# Patient Record
Sex: FEMALE | Race: WHITE | Employment: OTHER | URBAN - METROPOLITAN AREA
[De-identification: names, ages, dates, MRNs, and addresses within clinical notes are randomized per-mention and may not be internally consistent; named-entity substitution may affect disease eponyms.]

---

## 2017-02-27 RX ORDER — LANOLIN ALCOHOL/MO/W.PET/CERES
CREAM (GRAM) TOPICAL
Qty: 90 TAB | Refills: 5 | Status: SHIPPED | OUTPATIENT
Start: 2017-02-27 | End: 2017-12-05 | Stop reason: SDUPTHER

## 2017-02-27 NOTE — TELEPHONE ENCOUNTER
Orders Placed This Encounter    ferrous sulfate (IRON, FERROUS SULFATE,) 325 mg (65 mg iron) tablet     Sig: TAKE 1 TABLET BY MOUTH 3 TIMES A DAY WITH MEAL     Dispense:  90 Tab     Refill:  5     The above medication refills were approved via verbal order by Dr. Hermelinda Coburn III.

## 2017-03-01 ENCOUNTER — OFFICE VISIT (OUTPATIENT)
Dept: INTERNAL MEDICINE CLINIC | Age: 77
End: 2017-03-01

## 2017-03-01 ENCOUNTER — HOSPITAL ENCOUNTER (OUTPATIENT)
Dept: LAB | Age: 77
Discharge: HOME | End: 2017-03-01
Payer: MEDICARE

## 2017-03-01 VITALS
HEIGHT: 66 IN | SYSTOLIC BLOOD PRESSURE: 112 MMHG | HEART RATE: 78 BPM | BODY MASS INDEX: 24.11 KG/M2 | TEMPERATURE: 98 F | RESPIRATION RATE: 22 BRPM | OXYGEN SATURATION: 99 % | WEIGHT: 150 LBS | DIASTOLIC BLOOD PRESSURE: 68 MMHG

## 2017-03-01 DIAGNOSIS — R53.82 CHRONIC FATIGUE: Primary | ICD-10-CM

## 2017-03-01 PROCEDURE — 85027 COMPLETE CBC AUTOMATED: CPT

## 2017-03-01 PROCEDURE — 80053 COMPREHEN METABOLIC PANEL: CPT

## 2017-03-01 PROCEDURE — 36415 COLL VENOUS BLD VENIPUNCTURE: CPT

## 2017-03-01 NOTE — PROGRESS NOTES
Arcenio Bauer is a 67 y.o. female who was seen in clinic today (3/1/2017). She is accompanied by her daughter. Assessment & Plan: Yi Scott was seen today for fatigue. Diagnoses and all orders for this visit:    Chronic fatigue- acute on chronic, worsening. Differential dx reviewed with the patient, based on her history favor anemia as no other obvious etiology from her history. Will cont iron BID at this time. -     CBC W/O DIFF  -     METABOLIC PANEL, COMPREHENSIVE         Follow-up Disposition:  Return if symptoms worsen or fail to improve.       ----------------------------------------------------------------------    Subjective:  Patient complains of fatigue. Symptoms began 6 months ago and gradually getting worse. Sentinal symptom the patient feels fatigue began with nothing. Denies any URI symptoms. Patient describes the following psychologic symptoms: anxiety, this is a chronic issue & unchanged. Patient denies significant change in weight, symptoms of true arthritis, unusual rashes, cold intolerance, constipation and change in hair texture. , witnessed or suspected sleep apnea. Severity has been moderate. Fatigue states she feels okay in the morning but gets worse in the evening, per patient. Daughter helped to clarify and pt gives a different story, is fatigued all the time. No CP or changes in her chronic SOB. She also reports feeling cold all the time. Intermittently needing to put on extra layers. Heat in the house has been increased, 75 degrees currently. She reports normally cold natures but this is different. Prior to Admission medications    Medication Sig Start Date End Date Taking? Authorizing Provider   ferrous sulfate (IRON, FERROUS SULFATE,) 325 mg (65 mg iron) tablet TAKE 1 TABLET BY MOUTH 3 TIMES A DAY WITH MEAL 2/27/17  Yes Ranjit Pimentel III, MD   pravastatin (PRAVACHOL) 20 mg tablet TAKE 1 TAB BY MOUTH NIGHTLY.  12/18/16  Yes Main Lin MD lansoprazole (PREVACID) 30 mg capsule TAKE ONE CAPSULE BY MOUTH EVERY DAY BEFORE BREAKFAST 12/2/16  Yes Isamar Friedman MD   ALPRAZolam Charles Okeefe) 0.5 mg tablet Take 1 Tab by mouth three (3) times daily as needed for Anxiety. Max Daily Amount: 1.5 mg. Indications: ANXIETY 11/30/16  Yes Martha Rosario III, MD   cholecalciferol, vitamin d3, (VITAMIN D) 1,000 unit tablet Take 2,000 Units by mouth daily. Yes Historical Provider   MULTIVITAMINS (MULTIVITAMIN PO) Take 1 Tab by mouth daily. Yes Historical Provider   nystatin (MYCOSTATIN) powder Apply  to affected area two (2) times a day. 9/30/16   Martha Rosario III, MD          Allergies   Allergen Reactions    Latex Hives     Latex tape    Egg Anaphylaxis    Codeine Nausea and Vomiting    Darvon [Propoxyphene] Nausea and Vomiting    Pcn [Penicillins] Hives           Review of Systems   Constitutional: Positive for malaise/fatigue. Negative for chills, fever and weight loss. Respiratory: Negative for cough and shortness of breath. Cardiovascular: Negative for chest pain and palpitations. Gastrointestinal: Positive for abdominal pain (she attributes to anxiety, bilateral LUQ and RUQ) and constipation (intermittently, diet has changed recently). Negative for blood in stool, diarrhea, heartburn, melena (she reports darker/black stools), nausea and vomiting. Neurological: Positive for weakness. Objective:   Physical Exam   Constitutional: No distress. Eyes: Conjunctivae are normal. No scleral icterus. Cardiovascular: Regular rhythm and normal heart sounds. No murmur heard. Pulmonary/Chest: Effort normal and breath sounds normal. She has no wheezes. She has no rales. Abdominal: Bowel sounds are normal. She exhibits no mass. There is no hepatosplenomegaly. There is no tenderness.          Visit Vitals    /68    Pulse 78    Temp 98 °F (36.7 °C) (Oral)    Resp 22    Ht 5' 6\" (1.676 m)    Wt 150 lb (68 kg)    SpO2 99%    BMI 24.21 kg/m2         Disclaimer:  Advised her to call back or return to office if symptoms worsen/change/persist.  Discussed expected course/resolution/complications of diagnosis in detail with patient. Medication risks/benefits/costs/interactions/alternatives discussed with patient. She was given an after visit summary which includes diagnoses, current medications, & vitals. She expressed understanding with the diagnosis and plan.         Tim Lobato MD

## 2017-03-01 NOTE — MR AVS SNAPSHOT
Visit Information Date & Time Provider Department Dept. Phone Encounter #  
 3/1/2017  4:15 PM Britni Daigle, [address] [facility] [phone] Follow-up Instructions Return if symptoms worsen or fail to improve. Upcoming Health Maintenance Date Due COLONOSCOPY 11/28/1957 DTaP/Tdap/Td series (1 - Tdap) 11/28/1960 Pneumococcal 65+ Low/Medium Risk (2 of 2 - PCV13) 7/23/2015 MEDICARE YEARLY EXAM 2/15/2017 GLAUCOMA SCREENING Q2Y 2/9/2019 Allergies as of 3/1/2017  Review Complete On: 3/1/2017 By: Britni Daigle MD  
  
 Severity Noted Reaction Type Reactions Latex  07/06/2015    Hives Latex tape Egg High 10/03/2013    Anaphylaxis Codeine  10/17/2009    Nausea and Vomiting Darvon [Propoxyphene]  10/17/2009    Nausea and Vomiting Pcn [Penicillins]  10/17/2009    Hives Current Immunizations  Reviewed on 3/1/2017 Name Date Influenza Vaccine  Deferred (Contraindication) Pneumococcal Conjugate (PCV-13)  Deferred (Patient Refused) Pneumococcal Polysaccharide (PPSV-23) 7/23/2014 Reviewed by Dennise Guzman RN on 3/1/2017 at  3:54 PM  
You Were Diagnosed With   
  
 Codes Comments Chronic fatigue    -  Primary ICD-10-CM: R53.82 
ICD-9-CM: 780.79 Vitals BP  
  
  
  
  
  
 112/68 BMI and BSA Data Body Mass Index Body Surface Area  
 24.21 kg/m 2 1.78 m 2 Preferred Pharmacy Pharmacy Name Phone [id number]- [place] [address] [phone] Your Updated Medication List  
  
   
This list is accurate as of: 3/1/17  4:22 PM.  Always use your most recent med list.  
  
  
  
  
 ALPRAZolam 0.5 mg tablet Commonly known as:  Ace Sa Take 1 Tab by mouth three (3) times daily as needed for Anxiety. Max Daily Amount: 1.5 mg. Indications: ANXIETY  
  
 ferrous sulfate 325 mg (65 mg iron) tablet Commonly known as:  Iron (Ferrous Sulfate) TAKE 1 TABLET BY MOUTH 3 TIMES A DAY WITH MEAL  
  
 lansoprazole 30 mg capsule Commonly known as:  PREVACID TAKE ONE CAPSULE BY MOUTH EVERY DAY BEFORE BREAKFAST MULTIVITAMIN PO Take 1 Tab by mouth daily. nystatin powder Commonly known as:  MYCOSTATIN Apply  to affected area two (2) times a day. pravastatin 20 mg tablet Commonly known as:  PRAVACHOL  
TAKE 1 TAB BY MOUTH NIGHTLY. VITAMIN D3 1,000 unit tablet Generic drug:  cholecalciferol Take 2,000 Units by mouth daily. We Performed the Following CBC W/O DIFF [93840 CPT(R)] METABOLIC PANEL, COMPREHENSIVE [29154 CPT(R)] Follow-up Instructions Return if symptoms worsen or fail to improve. Patient Instructions Fatigue: Care Instructions Your Care Instructions Fatigue is a feeling of tiredness, exhaustion, or lack of energy. You may feel fatigue because of too much or not enough activity. It can also come from stress, lack of sleep, boredom, and poor diet. Many medical problems, such as viral infections, can cause fatigue. Emotional problems, especially depression, are often the cause of fatigue. Fatigue is most often a symptom of another problem. Treatment for fatigue depends on the cause. For example, if you have fatigue because you have a certain health problem, treating this problem also treats your fatigue. If depression or anxiety is the cause, treatment may help. Follow-up care is a key part of your treatment and safety. Be sure to make and go to all appointments, and call your doctor if you are having problems. It's also a good idea to know your test results and keep a list of the medicines you take. How can you care for yourself at home? · Get regular exercise. But don't overdo it. Go back and forth between rest and exercise. · Get plenty of rest. 
· Eat a healthy diet.  Do not skip meals, especially breakfast. 
 · Reduce your use of caffeine, tobacco, and alcohol. Caffeine is most often found in coffee, tea, cola drinks, and chocolate. · Limit medicines that can cause fatigue. This includes tranquilizers and cold and allergy medicines. When should you call for help? Watch closely for changes in your health, and be sure to contact your doctor if: 
· You have new symptoms such as fever or a rash. · Your fatigue gets worse. · You have been feeling down, depressed, or hopeless. Or you may have lost interest in things that you usually enjoy. · You are not getting better as expected. Where can you learn more? Go to http://candy-sheela.info/. Enter C548 in the search box to learn more about \"Fatigue: Care Instructions. \" Current as of: May 27, 2016 Content Version: 11.1 © 9019-6551 [employer]. Care instructions adapted under license by [employer] (which disclaims liability or warranty for this information). If you have questions about a medical condition or this instruction, always ask your healthcare professional. [place] [address] any warranty or liability for your use of this information. Introducing [facility]! Dear Yi Scott: Thank you for requesting a Near Infinity account. Our records indicate that you have previously registered for a Near Infinity account but its currently inactive. Please call our Near Infinity support line at [phone]. Additional Information If you have questions, please visit the Frequently Asked Questions section of the Near Infinity website at [email or web address] Remember, Near Infinity is NOT to be used for urgent needs. For medical emergencies, dial 911. Now available from your iPhone and Android! Please provide this summary of care documentation to your next provider. Your primary care clinician is listed as Bakari [phone]  If you have any questions after today's visit, please call [phone].

## 2017-03-01 NOTE — PATIENT INSTRUCTIONS
Fatigue: Care Instructions  Your Care Instructions  Fatigue is a feeling of tiredness, exhaustion, or lack of energy. You may feel fatigue because of too much or not enough activity. It can also come from stress, lack of sleep, boredom, and poor diet. Many medical problems, such as viral infections, can cause fatigue. Emotional problems, especially depression, are often the cause of fatigue. Fatigue is most often a symptom of another problem. Treatment for fatigue depends on the cause. For example, if you have fatigue because you have a certain health problem, treating this problem also treats your fatigue. If depression or anxiety is the cause, treatment may help. Follow-up care is a key part of your treatment and safety. Be sure to make and go to all appointments, and call your doctor if you are having problems. It's also a good idea to know your test results and keep a list of the medicines you take. How can you care for yourself at home? · Get regular exercise. But don't overdo it. Go back and forth between rest and exercise. · Get plenty of rest.  · Eat a healthy diet. Do not skip meals, especially breakfast.  · Reduce your use of caffeine, tobacco, and alcohol. Caffeine is most often found in coffee, tea, cola drinks, and chocolate. · Limit medicines that can cause fatigue. This includes tranquilizers and cold and allergy medicines. When should you call for help? Watch closely for changes in your health, and be sure to contact your doctor if:  · You have new symptoms such as fever or a rash. · Your fatigue gets worse. · You have been feeling down, depressed, or hopeless. Or you may have lost interest in things that you usually enjoy. · You are not getting better as expected. Where can you learn more? Go to http://candy-sheela.info/. Enter H400 in the search box to learn more about \"Fatigue: Care Instructions. \"  Current as of: May 27, 2016  Content Version: 11.1  © 7274-1527 Healthwise[employer]orporated. Care instructions adapted under license by [facility] (which disclaims liability or warranty for this information). If you have questions about a medical condition or this instruction, always ask your healthcare professional. [place] [address] any warranty or liability for your use of this information.

## 2017-03-01 NOTE — PROGRESS NOTES
H/O \"watermelons stomach\" and becomes anemic. She states this is what it feels like in terms of weakness.

## 2017-03-02 LAB
ALBUMIN SERPL-MCNC: 4.2 G/DL (ref 3.5–4.8)
ALBUMIN/GLOB SERPL: 1.7 {RATIO} (ref 1.1–2.5)
ALP SERPL-CCNC: 38 IU/L (ref 39–117)
ALT SERPL-CCNC: 13 IU/L (ref 0–32)
AST SERPL-CCNC: 23 IU/L (ref 0–40)
BILIRUB SERPL-MCNC: 0.4 MG/DL (ref 0–1.2)
BUN SERPL-MCNC: 18 MG/DL (ref 8–27)
BUN/CREAT SERPL: 14 (ref 11–26)
CALCIUM SERPL-MCNC: 9.8 MG/DL (ref 8.7–10.3)
CHLORIDE SERPL-SCNC: 100 MMOL/L (ref 96–106)
CO2 SERPL-SCNC: 24 MMOL/L (ref 18–29)
CREAT SERPL-MCNC: 1.25 MG/DL (ref 0.57–1)
ERYTHROCYTE [DISTWIDTH] IN BLOOD BY AUTOMATED COUNT: 14.6 % (ref 12.3–15.4)
GLOBULIN SER CALC-MCNC: 2.5 G/DL (ref 1.5–4.5)
GLUCOSE SERPL-MCNC: 112 MG/DL (ref 65–99)
HCT VFR BLD AUTO: 34 % (ref 34–46.6)
HGB BLD-MCNC: 11.4 G/DL (ref 11.1–15.9)
MCH RBC QN AUTO: 33 PG (ref 26.6–33)
MCHC RBC AUTO-ENTMCNC: 33.5 G/DL (ref 31.5–35.7)
MCV RBC AUTO: 99 FL (ref 79–97)
PLATELET # BLD AUTO: 308 X10E3/UL (ref 150–379)
POTASSIUM SERPL-SCNC: 4.4 MMOL/L (ref 3.5–5.2)
PROT SERPL-MCNC: 6.7 G/DL (ref 6–8.5)
RBC # BLD AUTO: 3.45 X10E6/UL (ref 3.77–5.28)
SODIUM SERPL-SCNC: 140 MMOL/L (ref 134–144)
WBC # BLD AUTO: 5 X10E3/UL (ref 3.4–10.8)

## 2017-03-02 NOTE — PROGRESS NOTES
Please call patient. All labs are stable or at goal.  Her hgb level is down slightly but not anything significant. Not enough that I would expect her to be symptomatic. Nothing that would explain her fatigue. If continues to should f/u with PCP. If anything changes let me know.

## 2017-03-10 RX ORDER — PRAVASTATIN SODIUM 20 MG/1
TABLET ORAL
Qty: 90 TAB | Refills: 1 | Status: SHIPPED | OUTPATIENT
Start: 2017-03-10 | End: 2017-04-03 | Stop reason: SDUPTHER

## 2017-03-21 RX ORDER — PRAVASTATIN SODIUM 20 MG/1
TABLET ORAL
Qty: 90 TAB | Refills: 0 | Status: SHIPPED | OUTPATIENT
Start: 2017-03-21 | End: 2018-11-16 | Stop reason: SDUPTHER

## 2017-04-03 ENCOUNTER — OFFICE VISIT (OUTPATIENT)
Dept: INTERNAL MEDICINE CLINIC | Age: 77
End: 2017-04-03

## 2017-04-03 VITALS
OXYGEN SATURATION: 98 % | DIASTOLIC BLOOD PRESSURE: 62 MMHG | HEIGHT: 66 IN | RESPIRATION RATE: 12 BRPM | TEMPERATURE: 98.6 F | WEIGHT: 147 LBS | HEART RATE: 68 BPM | BODY MASS INDEX: 23.63 KG/M2 | SYSTOLIC BLOOD PRESSURE: 114 MMHG

## 2017-04-03 DIAGNOSIS — R41.3 MEMORY LOSS: ICD-10-CM

## 2017-04-03 DIAGNOSIS — I63.50 CEREBROVASCULAR ACCIDENT (CVA) DUE TO OCCLUSION OF CEREBRAL ARTERY (HCC): ICD-10-CM

## 2017-04-03 DIAGNOSIS — K59.01 SLOW TRANSIT CONSTIPATION: Primary | ICD-10-CM

## 2017-04-03 DIAGNOSIS — E78.2 MIXED HYPERLIPIDEMIA: ICD-10-CM

## 2017-04-03 RX ORDER — POLYETHYLENE GLYCOL 3350 17 G/17G
17 POWDER, FOR SOLUTION ORAL
COMMUNITY
Start: 2017-04-03 | End: 2019-01-17 | Stop reason: SDUPTHER

## 2017-04-03 NOTE — LETTER
April 3, 2017 Dear Odilia Silverman, We are pleased to provide you with secure, online access to medical information via Flashnotes for: 
 
Tae Blancas How Do I Sign Up? 1. In your internet browser, go to [email or web address] 2. Click on the Sign Up Now link in the Sign In box. You will see the New Member Sign Up page. 3. Enter your Flashnotes Access Code exactly as it appears below. You will not need to use this code after youve completed the sign-up process. If you do not sign up before the expiration date, you must request a new code. Flashnotes Access Code: [email or web address] Expiration Date: 7/2/2017 12:46 PM  
 
4. In the Social Security Number field, enter your Social Security Number and your Date of Birth (mm/dd/yyyy) and click Submit. You will be taken to the next sign-up page. 5. Create a [email or web address] ID. This will be your Flashnotes login ID and cannot be changed, so think of one that is secure and easy to remember. 6. Create a Flashnotes password. You can change your password at any time. 7. Enter your Password Reset Question and Answer. This can be used at a later time if you forget your password. 8. Enter your e-mail address. You will receive e-mail notification when new information is available in [address] 9. Click Sign Up. You can now view the [email or web address] account of Tae Blancas. Additional Information If you have questions, you can call [phone]. Remember, Flashnotes is NOT to be used for urgent needs. For medical emergencies, dial 911. Now available from your iPhone and Android! Sincerely, Wai Phelps

## 2017-04-03 NOTE — LETTER
April 3, 2017 Beulah Weston [address] [place] [address] [place] [address] Dear Malcolm Hernandez: Thank you for requesting access to Pongr. Please follow the instructions below to securely access and download your online medical record. Pongr allows you to send messages to your doctor, view your test results, renew your prescriptions, schedule appointments, and more. How Do I Sign Up? 1. In your internet browser, go to [email or web address] 2. Click on the First Time User? Click Here link in the Sign In box. You will see the New Member Sign Up page. 3. Enter your Pongr Access Code exactly as it appears below. You will not need to use this code after youve completed the sign-up process. If you do not sign up before the expiration date, you must request a new code. Pongr Access Code: [email or web address] Expires: 7/2/2017 12:32 PM  
 
4. Enter the last four digits of your Social Security Number (xxxx) and Date of Birth (mm/dd/yyyy) as indicated and click Submit. You will be taken to the next sign-up page. 5. Create a Pongr ID. This will be your Pongr login ID and cannot be changed, so think of one that is secure and easy to remember. 6. Create a Pongr password. You can change your password at any time. 7. Enter your Password Reset Question and Answer. This can be used at a later time if you forget your password. 8. Enter your e-mail address. You will receive e-mail notification when new information is available in [address] 9. Click Sign Up. You can now view and download portions of your medical record. 10. Click the Download Summary menu link to download a portable copy of your medical information. Additional Information If you have questions, please visit the Frequently Asked Questions section of the Pongr website at [email or web address] Remember, Pongr is NOT to be used for urgent needs. For medical emergencies, dial 911. Now available from your iPhone and Android! Sincerely, [employer]

## 2017-04-03 NOTE — PATIENT INSTRUCTIONS
Please remember to bring a copy of your advance medical directive with you to your next appointment so that we may update your chart with this important information. Thank you.

## 2017-04-03 NOTE — LETTER
[address] Proxy Access Authorization Form Name: Ana Laura Lipoma Patient Email: [email or web address] Patient YOB: 1940 Patient MRN: [id number] Name of Proxy: Lamine Cabrera. Arcenio Felix Proxy Email: [email or web address] Proxy Address: [address] [place] [address].[facility] Proxy : 1966 Proxy SSN:  By signing this [employer] Proxy Access Authorization Form (this Authorization), I understand that I am giving permission to the [address] [place] [address], and its controlled affiliates that operate one or more hospitals or physician practices located in [place], [place], [place], [address] [place] [address] or [facility]) to disclose confidential health information contained about me through [employer] to the person whose name is designated above (my Proxy). I understand that [employer] is a web-based service through which some (but not all) of the information contained in my ZetrOZ record [facility]) (to the extent that I have an EMR) may be accessed, and that [employer] sometimes shows a summary or description and not the actual entries in my EMR. I understand that by signing this Authorization, my Proxy will be given electronic access through [employer] to all confidential health information about me that is available through [employer], including confidential health information about me that under most circumstances my Proxy would not be able to access without my permission. I understand that I am not required to name a Proxy or sign this Authorization. I further understand that [facility] may not condition treatment or payment on my willingness to sign this Authorization unless the specific circumstances under which such conditioning is permitted by law are applicable and are set forth in this Authorization.  
 
I understand that this Authorization is valid unless and until I revoke it.  I understand that I have the right to revoke this Authorization at any time, but that my revocation will not be effective until delivered in writing to [facility] at the following address:  
 
[address] If I choose to revoke this Authorization, I understand that my revocation will not be effective as to any MyChart information already disclosed to my Proxy pursuant to this Authorization. I understand that MyChart access is a privilege, not a right, and that my Proxy must agree to comply with the MyChart Terms and Conditions of Patient Use (the Terms and Conditions). [facility] will provide my Proxy a special activation code and instructions for accessing confidential health information about me in [address] The first time my Proxy uses the special activation code, my Proxy must review and accept the Terms and Conditions and the Proxy Disclaimer. If my Proxy does not accept and at all times comply with the Terms and Conditions or does not accept the Proxy Disclaimer each time my Proxy accesses MyChart, I understand that [facility] may deny my Proxy access or revoke my Proxys to access confidential health information about me in [address] I also understand that [facility] may deny my Proxy access or revoke my Proxys access for any reason and at any time in [facility] sole discretion. I understand that my Proxy must sign the Acknowledgement set forth below if my Proxy is in the office with me at the time I complete this request.  If my Proxy is not in the office with me, I understand that my Proxy will be mailed a Proxy Identification Verification for Access to [facility] form at the address I have designated above, and that my Proxy must complete and return the form to [facility] before [facility] will take any additional steps to give my Proxy access information about me in [address] A copy of this Authorization and a notation concerning my Proxy shall be included in my original health records. I understand that confidential health information about me disclosed in MyChart to my Proxy pursuant to this Authorization might be redisclosed by my Proxy and may, as a result of such disclosure, no longer be protected to the same extent as such confidential health information was protected by law while solely in the possession of [facility]. Signature of Patient or Legal Guardian Date (MM/DD/YYYY) Printed Name of Patient or Legal Guardian Relationship (if not self) ACKNOWLEDGEMENT TO BE COMPLETED BY PROXY IF IN OFFICE: 
I acknowledge and agree that the above information, including my name, e-mail address, date of birth, Social Security Number, and mailing address are true and correct. I further agree to comply with the Terms and Conditions and Proxy Disclaimer. Proxy Signature Date (MM/DD/YYYY) Printed Name of Proxy Identification Document:   
 
__ s License/Government Issued ID   
__ Passport   
__ Picture ID & Social Security Card Identification Document Number _______________________________ Expiration Date ______________

## 2017-04-03 NOTE — MR AVS SNAPSHOT
Visit Information Date & Time Provider Department Dept. Phone Encounter #  
 4/3/2017 11:00 AM Nam Watson MD [facility] Internal Medicine [phone] [id number] Follow-up Instructions Return for Wellness Visit. Upcoming Health Maintenance Date Due COLONOSCOPY 11/28/1957 DTaP/Tdap/Td series (1 - Tdap) 11/28/1960 Pneumococcal 65+ Low/Medium Risk (2 of 2 - PCV13) 7/23/2015 MEDICARE YEARLY EXAM 2/15/2017 GLAUCOMA SCREENING Q2Y 2/9/2019 Allergies as of 4/3/2017  Review Complete On: 4/3/2017 By: Nam Watson MD  
  
 Severity Noted Reaction Type Reactions Latex  07/06/2015    Hives Latex tape Egg High 10/03/2013    Anaphylaxis Codeine  10/17/2009    Nausea and Vomiting Darvon [Propoxyphene]  10/17/2009    Nausea and Vomiting Pcn [Penicillins]  10/17/2009    Hives Current Immunizations  Reviewed on 3/1/2017 Name Date Influenza Vaccine  Deferred (Contraindication) Pneumococcal Conjugate (PCV-13)  Deferred (Patient Refused) Pneumococcal Polysaccharide (PPSV-23) 7/23/2014 Not reviewed this visit You Were Diagnosed With   
  
 Codes Comments Slow transit constipation    -  Primary ICD-10-CM: K59.01 
ICD-9-CM: 564.01 Mixed hyperlipidemia     ICD-10-CM: E78.2 ICD-9-CM: 272.2 Cerebrovascular accident (CVA) due to occlusion of cerebral artery ([facility] [address]     ICD-10-CM: I63.50 ICD-9-CM: 434.91 Vitals BP Pulse Temp Resp Height(growth percentile) Weight(growth percentile) 114/62 68 98.6 °F (37 °C) (Oral) 12 5' 6\" (1.676 m) 147 lb (66.7 kg) SpO2 BMI OB Status Smoking Status 98% 23.73 kg/m2 Postmenopausal Never Smoker Vitals History BMI and BSA Data Body Mass Index Body Surface Area  
 23.73 kg/m 2 1.76 m 2 Preferred Pharmacy Pharmacy Name Phone [id number], [address] [place] [address] [phone] Your Updated Medication List  
  
   
This list is accurate as of: 4/3/17 12:33 PM.  Always use your most recent med list.  
  
  
  
  
 ALPRAZolam 0.5 mg tablet Commonly known as:  Eid Poke Take 1 Tab by mouth three (3) times daily as needed for Anxiety. Max Daily Amount: 1.5 mg. Indications: ANXIETY  
  
 ferrous sulfate 325 mg (65 mg iron) tablet Commonly known as:  Iron (Ferrous Sulfate) TAKE 1 TABLET BY MOUTH 3 TIMES A DAY WITH MEAL FIBER GUMMIES 2 gram Ezella Lick Generic drug:  inulin Take 1-2 Tabs by mouth daily. lansoprazole 30 mg capsule Commonly known as:  PREVACID TAKE ONE CAPSULE BY MOUTH EVERY DAY BEFORE BREAKFAST MULTIVITAMIN PO Take 1 Tab by mouth daily. polyethylene glycol 17 gram/dose powder Commonly known as:  Lynder Glad Take 17 g by mouth daily as needed. pravastatin 20 mg tablet Commonly known as:  PRAVACHOL  
TAKE 1 TABLET BY MOUTH AT BEDTIME  
  
 VITAMIN D3 1,000 unit tablet Generic drug:  cholecalciferol Take 2,000 Units by mouth daily. We Performed the Following CBC WITH AUTOMATED DIFF [18410 CPT(R)] LIPID PANEL [59064 CPT(R)] METABOLIC PANEL, COMPREHENSIVE [51660 CPT(R)] Follow-up Instructions Return for Wellness Visit. Patient Instructions Please remember to bring a copy of your advance medical directive with you to your next appointment so that we may update your chart with this important information. Thank you. Introducing [facility]! Holly Romero introduces [employer] patient portal. Now you can access parts of your medical record, email your doctor's office, and request medication refills online. 1. In your internet browser, go to [email or web address] 2. Click on the First Time User? Click Here link in the Sign In box. You will see the New Member Sign Up page. 3. Enter your [employer] Access Code exactly as it appears below.  You will not need to use this code after youve completed the sign-up process. If you do not sign up before the expiration date, you must request a new code. · [employer] Access Code: [email or web address] Expires: 7/2/2017 12:32 PM 
 
4. Enter the last four digits of your Social Security Number (xxxx) and Date of Birth (mm/dd/yyyy) as indicated and click Submit. You will be taken to the next sign-up page. 5. Create a [employer] ID. This will be your [employer] login ID and cannot be changed, so think of one that is secure and easy to remember. 6. Create a [employer] password. You can change your password at any time. 7. Enter your Password Reset Question and Answer. This can be used at a later time if you forget your password. 8. Enter your e-mail address. You will receive e-mail notification when new information is available in [address] 9. Click Sign Up. You can now view and download portions of your medical record. 10. Click the Download Summary menu link to download a portable copy of your medical information. If you have questions, please visit the Frequently Asked Questions section of the [employer] website. Remember, [employer] is NOT to be used for urgent needs. For medical emergencies, dial 911. Now available from your iPhone and Android! Please provide this summary of care documentation to your next provider. Your primary care clinician is listed as Bakari Buck64 If you have any questions after today's visit, please call [phone].

## 2017-04-03 NOTE — ACP (ADVANCE CARE PLANNING)
End of life planning discussed with patient. Patient states that they do have an advance directive and will provide a copy for our office at next visit. Patient has an advanced directive and will supply to us.

## 2017-04-04 ENCOUNTER — HOSPITAL ENCOUNTER (OUTPATIENT)
Dept: LAB | Age: 77
Discharge: HOME | End: 2017-04-04
Payer: MEDICARE

## 2017-04-04 PROCEDURE — 80061 LIPID PANEL: CPT

## 2017-04-04 PROCEDURE — 80053 COMPREHEN METABOLIC PANEL: CPT

## 2017-04-04 PROCEDURE — 85025 COMPLETE CBC W/AUTO DIFF WBC: CPT

## 2017-04-04 PROCEDURE — 36415 COLL VENOUS BLD VENIPUNCTURE: CPT

## 2017-04-04 NOTE — PROGRESS NOTES
HPI:  Virgilio Ramos is a 67y.o. year old female who is here for multiple issues/. Over the last wekk increased issues with constipation and left lower and upper abd pain. Finally had a BM yesterday and her pain was better. Some belching and burping. No vomiting. No fever or chills. No bleeding and no melena. She was concerned about the surgeon telling her she would have to have a 2 day surgery and long hospital stay to repair the hiatal hernia. After long discussion about this, her son and daughter who were with her also reported increased issue with her memory and their concerns about her taking her medications inappropriately. Her  recently had CABG and she has been more stressed about this. Her weight is down and she is not eating as much. She was referred to neurology as well as psych testing and did not get the psych testing done and cancelled it. Neurology felt she had white matter disease and prior CVA treated now with statin due to anticoagulant intolerance due to GI bleeding.      Past Medical History:   Diagnosis Date    Anemia NEC     Asthma     major asthma as a child/only with colds now    Cancer [facility])     BCCA removed    Coagulation disorder ([facility] [address]     on xarelto    GERD (gastroesophageal reflux disease) 10/22/2010    Hiatal hernia     Hypertension     hx of blood pressure med for short time    Ill-defined condition     increased cholesterol    Other unknown and unspecified cause of morbidity or mortality     eval with Dr Daquan Vaca for SOB, sept 2015:  results unkown    Stroke ([facility] [address] 2013    no deficits    Stroke ([facility] [address]     heat exhaustion    Watermelon stomach 2003       Past Surgical History:   Procedure Laterality Date    EXC SKIN MALIG >4CM FACE,FACIAL      times 3 - BCCA    HX BLADDER SUSPENSION      HX COLONOSCOPY      HX ENDOSCOPY  6/4/15    Dr. Navarro Estrada      vaginal del times 3    HX HERNIA REPAIR  2014    x2, failure    HX OTHER SURGICAL      EGDs    HX TONSILLECTOMY         Prior to Admission medications    Medication Sig Start Date End Date Taking? Authorizing Provider   inulin (FIBER GUMMIES) 2 gram chew Take 1-2 Tabs by mouth daily. 4/3/17  Yes Kenya Matute III, MD   polyethylene glycol (MIRALAX) 17 gram/dose powder Take 17 g by mouth daily as needed. 4/3/17  Yes Kenya Matute III, MD   pravastatin (PRAVACHOL) 20 mg tablet TAKE 1 TABLET BY MOUTH AT BEDTIME 3/21/17  Yes Kenya Matute III, MD   ferrous sulfate (IRON, FERROUS SULFATE,) 325 mg (65 mg iron) tablet TAKE 1 TABLET BY MOUTH 3 TIMES A DAY WITH MEAL 2/27/17  Yes Kenya Matute III, MD   lansoprazole (PREVACID) 30 mg capsule TAKE ONE CAPSULE BY MOUTH EVERY DAY BEFORE BREAKFAST 12/2/16  Yes Veronica Hauser MD   ALPRAZolam [place] Arbour) 0.5 mg tablet Take 1 Tab by mouth three (3) times daily as needed for Anxiety. Max Daily Amount: 1.5 mg. Indications: ANXIETY 11/30/16  Yes Kenya Matute III, MD   cholecalciferol, vitamin d3, (VITAMIN D) 1,000 unit tablet Take 2,000 Units by mouth daily. Yes Historical Provider   MULTIVITAMINS (MULTIVITAMIN PO) Take 1 Tab by mouth daily.    Yes Historical Provider       Social History     Social History    Marital status:      Spouse name: N/A    Number of children: N/A    Years of education: N/A     Occupational History     Retired     Social History Main Topics    Smoking status: Never Smoker    Smokeless tobacco: Never Used    Alcohol use No    Drug use: No    Sexual activity: No     Other Topics Concern    Not on file     Social History Narrative          ROS  Per HPI    Visit Vitals    /62    Pulse 68    Temp 98.6 °F (37 °C) (Oral)    Resp 12    Ht 5' 6\" (1.676 m)    Wt 147 lb (66.7 kg)    SpO2 98%    BMI 23.73 kg/m2         Physical Exam   Physical Examination: General appearance - alert, well appearing, and in no distress  Mouth - mucous membranes moist, pharynx normal without lesions  Neck - supple, no significant adenopathy  Lymphatics - no palpable lymphadenopathy, no hepatosplenomegaly  Chest - clear to auscultation, no wheezes, rales or rhonchi, symmetric air entry  Heart - normal rate, regular rhythm, normal S1, S2, no murmurs, rubs, clicks or gallops  Abdomen - soft, nontender, nondistended, no masses or organomegaly  Musculoskeletal - no joint tenderness, deformity or swelling  Extremities - peripheral pulses normal, no pedal edema, no clubbing or cyanosis      Assessment/Plan: Ying Waller was seen today for medication evaluation. Diagnoses and all orders for this visit:    Slow transit constipation - will add fiber gummies and use miralax as needed. -     CBC WITH AUTOMATED DIFF    Mixed hyperlipidemia - check labs and LDL goal of 100/.   -     LIPID PANEL  -     METABOLIC PANEL, COMPREHENSIVE    Cerebrovascular accident (CVA) due to occlusion of cerebral artery (HCC) - stable    Memory loss - long discussion about the need to get testing to consider treatment for anxiety versus memory. Have signed her son up for proxy access to her chart to communicate.   -     REFERRAL TO NEUROPSYCHOLOGY  Hiatal hernia - ;long discussion with her and family about the need to increase calories and only consider surgery if absolutely necessary. GERD and GI bleed - continue PPI and check labs for anemia. Follow-up Disposition: - after testing. Return for Wellness Visit. Advised her to call back or return to office if symptoms worsen/change/persist.  Discussed expected course/resolution/complications of diagnosis in detail with patient. Medication risks/benefits/costs/interactions/alternatives discussed with patient. She was given an after visit summary which includes diagnoses, current medications, & vitals. She expressed understanding with the diagnosis and plan.

## 2017-04-05 ENCOUNTER — TELEPHONE (OUTPATIENT)
Dept: INTERNAL MEDICINE CLINIC | Age: 77
End: 2017-04-05

## 2017-04-05 LAB
ALBUMIN SERPL-MCNC: 3.9 G/DL (ref 3.5–4.8)
ALBUMIN/GLOB SERPL: 1.7 {RATIO} (ref 1.2–2.2)
ALP SERPL-CCNC: 48 IU/L (ref 39–117)
ALT SERPL-CCNC: 11 IU/L (ref 0–32)
AST SERPL-CCNC: 22 IU/L (ref 0–40)
BASOPHILS # BLD AUTO: 0 X10E3/UL (ref 0–0.2)
BASOPHILS NFR BLD AUTO: 0 %
BILIRUB SERPL-MCNC: 0.3 MG/DL (ref 0–1.2)
BUN SERPL-MCNC: 16 MG/DL (ref 8–27)
BUN/CREAT SERPL: 15 (ref 12–28)
CALCIUM SERPL-MCNC: 9.4 MG/DL (ref 8.7–10.3)
CHLORIDE SERPL-SCNC: 103 MMOL/L (ref 96–106)
CHOLEST SERPL-MCNC: 145 MG/DL (ref 100–199)
CO2 SERPL-SCNC: 22 MMOL/L (ref 18–29)
CREAT SERPL-MCNC: 1.07 MG/DL (ref 0.57–1)
EOSINOPHIL # BLD AUTO: 0.1 X10E3/UL (ref 0–0.4)
EOSINOPHIL NFR BLD AUTO: 2 %
ERYTHROCYTE [DISTWIDTH] IN BLOOD BY AUTOMATED COUNT: 14.3 % (ref 12.3–15.4)
GLOBULIN SER CALC-MCNC: 2.3 G/DL (ref 1.5–4.5)
GLUCOSE SERPL-MCNC: 99 MG/DL (ref 65–99)
HCT VFR BLD AUTO: 34.5 % (ref 34–46.6)
HDLC SERPL-MCNC: 61 MG/DL
HGB BLD-MCNC: 11.5 G/DL (ref 11.1–15.9)
IMM GRANULOCYTES # BLD: 0 X10E3/UL (ref 0–0.1)
IMM GRANULOCYTES NFR BLD: 0 %
LDLC SERPL CALC-MCNC: 67 MG/DL (ref 0–99)
LYMPHOCYTES # BLD AUTO: 0.8 X10E3/UL (ref 0.7–3.1)
LYMPHOCYTES NFR BLD AUTO: 14 %
MCH RBC QN AUTO: 33.7 PG (ref 26.6–33)
MCHC RBC AUTO-ENTMCNC: 33.3 G/DL (ref 31.5–35.7)
MCV RBC AUTO: 101 FL (ref 79–97)
MONOCYTES # BLD AUTO: 0.5 X10E3/UL (ref 0.1–0.9)
MONOCYTES NFR BLD AUTO: 8 %
NEUTROPHILS # BLD AUTO: 4.3 X10E3/UL (ref 1.4–7)
NEUTROPHILS NFR BLD AUTO: 76 %
PLATELET # BLD AUTO: 293 X10E3/UL (ref 150–379)
POTASSIUM SERPL-SCNC: 4.2 MMOL/L (ref 3.5–5.2)
PROT SERPL-MCNC: 6.2 G/DL (ref 6–8.5)
RBC # BLD AUTO: 3.41 X10E6/UL (ref 3.77–5.28)
SODIUM SERPL-SCNC: 142 MMOL/L (ref 134–144)
TRIGL SERPL-MCNC: 87 MG/DL (ref 0–149)
VLDLC SERPL CALC-MCNC: 17 MG/DL (ref 5–40)
WBC # BLD AUTO: 5.7 X10E3/UL (ref 3.4–10.8)

## 2017-05-27 DIAGNOSIS — K21.9 GASTRIC REFLUX: ICD-10-CM

## 2017-05-29 RX ORDER — LANSOPRAZOLE 30 MG/1
CAPSULE, DELAYED RELEASE ORAL
Qty: 90 CAP | Refills: 1 | Status: SHIPPED | OUTPATIENT
Start: 2017-05-29 | End: 2017-11-04 | Stop reason: SDUPTHER

## 2017-06-11 RX ORDER — ALPRAZOLAM 0.5 MG/1
TABLET ORAL
Qty: 40 TAB | Refills: 1 | OUTPATIENT
Start: 2017-06-11 | End: 2017-11-09 | Stop reason: SDUPTHER

## 2017-06-12 NOTE — TELEPHONE ENCOUNTER
Orders Placed This Encounter    ALPRAZolam (XANAX) 0.5 mg tablet     Sig: TAKE 1 TABLET 3 TIMES DAILY AS NEEDED     Dispense:  40 Tab     Refill:  1     This request is for a new prescription for a controlled substance as required by Federal/State law. .     The above controlled substance refill was called into patients pharmacy -[facility] - authorized by Dr. Magdalena Muhammad.

## 2017-06-28 ENCOUNTER — TELEPHONE (OUTPATIENT)
Dept: INTERNAL MEDICINE CLINIC | Age: 77
End: 2017-06-28

## 2017-06-29 NOTE — TELEPHONE ENCOUNTER
Spoke with pt. She has been more lethargic lately and thinks that it has been a year since she had her Hgb checked. I told her that she had one in 4/17 and was wnl. She has an appt scheduled to see [facility] tomorrow for her hiatal hernia and lethargy.

## 2017-06-30 ENCOUNTER — HOSPITAL ENCOUNTER (OUTPATIENT)
Dept: LAB | Age: 77
Discharge: HOME | End: 2017-06-30
Payer: MEDICARE

## 2017-06-30 ENCOUNTER — OFFICE VISIT (OUTPATIENT)
Dept: INTERNAL MEDICINE CLINIC | Age: 77
End: 2017-06-30

## 2017-06-30 VITALS
HEART RATE: 84 BPM | WEIGHT: 143 LBS | SYSTOLIC BLOOD PRESSURE: 114 MMHG | RESPIRATION RATE: 18 BRPM | BODY MASS INDEX: 22.98 KG/M2 | DIASTOLIC BLOOD PRESSURE: 70 MMHG | TEMPERATURE: 97.5 F | HEIGHT: 66 IN | OXYGEN SATURATION: 97 %

## 2017-06-30 DIAGNOSIS — R53.82 CHRONIC FATIGUE: Primary | ICD-10-CM

## 2017-06-30 DIAGNOSIS — F99 ABNORMAL MMSE: ICD-10-CM

## 2017-06-30 PROCEDURE — 86141 C-REACTIVE PROTEIN HS: CPT

## 2017-06-30 PROCEDURE — 85027 COMPLETE CBC AUTOMATED: CPT

## 2017-06-30 PROCEDURE — 84443 ASSAY THYROID STIM HORMONE: CPT

## 2017-06-30 PROCEDURE — 85651 RBC SED RATE NONAUTOMATED: CPT

## 2017-06-30 NOTE — PROGRESS NOTES
Peetr Benito is a 67 y.o. female who was seen in clinic today (6/30/2017). Patient was seen with Dr Mauricio Way (R2 at [facility]). She RTC with her son. Assessment & Plan:   Diagnoses and all orders for this visit:    1. Chronic fatigue- this is a chronic problem, symptoms are: not changed, differential dx reviewed with the patient, sounds more like a sleep related issue. Reviewed my rational.  Offered sleep study but will defer. Will allow her to sleep upright in a chair for 3-4 wks and see if this resolves her issues (recommended increase in pillows but not an option). Really do not think this is anemia, auto-immune, or thyroid related but can't reconcile the heat intolerance so will check labs below. Red flags were reviewed with the patient to RTC or notify me, expected time course for resolution reviewed. -     TSH 3RD GENERATION  -     CBC W/O DIFF  -     SED RATE (ESR)  -     CRP, HIGH SENSITIVITY    2. Abnormal MMSE-  is aware, likely worse due to lack of sleep, recommend reassessing after sleep issues/fatigue improved. Follow-up Disposition:  Return if symptoms worsen or fail to improve.       ----------------------------------------------------------------------    Subjective: Gay Paris was seen today for Fatigue    Patient RTC to talk about fatigue. Symptoms began 9-12 months ago and is unchanged. She reports sleep is poor. She is not sleeping well during the night, not entirely sure why. Per her  she is having bilateral abd pain, he attributes to h/o PEG tube. He also reports constipation. If she does fall asleep on the couch, while watching tv, she can sleep for 6-7 hrs. She feels better the next day.  reports in bed, sleeps w/ her head flexed fully. Only other complaint is heat intolerance, always needing to wear a jacket or have the heat in the house turned up. She has lost 15 lbs in the last 1 year.       Abd CT scan from '15 reviewed, normal.  TTE from '15 reviewed, normal.        Prior to Admission medications    Medication Sig Start Date End Date Taking? Authorizing Provider   ALPRAZolam [place]) 0.5 mg tablet TAKE 1 TABLET 3 TIMES DAILY AS NEEDED 6/11/17  Yes Kaelyn Calderon III, MD   lansoprazole (PREVACID) 30 mg capsule TAKE ONE CAPSULE BY MOUTH EVERY MORNING WITH BREAKFAST 5/29/17  Yes Kaelyn Calderon III, MD   pravastatin (PRAVACHOL) 20 mg tablet TAKE 1 TABLET BY MOUTH AT BEDTIME 3/21/17  Yes Kaelyn Calderon III, MD   ferrous sulfate (IRON, FERROUS SULFATE,) 325 mg (65 mg iron) tablet TAKE 1 TABLET BY MOUTH 3 TIMES A DAY WITH MEAL 2/27/17  Yes Kaelyn Calderon III, MD   cholecalciferol, vitamin d3, (VITAMIN D) 1,000 unit tablet Take 2,000 Units by mouth daily. Yes Historical Provider   MULTIVITAMINS (MULTIVITAMIN PO) Take 1 Tab by mouth daily. Yes Historical Provider   inulin (FIBER GUMMIES) 2 gram chew Take 1-2 Tabs by mouth daily. 4/3/17   Kaelyn Calderon III, MD   polyethylene glycol (MIRALAX) 17 gram/dose powder Take 17 g by mouth daily as needed. 4/3/17   Kaelyn Calderon III, MD          Allergies   Allergen Reactions    Latex Hives     Latex tape    Egg Anaphylaxis    Codeine Nausea and Vomiting    Darvon [Propoxyphene] Nausea and Vomiting    Pcn [Penicillins] Hives           Review of Systems   Constitutional: Positive for malaise/fatigue and weight loss (15 lbs in the last year). Respiratory: Negative for cough and shortness of breath. Cardiovascular: Negative for chest pain and palpitations. Gastrointestinal: Positive for constipation. Negative for abdominal pain, diarrhea, nausea and vomiting. Musculoskeletal: Positive for back pain. Objective:   Physical Exam   Constitutional: No distress. Eyes: Conjunctivae are normal. No scleral icterus. Cardiovascular: Regular rhythm and normal heart sounds. No murmur heard. Pulmonary/Chest: Effort normal and breath sounds normal. She has no wheezes. She has no rales. Abdominal: Bowel sounds are normal. She exhibits no mass. There is no hepatosplenomegaly. There is no tenderness. Psychiatric:   MMSE 22/30         Visit Vitals    /70    Pulse 84    Temp 97.5 °F (36.4 °C) (Oral)    Resp 18    Ht 5' 6\" (1.676 m)    Wt 143 lb (64.9 kg)    SpO2 97%    BMI 23.08 kg/m2         Disclaimer:  Advised her to call back or return to office if symptoms worsen/change/persist.  Discussed expected course/resolution/complications of diagnosis in detail with patient. Medication risks/benefits/costs/interactions/alternatives discussed with patient. She was given an after visit summary which includes diagnoses, current medications, & vitals. She expressed understanding with the diagnosis and plan.         Colt Arias MD

## 2017-06-30 NOTE — PATIENT INSTRUCTIONS
Constipation: Care Instructions  Your Care Instructions  Constipation means that you have a hard time passing stools (bowel movements). People pass stools from 3 times a day to once every 3 days. What is normal for you may be different. Constipation may occur with pain in the rectum and cramping. The pain may get worse when you try to pass stools. Sometimes there are small amounts of bright red blood on toilet paper or the surface of stools. This is because of enlarged veins near the rectum (hemorrhoids). A few changes in your diet and lifestyle may help you avoid ongoing constipation. Your doctor may also prescribe medicine to help loosen your stool. Some medicines can cause constipation. These include pain medicines and antidepressants. Tell your doctor about all the medicines you take. Your doctor may want to make a medicine change to ease your symptoms. Follow-up care is a key part of your treatment and safety. Be sure to make and go to all appointments, and call your doctor if you are having problems. It's also a good idea to know your test results and keep a list of the medicines you take. How can you care for yourself at home? · Drink plenty of fluids, enough so that your urine is light yellow or clear like water. If you have kidney, heart, or liver disease and have to limit fluids, talk with your doctor before you increase the amount of fluids you drink. · Include high-fiber foods in your diet each day. These include fruits, vegetables, beans, and whole grains. · Get at least 30 minutes of exercise on most days of the week. Walking is a good choice. You also may want to do other activities, such as running, swimming, cycling, or playing tennis or team sports. · Take a fiber supplement, such as Citrucel or Metamucil, every day. Read and follow all instructions on the label. · Schedule time each day for a bowel movement. A daily routine may help.  Take your time having your bowel movement. · Support your feet with a small step stool when you sit on the toilet. This helps flex your hips and places your pelvis in a squatting position. · Your doctor may recommend an over-the-counter laxative to relieve your constipation. Examples are Milk of Magnesia and MiraLax. Read and follow all instructions on the label. Do not use laxatives on a long-term basis. When should you call for help? Call your doctor now or seek immediate medical care if:  · You have new or worse belly pain. · You have new or worse nausea or vomiting. · You have blood in your stools. Watch closely for changes in your health, and be sure to contact your doctor if:  · Your constipation is getting worse. · You do not get better as expected. Where can you learn more? Go to http://candy-sheela.info/. Enter 21 [phone] in the search box to learn more about \"Constipation: Care Instructions. \"  Current as of: March 20, 2017  Content Version: 11.3  © 5447-0087 [employer]dtMED[employer]. Care instructions adapted under license by [employer] (which disclaims liability or warranty for this information). If you have questions about a medical condition or this instruction, always ask your healthcare professional. [address] any warranty or liability for your use of this information. Learning About Sleeping Well  What does sleeping well mean? Sleeping well means getting enough sleep. How much sleep is enough varies among people. The number of hours you sleep is not as important as how you feel when you wake up. If you do not feel refreshed, you probably need more sleep. Another sign of not getting enough sleep is feeling tired during the day. The average total nightly sleep time is 7½ to 8 hours. Healthy adults may need a little more or a little less than this. Why is getting enough sleep important? Getting enough quality sleep is a basic part of good health.  When your sleep suffers, your mood and your thoughts can suffer too. You may find yourself feeling more grumpy or stressed. Not getting enough sleep also can lead to serious problems, including injury, accidents, anxiety, and depression. What might cause poor sleeping? Many things can cause sleep problems, including:  · Stress. Stress can be caused by fear about a single event, such as giving a speech. Or you may have ongoing stress, such as worry about work or school. · Depression, anxiety, and other mental or emotional conditions. · Changes in your sleep habits or surroundings. This includes changes that happen where you sleep, such as noise, light, or sleeping in a different bed. It also includes changes in your sleep pattern, such as having jet lag or working a late shift. · Health problems, such as pain, breathing problems, and restless legs syndrome. · Lack of regular exercise. How can you help yourself? Here are some tips that may help you sleep more soundly and wake up feeling more refreshed. Your sleeping area  · Use your bedroom only for sleeping and sex. A bit of light reading may help you fall asleep. But if it doesn't, do your reading elsewhere in the house. Don't watch TV in bed. · Be sure your bed is big enough to stretch out comfortably, especially if you have a sleep partner. · Keep your bedroom quiet, dark, and cool. Use curtains, blinds, or a sleep mask to block out light. To block out noise, use earplugs, soothing music, or a \"white noise\" machine. Your evening and bedtime routine  · Create a relaxing bedtime routine. You might want to take a warm shower or bath, listen to soothing music, or drink a cup of noncaffeinated tea. · Go to bed at the same time every night. And get up at the same time every morning, even if you feel tired. What to avoid  · Limit caffeine (coffee, tea, caffeinated sodas) during the day, and don't have any for at least 4 to 6 hours before bedtime.   · Don't drink alcohol before bedtime. Alcohol can cause you to wake up more often during the night. · Don't smoke or use tobacco, especially in the evening. Nicotine can keep you awake. · Don't take naps during the day, especially close to bedtime. · Don't lie in bed awake for too long. If you can't fall asleep, or if you wake up in the middle of the night and can't get back to sleep within 15 minutes or so, get out of bed and go to another room until you feel sleepy. · Don't take medicine right before bed that may keep you awake or make you feel hyper or energized. Your doctor can tell you if your medicine may do this and if you can take it earlier in the day. If you can't sleep  · Imagine yourself in a peaceful, pleasant scene. Focus on the details and feelings of being in a place that is relaxing. · Get up and do a quiet or boring activity until you feel sleepy. · Don't drink any liquids after 6 p.m. if you wake up often because you have to go to the bathroom. Where can you learn more? Go to http://candy-sheela.info/. Enter E109 in the search box to learn more about \"Learning About Sleeping Well. \"  Current as of: July 26, 2016  Content Version: 11.3  © 9828-6245 [employer]. Care instructions adapted under license by [employer] (which disclaims liability or warranty for this information). If you have questions about a medical condition or this instruction, always ask your healthcare professional. [place] [address] any warranty or liability for your use of this information.

## 2017-07-01 LAB
CRP SERPL HS-MCNC: 2.23 MG/L (ref 0–3)
ERYTHROCYTE [DISTWIDTH] IN BLOOD BY AUTOMATED COUNT: 13.9 % (ref 12.3–15.4)
ERYTHROCYTE [SEDIMENTATION RATE] IN BLOOD BY WESTERGREN METHOD: 5 MM/HR (ref 0–40)
HCT VFR BLD AUTO: 37.4 % (ref 34–46.6)
HGB BLD-MCNC: 12.7 G/DL (ref 11.1–15.9)
MCH RBC QN AUTO: 32.6 PG (ref 26.6–33)
MCHC RBC AUTO-ENTMCNC: 34 G/DL (ref 31.5–35.7)
MCV RBC AUTO: 96 FL (ref 79–97)
PLATELET # BLD AUTO: 272 X10E3/UL (ref 150–379)
RBC # BLD AUTO: 3.89 X10E6/UL (ref 3.77–5.28)
TSH SERPL DL<=0.005 MIU/L-ACNC: 2.38 UIU/ML (ref 0.45–4.5)
WBC # BLD AUTO: 6.2 X10E3/UL (ref 3.4–10.8)

## 2017-07-03 NOTE — PROGRESS NOTES
Called and spoke with patient regarding lab results. Informed patient that her fatigue and weight loss may be due to her not eating properly. Patient states she does think she is eating properly however she is not eating as much as she normally does.

## 2017-07-03 NOTE — PROGRESS NOTES
Please call patient's . All labs are stable or at goal for her. Nothing to suggest anything serious is going on. I think her fatigue is due to weight loss (not eating properly) and not sleeping properly. These are all related to her memory (dementia).

## 2017-10-29 RX ORDER — PRAVASTATIN SODIUM 20 MG/1
TABLET ORAL
Qty: 90 TAB | Refills: 1 | Status: SHIPPED | OUTPATIENT
Start: 2017-10-29 | End: 2018-01-31 | Stop reason: SDUPTHER

## 2017-11-04 DIAGNOSIS — K21.9 GASTRIC REFLUX: ICD-10-CM

## 2017-11-05 RX ORDER — LANSOPRAZOLE 30 MG/1
CAPSULE, DELAYED RELEASE ORAL
Qty: 90 CAP | Refills: 1 | Status: SHIPPED | OUTPATIENT
Start: 2017-11-05 | End: 2018-03-05 | Stop reason: SDUPTHER

## 2017-11-09 RX ORDER — ALPRAZOLAM 0.5 MG/1
TABLET ORAL
Qty: 40 TAB | Refills: 1 | OUTPATIENT
Start: 2017-11-09 | End: 2017-11-09 | Stop reason: SDUPTHER

## 2017-11-10 RX ORDER — ALPRAZOLAM 0.5 MG/1
TABLET ORAL
Qty: 40 TAB | Refills: 1 | OUTPATIENT
Start: 2017-11-10 | End: 2018-03-08 | Stop reason: SDUPTHER

## 2017-11-10 NOTE — TELEPHONE ENCOUNTER
Orders Placed This Encounter    DISCONTD: ALPRAZolam (XANAX) 0.5 mg tablet     Sig: TAKE 1 TABLET 3 TIMES A DAY AS NEEDED     Dispense:  40 Tab     Refill:  1     Not to exceed 4 additional fills before 12/09/2017. The above controlled substance refill was called into patients pharmacy - [facility]/ - authorized by Dr. Ambrosio العراقي.

## 2017-12-05 RX ORDER — LANOLIN ALCOHOL/MO/W.PET/CERES
CREAM (GRAM) TOPICAL
Qty: 90 TAB | Refills: 5 | Status: SHIPPED | OUTPATIENT
Start: 2017-12-05 | End: 2019-07-03

## 2018-01-31 ENCOUNTER — OFFICE VISIT (OUTPATIENT)
Dept: INTERNAL MEDICINE CLINIC | Age: 78
End: 2018-01-31

## 2018-01-31 VITALS
DIASTOLIC BLOOD PRESSURE: 78 MMHG | SYSTOLIC BLOOD PRESSURE: 124 MMHG | OXYGEN SATURATION: 98 % | TEMPERATURE: 97.6 F | HEIGHT: 66 IN | WEIGHT: 143.8 LBS | HEART RATE: 84 BPM | RESPIRATION RATE: 22 BRPM | BODY MASS INDEX: 23.11 KG/M2

## 2018-01-31 DIAGNOSIS — K44.9 HIATAL HERNIA: Primary | ICD-10-CM

## 2018-01-31 RX ORDER — FAMOTIDINE 40 MG/1
40 TABLET, FILM COATED ORAL DAILY
Qty: 90 TAB | Refills: 0 | Status: SHIPPED | OUTPATIENT
Start: 2018-01-31 | End: 2018-04-28 | Stop reason: SDUPTHER

## 2018-01-31 NOTE — PROGRESS NOTES
Carmen Billingsley is a 65 y.o. female who was seen in clinic today (1/31/2018). She RTC w/ her . Assessment & Plan:   Diagnoses and all orders for this visit:    1. Hiatal hernia- chronic issue, sounds like it is getting worse, reviewed differential dx with her & , does not sound like CP is cardiac and favor due to hernia. Last imaging ~2 yrs ago so will repeat. Will add in H2B to PPI. Reviewed diet triggers to avoid. Reviewed gastroparesis style diet. See AVS, Red flags were reviewed with the patient to RTC or notify me.   -     CT ABD WO CONT; Future  -     famotidine (PEPCID) 40 mg tablet; Take 1 Tab by mouth daily. Follow-up Disposition:  Return if symptoms worsen or fail to improve. Subjective: Bulmaro Lugo was seen today for Shortness of Breath    Chest Pain  Patient complains of chest pain. Onset was 3 weeks ago, with stable course since that time. The patient admits to chest discomfort that is on/off, right sided (was previously on the left side), with radiation no where. She has had on/off chest issues for years. She reports it is dull sensation and lasts for minutes to hours. May be associated w/ eating. Is occurring most evenings. Only issue is on/off abdominal discomfort. No association w/ activity. No nausea, vomiting. Has tried tums w/o much relief. She reports being told that if pain moved from L to R chest then she would need to have surgery. She has a h/o large hiatal hernia, seen by surgeon at [address] [place] [address] in '16, but deferred surgery. CT scan from 5/11/6- There is atelectasis left base with a large hiatal hernia        Prior to Admission medications    Medication Sig Start Date End Date Taking?  Authorizing Provider   ferrous sulfate 325 mg (65 mg iron) tablet TAKE 1 TABLET BY MOUTH 3 TIMES A DAY WITH MEAL 12/5/17  Yes MD SANTIAGO Mahajan IIIZolorrie Valdez Retort) 0.5 mg tablet TAKE 1 TABLET 3 TIMES A DAY AS NEEDED 11/10/17  Yes Jennifer Osullivan MD lansoprazole (PREVACID) 30 mg capsule TAKE ONE CAPSULE BY MOUTH EVERY MORNING WITH BREAKFAST 11/5/17  Yes Rey Guthrie III, MD   polyethylene glycol (MIRALAX) 17 gram/dose powder Take 17 g by mouth daily as needed. 4/3/17  Yes Rey Guthrie III, MD   pravastatin (PRAVACHOL) 20 mg tablet TAKE 1 TABLET BY MOUTH AT BEDTIME 3/21/17  Yes Rey Guthrie III, MD   cholecalciferol, vitamin d3, (VITAMIN D) 1,000 unit tablet Take 2,000 Units by mouth daily. Yes Historical Provider   MULTIVITAMINS (MULTIVITAMIN PO) Take 1 Tab by mouth daily. Yes Historical Provider          Allergies   Allergen Reactions    Latex Hives     Latex tape    Egg Anaphylaxis    Codeine Nausea and Vomiting    Darvon [Propoxyphene] Nausea and Vomiting    Pcn [Penicillins] Hives           Review of Systems   Constitutional: Negative for chills, fever, malaise/fatigue and weight loss. Respiratory: Positive for shortness of breath (chronic, stable per her & ). Negative for cough. Cardiovascular: Positive for chest pain. Negative for palpitations, orthopnea, leg swelling and PND. Gastrointestinal: Positive for abdominal pain and heartburn. Negative for blood in stool, constipation, diarrhea, melena, nausea and vomiting. Objective:   Physical Exam   Constitutional: No distress. Eyes: Conjunctivae are normal. No scleral icterus. Cardiovascular: Regular rhythm and normal heart sounds. No murmur heard. Pulmonary/Chest: Effort normal and breath sounds normal. She has no wheezes. She has no rales. Abdominal: Bowel sounds are normal. She exhibits no mass. There is no hepatosplenomegaly. There is no tenderness.          Visit Vitals    /78    Pulse 84    Temp 97.6 °F (36.4 °C) (Oral)    Resp 22    Ht 5' 6\" (1.676 m)    Wt 143 lb 12.8 oz (65.2 kg)    SpO2 98%    BMI 23.21 kg/m2         Disclaimer:  Advised her to call back or return to office if symptoms worsen/change/persist.  Discussed expected course/resolution/complications of diagnosis in detail with patient. Medication risks/benefits/costs/interactions/alternatives discussed with patient. She was given an after visit summary which includes diagnoses, current medications, & vitals. She expressed understanding with the diagnosis and plan. Aspects of this note may have been generated using voice recognition software. Despite editing, there may be some syntax errors.        Zoila Tovar MD

## 2018-01-31 NOTE — MR AVS SNAPSHOT
[address] [address] 
[phone] Patient: Danitza Holt MRN:  [id number] Visit Information Date & Time Provider Department Dept. Phone Encounter #  
 2018 11:15 AM Kings Starr, [facility] [phone] [id number] Follow-up Instructions Return if symptoms worsen or fail to improve. Upcoming Health Maintenance Date Due COLONOSCOPY 1957 DTaP/Tdap/Td series (1 - Tdap) 1960 Pneumococcal 65+ Low/Medium Risk (2 of 2 - PCV13) 2015 MEDICARE YEARLY EXAM 2/15/2017 GLAUCOMA SCREENING Q2Y 2019 Allergies as of 2018  Review Complete On: 2018 By: Kings Starr MD  
  
 Severity Noted Reaction Type Reactions Latex  2015    Hives Latex tape Egg High 10/03/2013    Anaphylaxis Codeine  10/17/2009    Nausea and Vomiting Darvon [Propoxyphene]  10/17/2009    Nausea and Vomiting Pcn [Penicillins]  10/17/2009    Hives Current Immunizations  Reviewed on 2018 Name Date Influenza Vaccine  Deferred (Contraindication) Pneumococcal Conjugate (PCV-13)  Deferred (Patient Refused) Pneumococcal Polysaccharide (PPSV-23) 2014 Reviewed by Malu Bonilla RN on 2018 at 11:12 AM  
You Were Diagnosed With   
  
 Codes Comments Hiatal hernia    -  Primary ICD-10-CM: K44.9 ICD-9-CM: 410. 3 Vitals BP Pulse Temp Resp Height(growth percentile) Weight(growth percentile) 124/78 84 97.6 °F (36.4 °C) (Oral) 22 5' 6\" (1.676 m) 143 lb 12.8 oz (65.2 kg) SpO2 BMI OB Status Smoking Status 98% 23.21 kg/m2 Postmenopausal Never Smoker Vitals History BMI and BSA Data Body Mass Index Body Surface Area  
 23.21 kg/m 2 1.74 m 2 Preferred Pharmacy Pharmacy Name Phone  [id number]- [address] [phone] Your Updated Medication List  
  
   
This list is accurate as of: 1/31/18 11:37 AM.  Always use your most recent med list.  
  
  
  
  
 ALPRAZolam 0.5 mg tablet Commonly known as:  XANAX  
TAKE 1 TABLET 3 TIMES A DAY AS NEEDED  
  
 famotidine 40 mg tablet Commonly known as:  PEPCID Take 1 Tab by mouth daily. ferrous sulfate 325 mg (65 mg iron) tablet TAKE 1 TABLET BY MOUTH 3 TIMES A DAY WITH MEAL  
  
 lansoprazole 30 mg capsule Commonly known as:  PREVACID TAKE ONE CAPSULE BY MOUTH EVERY MORNING WITH BREAKFAST MULTIVITAMIN PO Take 1 Tab by mouth daily. polyethylene glycol 17 gram/dose powder Commonly known as:  Andre Barbara Take 17 g by mouth daily as needed. pravastatin 20 mg tablet Commonly known as:  PRAVACHOL  
TAKE 1 TABLET BY MOUTH AT BEDTIME  
  
 VITAMIN D3 1,000 unit tablet Generic drug:  cholecalciferol Take 2,000 Units by mouth daily. Prescriptions Sent to Pharmacy Refills  
 famotidine (PEPCID) 40 mg tablet 0 Sig: Take 1 Tab by mouth daily. Class: Normal  
 Pharmacy: [id number]- [address], [address] [place] [address] #: [phone] Route: Oral  
  
Follow-up Instructions Return if symptoms worsen or fail to improve. To-Do List   
 01/31/2018 Imaging:  CT ABD WO CONT Patient Instructions Gastroparesis: Care Instructions Your Care Instructions When you have gastroparesis, your stomach takes a lot longer to empty. This delay can cause belly pain, bloating, and belching. It also can cause hiccups, heartburn, nausea or vomiting. You may not feel like eating. These symptoms may come and go. They most often occur during and after meals. You may feel full after only a few bites of food. This condition occurs when the nerves to the stomach don't work properly. Diabetes is the most common cause of this nerve damage.  Gastroparesis can make it harder to control your blood sugar levels. But keeping your blood sugar levels under control may help with your symptoms. Parkinson's disease, stroke, and some medicines can also cause this condition. Home treatment can often help. Follow-up care is a key part of your treatment and safety. Be sure to make and go to all appointments, and call your doctor if you are having problems. It's also a good idea to know your test results and keep a list of the medicines you take. How can you care for yourself at home? · Eat several small meals each day rather than three large meals. · Eat foods that are low in fiber and fat. · If your doctor suggests it, take medicines that help the stomach empty more quickly. These are called motility agents. When should you call for help? Call your doctor now or seek immediate medical care if: 
? · You are vomiting. ? · You have new or worse belly pain. ? · You have a fever. ? · You cannot pass stools or gas. ? Watch closely for changes in your health, and be sure to contact your doctor if you have any problems. Where can you learn more? Go to http://candy-sheela.info/. Enter M106 in the search box to learn more about \"Gastroparesis: Care Instructions. \" Current as of: May 12, 2017 Content Version: 11.4 © 0855-3453 [employer]. Care instructions adapted under license by [employer] (which disclaims liability or warranty for this information). If you have questions about a medical condition or this instruction, always ask your healthcare professional. [place] [address] any warranty or liability for your use of this information. Hiatal Hernia: Care Instructions Your Care Instructions A hiatal hernia occurs when part of the stomach bulges into the chest cavity. A hiatal hernia may allow stomach acid and juices to back up into the esophagus (acid reflux).  This can cause a feeling of burning, warmth, heat, or pain behind the breastbone. This feeling may often occur after you eat, soon after you lie down, or when you bend forward, and it may come and go. You also may have a sour taste in your mouth. These symptoms are commonly known as heartburn or reflux. But not all hiatal hernias cause symptoms. Follow-up care is a key part of your treatment and safety. Be sure to make and go to all appointments, and call your doctor if you are having problems. It's also a good idea to know your test results and keep a list of the medicines you take. How can you care for yourself at home? · Take your medicines exactly as prescribed. Call your doctor if you think you are having a problem with your medicine. · Do not take aspirin or other nonsteroidal anti-inflammatory drugs (NSAIDs), such as ibuprofen (Advil, Motrin) or naproxen (Aleve), unless your doctor says it is okay. Ask your doctor what you can take for pain. · Your doctor may recommend over-the-counter medicine. For mild or occasional indigestion, antacids such as Tums, Gaviscon, Maalox, or Mylanta may help. Your doctor also may recommend over-the-counter acid reducers, such as famotidine (Pepcid AC), cimetidine (Tagamet HB), ranitidine (Zantac 75 and Zantac 150), or omeprazole (Prilosec). Read and follow all instructions on the label. If you use these medicines often, talk with your doctor. · Change your eating habits. ¨ It's best to eat several small meals instead of two or three large meals. ¨ After you eat, wait 2 to 3 hours before you lie down. Late-night snacks aren't a good idea. ¨ Chocolate, mint, and alcohol can make heartburn worse. They relax the valve between the esophagus and the stomach. ¨ Spicy foods, foods that have a lot of acid (like tomatoes and oranges), and coffee can make heartburn symptoms worse in some people.  If your symptoms are worse after you eat a certain food, you may want to stop eating that food to see if your symptoms get better. · Do not smoke or chew tobacco. 
· If you get heartburn at night, raise the head of your bed 6 to 8 inches by putting the frame on blocks or placing a foam wedge under the head of your mattress. (Adding extra pillows does not work.) · Do not wear tight clothing around your middle. · Lose weight if you need to. Losing just 5 to 10 pounds can help. When should you call for help? Call your doctor now or seek immediate medical care if: 
? · You have new or worse belly pain. ? · You are vomiting. ? Watch closely for changes in your health, and be sure to contact your doctor if: 
? · You have new or worse symptoms of indigestion. ? · You have trouble or pain swallowing. ? · You are losing weight. ? · You do not get better as expected. Where can you learn more? Go to http://candy-sheela.info/. Enter L172 in the search box to learn more about \"Hiatal Hernia: Care Instructions. \" Current as of: May 12, 2017 Content Version: 11.4 © 2223-0234 [employer]. Care instructions adapted under license by [employer] (which disclaims liability or warranty for this information). If you have questions about a medical condition or this instruction, always ask your healthcare professional. [address] any warranty or liability for your use of this information. Introducing [facility]! Dear Libertad Rodriguez: Thank you for requesting a [facility] account. Our records indicate that you already have an active [facility] account. You can access your account anytime at [email or web address]. [email or web address] Did you know that you can access your hospital and ER discharge instructions at any time in [facility]? You can also review all of your test results from your hospital stay or ER visit. Additional Information If you have questions, please visit the Frequently Asked Questions section of the [facility] website at [email or web address] Remember, [facility] is NOT to be used for urgent needs. For medical emergencies, dial 911. Now available from your iPhone and Android! Please provide this summary of care documentation to your next provider. Your primary care clinician is listed as [id number] If you have any questions after today's visit, please call [phone].

## 2018-01-31 NOTE — PATIENT INSTRUCTIONS
Gastroparesis: Care Instructions  Your Care Instructions    When you have gastroparesis, your stomach takes a lot longer to empty. This delay can cause belly pain, bloating, and belching. It also can cause hiccups, heartburn, nausea or vomiting. You may not feel like eating. These symptoms may come and go. They most often occur during and after meals. You may feel full after only a few bites of food. This condition occurs when the nerves to the stomach don't work properly. Diabetes is the most common cause of this nerve damage. Gastroparesis can make it harder to control your blood sugar levels. But keeping your blood sugar levels under control may help with your symptoms. Parkinson's disease, stroke, and some medicines can also cause this condition. Home treatment can often help. Follow-up care is a key part of your treatment and safety. Be sure to make and go to all appointments, and call your doctor if you are having problems. It's also a good idea to know your test results and keep a list of the medicines you take. How can you care for yourself at home? · Eat several small meals each day rather than three large meals. · Eat foods that are low in fiber and fat. · If your doctor suggests it, take medicines that help the stomach empty more quickly. These are called motility agents. When should you call for help? Call your doctor now or seek immediate medical care if:  ? · You are vomiting. ? · You have new or worse belly pain. ? · You have a fever. ? · You cannot pass stools or gas. ? Watch closely for changes in your health, and be sure to contact your doctor if you have any problems. Where can you learn more? Go to http://candy-sheela.info/. Enter M106 in the search box to learn more about \"Gastroparesis: Care Instructions. \"  Current as of: May 12, 2017  Content Version: 11.4  © 8690-7813 Healthwise, [employer].  Care instructions adapted under license by [facility] (which disclaims liability or warranty for this information). If you have questions about a medical condition or this instruction, always ask your healthcare professional. [place] [address] any warranty or liability for your use of this information. Hiatal Hernia: Care Instructions  Your Care Instructions  A hiatal hernia occurs when part of the stomach bulges into the chest cavity. A hiatal hernia may allow stomach acid and juices to back up into the esophagus (acid reflux). This can cause a feeling of burning, warmth, heat, or pain behind the breastbone. This feeling may often occur after you eat, soon after you lie down, or when you bend forward, and it may come and go. You also may have a sour taste in your mouth. These symptoms are commonly known as heartburn or reflux. But not all hiatal hernias cause symptoms. Follow-up care is a key part of your treatment and safety. Be sure to make and go to all appointments, and call your doctor if you are having problems. It's also a good idea to know your test results and keep a list of the medicines you take. How can you care for yourself at home? · Take your medicines exactly as prescribed. Call your doctor if you think you are having a problem with your medicine. · Do not take aspirin or other nonsteroidal anti-inflammatory drugs (NSAIDs), such as ibuprofen (Advil, Motrin) or naproxen (Aleve), unless your doctor says it is okay. Ask your doctor what you can take for pain. · Your doctor may recommend over-the-counter medicine. For mild or occasional indigestion, antacids such as Tums, Gaviscon, Maalox, or Mylanta may help. Your doctor also may recommend over-the-counter acid reducers, such as famotidine (Pepcid AC), cimetidine (Tagamet HB), ranitidine (Zantac 75 and Zantac 150), or omeprazole (Prilosec). Read and follow all instructions on the label. If you use these medicines often, talk with your doctor.   · Change your eating habits. ¨ It's best to eat several small meals instead of two or three large meals. ¨ After you eat, wait 2 to 3 hours before you lie down. Late-night snacks aren't a good idea. ¨ Chocolate, mint, and alcohol can make heartburn worse. They relax the valve between the esophagus and the stomach. ¨ Spicy foods, foods that have a lot of acid (like tomatoes and oranges), and coffee can make heartburn symptoms worse in some people. If your symptoms are worse after you eat a certain food, you may want to stop eating that food to see if your symptoms get better. · Do not smoke or chew tobacco.  · If you get heartburn at night, raise the head of your bed 6 to 8 inches by putting the frame on blocks or placing a foam wedge under the head of your mattress. (Adding extra pillows does not work.)  · Do not wear tight clothing around your middle. · Lose weight if you need to. Losing just 5 to 10 pounds can help. When should you call for help? Call your doctor now or seek immediate medical care if:  ? · You have new or worse belly pain. ? · You are vomiting. ? Watch closely for changes in your health, and be sure to contact your doctor if:  ? · You have new or worse symptoms of indigestion. ? · You have trouble or pain swallowing. ? · You are losing weight. ? · You do not get better as expected. Where can you learn more? Go to http://candy-sheela.info/. Enter H749 in the search box to learn more about \"Hiatal Hernia: Care Instructions. \"  Current as of: May 12, 2017  Content Version: 11.4  © 6171-7302 [employer]. Care instructions adapted under license by [employer] (which disclaims liability or warranty for this information). If you have questions about a medical condition or this instruction, always ask your healthcare professional. [address] any warranty or liability for your use of this information.

## 2018-02-05 ENCOUNTER — HOSPITAL ENCOUNTER (OUTPATIENT)
Dept: CT IMAGING | Age: 78
Discharge: HOME | End: 2018-02-05
Attending: INTERNAL MEDICINE
Payer: MEDICARE

## 2018-02-05 DIAGNOSIS — K44.9 HIATAL HERNIA: ICD-10-CM

## 2018-02-05 PROCEDURE — 74150 CT ABDOMEN W/O CONTRAST: CPT

## 2018-03-05 DIAGNOSIS — K21.9 GASTRIC REFLUX: ICD-10-CM

## 2018-03-06 RX ORDER — LANSOPRAZOLE 30 MG/1
30 CAPSULE, DELAYED RELEASE ORAL
Qty: 90 CAP | Refills: 1 | Status: SHIPPED | OUTPATIENT
Start: 2018-03-06 | End: 2018-07-06 | Stop reason: SDUPTHER

## 2018-03-08 ENCOUNTER — OFFICE VISIT (OUTPATIENT)
Dept: INTERNAL MEDICINE CLINIC | Age: 78
End: 2018-03-08

## 2018-03-08 ENCOUNTER — HOSPITAL ENCOUNTER (OUTPATIENT)
Dept: LAB | Age: 78
Discharge: HOME | End: 2018-03-08
Payer: MEDICARE

## 2018-03-08 VITALS
HEART RATE: 85 BPM | BODY MASS INDEX: 23.14 KG/M2 | HEIGHT: 66 IN | DIASTOLIC BLOOD PRESSURE: 74 MMHG | OXYGEN SATURATION: 95 % | WEIGHT: 144 LBS | TEMPERATURE: 97.7 F | SYSTOLIC BLOOD PRESSURE: 118 MMHG | RESPIRATION RATE: 16 BRPM

## 2018-03-08 DIAGNOSIS — F41.9 ANXIETY: ICD-10-CM

## 2018-03-08 DIAGNOSIS — D50.0 BLOOD LOSS ANEMIA: Primary | ICD-10-CM

## 2018-03-08 DIAGNOSIS — K44.9 ESOPHAGEAL HIATAL HERNIA: ICD-10-CM

## 2018-03-08 PROBLEM — Z93.1 STATUS POST GASTROSTOMY (HCC): Status: ACTIVE | Noted: 2018-03-08

## 2018-03-08 PROCEDURE — 85025 COMPLETE CBC W/AUTO DIFF WBC: CPT

## 2018-03-08 PROCEDURE — 36415 COLL VENOUS BLD VENIPUNCTURE: CPT

## 2018-03-08 PROCEDURE — 84443 ASSAY THYROID STIM HORMONE: CPT

## 2018-03-08 PROCEDURE — 80053 COMPREHEN METABOLIC PANEL: CPT

## 2018-03-08 RX ORDER — ALPRAZOLAM 0.5 MG/1
TABLET ORAL
Qty: 40 TAB | Refills: 1 | Status: SHIPPED | OUTPATIENT
Start: 2018-03-08 | End: 2018-03-18 | Stop reason: SDUPTHER

## 2018-03-08 NOTE — PROGRESS NOTES
HPI:  Kana Davies is a 65y.o. year old female who is here for for discussion about her hiatal hernia. She has had a previous surgery to correct this and it has recurred. Her previous surgery was complicated by the need for a gastrostomy tube that was left in place for quite some time. She has seen Dr. Maia Nguyen at [address] [place] [address] to consider this a little over a year ago. She took away from that conversation that surgery to correct it would require a lifelong hospitalization. I previously have had this discussion with her and suggested that that would not be the case. Over the last few days she has had increasing issues with eating. She eats small amounts and then feels full. She seemed to do better with liquids. She has ongoing issues with feeling cold all the time and fatigued. It has been at least 9 months since her last set of blood work. She denies any vomiting. Denies any melena. Denies any hematochezia. Denies any fevers or chills. Denies any exertional chest pains or shortness of breath.       Past Medical History:   Diagnosis Date    Anemia NEC     Asthma     major asthma as a child/only with colds now    Cancer [facility])     BCCA removed    Coagulation disorder ([facility] [address]     on xarelto    GERD (gastroesophageal reflux disease) 10/22/2010    Hiatal hernia     Hypertension     hx of blood pressure med for short time    Ill-defined condition     increased cholesterol    Other unknown and unspecified cause of morbidity or mortality     eval with Dr Yin Cox for SOB, sept 2015:  results unkown    Stroke ([facility] [address] 2013    no deficits    Stroke ([facility] [address]     heat exhaustion    Watermelon stomach 2003       Past Surgical History:   Procedure Laterality Date    EXC SKIN MALIG >4CM FACE,FACIAL      times 3 - BCCA    HX BLADDER SUSPENSION      HX COLONOSCOPY      HX ENDOSCOPY  6/4/15    Dr. Ceron Saliva      vaginal del times 3    HX HERNIA REPAIR  2014    x2, failure    HX OTHER SURGICAL      EGDs    HX TONSILLECTOMY         Prior to Admission medications    Medication Sig Start Date End Date Taking? Authorizing Provider   ALPRAZolam Guadlurivas Hutchins) 0.5 mg tablet TAKE 1 TABLET 3 TIMES A DAY AS NEEDED 3/8/18  Yes Norris Zaragoza III, MD   lansoprazole (PREVACID) 30 mg capsule Take 1 Cap by mouth Daily (before breakfast). 3/6/18  Yes Norris Zaragoza III, MD   famotidine (PEPCID) 40 mg tablet Take 1 Tab by mouth daily. 1/31/18  Yes Camryn Rainey MD   ferrous sulfate 325 mg (65 mg iron) tablet TAKE 1 TABLET BY MOUTH 3 TIMES A DAY WITH MEAL 12/5/17  Yes Norris Zaragoza III, MD   pravastatin (PRAVACHOL) 20 mg tablet TAKE 1 TABLET BY MOUTH AT BEDTIME 3/21/17  Yes Norris Zaragoza III, MD   cholecalciferol, vitamin d3, (VITAMIN D) 1,000 unit tablet Take 2,000 Units by mouth daily. Yes Historical Provider   MULTIVITAMINS (MULTIVITAMIN PO) Take 1 Tab by mouth daily. Yes Historical Provider   polyethylene glycol (MIRALAX) 17 gram/dose powder Take 17 g by mouth daily as needed.  4/3/17   Danilo Corrales MD       Social History     Social History    Marital status:      Spouse name: N/A    Number of children: N/A    Years of education: N/A     Occupational History     Retired     Social History Main Topics    Smoking status: Never Smoker    Smokeless tobacco: Never Used    Alcohol use No    Drug use: No    Sexual activity: No     Other Topics Concern    Not on file     Social History Narrative          ROS  Per HPI    Visit Vitals    /74    Pulse 85    Temp 97.7 °F (36.5 °C) (Oral)    Resp 16    Ht 5' 6\" (1.676 m)    Wt 144 lb (65.3 kg)    SpO2 95%    BMI 23.24 kg/m2         Physical Exam   Physical Examination: General appearance - alert, well appearing, and in no distress  Mouth - mucous membranes moist, pharynx normal without lesions  Neck - supple, no significant adenopathy  Lymphatics - no palpable lymphadenopathy, no hepatosplenomegaly  Chest - clear to auscultation, no wheezes, rales or rhonchi, symmetric air entry  Heart - normal rate, regular rhythm, normal S1, S2, no murmurs, rubs, clicks or gallops  Abdomen - tenderness noted in the epigastrium, no masses  no rebound tenderness noted  bowel sounds normal  Extremities - peripheral pulses normal, no pedal edema, no clubbing or cyanosis      Assessment/Plan:  Diagnoses and all orders for this visit:    1. Blood loss anemia -due to her previous gastric varices. Will recheck her CBC today and treat as appropriate. She may have developed recurrent anemia given the fact that she has had ongoing fatigue and feeling cold. -     CBC WITH AUTOMATED DIFF    2. Esophageal hiatal hernia -given her worsening symptoms of with eating and her 30 pound weight loss over the last 3 years feel that she should be reevaluated for surgery by Dr. Jason Yoder. I will call and leave a message with him and discussed the situation with him regarding recurrent surgery.  -     METABOLIC PANEL, COMPREHENSIVE  -     CBC WITH AUTOMATED DIFF  -     TSH RFX ON ABNORMAL TO FREE T4    3. Anxiety -well-controlled on as needed meds. Will continue these for now and refill.  -     ALPRAZolam (XANAX) 0.5 mg tablet; TAKE 1 TABLET 3 TIMES A DAY AS NEEDED       Follow-up Disposition: as needed and with surgery. Advised her to call back or return to office if symptoms worsen/change/persist.  Discussed expected course/resolution/complications of diagnosis in detail with patient. Medication risks/benefits/costs/interactions/alternatives discussed with patient. She was given an after visit summary which includes diagnoses, current medications, & vitals. She expressed understanding with the diagnosis and plan.

## 2018-03-09 LAB
ALBUMIN SERPL-MCNC: 4.1 G/DL (ref 3.5–4.8)
ALBUMIN/GLOB SERPL: 1.2 {RATIO} (ref 1.2–2.2)
ALP SERPL-CCNC: 49 IU/L (ref 39–117)
ALT SERPL-CCNC: 14 IU/L (ref 0–32)
AST SERPL-CCNC: 20 IU/L (ref 0–40)
BASOPHILS # BLD AUTO: 0 X10E3/UL (ref 0–0.2)
BASOPHILS NFR BLD AUTO: 0 %
BILIRUB SERPL-MCNC: 0.6 MG/DL (ref 0–1.2)
BUN SERPL-MCNC: 19 MG/DL (ref 8–27)
BUN/CREAT SERPL: 15 (ref 12–28)
CALCIUM SERPL-MCNC: 10.4 MG/DL (ref 8.7–10.3)
CHLORIDE SERPL-SCNC: 98 MMOL/L (ref 96–106)
CO2 SERPL-SCNC: 22 MMOL/L (ref 18–29)
CREAT SERPL-MCNC: 1.25 MG/DL (ref 0.57–1)
EOSINOPHIL # BLD AUTO: 0.1 X10E3/UL (ref 0–0.4)
EOSINOPHIL NFR BLD AUTO: 1 %
ERYTHROCYTE [DISTWIDTH] IN BLOOD BY AUTOMATED COUNT: 13.6 % (ref 12.3–15.4)
GFR SERPLBLD CREATININE-BSD FMLA CKD-EPI: 41 ML/MIN/1.73
GFR SERPLBLD CREATININE-BSD FMLA CKD-EPI: 48 ML/MIN/1.73
GLOBULIN SER CALC-MCNC: 3.3 G/DL (ref 1.5–4.5)
GLUCOSE SERPL-MCNC: 102 MG/DL (ref 65–99)
HCT VFR BLD AUTO: 42.4 % (ref 34–46.6)
HGB BLD-MCNC: 14.5 G/DL (ref 11.1–15.9)
IMM GRANULOCYTES # BLD: 0 X10E3/UL (ref 0–0.1)
IMM GRANULOCYTES NFR BLD: 0 %
LYMPHOCYTES # BLD AUTO: 0.8 X10E3/UL (ref 0.7–3.1)
LYMPHOCYTES NFR BLD AUTO: 14 %
MCH RBC QN AUTO: 32.7 PG (ref 26.6–33)
MCHC RBC AUTO-ENTMCNC: 34.2 G/DL (ref 31.5–35.7)
MCV RBC AUTO: 96 FL (ref 79–97)
MONOCYTES # BLD AUTO: 0.4 X10E3/UL (ref 0.1–0.9)
MONOCYTES NFR BLD AUTO: 8 %
NEUTROPHILS # BLD AUTO: 4.3 X10E3/UL (ref 1.4–7)
NEUTROPHILS NFR BLD AUTO: 77 %
PLATELET # BLD AUTO: 238 X10E3/UL (ref 150–379)
POTASSIUM SERPL-SCNC: 4.3 MMOL/L (ref 3.5–5.2)
PROT SERPL-MCNC: 7.4 G/DL (ref 6–8.5)
RBC # BLD AUTO: 4.43 X10E6/UL (ref 3.77–5.28)
SODIUM SERPL-SCNC: 142 MMOL/L (ref 134–144)
TSH SERPL DL<=0.005 MIU/L-ACNC: 1.74 UIU/ML (ref 0.45–4.5)
WBC # BLD AUTO: 5.6 X10E3/UL (ref 3.4–10.8)

## 2018-03-15 ENCOUNTER — TELEPHONE (OUTPATIENT)
Dept: INTERNAL MEDICINE CLINIC | Age: 78
End: 2018-03-15

## 2018-03-15 DIAGNOSIS — E83.52 HYPERCALCEMIA: Primary | ICD-10-CM

## 2018-03-15 NOTE — TELEPHONE ENCOUNTER
----- Message from Cr Mohan MD sent at 3/9/2018  2:52 PM EST -----  Please get vitamin D level as well as PTH level.

## 2018-03-15 NOTE — TELEPHONE ENCOUNTER
Spoke with pt spouse in person at office. Is on hippa. 2 pt identifiers. Explained that pt calcium barely elevated. Per [facility], to obtain Vit D and PTH. Orders for lab placed. Pt will have done early next week. Also, advised him that I have left messages for Dr. Mindy Iraheta @[facility] to call Dr. Virginia Ryan. Back line info was provided.     Orders Placed This Encounter    PTH INTACT    VITAMIN D, 25 HYDROXY

## 2018-03-18 DIAGNOSIS — F41.9 ANXIETY: ICD-10-CM

## 2018-03-18 RX ORDER — ALPRAZOLAM 0.5 MG/1
TABLET ORAL
Qty: 40 TAB | Refills: 1 | OUTPATIENT
Start: 2018-03-18 | End: 2018-09-05 | Stop reason: SDUPTHER

## 2018-03-19 NOTE — TELEPHONE ENCOUNTER
Orders Placed This Encounter    ALPRAZolam (XANAX) 0.5 mg tablet     Sig: TAKE 1 TABLET BY MOUTH 3 TIMES A DAY     Dispense:  40 Tab     Refill:  1     Not to exceed 4 additional fills before 05/09/2018. The above controlled substance refill was called into patients pharmacy -[facility]/ - authorized by Dr. Fransico Babin

## 2018-03-20 ENCOUNTER — HOSPITAL ENCOUNTER (OUTPATIENT)
Dept: LAB | Age: 78
Discharge: HOME | End: 2018-03-20
Payer: MEDICARE

## 2018-03-20 PROCEDURE — 82306 VITAMIN D 25 HYDROXY: CPT

## 2018-03-20 PROCEDURE — 83970 ASSAY OF PARATHORMONE: CPT

## 2018-03-20 PROCEDURE — 36415 COLL VENOUS BLD VENIPUNCTURE: CPT

## 2018-03-21 LAB
25(OH)D3+25(OH)D2 SERPL-MCNC: 39.2 NG/ML (ref 30–100)
PTH-INTACT SERPL-MCNC: 34 PG/ML (ref 15–65)

## 2018-04-08 RX ORDER — PRAVASTATIN SODIUM 20 MG/1
TABLET ORAL
Qty: 90 TAB | Refills: 1 | Status: SHIPPED | OUTPATIENT
Start: 2018-04-08 | End: 2018-07-13 | Stop reason: SDUPTHER

## 2018-04-28 DIAGNOSIS — K44.9 HIATAL HERNIA: ICD-10-CM

## 2018-04-30 RX ORDER — FAMOTIDINE 40 MG/1
TABLET, FILM COATED ORAL
Qty: 90 TAB | Refills: 0 | Status: SHIPPED | OUTPATIENT
Start: 2018-04-30 | End: 2018-07-13 | Stop reason: SDUPTHER

## 2018-05-19 DIAGNOSIS — K44.9 HIATAL HERNIA: ICD-10-CM

## 2018-05-20 RX ORDER — FAMOTIDINE 40 MG/1
TABLET, FILM COATED ORAL
Qty: 90 TAB | Refills: 0 | Status: SHIPPED | OUTPATIENT
Start: 2018-05-20 | End: 2018-09-20

## 2018-07-02 ENCOUNTER — APPOINTMENT (OUTPATIENT)
Dept: GENERAL RADIOLOGY | Age: 78
End: 2018-07-02
Attending: EMERGENCY MEDICINE
Payer: MEDICARE

## 2018-07-02 ENCOUNTER — HOSPITAL ENCOUNTER (EMERGENCY)
Age: 78
Discharge: HOME | End: 2018-07-02
Attending: EMERGENCY MEDICINE
Payer: MEDICARE

## 2018-07-02 VITALS
BODY MASS INDEX: 23.3 KG/M2 | DIASTOLIC BLOOD PRESSURE: 60 MMHG | RESPIRATION RATE: 26 BRPM | OXYGEN SATURATION: 99 % | WEIGHT: 145 LBS | HEART RATE: 65 BPM | TEMPERATURE: 98 F | HEIGHT: 66 IN | SYSTOLIC BLOOD PRESSURE: 123 MMHG

## 2018-07-02 DIAGNOSIS — F45.8 PSYCHOGENIC HYPERVENTILATION: ICD-10-CM

## 2018-07-02 DIAGNOSIS — F41.0 PANIC ATTACK: Primary | ICD-10-CM

## 2018-07-02 LAB
ALBUMIN SERPL-MCNC: 3 G/DL (ref 3.5–5)
ALBUMIN/GLOB SERPL: 0.8 {RATIO} (ref 1.1–2.2)
ALP SERPL-CCNC: 52 U/L (ref 45–117)
ALT SERPL-CCNC: 17 U/L (ref 12–78)
ANION GAP SERPL CALC-SCNC: 8 MMOL/L (ref 5–15)
AST SERPL-CCNC: 22 U/L (ref 15–37)
BASOPHILS # BLD: 0.1 K/UL (ref 0–0.1)
BASOPHILS NFR BLD: 1 % (ref 0–1)
BILIRUB SERPL-MCNC: 0.4 MG/DL (ref 0.2–1)
BUN SERPL-MCNC: 18 MG/DL (ref 6–20)
BUN/CREAT SERPL: 16 (ref 12–20)
CALCIUM SERPL-MCNC: 8.2 MG/DL (ref 8.5–10.1)
CHLORIDE SERPL-SCNC: 111 MMOL/L (ref 97–108)
CO2 SERPL-SCNC: 23 MMOL/L (ref 21–32)
CREAT SERPL-MCNC: 1.1 MG/DL (ref 0.55–1.02)
DIFFERENTIAL METHOD BLD: ABNORMAL
EOSINOPHIL # BLD: 0.2 K/UL (ref 0–0.4)
EOSINOPHIL NFR BLD: 2 % (ref 0–7)
ERYTHROCYTE [DISTWIDTH] IN BLOOD BY AUTOMATED COUNT: 13.1 % (ref 11.5–14.5)
GLOBULIN SER CALC-MCNC: 3.6 G/DL (ref 2–4)
GLUCOSE SERPL-MCNC: 115 MG/DL (ref 65–100)
HCT VFR BLD AUTO: 38.6 % (ref 35–47)
HGB BLD-MCNC: 12.9 G/DL (ref 11.5–16)
IMM GRANULOCYTES # BLD: 0 K/UL (ref 0–0.04)
IMM GRANULOCYTES NFR BLD AUTO: 0 % (ref 0–0.5)
LIPASE SERPL-CCNC: 89 U/L (ref 73–393)
LYMPHOCYTES # BLD: 0.7 K/UL (ref 0.8–3.5)
LYMPHOCYTES NFR BLD: 8 % (ref 12–49)
MCH RBC QN AUTO: 33.5 PG (ref 26–34)
MCHC RBC AUTO-ENTMCNC: 33.4 G/DL (ref 30–36.5)
MCV RBC AUTO: 100.3 FL (ref 80–99)
MONOCYTES # BLD: 0.7 K/UL (ref 0–1)
MONOCYTES NFR BLD: 8 % (ref 5–13)
NEUTS SEG # BLD: 7.6 K/UL (ref 1.8–8)
NEUTS SEG NFR BLD: 81 % (ref 32–75)
NRBC # BLD: 0 K/UL (ref 0–0.01)
NRBC BLD-RTO: 0 PER 100 WBC
PLATELET # BLD AUTO: 232 K/UL (ref 150–400)
PMV BLD AUTO: 8.4 FL (ref 8.9–12.9)
POTASSIUM SERPL-SCNC: 3.7 MMOL/L (ref 3.5–5.1)
PROT SERPL-MCNC: 6.6 G/DL (ref 6.4–8.2)
RBC # BLD AUTO: 3.85 M/UL (ref 3.8–5.2)
RBC MORPH BLD: ABNORMAL
SODIUM SERPL-SCNC: 142 MMOL/L (ref 136–145)
TROPONIN I SERPL-MCNC: <0.05 NG/ML
WBC # BLD AUTO: 9.3 K/UL (ref 3.6–11)

## 2018-07-02 PROCEDURE — 83690 ASSAY OF LIPASE: CPT | Performed by: EMERGENCY MEDICINE

## 2018-07-02 PROCEDURE — 84484 ASSAY OF TROPONIN QUANT: CPT | Performed by: EMERGENCY MEDICINE

## 2018-07-02 PROCEDURE — 71046 X-RAY EXAM CHEST 2 VIEWS: CPT

## 2018-07-02 PROCEDURE — 80053 COMPREHEN METABOLIC PANEL: CPT | Performed by: EMERGENCY MEDICINE

## 2018-07-02 PROCEDURE — 36415 COLL VENOUS BLD VENIPUNCTURE: CPT | Performed by: EMERGENCY MEDICINE

## 2018-07-02 PROCEDURE — 85025 COMPLETE CBC W/AUTO DIFF WBC: CPT | Performed by: EMERGENCY MEDICINE

## 2018-07-02 PROCEDURE — 93005 ELECTROCARDIOGRAM TRACING: CPT

## 2018-07-02 PROCEDURE — 99284 EMERGENCY DEPT VISIT MOD MDM: CPT

## 2018-07-02 RX ORDER — CHOLECALCIFEROL (VITAMIN D3) 125 MCG
5 CAPSULE ORAL
Qty: 30 TAB | Refills: 0 | Status: SHIPPED | OUTPATIENT
Start: 2018-07-02 | End: 2020-04-20

## 2018-07-02 RX ORDER — HYDROXYZINE PAMOATE 25 MG/1
25 CAPSULE ORAL
Qty: 30 CAP | Refills: 0 | Status: SHIPPED | OUTPATIENT
Start: 2018-07-02 | End: 2019-10-24 | Stop reason: ALTCHOICE

## 2018-07-02 NOTE — ED TRIAGE NOTES
Triage:  Pt to ED from home by EMS due to concerns over sudden onset of CP and SOB around 11 AM today. Ens stated stable transport, Pt appeared anxious during transport and on arrival.  Pt on arrival with complaint of SOB, V/S stable with continued anxiety noted.

## 2018-07-02 NOTE — ED PROVIDER NOTES
HPI Comments: 65 y.o. female with past medical history significant for HTN, stroke, hiatal hernia, asthma, coagulation disorder, skin cancer, high cholesterol, anemia, watermelon stomach, morbid obesity, and GERD who presents from home via EMS with chief complaint of SOB. Pt had a sudden onset of SOB ~6 hours ago and she states that she had a sudden onset of dull, L-sided, chest pain once in the ED. Per EMS, the pt appeared anxious en route. Pt has a hx of a hiatal hernia as well as asthma as a child. Pt denies any abd pain, fever, chills, N/V/D. There are no other acute medical concerns at this time. Social hx: no tobacco use; no EtOH use  PCP: Yudith Montez MD    Note written by Jennifer Bauman, as dictated by Bina Han MD 4:45 PM    The history is provided by the patient. No  was used.         Past Medical History:   Diagnosis Date    Anemia NEC     Asthma     major asthma as a child/only with colds now    Cancer [facility])     BCCA removed    Coagulation disorder ([facility] [address]     on xarelto    GERD (gastroesophageal reflux disease) 10/22/2010    Hiatal hernia     Hypertension     hx of blood pressure med for short time    Ill-defined condition     increased cholesterol    Other unknown and unspecified cause of morbidity or mortality     eval with Dr Silvia Reyes for SOB, sept 2015:  results unkown    Stroke ([address] 2013    no deficits    Stroke ([address]     heat exhaustion    Watermelon stomach 2003       Past Surgical History:   Procedure Laterality Date    EXC SKIN MALIG >4CM FACE,FACIAL      times 3 - BCCA    HX BLADDER SUSPENSION      HX COLONOSCOPY      HX ENDOSCOPY  6/4/15    Dr. Beverly Francois HX GYN      vaginal del times 3    HX HERNIA REPAIR  2014    x2, failure    HX OTHER SURGICAL      EGDs    HX TONSILLECTOMY           Family History:   Problem Relation Age of Onset    COPD Mother     Asthma Mother     Heart Attack Father     Other Father      peptic ulcer disease    Anesth Problems Neg Hx        Social History     Social History    Marital status:      Spouse name: N/A    Number of children: N/A    Years of education: N/A     Occupational History     Retired     Social History Main Topics    Smoking status: Never Smoker    Smokeless tobacco: Never Used    Alcohol use No    Drug use: No    Sexual activity: No     Other Topics Concern    Not on file     Social History Narrative         ALLERGIES: Latex; Egg; Codeine; Darvon [propoxyphene]; and Pcn [penicillins]    Review of Systems   Constitutional: Negative for chills and fever. Respiratory: Positive for shortness of breath. Cardiovascular: Positive for chest pain. Gastrointestinal: Negative for abdominal pain, diarrhea, nausea and vomiting. Psychiatric/Behavioral: The patient is nervous/anxious. All other systems reviewed and are negative. Vitals:    07/02/18 1646   BP: 118/65   Pulse: 67   Resp: 24   Temp: 97.6 °F (36.4 °C)   SpO2: 99%   Weight: 65.8 kg (145 lb)   Height: 5' 6\" (1.676 m)            Physical Exam   Constitutional: She appears well-developed and well-nourished. No distress. HENT:   Head: Normocephalic and atraumatic. Eyes: Conjunctivae are normal.   Neck: Neck supple. Cardiovascular: Normal rate, regular rhythm and normal heart sounds. Occasional extrasystoles are present. No LE edema. Pulmonary/Chest: Effort normal and breath sounds normal. No stridor. No respiratory distress. Normal clear breath sounds bilaterally to auscultation. Abdominal: She exhibits no distension. Musculoskeletal: Normal range of motion. She exhibits no edema. Neurological: She is alert. Coordination normal.   Skin: Skin is warm and dry. Psychiatric: Her mood appears anxious. Anxious with psychogenic hyperventilation that is distractable. Nursing note and vitals reviewed.      Note written by Jennifer Hawkins, as dictated by Jennyfer Okeefe MD 4:45 PM    MDM    76-year-old female presents with shortness of breath over the course of the day along with pain is reproducible with palpation of the xiphoid. Highly atypical for ACS. She is breathing very rapidly and shallowly when discussing her current state of health but when monitored on telemetry her breathing slows. She is highly anxious but ultimately well-appearing. Her family states that she has recurrent anxiety often surrounding a hiatal hernia that she has had for many years. The does not appear to be a contributing factor to today's visit. Symptoms are consistent with psychogenic hyperventilation and subsequent precordial musculoskeletal chest pain due to increased rate of breathing. After discussion with the family her main symptoms revolve around inability to sleep and progressing anxiety so she was provided instructions on melatonin use and p.r.n. Vistaril. Plan to follow up with PCP as needed and return precautions discussed for worsening or new concerning symptoms. ED Course       Procedures    ED EKG interpretation:  Rhythm: sinus rhythm with occasional PAC's; and regular . Rate (approx.): 71; Axis: normal; ST/T wave: no abnormalities.    Note written by Jennifer Aldana, as dictated by Kourtney Thomas MD 4:44 PM

## 2018-07-02 NOTE — DISCHARGE INSTRUCTIONS
Panic Attacks: Care Instructions  Your Care Instructions    During a panic attack, you may have a feeling of intense fear or terror, trouble breathing, chest pain or tightness, heartbeat changes, dizziness, sweating, and shaking. A panic attack starts suddenly and usually lasts from 5 to 20 minutes but may last even longer. You have the most anxiety about 10 minutes after the attack starts. An attack can begin with a stressful event, or it can happen without a cause. Although panic attacks can cause scary symptoms, you can learn to manage them with self-care, counseling, and medicine. Follow-up care is a key part of your treatment and safety. Be sure to make and go to all appointments, and call your doctor if you are having problems. It's also a good idea to know your test results and keep a list of the medicines you take. How can you care for yourself at home? · Take your medicine exactly as directed. Call your doctor if you think you are having a problem with your medicine. · Go to your counseling sessions and follow-up appointments. · Recognize and accept your anxiety. Then, when you are in a situation that makes you anxious, say to yourself, \"This is not an emergency. I feel uncomfortable, but I am not in danger. I can keep going even if I feel anxious. \"  · Be kind to your body:  ¨ Relieve tension with exercise or a massage. ¨ Get enough rest.  ¨ Avoid alcohol, caffeine, nicotine, and illegal drugs. They can increase your anxiety level, cause sleep problems, or trigger a panic attack. ¨ Learn and do relaxation techniques. See below for more about these techniques. · Engage your mind. Get out and do something you enjoy. Go to a funny movie, or take a walk or hike. Plan your day. Having too much or too little to do can make you anxious. · Keep a record of your symptoms. Discuss your fears with a good friend or family member, or join a support group for people with similar problems.  Talking to others sometimes relieves stress. · Get involved in social groups, or volunteer to help others. Being alone sometimes makes things seem worse than they are. · Get at least 30 minutes of exercise on most days of the week to relieve stress. Walking is a good choice. You also may want to do other activities, such as running, swimming, cycling, or playing tennis or team sports. Relaxation techniques  Do relaxation exercises for 10 to 20 minutes a day. You can play soothing, relaxing music while you do them, if you wish. · Tell others in your house that you are going to do your relaxation exercises. Ask them not to disturb you. · Find a comfortable place, away from all distractions and noise. · Lie down on your back, or sit with your back straight. · Focus on your breathing. Make it slow and steady. · Breathe in through your nose. Breathe out through either your nose or mouth. · Breathe deeply, filling up the area between your navel and your rib cage. Breathe so that your belly goes up and down. · Do not hold your breath. · Breathe like this for 5 to 10 minutes. Notice the feeling of calmness throughout your whole body. As you continue to breathe slowly and deeply, relax by doing the following for another 5 to 10 minutes:  · Tighten and relax each muscle group in your body. You can begin at your toes and work your way up to your head. · Imagine your muscle groups relaxing and becoming heavy. · Empty your mind of all thoughts. · Let yourself relax more and more deeply. · Become aware of the state of calmness that surrounds you. · When your relaxation time is over, you can bring yourself back to alertness by moving your fingers and toes and then your hands and feet and then stretching and moving your entire body. Sometimes people fall asleep during relaxation, but they usually wake up shortly afterward.   · Always give yourself time to return to full alertness before you drive a car or do anything that might cause an accident if you are not fully alert. Never play a relaxation tape while driving a car. When should you call for help? Call 911 anytime you think you may need emergency care. For example, call if:  ? · You feel you cannot stop from hurting yourself or someone else. ? Watch closely for changes in your health, and be sure to contact your doctor if:  ? · Your panic attacks get worse. ? · You have new or different anxiety. ? · You are not getting better as expected. Where can you learn more? Go to http://candy-sheela.info/. Enter H601 in the search box to learn more about \"Panic Attacks: Care Instructions. \"  Current as of: May 12, 2017  Content Version: 11.4  © 7376-1945 [employer]. Care instructions adapted under license by [employer] (which disclaims liability or warranty for this information). If you have questions about a medical condition or this instruction, always ask your healthcare professional. [place] [address] any warranty or liability for your use of this information.

## 2018-07-03 LAB
ATRIAL RATE: 71 BPM
CALCULATED P AXIS, ECG09: 10 DEGREES
CALCULATED R AXIS, ECG10: 7 DEGREES
CALCULATED T AXIS, ECG11: 65 DEGREES
DIAGNOSIS, 93000: NORMAL
P-R INTERVAL, ECG05: 96 MS
Q-T INTERVAL, ECG07: 380 MS
QRS DURATION, ECG06: 80 MS
QTC CALCULATION (BEZET), ECG08: 412 MS
VENTRICULAR RATE, ECG03: 71 BPM

## 2018-07-06 DIAGNOSIS — K21.9 GASTRIC REFLUX: ICD-10-CM

## 2018-07-08 RX ORDER — LANSOPRAZOLE 30 MG/1
CAPSULE, DELAYED RELEASE ORAL
Qty: 90 CAP | Refills: 1 | Status: SHIPPED | OUTPATIENT
Start: 2018-07-08 | End: 2019-02-06

## 2018-07-13 ENCOUNTER — OFFICE VISIT (OUTPATIENT)
Dept: INTERNAL MEDICINE CLINIC | Age: 78
End: 2018-07-13

## 2018-07-13 ENCOUNTER — HOSPITAL ENCOUNTER (OUTPATIENT)
Dept: LAB | Age: 78
Discharge: HOME | End: 2018-07-13
Payer: MEDICARE

## 2018-07-13 ENCOUNTER — TELEPHONE (OUTPATIENT)
Dept: INTERNAL MEDICINE CLINIC | Age: 78
End: 2018-07-13

## 2018-07-13 VITALS
OXYGEN SATURATION: 99 % | BODY MASS INDEX: 23.14 KG/M2 | HEIGHT: 66 IN | HEART RATE: 74 BPM | WEIGHT: 144 LBS | TEMPERATURE: 98 F | DIASTOLIC BLOOD PRESSURE: 63 MMHG | RESPIRATION RATE: 18 BRPM | SYSTOLIC BLOOD PRESSURE: 99 MMHG

## 2018-07-13 DIAGNOSIS — K44.9 PARAESOPHAGEAL HERNIA: ICD-10-CM

## 2018-07-13 DIAGNOSIS — R06.02 SHORTNESS OF BREATH ON EXERTION: Primary | ICD-10-CM

## 2018-07-13 DIAGNOSIS — D64.9 ANEMIA, UNSPECIFIED TYPE: ICD-10-CM

## 2018-07-13 DIAGNOSIS — K31.819 WATERMELON STOMACH: ICD-10-CM

## 2018-07-13 DIAGNOSIS — I27.20 PULMONARY HYPERTENSION (HCC): ICD-10-CM

## 2018-07-13 PROCEDURE — 36415 COLL VENOUS BLD VENIPUNCTURE: CPT

## 2018-07-13 PROCEDURE — 85025 COMPLETE CBC W/AUTO DIFF WBC: CPT

## 2018-07-13 NOTE — Clinical Note
Can you please help me with seeing this patient soon? She has seen you in the past and COLLINS is worse and may need surgery for hernia now. Should I order a CT or barium swallow prior to her appt there?  Guru Hahn

## 2018-07-13 NOTE — PROGRESS NOTES
HPI:  Steven Vasquez is a 65y.o. year old female who is here for an urgent visit for increasing dyspnea on exertion. She has a known history of a large hiatal hernia with impression of her lung. She has a long-standing history of early satiety and increasing problems with shortness of breath. She was seen in the emergency room 10 days ago and had an extensive evaluation. Chest x-ray did reveal large hiatal hernia but otherwise negative. Her hemoglobin was normal at 12.9 but this was down from her previous 1 of 14+. She denies any exertional chest pain. No cough or sputum production. No nausea vomiting. No fevers or chills. No change in bowel or bladder habits. No melena. Past Medical History:   Diagnosis Date    Anemia NEC     Asthma     major asthma as a child/only with colds now    Cancer [facility])     BCCA removed    Coagulation disorder ([facility]     on xarelto    GERD (gastroesophageal reflux disease) 10/22/2010    Hiatal hernia     Hypertension     hx of blood pressure med for short time    Ill-defined condition     increased cholesterol    Other unknown and unspecified cause of morbidity or mortality     eval with Dr Bria Millan for SOB, sept 2015:  results unkown    Stroke ([facility] [address] 2013    no deficits    Stroke ([facility] [address]     heat exhaustion    Watermelon stomach 2003       Past Surgical History:   Procedure Laterality Date    EXC SKIN MALIG >4CM FACE,FACIAL      times 3 - BCCA    HX BLADDER SUSPENSION      HX COLONOSCOPY      HX ENDOSCOPY  6/4/15    Dr. Villa Roman      vaginal del times 3    HX HERNIA REPAIR  2014    x2, failure    HX OTHER SURGICAL      EGDs    HX TONSILLECTOMY         Prior to Admission medications    Medication Sig Start Date End Date Taking? Authorizing Provider   lansoprazole (PREVACID) 30 mg capsule TAKE ONE CAPSULE BY MOUTH EVERY MORNING BEFORE BREAKFAST *PA FAXED 3/6* 7/8/18  Yes Luke Torres III, MD   melatonin tab tablet Take 1 Tab by mouth nightly. 7/2/18  Yes Hien Campbell MD   hydrOXYzine pamoate (VISTARIL) 25 mg capsule Take 1 Cap by mouth three (3) times daily as needed for Anxiety. 7/2/18  Yes Hien Campbell MD   famotidine (PEPCID) 40 mg tablet TAKE 1 TABLET BY MOUTH EVERY DAY 5/20/18  Yes Brady Montemayor III, MD   ALPRAZolam Tabby Jordanaz) 0.5 mg tablet TAKE 1 TABLET BY MOUTH 3 TIMES A DAY 3/18/18  Yes Brady Montemayor III, MD   ferrous sulfate 325 mg (65 mg iron) tablet TAKE 1 TABLET BY MOUTH 3 TIMES A DAY WITH MEAL 12/5/17  Yes Brady Montemayor III, MD   polyethylene glycol (MIRALAX) 17 gram/dose powder Take 17 g by mouth daily as needed. 4/3/17  Yes Brady Montemayor III, MD   pravastatin (PRAVACHOL) 20 mg tablet TAKE 1 TABLET BY MOUTH AT BEDTIME 3/21/17  Yes Brady Montemayor III, MD   cholecalciferol, vitamin d3, (VITAMIN D) 1,000 unit tablet Take 2,000 Units by mouth daily. Yes Historical Provider   MULTIVITAMINS (MULTIVITAMIN PO) Take 1 Tab by mouth daily.    Yes Historical Provider       Social History     Social History    Marital status:      Spouse name: N/A    Number of children: N/A    Years of education: N/A     Occupational History     Retired     Social History Main Topics    Smoking status: Never Smoker    Smokeless tobacco: Never Used    Alcohol use No    Drug use: No    Sexual activity: No     Other Topics Concern    Not on file     Social History Narrative          ROS  Per HPI    Visit Vitals    BP 99/63    Pulse 74    Temp 98 °F (36.7 °C) (Oral)    Resp 18    Ht 5' 6\" (1.676 m)    Wt 144 lb (65.3 kg)    SpO2 99%    BMI 23.24 kg/m2         Physical Exam   Physical Examination: General appearance - alert, well appearing, and in no distress  Mouth - mucous membranes moist, pharynx normal without lesions  Neck - supple, no significant adenopathy  Lymphatics - no palpable lymphadenopathy, no hepatosplenomegaly  Chest - clear to auscultation, no wheezes, rales or rhonchi, symmetric air entry  Heart - normal rate, regular rhythm, normal S1, S2, no murmurs, rubs, clicks or gallops  Abdomen - tenderness noted epigastrium. No masses. no rebound tenderness noted  bowel sounds normal  Extremities - peripheral pulses normal, no pedal edema, no clubbing or cyanosis      Assessment/Plan:  Diagnoses and all orders for this visit:    1. Shortness of breath on exertion -is hypoxic when she walked down the tan today. This quickly returned to normal with sitting. Likely related to her previous diagnosis of large hiatal hernia. She also has a known history of GI bleeds associated with watermelon stomach. Will repeat her CBC today. She will likely need surgical intervention. She has seen Dr. Corie Sheppard at [facility] for this and will refer back there. -     CBC WITH AUTOMATED DIFF    2. Pulmonary hypertension ([address]    3. Watermelon stomach    4. Anemia, unspecified type    5. Paraesophageal hernia       Follow-up Disposition:  Return for Wellness Visit. Advised her to call back or return to office if symptoms worsen/change/persist.  Discussed expected course/resolution/complications of diagnosis in detail with patient. Medication risks/benefits/costs/interactions/alternatives discussed with patient. She was given an after visit summary which includes diagnoses, current medications, & vitals. She expressed understanding with the diagnosis and plan.

## 2018-07-14 LAB
BASOPHILS # BLD AUTO: 0 X10E3/UL (ref 0–0.2)
BASOPHILS NFR BLD AUTO: 1 %
EOSINOPHIL # BLD AUTO: 0.1 X10E3/UL (ref 0–0.4)
EOSINOPHIL NFR BLD AUTO: 1 %
ERYTHROCYTE [DISTWIDTH] IN BLOOD BY AUTOMATED COUNT: 13.7 % (ref 12.3–15.4)
HCT VFR BLD AUTO: 42.6 % (ref 34–46.6)
HGB BLD-MCNC: 14.3 G/DL (ref 11.1–15.9)
IMM GRANULOCYTES # BLD: 0 X10E3/UL (ref 0–0.1)
IMM GRANULOCYTES NFR BLD: 0 %
LYMPHOCYTES # BLD AUTO: 0.9 X10E3/UL (ref 0.7–3.1)
LYMPHOCYTES NFR BLD AUTO: 12 %
MCH RBC QN AUTO: 32.4 PG (ref 26.6–33)
MCHC RBC AUTO-ENTMCNC: 33.6 G/DL (ref 31.5–35.7)
MCV RBC AUTO: 96 FL (ref 79–97)
MONOCYTES # BLD AUTO: 0.6 X10E3/UL (ref 0.1–0.9)
MONOCYTES NFR BLD AUTO: 7 %
NEUTROPHILS # BLD AUTO: 6.4 X10E3/UL (ref 1.4–7)
NEUTROPHILS NFR BLD AUTO: 79 %
PLATELET # BLD AUTO: 264 X10E3/UL (ref 150–379)
RBC # BLD AUTO: 4.42 X10E6/UL (ref 3.77–5.28)
WBC # BLD AUTO: 8 X10E3/UL (ref 3.4–10.8)

## 2018-07-16 ENCOUNTER — TELEPHONE (OUTPATIENT)
Dept: INTERNAL MEDICINE CLINIC | Age: 78
End: 2018-07-16

## 2018-07-16 DIAGNOSIS — K44.9 HIATAL HERNIA: Primary | ICD-10-CM

## 2018-07-16 DIAGNOSIS — R06.09 DOE (DYSPNEA ON EXERTION): ICD-10-CM

## 2018-07-16 NOTE — TELEPHONE ENCOUNTER
Was able to get in touch with Dr. Ramses Mora from [address] [place] [address] about her hiatal hernia. He is willing to repair the hernia. Wants pt to have a stress test and chest CT prior to the appt with him. Will order these and he will assist with an appt with him next week. Called Mr. Geiger Escort and explained the plan with mrs Ann on the line as well. WIll arrange CT chest this week. Also will discuss stress test after discussion with cardiology to decide about best test to do - she will not be able to walk adequately to do testing.

## 2018-07-19 ENCOUNTER — HOSPITAL ENCOUNTER (OUTPATIENT)
Dept: CT IMAGING | Age: 78
Discharge: HOME | End: 2018-07-19
Attending: INTERNAL MEDICINE
Payer: MEDICARE

## 2018-07-19 DIAGNOSIS — R06.09 DOE (DYSPNEA ON EXERTION): ICD-10-CM

## 2018-07-19 DIAGNOSIS — K44.9 HIATAL HERNIA: ICD-10-CM

## 2018-07-19 PROCEDURE — 71260 CT THORAX DX C+: CPT

## 2018-07-19 PROCEDURE — 74011636320 HC RX REV CODE- 636/320: Performed by: INTERNAL MEDICINE

## 2018-07-19 PROCEDURE — 74011000258 HC RX REV CODE- 258: Performed by: INTERNAL MEDICINE

## 2018-07-19 RX ADMIN — IOPAMIDOL 100 ML: 612 INJECTION, SOLUTION INTRAVENOUS at 11:31

## 2018-07-19 RX ADMIN — SODIUM CHLORIDE 50 ML: 900 INJECTION, SOLUTION INTRAVENOUS at 11:31

## 2018-07-20 ENCOUNTER — TELEPHONE (OUTPATIENT)
Dept: INTERNAL MEDICINE CLINIC | Age: 78
End: 2018-07-20

## 2018-07-20 NOTE — TELEPHONE ENCOUNTER
----- Message from Rojas Tucker MD sent at 7/19/2018  1:38 PM EDT -----  Copy to Dr. Oscar Phipps. Notify OK.

## 2018-07-20 NOTE — TELEPHONE ENCOUNTER
Spoke with pt and advised that appt has been set up with Dr. Anjana Patel @[facility] for 7/26 at 8:15, Meenakshi Garg [phone]. Explained that I am waiting to hear back from his office about if she needs a stress test since she had a cath in 2015. I will call her back with that info Monday. Copy of CT routed to Dr. Renetta Cope.

## 2018-07-25 ENCOUNTER — TELEPHONE (OUTPATIENT)
Dept: INTERNAL MEDICINE CLINIC | Age: 78
End: 2018-07-25

## 2018-07-25 DIAGNOSIS — Z01.810 ENCOUNTER FOR PRE-OPERATIVE CARDIOVASCULAR CLEARANCE: Primary | ICD-10-CM

## 2018-07-25 NOTE — TELEPHONE ENCOUNTER
Spoke with pt and spouse. Explained that I spoke with Dr. Chaya Russo office and they do want pt to have stress test. It does not have to be before her appt. I will place order and  will contact her to schedule. Verbalized understanding. Orders Placed This Encounter    ECHO TTE STRESS COMP W OR WO CONTR     Standing Status:   Future     Standing Expiration Date:   1/25/2019     Order Specific Question:   Reason for Exam:     Answer:   Z01.810     Order Specific Question:   Contrast Enhancement (Bubble Study, Definity, Optison) may be used if criteria listed in established evidence-based protocol has been identified. Answer:    Yes

## 2018-07-27 ENCOUNTER — TELEPHONE (OUTPATIENT)
Dept: INTERNAL MEDICINE CLINIC | Age: 78
End: 2018-07-27

## 2018-07-27 DIAGNOSIS — Z01.818 PRE-OPERATIVE CLEARANCE: ICD-10-CM

## 2018-07-27 DIAGNOSIS — R06.02 SOB (SHORTNESS OF BREATH) ON EXERTION: Primary | ICD-10-CM

## 2018-07-27 DIAGNOSIS — K44.9 LARGE HIATAL HERNIA: ICD-10-CM

## 2018-07-27 DIAGNOSIS — K44.9 PARAESOPHAGEAL HERNIA: ICD-10-CM

## 2018-07-27 NOTE — TELEPHONE ENCOUNTER
Spoke with Dr. Dayton Mortimer and advised that pt is not able to do stress echo. CAN NOT walk on treadmill. Will then do a dobutamine stress echo. Orders placed for STAT so that pt can have surgery ASAP. Pt advised and will let me know if no call from [employer]. Orders Placed This Encounter    ECHO DOBUTAMINE STRESS     PLEASE RECOGNIZE STAT. PT WAITING FOR SURGERY.  THANKS     Standing Status:   Future     Standing Expiration Date:   1/27/2019     Order Specific Question:   Reason for Exam:     Answer:   R06.02

## 2018-07-30 ENCOUNTER — DOCUMENTATION ONLY (OUTPATIENT)
Dept: CARDIOLOGY CLINIC | Age: 78
End: 2018-07-30

## 2018-07-30 ENCOUNTER — TELEPHONE (OUTPATIENT)
Dept: INTERNAL MEDICINE CLINIC | Age: 78
End: 2018-07-30

## 2018-07-30 NOTE — TELEPHONE ENCOUNTER
Spoke with pt and spouse in ref to appt for dobutamine stress echo that is being recommended by Dr. Girihs Camara for pre op and SOB. Tried to have  schedule, but could not understand what test was being ordered and the \"department\" at [facility] kept giving incorrect order. She is now scheduled at [address] 8/10 arrive 12p. Pre procedure info given. Verbalized understanding. RAISA Almaraz there were early appts available, but the pt did not want to get up early.

## 2018-07-30 NOTE — PROGRESS NOTES
Patient is scheduled for a nuclear test on 8-10-18 at 12 noon at [facility]. Orders were placed for a nuclear test and a dobutamine stress test. Called Dr. Bushra Frank office and left message for a call back to verify type of test. 12:40 pm, per Dawson Guido LPN (Dr. Bushra Frank office) Do Dobutamine stress echo at [facility] [address]. Informed her we are able to do Dobutamine stress echo at the same time the nuclear was scheduled. Gretel Simeon verified paient knew location and time and didn't need a call to verify.

## 2018-08-10 ENCOUNTER — CLINICAL SUPPORT (OUTPATIENT)
Dept: CARDIOLOGY CLINIC | Age: 78
End: 2018-08-10

## 2018-08-10 VITALS — BODY MASS INDEX: 23.24 KG/M2 | WEIGHT: 144 LBS

## 2018-08-10 DIAGNOSIS — I63.50 CEREBROVASCULAR ACCIDENT (CVA) DUE TO OCCLUSION OF CEREBRAL ARTERY (HCC): ICD-10-CM

## 2018-08-10 DIAGNOSIS — Z01.818 PRE-OP TESTING: Primary | ICD-10-CM

## 2018-08-10 RX ORDER — DOBUTAMINE HYDROCHLORIDE 200 MG/100ML
0-10 INJECTION INTRAVENOUS ONCE
Qty: 10 ML | Refills: 0
Start: 2018-08-10 | End: 2018-08-10

## 2018-08-10 NOTE — PROGRESS NOTES
See scanned report. Dr. Hermelinda Coburn ordered study and Dr. Gilles Flowers read study. ID verified per protocol. Explained procedure and risks to patient. All concerns and questions ansewered prior to obtaining consent test. Patient developed headache during test. At 10 minutes in recovey, patient without symptoms or complaints voiced.

## 2018-08-15 DIAGNOSIS — K44.9 HIATAL HERNIA: ICD-10-CM

## 2018-08-15 RX ORDER — FAMOTIDINE 40 MG/1
TABLET, FILM COATED ORAL
Qty: 90 TAB | Refills: 0 | Status: SHIPPED | OUTPATIENT
Start: 2018-08-15 | End: 2018-09-20

## 2018-08-17 ENCOUNTER — TELEPHONE (OUTPATIENT)
Dept: INTERNAL MEDICINE CLINIC | Age: 78
End: 2018-08-17

## 2018-08-20 NOTE — TELEPHONE ENCOUNTER
Spoke with pt and spouse and advised that per [facility], stress echo is normal. Need to discuss with Dr. Domiinque Henning to see what next is. This was ordered at his request for pt to undergo ~7-8 hour surgery.

## 2018-09-05 DIAGNOSIS — F41.9 ANXIETY: ICD-10-CM

## 2018-09-05 RX ORDER — ALPRAZOLAM 0.5 MG/1
TABLET ORAL
Qty: 40 TAB | Refills: 1 | OUTPATIENT
Start: 2018-09-05 | End: 2019-03-25 | Stop reason: SDUPTHER

## 2018-09-07 NOTE — TELEPHONE ENCOUNTER
Orders Placed This Encounter    ALPRAZolam (XANAX) 0.5 mg tablet     Sig: TAKE 1 TABLET BY MOUTH 3 TIMES A DAY AS NEEDED     Dispense:  40 Tab     Refill:  1     Not to exceed 4 additional fills before 09/15/2018. The above controlled substance refill was called into patients pharmacy -[facility]/ - authorized by Dr. Ambrosio العراقي.

## 2018-09-10 ENCOUNTER — TELEPHONE (OUTPATIENT)
Dept: INTERNAL MEDICINE CLINIC | Age: 78
End: 2018-09-10

## 2018-09-10 NOTE — TELEPHONE ENCOUNTER
rupinder at [facility] [phone]     Wants dr Oscar Luis to know that pt just had surgery and had a fall last night and they are trying to get a admit to rehab.

## 2018-09-11 ENCOUNTER — HOSPITAL ENCOUNTER (EMERGENCY)
Age: 78
Discharge: ANOTHER HOSPITAL | End: 2018-09-11
Attending: EMERGENCY MEDICINE
Payer: MEDICARE

## 2018-09-11 ENCOUNTER — APPOINTMENT (OUTPATIENT)
Dept: GENERAL RADIOLOGY | Age: 78
End: 2018-09-11
Attending: EMERGENCY MEDICINE
Payer: MEDICARE

## 2018-09-11 VITALS
RESPIRATION RATE: 28 BRPM | TEMPERATURE: 100 F | BODY MASS INDEX: 21.69 KG/M2 | HEIGHT: 66 IN | HEART RATE: 91 BPM | SYSTOLIC BLOOD PRESSURE: 152 MMHG | WEIGHT: 135 LBS | DIASTOLIC BLOOD PRESSURE: 69 MMHG | OXYGEN SATURATION: 96 %

## 2018-09-11 DIAGNOSIS — J90 PLEURAL EFFUSION: Primary | ICD-10-CM

## 2018-09-11 LAB
ALBUMIN SERPL-MCNC: 2.9 G/DL (ref 3.5–5)
ALBUMIN/GLOB SERPL: 0.5 {RATIO} (ref 1.1–2.2)
ALP SERPL-CCNC: 72 U/L (ref 45–117)
ALT SERPL-CCNC: 16 U/L (ref 12–78)
AMORPH CRY URNS QL MICRO: ABNORMAL
ANION GAP SERPL CALC-SCNC: 13 MMOL/L (ref 5–15)
APPEARANCE UR: CLEAR
AST SERPL-CCNC: 22 U/L (ref 15–37)
ATRIAL RATE: 89 BPM
BACTERIA URNS QL MICRO: NEGATIVE /HPF
BASOPHILS # BLD: 0 K/UL (ref 0–0.1)
BASOPHILS NFR BLD: 0 % (ref 0–1)
BILIRUB SERPL-MCNC: 0.8 MG/DL (ref 0.2–1)
BILIRUB UR QL: NEGATIVE
BNP SERPL-MCNC: 398 PG/ML (ref 0–450)
BUN SERPL-MCNC: 9 MG/DL (ref 6–20)
BUN/CREAT SERPL: 9 (ref 12–20)
CALCIUM SERPL-MCNC: 9.5 MG/DL (ref 8.5–10.1)
CALCULATED R AXIS, ECG10: -9 DEGREES
CALCULATED T AXIS, ECG11: 19 DEGREES
CHLORIDE SERPL-SCNC: 95 MMOL/L (ref 97–108)
CO2 SERPL-SCNC: 27 MMOL/L (ref 21–32)
COLOR UR: ABNORMAL
COMMENT, HOLDF: NORMAL
CREAT SERPL-MCNC: 0.97 MG/DL (ref 0.55–1.02)
DIAGNOSIS, 93000: NORMAL
DIFFERENTIAL METHOD BLD: ABNORMAL
EOSINOPHIL # BLD: 0.1 K/UL (ref 0–0.4)
EOSINOPHIL NFR BLD: 1 % (ref 0–7)
EPITH CASTS URNS QL MICRO: ABNORMAL /LPF
ERYTHROCYTE [DISTWIDTH] IN BLOOD BY AUTOMATED COUNT: 12.9 % (ref 11.5–14.5)
GLOBULIN SER CALC-MCNC: 5.3 G/DL (ref 2–4)
GLUCOSE SERPL-MCNC: 93 MG/DL (ref 65–100)
GLUCOSE UR STRIP.AUTO-MCNC: NEGATIVE MG/DL
HCT VFR BLD AUTO: 41.8 % (ref 35–47)
HGB BLD-MCNC: 13.6 G/DL (ref 11.5–16)
HGB UR QL STRIP: ABNORMAL
IMM GRANULOCYTES # BLD: 0.1 K/UL (ref 0–0.04)
IMM GRANULOCYTES NFR BLD AUTO: 1 % (ref 0–0.5)
KETONES UR QL STRIP.AUTO: 15 MG/DL
LEUKOCYTE ESTERASE UR QL STRIP.AUTO: NEGATIVE
LYMPHOCYTES # BLD: 0.6 K/UL (ref 0.8–3.5)
LYMPHOCYTES NFR BLD: 6 % (ref 12–49)
MCH RBC QN AUTO: 32.5 PG (ref 26–34)
MCHC RBC AUTO-ENTMCNC: 32.5 G/DL (ref 30–36.5)
MCV RBC AUTO: 100 FL (ref 80–99)
MONOCYTES # BLD: 0.8 K/UL (ref 0–1)
MONOCYTES NFR BLD: 8 % (ref 5–13)
NEUTS SEG # BLD: 9 K/UL (ref 1.8–8)
NEUTS SEG NFR BLD: 84 % (ref 32–75)
NITRITE UR QL STRIP.AUTO: NEGATIVE
NRBC # BLD: 0 K/UL (ref 0–0.01)
NRBC BLD-RTO: 0 PER 100 WBC
P-R INTERVAL, ECG05: 122 MS
PH UR STRIP: 6.5 [PH] (ref 5–8)
PLATELET # BLD AUTO: 412 K/UL (ref 150–400)
PMV BLD AUTO: 7.8 FL (ref 8.9–12.9)
POTASSIUM SERPL-SCNC: 4.5 MMOL/L (ref 3.5–5.1)
PROT SERPL-MCNC: 8.2 G/DL (ref 6.4–8.2)
PROT UR STRIP-MCNC: NEGATIVE MG/DL
Q-T INTERVAL, ECG07: 362 MS
QRS DURATION, ECG06: 86 MS
QTC CALCULATION (BEZET), ECG08: 440 MS
RBC # BLD AUTO: 4.18 M/UL (ref 3.8–5.2)
RBC #/AREA URNS HPF: ABNORMAL /HPF (ref 0–5)
RBC MORPH BLD: ABNORMAL
SAMPLES BEING HELD,HOLD: NORMAL
SODIUM SERPL-SCNC: 135 MMOL/L (ref 136–145)
SP GR UR REFRACTOMETRY: 1.01 (ref 1–1.03)
TROPONIN I SERPL-MCNC: <0.05 NG/ML
UROBILINOGEN UR QL STRIP.AUTO: 0.2 EU/DL (ref 0.2–1)
VENTRICULAR RATE, ECG03: 89 BPM
WBC # BLD AUTO: 10.6 K/UL (ref 3.6–11)
WBC URNS QL MICRO: ABNORMAL /HPF (ref 0–4)

## 2018-09-11 PROCEDURE — 84484 ASSAY OF TROPONIN QUANT: CPT | Performed by: EMERGENCY MEDICINE

## 2018-09-11 PROCEDURE — 71045 X-RAY EXAM CHEST 1 VIEW: CPT

## 2018-09-11 PROCEDURE — 99285 EMERGENCY DEPT VISIT HI MDM: CPT

## 2018-09-11 PROCEDURE — 83880 ASSAY OF NATRIURETIC PEPTIDE: CPT | Performed by: EMERGENCY MEDICINE

## 2018-09-11 PROCEDURE — 51701 INSERT BLADDER CATHETER: CPT

## 2018-09-11 PROCEDURE — 85025 COMPLETE CBC W/AUTO DIFF WBC: CPT | Performed by: EMERGENCY MEDICINE

## 2018-09-11 PROCEDURE — 36415 COLL VENOUS BLD VENIPUNCTURE: CPT | Performed by: EMERGENCY MEDICINE

## 2018-09-11 PROCEDURE — 80053 COMPREHEN METABOLIC PANEL: CPT | Performed by: EMERGENCY MEDICINE

## 2018-09-11 PROCEDURE — 77030011943

## 2018-09-11 PROCEDURE — 81001 URINALYSIS AUTO W/SCOPE: CPT | Performed by: EMERGENCY MEDICINE

## 2018-09-11 PROCEDURE — 93005 ELECTROCARDIOGRAM TRACING: CPT

## 2018-09-11 NOTE — ED PROVIDER NOTES
HPI Comments: Red Britt is a 65 y.o. female who presents  to the ED via EMS with a c/o fatigue, generalized weakness and lethargy onset 8 days ago. Pt's  states that pt has been increasingly fatigued and lethargic since being discharged on 9/3/18 after having Hiatal Hernia surgery. Pt's  states that her surgery was on 8/28/18 and she was hospitalized until 9/3/18 (by  at [address] [place] [address]). Pt's  also reports that she appeared to be recovering well until Saturday, on this fate she began to decline. Pt specifically denies chest pain, shortness of breath, n/v,d , fever, chills, numbness, tingling, abdominal pain, back pain, cough, leg swelling, dizziness or any other acute sx. Pt denies any recent travel, known sick contacts, or recent illness. PCP: Darwin Cazares MD 
PMHx significant for:Anemia, GERD, HLD, Asthma, HTN, TIA, Hiatal Hernia PSHx significant for: Hernia Repair, Tonsillectomy, Endoscopy, EGD, Facial Surgery Social Hx: Tobacco: none EtOH: none Illicit drug use: none There are no further complaints or symptoms at this time. Signed by: Maury Judd, scribjordi for Tanya Fernando on September 11th, 2018 at 13:49pm. 
 
 
The history is provided by the patient and the spouse. No  was used. Past Medical History:  
Diagnosis Date  Anemia NEC  Asthma   
 major asthma as a child/only with colds now  Cancer ([facility] [address] [address] BCCA removed  Coagulation disorder ([facility] [address]   
 on xarelto  GERD (gastroesophageal reflux disease) 10/22/2010  
 Hiatal hernia  Hypertension   
 hx of blood pressure med for short time  Ill-defined condition   
 increased cholesterol  Other unknown and unspecified cause of morbidity or mortality   
 eval with Dr Vernadine Denver for SOB, sept 2015:  results unkown  Stroke [facility]) 2013  
 no deficits  Stroke ([facility])   
 heat exhaustion  Watermelon stomach 2003 Past Surgical History: Procedure Laterality Date  EXC SKIN MALIG >4CM FACE,FACIAL    
 times 3 - BCCA  
 HX BLADDER SUSPENSION    
 HX COLONOSCOPY    
 HX ENDOSCOPY  6/4/15 Dr. Coco Bedolla  HX GYN    
 vaginal del times 3  
 HX HERNIA REPAIR  2014  
 x2, failure  HX OTHER SURGICAL    
 EGDs  HX TONSILLECTOMY Family History:  
Problem Relation Age of Onset  COPD Mother  Asthma Mother  Heart Attack Father  Other Father   
  peptic ulcer disease  Anesth Problems Neg Hx Social History Social History  Marital status:  Spouse name: N/A  
 Number of children: N/A  
 Years of education: N/A Occupational History   Retired Social History Main Topics  Smoking status: Never Smoker  Smokeless tobacco: Never Used  Alcohol use No  
 Drug use: No  
 Sexual activity: No  
 
Other Topics Concern  Not on file Social History Narrative ALLERGIES: Latex; Egg; Codeine; Darvon [propoxyphene]; and Pcn [penicillins] Review of Systems Constitutional: Positive for fatigue. Negative for chills and fever. Respiratory: Negative for cough and shortness of breath. Cardiovascular: Negative for chest pain and leg swelling. Gastrointestinal: Negative for nausea and vomiting. Genitourinary: Negative for dysuria and hematuria. Musculoskeletal: Positive for back pain. Negative for neck pain. Neurological: Positive for weakness. Negative for numbness and headaches. All other systems reviewed and are negative. Vitals:  
 09/11/18 1306 BP: 134/65 Pulse: 88 Resp: (!) 35 Temp: 98.9 °F (37.2 °C) SpO2: 92% Weight: 61.2 kg (135 lb) Height: 5' 6\" (1.676 m) Physical Exam  
Constitutional: She is oriented to person, place, and time. She appears cachectic. No distress. HENT:  
Head: Normocephalic and atraumatic. Eyes: Pupils are equal, round, and reactive to light. Neck: Normal range of motion. Neck supple. Cardiovascular: Normal rate, regular rhythm and normal heart sounds. Exam reveals no gallop and no friction rub. No murmur heard. Pulmonary/Chest: Effort normal and breath sounds normal. No respiratory distress. She has no wheezes. Abdominal: Soft. Bowel sounds are normal. She exhibits no distension. There is generalized tenderness. There is no rebound and no guarding. PEG tube presentin left upper abdomen, tenderness present 
(no discharge, increased warmth) Musculoskeletal: Normal range of motion. Neurological: She is alert and oriented to person, place, and time. Skin: Skin is warm. No rash noted. She is not diaphoretic. Psychiatric: She has a normal mood and affect. Her behavior is normal. Judgment and thought content normal.  
Nursing note and vitals reviewed. MDM 
 
 
ED Course This is a 76-year-old female with past medical history, review of systems, physical exam as above, presenting with complaints of weakness, fatigue, lethargy, in the setting of recent discharge from an outside hospital for hiatal hernia repair.  states the patient was discharged approximately one week ago, initially doing well, with gradually advancing diet, however worsening weakness in the last 4 days. Upon arrival patient is awake, alert, tired appearing, noted to be mildly tachypneic, with low room air saturations, requiring 2 L of oxygen by nasal cannula. Physical exam remarkable for a cachectic elderly female, in no acute distress, with soft abdomen, mildly tender to palpation, left-sided G-tube site, without erythema, or discharge, no pedal edema, regular rate and rhythm without murmurs gallops or rubs. Differential includes urinary tract infection, dehydration, electrolyte abnormality, pneumonia. Plan to continue supplemental oxygen, obtain CMP, CBC, chest x-ray, cardiac enzymes. We will reevaluate, and make a disposition based on the patient's diagnostics and response to therapy. Procedures 2:53 PM 
Family requesting pt be transferred to [address] [place] [address]. Spoke with Dr. Elin JENSEN, who will accept the pt. Will go through [facility] transfer center for transport. 3:18 PM 
Pt has been accepted by  at this time.

## 2018-09-11 NOTE — ED NOTES
Report called to [facility] MARINE URIARTE at [facility]. [facility] notified, ETA within 10-15 minutes, confirmation no. [id number].

## 2018-09-11 NOTE — ED NOTES
ED EKG interpretation: 
Rhythm: normal sinus rhythm; and regular . Rate (approx.): 89; Axis: normal; ST/T wave: T wave inverted in lead 3; No ST changes, PAC's; 13:26pm 
 
As dictated by Shanti De La Cruz. Lakshmi Sparrow MD 
Signed by: Gaurang Hooks, scribing for Shanti De La Cruz.  Diskin on September 11th, 2018 at 15:42pm.

## 2018-09-11 NOTE — ED TRIAGE NOTES
Triage note: Pt arrives via EMS from home for fatigue and lethargy since having hiatal hernia repair. Pt states she feels tired. Pt unable to recall when surgery occurred, not able to provide details about current situation. Pt  reports surgery on 8/28 at [address] [place] [address] by Dr Tim Walter, chandler'd on 9/3. States pt was doing well until Saturday when there was a sharp decline in pt's glob al strength.

## 2018-09-11 NOTE — PROGRESS NOTES
Date of previous inpatient admission/ ED visit? 7/2/18 ED visit What brought the patient back to ED? Patient presents to the ED w/ weight loss and post op problem ( surgery at [address] [place] [address] 8/28/18) Did patient decline recommended services during last admission/ ED visit (if yes, what)? No currently [facility] provider is At [facility] (RN/PT/OT) Has patient seen a provider since their last inpatient admission/ED visit (if yes, when)? Yes seen by Mallory Head III on 7/13/18. CM Interventions: 
From previous inpatient admission/ED visit: Assessment From current inpatient admission/ED visit:Assessment SSED/CM consult received and appreciated. EMR reviewed. History significant for HTN, GERD, CVA, SOB, Anemia, and Hiatal Hernia Repair. Patient presents to the ED w/ fatigue and lethargy. Met w/ patient and introduced to role of CM. Patient alert and verbalized spouse had went to the [facility]. Noted Ms. Ann delayed w/ verbal responses. Informed this writer to return. Met Mr. Igor Bond as returning back to room. Provided history patient is followed by [facility] / [facility] - spouse attempted to get additional help into the home and contacted [facility] who provided [facility] [phone] who recommended patient be evaluated for rehab while at [facility].  states made contact w/ [facility] - Surgeon/PA regarding possible transfer to facility noted on diversion.  was able to provide updates to Dr.Francis Marmolejo. Home is 3 levels w/ 3 steps to enter (master bedroom is on the first level. Case Management will follow for transitional care needs. BA Medicare A/B and [employer] are [employer]. Care Management Interventions PCP Verified by CM: Yes Last Visit to PCP: 07/13/18 Palliative Care Criteria Met (RRAT>21 & CHF Dx)?: No 
Transition of Care Consult (CM Consult): Discharge Planning MyChart Signup: Yes Discharge Durable Medical Equipment: No 
Health Maintenance Reviewed:  Yes 
 Physical Therapy Consult: No 
Occupational Therapy Consult: No 
Speech Therapy Consult: No 
Current Support Network: Lives with Spouse, Own Home Confirm Follow Up Transport:  (TBD) Plan discussed with Pt/Family/Caregiver: Yes [employer] Information Provided?: No 
Discharge Location Discharge Placement:  (TBD)

## 2018-09-20 ENCOUNTER — PATIENT OUTREACH (OUTPATIENT)
Dept: INTERNAL MEDICINE CLINIC | Age: 78
End: 2018-09-20

## 2018-09-20 RX ORDER — DOCUSATE SODIUM 50 MG/5ML
100 LIQUID ORAL 2 TIMES DAILY
COMMUNITY
End: 2018-11-30 | Stop reason: SDUPTHER

## 2018-09-20 RX ORDER — MIRTAZAPINE 7.5 MG/1
7.5 TABLET, FILM COATED ORAL
COMMUNITY
End: 2018-11-30 | Stop reason: DRUGHIGH

## 2018-09-20 RX ORDER — METOPROLOL TARTRATE 25 MG/1
12.5 TABLET, FILM COATED ORAL 2 TIMES DAILY
COMMUNITY
End: 2018-11-15 | Stop reason: SDUPTHER

## 2018-09-20 RX ORDER — LANOLIN ALCOHOL/MO/W.PET/CERES
400 CREAM (GRAM) TOPICAL 2 TIMES DAILY
COMMUNITY
End: 2019-10-24 | Stop reason: ALTCHOICE

## 2018-09-20 RX ORDER — SENNOSIDES 8.6 MG/1
2 TABLET ORAL
COMMUNITY
End: 2019-10-24 | Stop reason: ALTCHOICE

## 2018-09-20 RX ORDER — AMIODARONE HYDROCHLORIDE 200 MG/1
TABLET ORAL
COMMUNITY
End: 2018-11-16 | Stop reason: DRUGHIGH

## 2018-09-20 RX ORDER — SIMETHICONE 80 MG
80 TABLET,CHEWABLE ORAL
COMMUNITY
End: 2020-02-07

## 2018-09-20 RX ORDER — ESCITALOPRAM OXALATE 10 MG/1
10 TABLET ORAL DAILY
COMMUNITY
End: 2019-01-07 | Stop reason: SDUPTHER

## 2018-09-20 NOTE — PROGRESS NOTES
Transition of Care Coordination/Hospital to Post Acute Facility: 
  
Date/Time:  2018 10:53 AM 
 
Patient was readmitted to [facility] on 18 and discharged on 18 for failure to thrive. Patient was transferred to [facility] for continuation of care. Pt previously at [facility] on 18 - 18 for scheduled exploratory lap, hiatal hernia repair. She was d/c home with At [facility] SN,PT,OT. Top Challenges reviewed 1. Anxiety - Geriatric MD at [facility] spoke w/ spouse about stopping Xanax d/t potential rebound agitation and delirium in geriatric pts and start Lexapro.  in agreement. However, [facility] received no orders. Nurse spoke w/ Dr Claribel García and will start her on 10mg Lexapro. Method of communication with care team :chart routing Nurse Navigator(NN) spoke with Marcarmenfrancesca Vicente to provide introduction to self and explanation of the Nurse Navigator Role. Verified name and  as patient identifiers. Discussed and reviewed  medication reconciliation ACP:  
Does the patient have a current ACP (including DDNR):  no 
Does the post acute facility have a copy of the patients ACP:  N/A Patient Top Problems: 1. Failure to Thrive - 10# loss from  - 9/15 possibly 2/2 to xerostomia, early satiety, chronic pain, amiodarone. Recommend decrease to 100mg daily. LFTs consistent w/ poor intake. Recommend weighing 3-4 x week. Ensure shakes TID btwn meals. Monitor labs. 2. Insomnia - start Mirtazapine 7.5mg daily, w/ positive SE of appetite stimulant 3. Xerostomia - freq sips of water, avoid caffeine, no alcohol mouth wash, avoid antihistamines, antidepressants, antipsychotics, diuretics. Recommend OTC saliva substitutes, lemon drops, lozenges, Biotene. 4. Anxiety - Xanax stopped d/t rebound agitation and delirium. Lexapro 10mg started at [facility]. Serotonin syndrome, QTC low risk d/t low dose w/ Mirtazapine. 5. AFib - started post op 8/28, now NSR. Cont Amio at lower dose 2/2 appetite depressant, Metoprolol. 6. Large Hiatal Hernia - s/p surgery 8/28/18. PEG tube placed to hold stomach in place. Tube to remain 6-9 weeks. Pt also w/ GAVE Syndrome (Watermelon Stomach) - Cont Prevacid 15mg daily. Medication(s):  
New Medications at Discharge:  
Lexapro 10mg daily Amiodarone 200mg daily Metoprolol 12.5mg BID Changed Medications at Discharge:  
 
Discontinued Medications at Discharge:  
Xanax 0.5mg BID 
  
PCP/Specialist follow up: No future appointments. Opportunity to ask questions was provided. Contact information was provided for future reference or further questions. Will continue to monitor.

## 2018-09-28 ENCOUNTER — HOSPITAL ENCOUNTER (EMERGENCY)
Age: 78
Discharge: NURSING, REHAB OR LONG-TERM CARE | End: 2018-09-28
Attending: EMERGENCY MEDICINE | Admitting: EMERGENCY MEDICINE
Payer: MEDICARE

## 2018-09-28 ENCOUNTER — APPOINTMENT (OUTPATIENT)
Dept: GENERAL RADIOLOGY | Age: 78
End: 2018-09-28
Attending: EMERGENCY MEDICINE
Payer: MEDICARE

## 2018-09-28 VITALS
HEART RATE: 58 BPM | SYSTOLIC BLOOD PRESSURE: 101 MMHG | RESPIRATION RATE: 18 BRPM | TEMPERATURE: 98.5 F | DIASTOLIC BLOOD PRESSURE: 52 MMHG | OXYGEN SATURATION: 100 %

## 2018-09-28 DIAGNOSIS — R06.02 SOB (SHORTNESS OF BREATH): Primary | ICD-10-CM

## 2018-09-28 LAB
ANION GAP BLD CALC-SCNC: 14 MMOL/L (ref 10–20)
BASOPHILS # BLD: 0 K/UL (ref 0–0.1)
BASOPHILS NFR BLD: 0 % (ref 0–1)
BUN BLD-MCNC: 13 MG/DL (ref 9–20)
CA-I BLD-MCNC: 1.15 MMOL/L (ref 1.12–1.32)
CHLORIDE BLD-SCNC: 95 MMOL/L (ref 98–107)
CO2 BLD-SCNC: 29 MMOL/L (ref 21–32)
CREAT BLD-MCNC: 1 MG/DL (ref 0.6–1.3)
DIFFERENTIAL METHOD BLD: ABNORMAL
EOSINOPHIL # BLD: 0.2 K/UL (ref 0–0.4)
EOSINOPHIL NFR BLD: 3 % (ref 0–7)
ERYTHROCYTE [DISTWIDTH] IN BLOOD BY AUTOMATED COUNT: 13.9 % (ref 11.5–14.5)
GLUCOSE BLD-MCNC: 115 MG/DL (ref 65–100)
HCT VFR BLD AUTO: 36.1 % (ref 35–47)
HCT VFR BLD CALC: 33 % (ref 35–47)
HGB BLD-MCNC: 11.7 G/DL (ref 11.5–16)
IMM GRANULOCYTES # BLD: 0 K/UL (ref 0–0.04)
IMM GRANULOCYTES NFR BLD AUTO: 0 % (ref 0–0.5)
LYMPHOCYTES # BLD: 0.9 K/UL (ref 0.8–3.5)
LYMPHOCYTES NFR BLD: 12 % (ref 12–49)
MCH RBC QN AUTO: 31.3 PG (ref 26–34)
MCHC RBC AUTO-ENTMCNC: 32.4 G/DL (ref 30–36.5)
MCV RBC AUTO: 96.5 FL (ref 80–99)
MONOCYTES # BLD: 0.6 K/UL (ref 0–1)
MONOCYTES NFR BLD: 8 % (ref 5–13)
NEUTS SEG # BLD: 5.5 K/UL (ref 1.8–8)
NEUTS SEG NFR BLD: 76 % (ref 32–75)
NRBC # BLD: 0 K/UL (ref 0–0.01)
NRBC BLD-RTO: 0 PER 100 WBC
PLATELET # BLD AUTO: 342 K/UL (ref 150–400)
PMV BLD AUTO: 7.9 FL (ref 8.9–12.9)
POTASSIUM BLD-SCNC: 4 MMOL/L (ref 3.5–5.1)
RBC # BLD AUTO: 3.74 M/UL (ref 3.8–5.2)
SERVICE CMNT-IMP: ABNORMAL
SODIUM BLD-SCNC: 133 MMOL/L (ref 136–145)
TROPONIN I BLD-MCNC: <0.04 NG/ML (ref 0–0.08)
WBC # BLD AUTO: 7.3 K/UL (ref 3.6–11)

## 2018-09-28 PROCEDURE — 85025 COMPLETE CBC W/AUTO DIFF WBC: CPT | Performed by: EMERGENCY MEDICINE

## 2018-09-28 PROCEDURE — 71046 X-RAY EXAM CHEST 2 VIEWS: CPT

## 2018-09-28 PROCEDURE — 74011250637 HC RX REV CODE- 250/637: Performed by: EMERGENCY MEDICINE

## 2018-09-28 PROCEDURE — 80047 BASIC METABLC PNL IONIZED CA: CPT

## 2018-09-28 PROCEDURE — 84484 ASSAY OF TROPONIN QUANT: CPT

## 2018-09-28 PROCEDURE — 99285 EMERGENCY DEPT VISIT HI MDM: CPT

## 2018-09-28 PROCEDURE — 93005 ELECTROCARDIOGRAM TRACING: CPT

## 2018-09-28 RX ORDER — ALPRAZOLAM 0.5 MG/1
0.5 TABLET ORAL
Status: COMPLETED | OUTPATIENT
Start: 2018-09-28 | End: 2018-09-28

## 2018-09-28 RX ADMIN — ALPRAZOLAM 0.5 MG: 0.5 TABLET ORAL at 21:16

## 2018-09-29 LAB
ATRIAL RATE: 59 BPM
CALCULATED P AXIS, ECG09: 18 DEGREES
CALCULATED R AXIS, ECG10: -5 DEGREES
CALCULATED T AXIS, ECG11: 98 DEGREES
DIAGNOSIS, 93000: NORMAL
P-R INTERVAL, ECG05: 132 MS
Q-T INTERVAL, ECG07: 348 MS
QRS DURATION, ECG06: 82 MS
QTC CALCULATION (BEZET), ECG08: 344 MS
VENTRICULAR RATE, ECG03: 59 BPM

## 2018-09-29 NOTE — ED NOTES
Pt sleeping, easily aroused, vs stable, no c/o or needs at this time. Continues to be tachypnic, but is able to slow breathing down when reminded.

## 2018-09-29 NOTE — ED NOTES
Report called to Js Terry, nurse manager at [facility], and verified they would be able to meet  with wheelchair and assist pt back to room once arriving at home.

## 2018-09-29 NOTE — DISCHARGE INSTRUCTIONS
Shortness of Breath: Care Instructions  Your Care Instructions  Shortness of breath has many causes. Sometimes conditions such as anxiety can lead to shortness of breath. Some people get mild shortness of breath when they exercise. Trouble breathing also can be a symptom of a serious problem, such as asthma, lung disease, emphysema, heart problems, and pneumonia. If your shortness of breath continues, you may need tests and treatment. Watch for any changes in your breathing and other symptoms. Follow-up care is a key part of your treatment and safety. Be sure to make and go to all appointments, and call your doctor if you are having problems. It's also a good idea to know your test results and keep a list of the medicines you take. How can you care for yourself at home? · Do not smoke or allow others to smoke around you. If you need help quitting, talk to your doctor about stop-smoking programs and medicines. These can increase your chances of quitting for good. · Get plenty of rest and sleep. · Take your medicines exactly as prescribed. Call your doctor if you think you are having a problem with your medicine. · Find healthy ways to deal with stress. ¨ Exercise daily. ¨ Get plenty of sleep. ¨ Eat regularly and well. When should you call for help? Call 911 anytime you think you may need emergency care. For example, call if:    · You have severe shortness of breath.     · You have symptoms of a heart attack. These may include:  ¨ Chest pain or pressure, or a strange feeling in the chest.  ¨ Sweating. ¨ Shortness of breath. ¨ Nausea or vomiting. ¨ Pain, pressure, or a strange feeling in the back, neck, jaw, or upper belly or in one or both shoulders or arms. ¨ Lightheadedness or sudden weakness. ¨ A fast or irregular heartbeat. After you call 911, the  may tell you to chew 1 adult-strength or 2 to 4 low-dose aspirin. Wait for an ambulance.  Do not try to drive yourself.    Call your doctor now or seek immediate medical care if:    · Your shortness of breath gets worse or you start to wheeze. Wheezing is a high-pitched sound when you breathe.     · You wake up at night out of breath or have to prop your head up on several pillows to breathe.     · You are short of breath after only light activity or while at rest.    Watch closely for changes in your health, and be sure to contact your doctor if:    · You do not get better over the next 1 to 2 days. Where can you learn more? Go to http://candy-sheela.info/. Enter S780 in the search box to learn more about \"Shortness of Breath: Care Instructions. \"  Current as of: December 6, 2017  Content Version: 11.7  © 5411-7531 [employer]. Care instructions adapted under license by [employer] (which disclaims liability or warranty for this information). If you have questions about a medical condition or this instruction, always ask your healthcare professional. [place] [address] any warranty or liability for your use of this information. We hope that we have addressed all of your medical concerns. The examination and treatment you received in the Emergency Department were for an emergent problem and were not intended as complete care. It is important that you follow up with your healthcare provider(s) for ongoing care. If your symptoms worsen or do not improve as expected, and you are unable to reach your usual health care provider(s), you should return to the Emergency Department. Today's healthcare is undergoing tremendous change, and patient satisfaction surveys are one of the many tools to assess the quality of medical care. You may receive a survey from the [employer] organization regarding your experience in the Emergency Department. I hope that your experience has been completely positive, particularly the medical care that I provided.   As such, please participate in the survey; anything less than excellent does not meet my expectations or intentions. [facility] and [facility] participate in nationally recognized quality of care measures. If your blood pressure is greater than 120/80, as reported below, we urge that you seek medical care to address the potential of high blood pressure, commonly known as hypertension. Hypertension can be hereditary or can be caused by certain medical conditions, pain, stress, or \"white coat syndrome. \"       Please make an appointment with your health care provider(s) for follow up of your Emergency Department visit. VITALS:   Patient Vitals for the past 8 hrs:   Temp Pulse Resp BP SpO2   09/28/18 2215 - (!) 58 18 101/52 100 %   09/28/18 2130 - 66 21 119/71 99 %   09/28/18 2100 - 60 (!) 32 128/58 99 %   09/28/18 2059 - - - - 93 %   09/28/18 2028 98.5 °F (36.9 °C) 63 26 122/62 98 %          Thank you for allowing us to provide you with medical care today. We realize that you have many choices for your emergency care needs. Please choose us in the future for any continued health care needs. Yolette Erazo MD    [facility], [facility].   Office: [phone]            Recent Results (from the past 24 hour(s))   CBC WITH AUTOMATED DIFF    Collection Time: 09/28/18  8:36 PM   Result Value Ref Range    WBC 7.3 3.6 - 11.0 K/uL    RBC 3.74 (L) 3.80 - 5.20 M/uL    HGB 11.7 11.5 - 16.0 g/dL    HCT 36.1 35.0 - 47.0 %    MCV 96.5 80.0 - 99.0 FL    MCH 31.3 26.0 - 34.0 PG    MCHC 32.4 30.0 - 36.5 g/dL    RDW 13.9 11.5 - 14.5 %    PLATELET 575 304 - 389 K/uL    MPV 7.9 (L) 8.9 - 12.9 FL    NRBC 0.0 0  WBC    ABSOLUTE NRBC 0.00 0.00 - 0.01 K/uL    NEUTROPHILS 76 (H) 32 - 75 %    LYMPHOCYTES 12 12 - 49 %    MONOCYTES 8 5 - 13 %    EOSINOPHILS 3 0 - 7 %    BASOPHILS 0 0 - 1 %    IMMATURE GRANULOCYTES 0 0.0 - 0.5 %    ABS. NEUTROPHILS 5.5 1.8 - 8.0 K/UL    ABS. LYMPHOCYTES 0.9 0.8 - 3.5 K/UL    ABS. MONOCYTES 0.6 0.0 - 1.0 K/UL    ABS. EOSINOPHILS 0.2 0.0 - 0.4 K/UL    ABS. BASOPHILS 0.0 0.0 - 0.1 K/UL    ABS. IMM. GRANS. 0.0 0.00 - 0.04 K/UL    DF AUTOMATED     EKG, 12 LEAD, INITIAL    Collection Time: 09/28/18  8:36 PM   Result Value Ref Range    Ventricular Rate 59 BPM    Atrial Rate 59 BPM    P-R Interval 132 ms    QRS Duration 82 ms    Q-T Interval 348 ms    QTC Calculation (Bezet) 344 ms    Calculated P Axis 18 degrees    Calculated R Axis -5 degrees    Calculated T Axis 98 degrees    Diagnosis       Sinus bradycardia  Minimal voltage criteria for LVH, may be normal variant  Inferior infarct (cited on or before 11-SEP-2018)  ST & T wave abnormality, consider lateral ischemia  Abnormal ECG  When compared with ECG of 11-SEP-2018 13:26,  premature atrial complexes are no longer present  Vent. rate has decreased BY  30 BPM  T wave inversion now evident in Lateral leads  QT has shortened     POC CHEM8    Collection Time: 09/28/18  8:46 PM   Result Value Ref Range    Calcium, ionized (POC) 1.15 1.12 - 1.32 mmol/L    Sodium (POC) 133 (L) 136 - 145 mmol/L    Potassium (POC) 4.0 3.5 - 5.1 mmol/L    Chloride (POC) 95 (L) 98 - 107 mmol/L    CO2 (POC) 29 21 - 32 mmol/L    Anion gap (POC) 14 10 - 20 mmol/L    Glucose (POC) 115 (H) 65 - 100 mg/dL    BUN (POC) 13 9 - 20 mg/dL    Creatinine (POC) 1.0 0.6 - 1.3 mg/dL    GFRAA, POC >60 >60 ml/min/1.73m2    GFRNA, POC 54 (L) >60 ml/min/1.73m2    Hematocrit (POC) 33 (L) 35.0 - 47.0 %    Comment Comment Not Indicated. POC TROPONIN-I    Collection Time: 09/28/18  9:20 PM   Result Value Ref Range    Troponin-I (POC) <0.04 0.00 - 0.08 ng/mL       Xr Chest Pa Lat    Result Date: 9/28/2018  EXAM:  XR CHEST PA LAT INDICATION:   sob COMPARISON: Chest radiograph 9/11/2018, CT chest 7/19/2018. FINDINGS: PA and lateral radiographs of the chest were obtained. Moderate hiatal hernia with left basilar atelectasis. Trace left pleural effusion.  No pneumothorax. Heart, star, mediastinum are within normal limits. No acute osseous abnormalities. Exaggerated thoracic kyphosis. IMPRESSION: 1. Moderate hiatal hernia with left basilar atelectasis. Trace left pleural effusion.

## 2018-09-29 NOTE — ED TRIAGE NOTES
C/o sob x 1 hr, started after seeing a new nurse at her facility, [facility], which caused some anxiety. Given Xanax by the facility.

## 2018-09-29 NOTE — ED PROVIDER NOTES
HPI Comments: 50-year-old female who arrives from  Equipment or via EMS with chief complaint of shortness of breath. Patient is joined by her . Past medical history significant for  Asthma, hypertension, stroke, anxiety, coagulation disorder on Zaroxolyn. Patient reports she develop shortness of breath around 4:00 this afternoon. No clear trigger for this symptom. Per her , patient breathing appeared shallow and rapid. Patient denies any cough, fever, nausea, vomiting, chest pain, belly pain. Her living facility gave her a dose of Valium which did not seem to help with her symptoms. Per her , she has had several episodes similar to this in the past treated 2 anxiety. The patient's  also adds that she had a thoracentesis performed a couple of weeks ago at [facility] where they drained about 1 L of fluid. Patient is a 65 y.o. female presenting with shortness of breath. The history is provided by the patient, the EMS personnel, medical records and a relative. Shortness of Breath Pertinent negatives include no fever, no chest pain, no abdominal pain and no rash. Past Medical History:  
Diagnosis Date  Anemia NEC  Asthma   
 major asthma as a child/only with colds now  Cancer ([facility] [address] BCCA removed  Coagulation disorder ([facility] [address]   
 on xarelto  GERD (gastroesophageal reflux disease) 10/22/2010  
 Hiatal hernia  Hypertension   
 hx of blood pressure med for short time  Ill-defined condition   
 increased cholesterol  Other unknown and unspecified cause of morbidity or mortality   
 eval with Dr Ubaldo Espana for SOB, sept 2015:  results unkown  Stroke [facility]) 2013  
 no deficits  Stroke (HCC)   
 heat exhaustion  Watermelon stomach 2003 Past Surgical History:  
Procedure Laterality Date  EXC SKIN MALIG >4CM FACE,FACIAL    
 times 3 - BCCA  
 HX BLADDER SUSPENSION    
 HX COLONOSCOPY    
 HX ENDOSCOPY  6/4/15 Dr. Shah Quiet  HX GYN    
 vaginal del times 3  
 HX HERNIA REPAIR  2014  
 x2, failure  HX OTHER SURGICAL    
 EGDs  HX TONSILLECTOMY Family History:  
Problem Relation Age of Onset  COPD Mother  Asthma Mother  Heart Attack Father  Other Father   
  peptic ulcer disease  Anesth Problems Neg Hx Social History Social History  Marital status:  Spouse name: N/A  
 Number of children: N/A  
 Years of education: N/A Occupational History   Retired Social History Main Topics  Smoking status: Never Smoker  Smokeless tobacco: Never Used  Alcohol use No  
 Drug use: No  
 Sexual activity: No  
 
Other Topics Concern  Not on file Social History Narrative ALLERGIES: Latex; Egg; Codeine; Darvon [propoxyphene]; and Pcn [penicillins] Review of Systems Constitutional: Negative for fever. HENT: Negative for facial swelling. Eyes: Negative for visual disturbance. Respiratory: Positive for shortness of breath. Negative for chest tightness. Cardiovascular: Negative for chest pain. Gastrointestinal: Negative for abdominal pain. Genitourinary: Negative for dysuria. Musculoskeletal: Negative for arthralgias. Skin: Negative for rash. Neurological: Negative for dizziness. Hematological: Negative for adenopathy. Psychiatric/Behavioral: Negative for suicidal ideas. Vitals:  
 09/28/18 2028 09/28/18 2059 BP: 122/62 Pulse: 63 Resp: 26 Temp: 98.5 °F (36.9 °C) SpO2: 98% 93% Physical Exam  
Constitutional: She is oriented to person, place, and time. She appears well-developed and well-nourished. No distress. HENT:  
Head: Normocephalic and atraumatic. Mouth/Throat: Oropharynx is clear and moist.  
Eyes: Pupils are equal, round, and reactive to light. No scleral icterus. Neck: Normal range of motion. Neck supple. No thyromegaly present.   
Cardiovascular: Normal rate, regular rhythm, normal heart sounds and intact distal pulses. No murmur heard. Pulmonary/Chest: Effort normal and breath sounds normal. No respiratory distress. Tachypnic. BS clear. Abdominal: Soft. Bowel sounds are normal. She exhibits no distension. There is no tenderness. Musculoskeletal: Normal range of motion. She exhibits no edema. Neurological: She is alert and oriented to person, place, and time. Skin: Skin is warm and dry. No rash noted. She is not diaphoretic. Nursing note and vitals reviewed. MDM Number of Diagnoses or Management Options Diagnosis management comments: A:  Sob and tachypnea. Other VS stable on 2LNC. Denies pain. No other symptoms to suggest infectious etiology. P: 
ecg 
cxr Labs Xanax 
reassess ED Course Procedures ED EKG interpretation: 
Rhythm: normal sinus rhythm. Rate (approx.): 59. Axis: normal.  ST segment:  No concerning ST elevations or depressions. This EKG was interpreted by Seamus Pack MD,ED Provider. Labs and CXR unremarkable. 10:28 PM 
Patient resting comfortably with no complaints at this time. VS remain stable. Repeat physical exam is unremarkable. Pt ambulatory without difficulty and tolerating po's well. Symptoms have completely resolved following xanax. Stable for discharge home.

## 2018-09-29 NOTE — ED NOTES
O2 turned off, pt assisted in getting dressed. O2 sat 98% on RA after activity. Assisted into wheelchair and out to car.  to drive back to facility, they will assist pt back to room.

## 2018-10-01 ENCOUNTER — PATIENT OUTREACH (OUTPATIENT)
Dept: INTERNAL MEDICINE CLINIC | Age: 78
End: 2018-10-01

## 2018-10-01 NOTE — PROGRESS NOTES
NN Follow Up Called pt's spouse. No answer. Pt was sent to [facility] ED on 9/28/18 from [facility] d/t SOB x 1 hr. She was given a dose of Xanax by [facility] prior to EMS arriving. It appears she was given another dose in the ED. Pt was sent back to [facility].

## 2018-10-08 ENCOUNTER — PATIENT OUTREACH (OUTPATIENT)
Dept: INTERNAL MEDICINE CLINIC | Age: 78
End: 2018-10-08

## 2018-10-08 NOTE — PROGRESS NOTES
NN Follow Up Called pt's spouse. Verified wife with 2 identifiers. She continues to struggle with anxiety and is now back on Xanax. She is participating with OT, PT, SLP 3x/day. She is supposed to f/u with Dr Lata Baird this week, but unsure how she will get there if  has to transport since she is on O2 and needs a WC. He is planning on speaking with [address] [facility] staff about this. He may have to r/s if nothing can be worked out. Will continue to follow.

## 2018-10-17 RX ORDER — PRAVASTATIN SODIUM 20 MG/1
TABLET ORAL
Qty: 90 TAB | Refills: 1 | Status: SHIPPED | OUTPATIENT
Start: 2018-10-17

## 2018-11-15 ENCOUNTER — TELEPHONE (OUTPATIENT)
Dept: INTERNAL MEDICINE CLINIC | Age: 78
End: 2018-11-15

## 2018-11-15 ENCOUNTER — PATIENT OUTREACH (OUTPATIENT)
Dept: INTERNAL MEDICINE CLINIC | Age: 78
End: 2018-11-15

## 2018-11-15 RX ORDER — METOPROLOL TARTRATE 25 MG/1
TABLET, FILM COATED ORAL
Qty: 15 TAB | Refills: 0 | Status: SHIPPED | OUTPATIENT
Start: 2018-11-15 | End: 2018-11-16 | Stop reason: ALTCHOICE

## 2018-11-16 RX ORDER — ACETAMINOPHEN 325 MG/1
TABLET ORAL
COMMUNITY
End: 2020-04-20

## 2018-11-16 RX ORDER — AMIODARONE HYDROCHLORIDE 100 MG/1
100 TABLET ORAL DAILY
Status: ON HOLD | COMMUNITY
End: 2019-01-18

## 2018-11-16 RX ORDER — MIDODRINE HYDROCHLORIDE 10 MG/1
10 TABLET ORAL 2 TIMES DAILY
COMMUNITY

## 2018-11-23 ENCOUNTER — PATIENT OUTREACH (OUTPATIENT)
Dept: INTERNAL MEDICINE CLINIC | Age: 78
End: 2018-11-23

## 2018-11-26 NOTE — PROGRESS NOTES
NN Follow Up Called pt and spoke with spouse. [facility]iting [facility] was currently there and she was seen by PT this morning. Pt is c/o back pain. Currently using a heating pad. Probably arthritis, but will have Dr Arlyn Nelson assess when she comes in for appt this week. Future Appointments Date Time Provider Ema Franco 11/29/2018  9:00 AM Art Fernandez MD [id number] [facility]

## 2018-11-30 ENCOUNTER — PATIENT OUTREACH (OUTPATIENT)
Dept: INTERNAL MEDICINE CLINIC | Age: 78
End: 2018-11-30

## 2018-11-30 ENCOUNTER — OFFICE VISIT (OUTPATIENT)
Dept: INTERNAL MEDICINE CLINIC | Age: 78
End: 2018-11-30

## 2018-11-30 ENCOUNTER — HOSPITAL ENCOUNTER (OUTPATIENT)
Dept: LAB | Age: 78
Discharge: HOME | End: 2018-11-30
Payer: MEDICARE

## 2018-11-30 VITALS
SYSTOLIC BLOOD PRESSURE: 146 MMHG | WEIGHT: 145 LBS | HEIGHT: 66 IN | DIASTOLIC BLOOD PRESSURE: 76 MMHG | HEART RATE: 44 BPM | OXYGEN SATURATION: 88 % | TEMPERATURE: 97.4 F | BODY MASS INDEX: 23.3 KG/M2 | RESPIRATION RATE: 14 BRPM

## 2018-11-30 DIAGNOSIS — I27.20 PULMONARY HYPERTENSION (HCC): ICD-10-CM

## 2018-11-30 DIAGNOSIS — K31.819 WATERMELON STOMACH: ICD-10-CM

## 2018-11-30 DIAGNOSIS — E44.0 MODERATE PROTEIN-CALORIE MALNUTRITION (HCC): Primary | ICD-10-CM

## 2018-11-30 DIAGNOSIS — E44.0 MODERATE PROTEIN-CALORIE MALNUTRITION (HCC): ICD-10-CM

## 2018-11-30 DIAGNOSIS — R60.0 BILATERAL LOWER EXTREMITY EDEMA: ICD-10-CM

## 2018-11-30 DIAGNOSIS — I63.50 CEREBROVASCULAR ACCIDENT (CVA) DUE TO OCCLUSION OF CEREBRAL ARTERY (HCC): ICD-10-CM

## 2018-11-30 DIAGNOSIS — D64.9 ANEMIA, UNSPECIFIED TYPE: Primary | ICD-10-CM

## 2018-11-30 DIAGNOSIS — L89.612 PRESSURE INJURY OF RIGHT HEEL, STAGE 2 (HCC): ICD-10-CM

## 2018-11-30 DIAGNOSIS — F41.9 ANXIETY: ICD-10-CM

## 2018-11-30 PROCEDURE — 83735 ASSAY OF MAGNESIUM: CPT

## 2018-11-30 PROCEDURE — 36415 COLL VENOUS BLD VENIPUNCTURE: CPT

## 2018-11-30 PROCEDURE — 80053 COMPREHEN METABOLIC PANEL: CPT

## 2018-11-30 PROCEDURE — 85025 COMPLETE CBC W/AUTO DIFF WBC: CPT

## 2018-11-30 RX ORDER — DOCUSATE SODIUM 100 MG/1
100 CAPSULE, LIQUID FILLED ORAL 2 TIMES DAILY
COMMUNITY
End: 2019-10-24 | Stop reason: ALTCHOICE

## 2018-11-30 RX ORDER — ASPIRIN 81 MG/1
TABLET ORAL DAILY
COMMUNITY
End: 2020-04-20

## 2018-11-30 RX ORDER — MIRTAZAPINE 15 MG/1
15 TABLET, FILM COATED ORAL
Qty: 30 TAB | Refills: 3 | Status: SHIPPED | OUTPATIENT
Start: 2018-11-30 | End: 2019-12-24

## 2018-11-30 NOTE — PROGRESS NOTES
NN Follow Up Met with pt and spouse during her BAKARI appt with Dr Padilla. Discussed adding on SN services to monitor outer wound on right heal and to get pt measured and fitted for compression hose. NN placed order in epic and faxed over to [facility] for these additional services. Will continue to follow.

## 2018-12-01 LAB
ALBUMIN SERPL-MCNC: 4.1 G/DL (ref 3.5–4.8)
ALBUMIN/GLOB SERPL: 1.3 {RATIO} (ref 1.2–2.2)
ALP SERPL-CCNC: 57 IU/L (ref 39–117)
ALT SERPL-CCNC: 14 IU/L (ref 0–32)
AST SERPL-CCNC: 20 IU/L (ref 0–40)
BASOPHILS # BLD AUTO: 0 X10E3/UL (ref 0–0.2)
BASOPHILS NFR BLD AUTO: 1 %
BILIRUB SERPL-MCNC: 0.3 MG/DL (ref 0–1.2)
BUN SERPL-MCNC: 14 MG/DL (ref 8–27)
BUN/CREAT SERPL: 14 (ref 12–28)
CALCIUM SERPL-MCNC: 9.5 MG/DL (ref 8.7–10.3)
CHLORIDE SERPL-SCNC: 99 MMOL/L (ref 96–106)
CO2 SERPL-SCNC: 23 MMOL/L (ref 20–29)
CREAT SERPL-MCNC: 1 MG/DL (ref 0.57–1)
EOSINOPHIL # BLD AUTO: 0.3 X10E3/UL (ref 0–0.4)
EOSINOPHIL NFR BLD AUTO: 6 %
ERYTHROCYTE [DISTWIDTH] IN BLOOD BY AUTOMATED COUNT: 15.1 % (ref 12.3–15.4)
GLOBULIN SER CALC-MCNC: 3.1 G/DL (ref 1.5–4.5)
GLUCOSE SERPL-MCNC: 106 MG/DL (ref 65–99)
HCT VFR BLD AUTO: 37 % (ref 34–46.6)
HGB BLD-MCNC: 12.5 G/DL (ref 11.1–15.9)
IMM GRANULOCYTES # BLD: 0 X10E3/UL (ref 0–0.1)
IMM GRANULOCYTES NFR BLD: 0 %
LYMPHOCYTES # BLD AUTO: 1.1 X10E3/UL (ref 0.7–3.1)
LYMPHOCYTES NFR BLD AUTO: 22 %
MAGNESIUM SERPL-MCNC: 2 MG/DL (ref 1.6–2.3)
MCH RBC QN AUTO: 32.6 PG (ref 26.6–33)
MCHC RBC AUTO-ENTMCNC: 33.8 G/DL (ref 31.5–35.7)
MCV RBC AUTO: 96 FL (ref 79–97)
MONOCYTES # BLD AUTO: 0.5 X10E3/UL (ref 0.1–0.9)
MONOCYTES NFR BLD AUTO: 10 %
NEUTROPHILS # BLD AUTO: 3 X10E3/UL (ref 1.4–7)
NEUTROPHILS NFR BLD AUTO: 61 %
PLATELET # BLD AUTO: 299 X10E3/UL (ref 150–379)
POTASSIUM SERPL-SCNC: 4.3 MMOL/L (ref 3.5–5.2)
PROT SERPL-MCNC: 7.2 G/DL (ref 6–8.5)
RBC # BLD AUTO: 3.84 X10E6/UL (ref 3.77–5.28)
SODIUM SERPL-SCNC: 138 MMOL/L (ref 134–144)
WBC # BLD AUTO: 4.9 X10E3/UL (ref 3.4–10.8)

## 2018-12-01 NOTE — PROGRESS NOTES
HPI:  Todd Farris is a 77y.o. year old female who is here for a routine visit:    Presents today for a transitional care visit after a recent complicated history. She had repair of a large hiatal hernia done in September. She was discharged home from [address] [facility] and then a short period of time had an episode of falling to the floor with generalized weakness to the point that she could not walk and her  could not care for her at home. She was readmitted to [address] [facility] and treated there with rehabilitation. She has for the last month been in rehab at [facility]. She was just discharged home 10 days ago with home health with PT. She has not been getting any occupational therapy as she did not qualify. Her  reports that she continues to have episodes of shortness of breath when she hyperventilates that seem to occur when she is anxious. Her appetite has improved. She is eating some better. She has had some edema in her ankles but that is improving. She has had multiple pressure ulcers on her right heel but all but one have healed. She does describe pain across her back particularly when she is lying in bed. No radicular pain. No fevers or chills. No vomiting. No nausea. No PND or orthopnea. She also suffered from significantly low blood pressure and has been maintained with medications for that.       Past Medical History:   Diagnosis Date    Anemia NEC     Asthma     major asthma as a child/only with colds now    Cancer [facility])     BCCA removed    Coagulation disorder ([address]     on xarelto    GERD (gastroesophageal reflux disease) 10/22/2010    Hiatal hernia     Hypertension     hx of blood pressure med for short time    Ill-defined condition     increased cholesterol    Other unknown and unspecified cause of morbidity or mortality     eval with Dr Enzo Issa for SOB, sept 2015:  results unkown    Stroke ([address] 2013    no deficits    Stroke ([address]     heat exhaustion    Watermelon stomach 2003       Past Surgical History:   Procedure Laterality Date    EXC SKIN MALIG >4CM FACE,FACIAL      times 3 - BCCA    HX BLADDER SUSPENSION      HX COLONOSCOPY      HX ENDOSCOPY  6/4/15    Dr. Luba Tyler HX GYN      vaginal del times 3    HX HERNIA REPAIR  2014    x2, failure    HX OTHER SURGICAL      EGDs    HX TONSILLECTOMY         Prior to Admission medications    Medication Sig Start Date End Date Taking? Authorizing Provider   famotidine (PEPCID PO) Take  by mouth. Yes Provider, Historical   aspirin delayed-release 81 mg tablet Take  by mouth daily. Yes Provider, Historical   docusate sodium (COLACE) 100 mg capsule Take 100 mg by mouth two (2) times a day. Yes Provider, Historical   mirtazapine (REMERON) 15 mg tablet Take 1 Tab by mouth nightly. 11/30/18  Yes Anastasiia Shaffer MD   amiodarone (PACERONE) 100 mg tablet Take 100 mg by mouth daily. Yes Provider, Historical   acetaminophen (TYLENOL) 325 mg tablet Take  by mouth every four (4) hours as needed for Pain. Yes Provider, Historical   midodrine (PROAMITINE) 10 mg tablet Take  by mouth three (3) times daily. Yes Provider, Historical   pravastatin (PRAVACHOL) 20 mg tablet TAKE 1 TABLET BY MOUTH AT BEDTIME 10/17/18  Yes Anastasiia Shaffer MD   magnesium oxide (MAG-OX) 400 mg tablet Take 400 mg by mouth two (2) times a day. Yes Provider, Historical   simethicone (MYLICON) 80 mg chewable tablet Take 80 mg by mouth every four (4) hours as needed for Flatulence. Yes Provider, Historical   saliva substitute combo no.9 (BIOTENE DRY MOUTH ORAL RINSE MM) by Mucous Membrane route. Yes Provider, Historical   ALPRAZolam (XANAX) 0.5 mg tablet TAKE 1 TABLET BY MOUTH 3 TIMES A DAY AS NEEDED 9/5/18  Yes Sailaja Brito III, MD   lansoprazole (PREVACID) 30 mg capsule TAKE ONE CAPSULE BY MOUTH EVERY MORNING BEFORE BREAKFAST *PA FAXED 3/6* 7/8/18  Yes Anastasiia Shaffer MD   melatonin tab tablet Take 1 Tab by mouth nightly. 7/2/18  Yes Jenna Calzada MD   ferrous sulfate 325 mg (65 mg iron) tablet TAKE 1 TABLET BY MOUTH 3 TIMES A DAY WITH MEAL 12/5/17  Yes Heather Rossi MD   polyethylene glycol (MIRALAX) 17 gram/dose powder Take 17 g by mouth daily as needed. 4/3/17  Yes Heather Rossi MD   cholecalciferol, vitamin d3, (VITAMIN D) 1,000 unit tablet Take 2,000 Units by mouth daily. Yes Provider, Historical   MULTIVITAMINS (MULTIVITAMIN PO) Take 1 Tab by mouth daily. Yes Provider, Historical   senna (SENNA) 8.6 mg tablet Take 2 Tabs by mouth nightly as needed for Constipation. Provider, Historical   escitalopram oxalate (LEXAPRO) 10 mg tablet Take 10 mg by mouth daily. Provider, Historical   hydrOXYzine pamoate (VISTARIL) 25 mg capsule Take 1 Cap by mouth three (3) times daily as needed for Anxiety.  7/2/18   Jenna Calzada MD       Social History     Socioeconomic History    Marital status:      Spouse name: Not on file    Number of children: Not on file    Years of education: Not on file    Highest education level: Not on file   Social Needs    Financial resource strain: Not on file    Food insecurity - worry: Not on file    Food insecurity - inability: Not on file    Transportation needs - medical: Not on file   [place] needs - non-medical: Not on file   Occupational History     Employer: RETIRED   Tobacco Use    Smoking status: Never Smoker    Smokeless tobacco: Never Used   Substance and Sexual Activity    Alcohol use: No    Drug use: No    Sexual activity: No   Other Topics Concern    Not on file   Social History Narrative    Not on file          ROS  Per HPI    Visit Vitals  /76   Pulse (!) 44   Temp 97.4 °F (36.3 °C) (Oral)   Resp 14   Ht 5' 6\" (1.676 m)   Wt 145 lb (65.8 kg)   SpO2 (!) 88%   BMI 23.40 kg/m²         Physical Exam   Physical Examination: General appearance - alert, well appearing, and in no distress  Mouth - mucous membranes moist, pharynx normal without lesions  Chest - clear to auscultation, no wheezes, rales or rhonchi, symmetric air entry  Heart - normal rate and regular rhythm  Abdomen - soft, nontender, nondistended, no masses or organomegaly  Musculoskeletal - no joint tenderness, deformity or swelling  Extremities - pedal edema 2 +, intact peripheral pulses  Small scabbed over ulcer on the right heel lateral aspect. No evidence of purulence or drainage. Assessment/Plan:  Diagnoses and all orders for this visit:    1. Anemia, unspecified type -postoperatively. We will recheck her CBC to today to make sure that it has improved. -     CBC WITH AUTOMATED DIFF    2. Moderate protein-calorie malnutrition ([address] -appears to be improving. Will check protein stores today. -     METABOLIC PANEL, COMPREHENSIVE  -     MAGNESIUM    3. Pulmonary hypertension (HCC) -stable. 4. Cerebrovascular accident (CVA) due to occlusion of cerebral artery ([address] -we will. 5. Watermelon stomach -stable. 6. Anxiety -not well controlled. Will increase mirtazapine to 15 mg at night. Will continue to use Lexapro in the morning. 7. Pressure injury of right heel, stage 2 -impression stockings, elevation, and will have home health nursing to follow this up over the next few weeks. Other orders  -     mirtazapine (REMERON) 15 mg tablet; Take 1 Tab by mouth nightly. Dilatation-agree with continued physical therapy. I suggested that she work harder on her exercises on the days off when she does not have home physical therapy as her been reports she is not been doing so. Follow-up Disposition:  Return in about 1 month (around 12/30/2018). Advised her to call back or return to office if symptoms worsen/change/persist.  Discussed expected course/resolution/complications of diagnosis in detail with patient. Medication risks/benefits/costs/interactions/alternatives discussed with patient.   She was given an after visit summary which includes diagnoses, current medications, & vitals. She expressed understanding with the diagnosis and plan.

## 2018-12-11 ENCOUNTER — PATIENT OUTREACH (OUTPATIENT)
Dept: INTERNAL MEDICINE CLINIC | Age: 78
End: 2018-12-11

## 2018-12-11 NOTE — PROGRESS NOTES
NN BAKARI Follow Up Called pt's . Confirmed he received message regarding XR. He hasn't been able to get out of the house d/t weather. Told him the major roads were clear and to let office know when XR is done. Voiced understanding.

## 2018-12-12 ENCOUNTER — HOSPITAL ENCOUNTER (OUTPATIENT)
Dept: GENERAL RADIOLOGY | Age: 78
Discharge: HOME | End: 2018-12-12
Attending: INTERNAL MEDICINE
Payer: MEDICARE

## 2018-12-12 DIAGNOSIS — M54.5 ACUTE LOW BACK PAIN, UNSPECIFIED BACK PAIN LATERALITY, WITH SCIATICA PRESENCE UNSPECIFIED: ICD-10-CM

## 2018-12-12 PROCEDURE — 72100 X-RAY EXAM L-S SPINE 2/3 VWS: CPT

## 2018-12-14 ENCOUNTER — OFFICE VISIT (OUTPATIENT)
Dept: INTERNAL MEDICINE CLINIC | Age: 78
End: 2018-12-14

## 2018-12-14 ENCOUNTER — TELEPHONE (OUTPATIENT)
Dept: INTERNAL MEDICINE CLINIC | Age: 78
End: 2018-12-14

## 2018-12-14 VITALS
HEART RATE: 60 BPM | WEIGHT: 138.4 LBS | DIASTOLIC BLOOD PRESSURE: 54 MMHG | TEMPERATURE: 97.6 F | HEIGHT: 66 IN | RESPIRATION RATE: 20 BRPM | SYSTOLIC BLOOD PRESSURE: 96 MMHG | BODY MASS INDEX: 22.24 KG/M2

## 2018-12-14 DIAGNOSIS — M43.9 COMPRESSION DEFORMITY OF VERTEBRA: Primary | ICD-10-CM

## 2018-12-14 DIAGNOSIS — E86.0 DEHYDRATION: ICD-10-CM

## 2018-12-14 DIAGNOSIS — K52.9 GASTROENTERITIS: Primary | ICD-10-CM

## 2018-12-14 NOTE — PROGRESS NOTES
Vomited all night Wednesday. In bed since. Ongoing back pain, new rib pain. Asking about labs and Xray.      Denies egg allergy

## 2018-12-14 NOTE — TELEPHONE ENCOUNTER
----- Message from Andriy Menezes MD sent at 12/12/2018  5:54 PM EST -----  MRI of lumbar spine without - compression fracture.

## 2018-12-14 NOTE — PATIENT INSTRUCTIONS
Gastroenteritis: Care Instructions  Your Care Instructions    Gastroenteritis is an illness that may cause nausea, vomiting, and diarrhea. It is sometimes called \"stomach flu. \" It can be caused by bacteria or a virus. You will probably begin to feel better in 1 to 2 days. In the meantime, get plenty of rest and make sure you do not become dehydrated. Dehydration occurs when your body loses too much fluid. Follow-up care is a key part of your treatment and safety. Be sure to make and go to all appointments, and call your doctor if you are having problems. It's also a good idea to know your test results and keep a list of the medicines you take. How can you care for yourself at home? · If your doctor prescribed antibiotics, take them as directed. Do not stop taking them just because you feel better. You need to take the full course of antibiotics. · Drink plenty of fluids to prevent dehydration, enough so that your urine is light yellow or clear like water. Choose water and other caffeine-free clear liquids until you feel better. If you have kidney, heart, or liver disease and have to limit fluids, talk with your doctor before you increase your fluid intake. · Drink fluids slowly, in frequent, small amounts, because drinking too much too fast can cause vomiting. · Begin eating mild foods, such as dry toast, yogurt, applesauce, bananas, and rice. Avoid spicy, hot, or high-fat foods, and do not drink alcohol or caffeine for a day or two. Do not drink milk or eat ice cream until you are feeling better. How to prevent gastroenteritis  · Keep hot foods hot and cold foods cold. · Do not eat meats, dressings, salads, or other foods that have been kept at room temperature for more than 2 hours. · Use a thermometer to check your refrigerator. It should be between 34°F and 40°F.  · Defrost meats in the refrigerator or microwave, not on the kitchen counter. · Keep your hands and your kitchen clean.  Wash your hands, cutting boards, and countertops with hot soapy water frequently. · Cook meat until it is well done. · Do not eat raw eggs or uncooked sauces made with raw eggs. · Do not take chances. If food looks or tastes spoiled, throw it out. When should you call for help? Call 911 anytime you think you may need emergency care. For example, call if:    · You vomit blood or what looks like coffee grounds.     · You passed out (lost consciousness).     · You pass maroon or very bloody stools.    Call your doctor now or seek immediate medical care if:    · You have severe belly pain.     · You have signs of needing more fluids. You have sunken eyes, a dry mouth, and pass only a little dark urine.     · You feel like you are going to faint.     · You have increased belly pain that does not go away in 1 to 2 days.     · You have new or increased nausea, or you are vomiting.     · You have a new or higher fever.     · Your stools are black and tarlike or have streaks of blood.    Watch closely for changes in your health, and be sure to contact your doctor if:    · You are dizzy or lightheaded.     · You urinate less than usual, or your urine is dark yellow or brown.     · You do not feel better with each day that goes by. Where can you learn more? Go to http://candy-sheela.info/. Enter N142 in the search box to learn more about \"Gastroenteritis: Care Instructions. \"  Current as of: November 18, 2017  Content Version: 11.8  © 2069-4871 [employer]. Care instructions adapted under license by [employer] (which disclaims liability or warranty for this information). If you have questions about a medical condition or this instruction, always ask your healthcare professional. [place] [address] any warranty or liability for your use of this information.

## 2018-12-14 NOTE — PROGRESS NOTES
Kaitlin Noonan is a 77 y.o. female who was seen in clinic today (12/14/2018) for an acute visit. Assessment & Plan:   Diagnoses and all orders for this visit:    1. Gastroenteritis- new dx, resolved, most likely food related, reviewed BRAT diet and hydration. Red flags and expectations were reviewed & discussed with the her. She verbalized understanding. 2. Dehydration- new dx, secondary to above, weight has been fluctuating, increase PO intake as tolerated. S/s of worsening dehydration reviewed w/ . Follow-up Disposition:  Return if symptoms worsen or fail to improve. Subjective: Solo Ramirez was seen today for Vomiting and Back Pain    Abdominal Pain  Kaitlin Noonan is here to talk about vomiting. This is a new problem and symptoms began yesterday (around midnight and lasted on/off to about 7am), occurred 7 times and had some nausea. No abdominal pain, diarrhea, or constipation. They reports eating at [facility] the night night before. She RTC with her , most of history is provided by him. Prior to Admission medications    Medication Sig Start Date End Date Taking? Authorizing Provider   famotidine (PEPCID PO) Take  by mouth. Yes Provider, Historical   aspirin delayed-release 81 mg tablet Take  by mouth daily. Yes Provider, Historical   docusate sodium (COLACE) 100 mg capsule Take 100 mg by mouth two (2) times a day. Yes Provider, Historical   mirtazapine (REMERON) 15 mg tablet Take 1 Tab by mouth nightly. 11/30/18  Yes Jaison Baez MD   amiodarone (PACERONE) 100 mg tablet Take 100 mg by mouth daily. Yes Provider, Historical   acetaminophen (TYLENOL) 325 mg tablet Take  by mouth every four (4) hours as needed for Pain. Yes Provider, Historical   midodrine (PROAMITINE) 10 mg tablet Take  by mouth three (3) times daily.    Yes Provider, Historical   pravastatin (PRAVACHOL) 20 mg tablet TAKE 1 TABLET BY MOUTH AT BEDTIME 10/17/18  Yes [place] Stage, Bandar Buitrago MD   magnesium oxide (MAG-OX) 400 mg tablet Take 400 mg by mouth two (2) times a day. Yes Provider, Historical   simethicone (MYLICON) 80 mg chewable tablet Take 80 mg by mouth every four (4) hours as needed for Flatulence. Yes Provider, Historical   escitalopram oxalate (LEXAPRO) 10 mg tablet Take 10 mg by mouth daily. Yes Provider, Historical   saliva substitute combo no.9 (BIOTENE DRY MOUTH ORAL RINSE MM) by Mucous Membrane route. Yes Provider, Historical   ALPRAZolam (XANAX) 0.5 mg tablet TAKE 1 TABLET BY MOUTH 3 TIMES A DAY AS NEEDED 9/5/18  Yes Shi Burton III, MD   lansoprazole (PREVACID) 30 mg capsule TAKE ONE CAPSULE BY MOUTH EVERY MORNING BEFORE BREAKFAST *PA FAXED 3/6* 7/8/18  Yes Meli Harrington MD   melatonin tab tablet Take 1 Tab by mouth nightly. 7/2/18  Yes Xavier Fontanez MD   ferrous sulfate 325 mg (65 mg iron) tablet TAKE 1 TABLET BY MOUTH 3 TIMES A DAY WITH MEAL 12/5/17  Yes Meli Harrington MD   polyethylene glycol (MIRALAX) 17 gram/dose powder Take 17 g by mouth daily as needed. 4/3/17  Yes Meli Harrington MD   cholecalciferol, vitamin d3, (VITAMIN D) 1,000 unit tablet Take 2,000 Units by mouth daily. Yes Provider, Historical   MULTIVITAMINS (MULTIVITAMIN PO) Take 1 Tab by mouth daily. Yes Provider, Historical   senna (SENNA) 8.6 mg tablet Take 2 Tabs by mouth nightly as needed for Constipation. Provider, Historical   hydrOXYzine pamoate (VISTARIL) 25 mg capsule Take 1 Cap by mouth three (3) times daily as needed for Anxiety. 7/2/18   Xavier Fontanez MD          Allergies   Allergen Reactions    Latex Hives     Latex tape    Egg Anaphylaxis    Codeine Nausea and Vomiting    Darvon [Propoxyphene] Nausea and Vomiting    Pcn [Penicillins] Hives           Review of Systems   Constitutional: Positive for malaise/fatigue and weight loss. Negative for chills and fever. Respiratory: Negative for cough and shortness of breath.     Cardiovascular: Positive for chest pain (bilateral, lower). Negative for palpitations. Gastrointestinal: Negative for abdominal pain, constipation, diarrhea, nausea and vomiting. Musculoskeletal: Positive for back pain. Objective:   Physical Exam   Constitutional: No distress. HENT:   Mouth/Throat: Mucous membranes are dry. Eyes: Conjunctivae are normal. No scleral icterus. Cardiovascular: Regular rhythm and normal heart sounds. No murmur heard. Pulmonary/Chest: Effort normal and breath sounds normal. She has no wheezes. She has no rales. She exhibits tenderness. Abdominal: Bowel sounds are normal. She exhibits no mass. There is no hepatosplenomegaly. There is no tenderness. Visit Vitals  BP 96/54   Pulse 60   Temp 97.6 °F (36.4 °C) (Oral)   Resp 20   Ht 5' 6\" (1.676 m)   Wt 138 lb 6.4 oz (62.8 kg)   BMI 22.34 kg/m²         Disclaimer:  Advised her to call back or return to office if symptoms worsen/change/persist.  Discussed expected course/resolution/complications of diagnosis in detail with patient. Medication risks/benefits/costs/interactions/alternatives discussed with patient. She was given an after visit summary which includes diagnoses, current medications, & vitals. She expressed understanding with the diagnosis and plan. Aspects of this note may have been generated using voice recognition software. Despite editing, there may be some syntax errors.        Felipe Villegas MD

## 2018-12-14 NOTE — TELEPHONE ENCOUNTER
Spoke with pt and spouse in person about XR results that show a ?compression fx. Per [facility], will obtain MRI lumbar w/o. Pt states that she is not able to lie down for the MRI. I told them that she can go to [facility] MRI and can sit. Imaging may not be as clear was explained. Order placed. Verbalized understanding.

## 2018-12-18 ENCOUNTER — PATIENT OUTREACH (OUTPATIENT)
Dept: INTERNAL MEDICINE CLINIC | Age: 78
End: 2018-12-18

## 2018-12-18 NOTE — PROGRESS NOTES
Patient has graduated from the Transitions of Care Coordination  program on 12/18/18. Patient's symptoms are stable at this time. Patient/family has the ability to self-manage. Care management goals have been completed at this time. No further nurse navigator follow up scheduled. Pt has nurse navigator's contact information for any further questions, concerns, or needs.   Patients upcoming visits:    Future Appointments   Date Time Provider Ema Franco   1/2/2019  2:00 PM WE MRI 2 OPEN [facility] MRI [facility]

## 2019-01-02 ENCOUNTER — HOSPITAL ENCOUNTER (OUTPATIENT)
Dept: MRI IMAGING | Age: 79
Discharge: HOME | End: 2019-01-02
Attending: INTERNAL MEDICINE
Payer: MEDICARE

## 2019-01-02 DIAGNOSIS — M43.9 COMPRESSION DEFORMITY OF VERTEBRA: ICD-10-CM

## 2019-01-02 PROCEDURE — 72148 MRI LUMBAR SPINE W/O DYE: CPT

## 2019-01-07 RX ORDER — ESCITALOPRAM OXALATE 10 MG/1
TABLET ORAL
Qty: 30 TAB | Refills: 0 | Status: SHIPPED | OUTPATIENT
Start: 2019-01-07 | End: 2019-02-06 | Stop reason: SDUPTHER

## 2019-01-08 ENCOUNTER — TELEPHONE (OUTPATIENT)
Dept: INTERNAL MEDICINE CLINIC | Age: 79
End: 2019-01-08

## 2019-01-08 NOTE — TELEPHONE ENCOUNTER
Trinity Villatoro () [phone]      Patient's  called again -- please let him know the results of the MRI. Please call # above.

## 2019-01-09 ENCOUNTER — TELEPHONE (OUTPATIENT)
Dept: INTERNAL MEDICINE CLINIC | Age: 79
End: 2019-01-09

## 2019-01-09 DIAGNOSIS — S32.050A CLOSED COMPRESSION FRACTURE OF FIFTH LUMBAR VERTEBRA, INITIAL ENCOUNTER: Primary | ICD-10-CM

## 2019-01-09 NOTE — TELEPHONE ENCOUNTER
Spoke with pt and spouse in ref to MRI results. It did show Moderate L5 compression fracture, relatively acute, without spinal stenosis. Per [facility]J, will proceed with a kyphoplasty to hopefully help with pain. Pt is in agreement to procedure.      Orders Placed This Encounter    IR KYPHOPLASTY LUMBAR     Standing Status:   Future     Standing Expiration Date:   2/9/2020     Order Specific Question:   Transport     Answer:   Wheelchair [7]     Order Specific Question:   Reason for Exam     Answer:   s32.050A

## 2019-01-11 ENCOUNTER — HOSPITAL ENCOUNTER (EMERGENCY)
Age: 79
Discharge: HOME | End: 2019-01-11
Attending: EMERGENCY MEDICINE | Admitting: EMERGENCY MEDICINE
Payer: MEDICARE

## 2019-01-11 ENCOUNTER — APPOINTMENT (OUTPATIENT)
Dept: GENERAL RADIOLOGY | Age: 79
End: 2019-01-11
Attending: EMERGENCY MEDICINE
Payer: MEDICARE

## 2019-01-11 VITALS
BODY MASS INDEX: 24.61 KG/M2 | HEIGHT: 65 IN | TEMPERATURE: 97.4 F | SYSTOLIC BLOOD PRESSURE: 155 MMHG | RESPIRATION RATE: 20 BRPM | WEIGHT: 147.71 LBS | DIASTOLIC BLOOD PRESSURE: 64 MMHG | HEART RATE: 57 BPM | OXYGEN SATURATION: 99 %

## 2019-01-11 DIAGNOSIS — K59.00 CONSTIPATION, UNSPECIFIED CONSTIPATION TYPE: Primary | ICD-10-CM

## 2019-01-11 PROCEDURE — 99283 EMERGENCY DEPT VISIT LOW MDM: CPT

## 2019-01-11 PROCEDURE — 74018 RADEX ABDOMEN 1 VIEW: CPT

## 2019-01-11 RX ORDER — MAGNESIUM CITRATE
296 SOLUTION, ORAL ORAL
Status: DISCONTINUED | OUTPATIENT
Start: 2019-01-11 | End: 2019-01-11

## 2019-01-11 RX ORDER — POLYETHYLENE GLYCOL 3350 17 G/17G
17 POWDER, FOR SOLUTION ORAL DAILY
Qty: 119 G | Refills: 0 | Status: SHIPPED | OUTPATIENT
Start: 2019-01-11 | End: 2019-01-18

## 2019-01-11 RX ORDER — ONDANSETRON 4 MG/1
4 TABLET, FILM COATED ORAL
Qty: 20 TAB | Refills: 0 | Status: SHIPPED | OUTPATIENT
Start: 2019-01-11 | End: 2020-04-20

## 2019-01-11 RX ORDER — MAGNESIUM CITRATE
296 SOLUTION, ORAL ORAL
Qty: 1 BOTTLE | Refills: 0 | Status: SHIPPED | OUTPATIENT
Start: 2019-01-11 | End: 2019-01-11

## 2019-01-11 NOTE — ED NOTES
Patient discharged by MD. Results and discharge instructions reviewed with the patient by MD. Patient verbalizes understanding. Patient discharged home, stable, in no acute distress. Patient wheeled to the bathroom.

## 2019-01-11 NOTE — ED TRIAGE NOTES
TRIAGE NOTE: Patient arrived from home with c/o constipation since Monday. \"there was blood when I wiped and a drop in the toilet today when I tried to go. \"

## 2019-01-11 NOTE — ED PROVIDER NOTES
The history is provided by the patient and the spouse. No  was used. Constipation This is a new problem. The current episode started more than 2 days ago. The stool is described as blood tinged, firm and formed. Associated symptoms include flatus and constipation. Pertinent negatives include no abdominal pain, no dysuria, no abdominal distention, no chills, no fever, no nausea, no back pain, no vomiting and no diarrhea. Risk factors include immobility. She has tried osmotic agents for the symptoms. The treatment provided no relief. Her past medical history is significant for endocrine disease and metabolic disease. Her past medical history does not include dementia, neuromuscular disease, irritable bowel syndrome, neurologic disease, small bowel obstruction, abdominal surgery or nursing home resident. Past Medical History:  
Diagnosis Date  Anemia NEC  Asthma   
 major asthma as a child/only with colds now  Cancer ([facility] [address] BCCA removed  Coagulation disorder ([facility] [address]   
 on xarelto  GERD (gastroesophageal reflux disease) 10/22/2010  
 Hiatal hernia  Hypertension   
 hx of blood pressure med for short time  Ill-defined condition   
 increased cholesterol  Other unknown and unspecified cause of morbidity or mortality   
 eval with Dr Keaton Gaona for SOB, sept 2015:  results unkown  Stroke [facility]) 2013  
 no deficits  Stroke (HCC)   
 heat exhaustion  Watermelon stomach 2003 Past Surgical History:  
Procedure Laterality Date  EXC SKIN MALIG >4CM FACE,FACIAL    
 times 3 - BCCA  
 HX BLADDER SUSPENSION    
 HX COLONOSCOPY    
 HX ENDOSCOPY  6/4/15 Dr. Caitlin Wadsworth  HX GYN    
 vaginal del times 3  
 HX HERNIA REPAIR  2014  
 x2, failure  HX OTHER SURGICAL    
 EGDs  HX TONSILLECTOMY Family History:  
Problem Relation Age of Onset  COPD Mother  Asthma Mother  Heart Attack Father  Other Father peptic ulcer disease  Anesth Problems Neg Hx Social History Socioeconomic History  Marital status:  Spouse name: Not on file  Number of children: Not on file  Years of education: Not on file  Highest education level: Not on file Social Needs  Financial resource strain: Not on file  Food insecurity - worry: Not on file  Food insecurity - inability: Not on file  Transportation needs - medical: Not on file  Transportation needs - non-medical: Not on file Occupational History Employer: RET[employer] Tobacco Use  Smoking status: Never Smoker  Smokeless tobacco: Never Used Substance and Sexual Activity  Alcohol use: No  
 Drug use: No  
 Sexual activity: No  
Other Topics Concern  Not on file Social History Narrative  Not on file ALLERGIES: Latex; Egg; Codeine; Darvon [propoxyphene]; and Pcn [penicillins] Review of Systems Constitutional: Negative for activity change, chills and fever. HENT: Negative for nosebleeds, sore throat, trouble swallowing and voice change. Eyes: Negative for visual disturbance. Respiratory: Negative for shortness of breath. Cardiovascular: Negative for chest pain and palpitations. Gastrointestinal: Positive for constipation and flatus. Negative for abdominal distention, abdominal pain, diarrhea, nausea and vomiting. Genitourinary: Negative for difficulty urinating, dysuria, hematuria and urgency. Musculoskeletal: Negative for back pain, neck pain and neck stiffness. Skin: Negative for color change. Allergic/Immunologic: Negative for immunocompromised state. Neurological: Negative for dizziness, seizures, syncope, weakness, light-headedness, numbness and headaches. Psychiatric/Behavioral: Negative for behavioral problems, confusion, hallucinations, self-injury and suicidal ideas. Vitals:  
 01/11/19 1539 BP: 155/64 Pulse: (!) 57 Resp: 20 Temp: 97.4 °F (36.3 °C) SpO2: 99% Weight: 67 kg (147 lb 11.3 oz) Height: 5' 5\" (1.651 m) Physical Exam  
Constitutional: She appears well-developed and well-nourished. No distress. HENT:  
Head: Atraumatic. Eyes: EOM are normal.  
Neck: No tracheal deviation present. Cardiovascular:  
Warm and well perfused Pulmonary/Chest: Effort normal. No respiratory distress. Musculoskeletal: Normal range of motion. Neurological: She is alert. Coordination normal.  
Skin: Skin is warm and dry. She is not diaphoretic. Psychiatric: She has a normal mood and affect. Her behavior is normal. Judgment and thought content normal.  
Nursing note and vitals reviewed. MDM This is a 14-year-old female with past medical history, review of systems, physical exam as above, presenting with complaints of constipation. The patient states her last normal bowel movement was 4 days ago, states it was hard, associated with bleeding. She denies complaints of abdominal pain, nausea, vomiting, or rectal pressure, stating that her only concern is she is yet to produce a bowel movement. She denies use of narcotic pain medications, states she's been taking MiraLax without improvement. Physical exam is remarkable for well-appearing elderly female, in no acute distress, with clear breath sounds, soft abdomen, without tenderness. Plan to obtain KUB to evaluate for fecal burden, versus ulcerative process. Will consider magnesium citrate, enema, and make a disposition based on the patient's diagnostics and response to therapy. Procedures 5:26 PM 
Plain films with moderate constipation, enema with mild success. Offered mag citrate, patient states would prefer to take at home, will add one week Miralax. PCP f/u and return precautions given.

## 2019-01-11 NOTE — ED NOTES
Assisted patient with enema. Patient did not tolerate well. Patient unable to have a significant bowel movement. MD aware.

## 2019-01-11 NOTE — DISCHARGE INSTRUCTIONS
Patient Education        Constipation: Care Instructions  Your Care Instructions    Constipation means that you have a hard time passing stools (bowel movements). People pass stools from 3 times a day to once every 3 days. What is normal for you may be different. Constipation may occur with pain in the rectum and cramping. The pain may get worse when you try to pass stools. Sometimes there are small amounts of bright red blood on toilet paper or the surface of stools. This is because of enlarged veins near the rectum (hemorrhoids). A few changes in your diet and lifestyle may help you avoid ongoing constipation. Your doctor may also prescribe medicine to help loosen your stool. Some medicines can cause constipation. These include pain medicines and antidepressants. Tell your doctor about all the medicines you take. Your doctor may want to make a medicine change to ease your symptoms. Follow-up care is a key part of your treatment and safety. Be sure to make and go to all appointments, and call your doctor if you are having problems. It's also a good idea to know your test results and keep a list of the medicines you take. How can you care for yourself at home? · Drink plenty of fluids, enough so that your urine is light yellow or clear like water. If you have kidney, heart, or liver disease and have to limit fluids, talk with your doctor before you increase the amount of fluids you drink. · Include high-fiber foods in your diet each day. These include fruits, vegetables, beans, and whole grains. · Get at least 30 minutes of exercise on most days of the week. Walking is a good choice. You also may want to do other activities, such as running, swimming, cycling, or playing tennis or team sports. · Take a fiber supplement, such as Citrucel or Metamucil, every day. Read and follow all instructions on the label. · Schedule time each day for a bowel movement. A daily routine may help.  Take your time having your bowel movement. · Support your feet with a small step stool when you sit on the toilet. This helps flex your hips and places your pelvis in a squatting position. · Your doctor may recommend an over-the-counter laxative to relieve your constipation. Examples are Milk of Magnesia and MiraLax. Read and follow all instructions on the label. Do not use laxatives on a long-term basis. When should you call for help? Call your doctor now or seek immediate medical care if:    · You have new or worse belly pain.     · You have new or worse nausea or vomiting.     · You have blood in your stools.    Watch closely for changes in your health, and be sure to contact your doctor if:    · Your constipation is getting worse.     · You do not get better as expected. Where can you learn more? Go to http://candy-sheela.info/. Enter 21 [phone] in the search box to learn more about \"Constipation: Care Instructions. \"  Current as of: November 20, 2017  Content Version: 11.8  © 6961-2191 [employer]. Care instructions adapted under license by [employer] (which disclaims liability or warranty for this information). If you have questions about a medical condition or this instruction, always ask your healthcare professional. [place] [address] any warranty or liability for your use of this information.

## 2019-01-17 ENCOUNTER — OFFICE VISIT (OUTPATIENT)
Dept: INTERNAL MEDICINE CLINIC | Age: 79
End: 2019-01-17

## 2019-01-17 VITALS
HEIGHT: 65 IN | SYSTOLIC BLOOD PRESSURE: 122 MMHG | DIASTOLIC BLOOD PRESSURE: 62 MMHG | HEART RATE: 68 BPM | OXYGEN SATURATION: 88 % | TEMPERATURE: 98.3 F | RESPIRATION RATE: 16 BRPM | BODY MASS INDEX: 22.99 KG/M2 | WEIGHT: 138 LBS

## 2019-01-17 DIAGNOSIS — M79.671 HEEL PAIN, BILATERAL: ICD-10-CM

## 2019-01-17 DIAGNOSIS — E44.0 MODERATE PROTEIN-CALORIE MALNUTRITION (HCC): ICD-10-CM

## 2019-01-17 DIAGNOSIS — R53.1 GENERALIZED WEAKNESS: Primary | ICD-10-CM

## 2019-01-17 DIAGNOSIS — I27.20 PULMONARY HYPERTENSION (HCC): ICD-10-CM

## 2019-01-17 DIAGNOSIS — M79.672 HEEL PAIN, BILATERAL: ICD-10-CM

## 2019-01-17 RX ORDER — DRESSING,ODOR ABSORBENT,H-POLY 4" X 4"
1 BANDAGE MISCELLANEOUS
Qty: 2 EACH | Refills: 0 | Status: SHIPPED | OUTPATIENT
Start: 2019-01-17 | End: 2019-10-24 | Stop reason: ALTCHOICE

## 2019-01-17 NOTE — PROGRESS NOTES
HPI: 
Hannah Mcfarlane is a 77y.o. year old female who is here for a routine visit: 
 
Presents for a follow-up visit. She was recently here after an extended hospitalization for repair of a recurrent paraesophageal hernia. She is currently finishing up with home health and physical therapy. They suggested she needed additional therapy as an outpatient. She continues to be somewhat weak and unable to walk without assistance. She is also been diagnosed with a recent compression fracture in her spine and is scheduled for kyphoplasty tomorrow. She does note ongoing issues with pain with certain movements. She has had pain in both of her heels. She apparently had pressure sores when she was in the nursing facility that have healed but they continue to be uncomfortable. Past Medical History:  
Diagnosis Date  Anemia NEC  Asthma   
 major asthma as a child/only with colds now  Cancer ([facility] BCCA removed  Coagulation disorder ([facility] [address]   
 on xarelto  GERD (gastroesophageal reflux disease) 10/22/2010  
 Hiatal hernia  Hypertension   
 hx of blood pressure med for short time  Ill-defined condition   
 increased cholesterol  Other unknown and unspecified cause of morbidity or mortality   
 eval with Dr Jhony Tapia for SOB, sept 2015:  results unkown  Stroke [facility]) 2013  
 no deficits  Stroke (HCC)   
 heat exhaustion  Watermelon stomach 2003 Past Surgical History:  
Procedure Laterality Date  EXC SKIN MALIG >4CM FACE,FACIAL    
 times 3 - BCCA  
 HX BLADDER SUSPENSION    
 HX COLONOSCOPY    
 HX ENDOSCOPY  6/4/15 Dr. Heather Rodriguez  HX GYN    
 vaginal del times 3  
 HX HERNIA REPAIR  2014  
 x2, failure  HX OTHER SURGICAL    
 EGDs  HX TONSILLECTOMY Prior to Admission medications Medication Sig Start Date End Date Taking? Authorizing Provider Hydrocolloid Dressing (DUODERM HYDROACTIVE) 4 X 4 \" bndg 1 Device by Apply Externally route three (3) times daily as needed. 1/17/19  Yes Maxine Huerta MD  
polyethylene glycol (MIRALAX) 17 gram/dose powder Take 17 g by mouth daily for 7 days. 1 tablespoon with 8 oz of water daily 1/11/19 1/18/19 Yes Lucho De Paz MD  
ondansetron hcl (ZOFRAN) 4 mg tablet Take 1 Tab by mouth every eight (8) hours as needed for Nausea. 1/11/19  Yes Lucho De Paz MD  
escitalopram oxalate (LEXAPRO) 10 mg tablet TAKE 1 TABLET BY MOUTH EVERY DAY 1/7/19  Yes Maulik Wise III, MD  
famotidine (PEPCID PO) Take  by mouth. Yes Provider, Historical  
aspirin delayed-release 81 mg tablet Take  by mouth daily. Yes Provider, Historical  
docusate sodium (COLACE) 100 mg capsule Take 100 mg by mouth two (2) times a day. Yes Provider, Historical  
mirtazapine (REMERON) 15 mg tablet Take 1 Tab by mouth nightly. 11/30/18  Yes Maxine Huerta MD  
acetaminophen (TYLENOL) 325 mg tablet Take  by mouth every four (4) hours as needed for Pain. Yes Provider, Historical  
pravastatin (PRAVACHOL) 20 mg tablet TAKE 1 TABLET BY MOUTH AT BEDTIME 10/17/18  Yes Maulik Wise III, MD  
simethicone (MYLICON) 80 mg chewable tablet Take 80 mg by mouth every four (4) hours as needed for Flatulence. Yes Provider, Historical  
saliva substitute combo no.9 (BIOTENE DRY MOUTH ORAL RINSE MM) by Mucous Membrane route. Yes Provider, Historical  
ALPRAZolam (XANAX) 0.5 mg tablet TAKE 1 TABLET BY MOUTH 3 TIMES A DAY AS NEEDED 9/5/18  Yes Maulik Wise III, MD  
lansoprazole (PREVACID) 30 mg capsule TAKE ONE CAPSULE BY MOUTH EVERY MORNING BEFORE BREAKFAST *PA FAXED 3/6* 7/8/18  Yes Maxine Huerta MD  
melatonin tab tablet Take 1 Tab by mouth nightly.  7/2/18  Yes Shari Wilkerson MD  
ferrous sulfate 325 mg (65 mg iron) tablet TAKE 1 TABLET BY MOUTH 3 TIMES A DAY WITH MEAL 12/5/17  Yes Maxine Huerta MD  
cholecalciferol, vitamin d3, (VITAMIN D) 1,000 unit tablet Take 2,000 Units by mouth daily. Yes Provider, Historical  
MULTIVITAMINS (MULTIVITAMIN PO) Take 1 Tab by mouth daily. Yes Provider, Historical  
amiodarone (PACERONE) 100 mg tablet Take 100 mg by mouth daily. Provider, Historical  
midodrine (PROAMITINE) 10 mg tablet Take  by mouth three (3) times daily. Provider, Historical  
magnesium oxide (MAG-OX) 400 mg tablet Take 400 mg by mouth two (2) times a day. Provider, Historical  
senna (SENNA) 8.6 mg tablet Take 2 Tabs by mouth nightly as needed for Constipation. Provider, Historical  
hydrOXYzine pamoate (VISTARIL) 25 mg capsule Take 1 Cap by mouth three (3) times daily as needed for Anxiety. 7/2/18   Kevin Mcmanus MD  
 
 
Social History Socioeconomic History  Marital status:  Spouse name: Not on file  Number of children: Not on file  Years of education: Not on file  Highest education level: Not on file Social Needs  Financial resource strain: Not on file  Food insecurity - worry: Not on file  Food insecurity - inability: Not on file  Transportation needs - medical: Not on file  Transportation needs - non-medical: Not on file Occupational History Employer: RETIRED Tobacco Use  Smoking status: Never Smoker  Smokeless tobacco: Never Used Substance and Sexual Activity  Alcohol use: No  
 Drug use: No  
 Sexual activity: No  
Other Topics Concern  Not on file Social History Narrative  Not on file ROS Per HPI. Visit Vitals /62 Pulse 68 Temp 98.3 °F (36.8 °C) (Oral) Resp 16 Ht 5' 5\" (1.651 m) Wt 138 lb (62.6 kg) SpO2 (!) 88% BMI 22.96 kg/m² Physical Exam  
Physical Examination: General appearance - alert, well appearing, and in no distress Chest - clear to auscultation, no wheezes, rales or rhonchi, symmetric air entry Heart - normal rate and regular rhythm Abdomen - soft, nontender, nondistended, no masses or organomegaly She has a small bone spur on the lateral aspect of the right calcaneus. There is a small pressure scaly skin area there that is tender to the touch. No erythema. No drainage. There is some tenderness across the Achilles insertion at both heels. No swelling or redness. Assessment/Plan: 
Diagnoses and all orders for this visit: 1. Generalized weakness-will refer to physical therapy for continued strengthening and gait training. 
-     REFERRAL TO PHYSICAL THERAPY 2. Heel pain, bilateral-due to tendons as well as pressure. Will add DuoDERM to the area that has not completely healed. Her  will monitor this and let me know if not improving. 3. Pulmonary hypertension ([address] 4. Moderate protein-calorie malnutrition (HCC)-improving. 5.  Compression fracturewe will proceed with kyphoplasty as this may be limiting her ability to rehab. Other orders -     Hydrocolloid Dressing (DUODERM HYDROACTIVE) 4 X 4 \" bndg; 1 Device by Apply Externally route three (3) times daily as needed. Follow-up Disposition: as needed Advised her to call back or return to office if symptoms worsen/change/persist. 
Discussed expected course/resolution/complications of diagnosis in detail with patient. Medication risks/benefits/costs/interactions/alternatives discussed with patient. She was given an after visit summary which includes diagnoses, current medications, & vitals. She expressed understanding with the diagnosis and plan.

## 2019-01-18 ENCOUNTER — HOSPITAL ENCOUNTER (OUTPATIENT)
Dept: INTERVENTIONAL RADIOLOGY/VASCULAR | Age: 79
Discharge: HOME | End: 2019-01-18
Attending: RADIOLOGY | Admitting: RADIOLOGY
Payer: MEDICARE

## 2019-01-18 VITALS
OXYGEN SATURATION: 100 % | DIASTOLIC BLOOD PRESSURE: 53 MMHG | HEART RATE: 58 BPM | RESPIRATION RATE: 16 BRPM | HEIGHT: 65 IN | BODY MASS INDEX: 22.99 KG/M2 | SYSTOLIC BLOOD PRESSURE: 142 MMHG | WEIGHT: 138 LBS | TEMPERATURE: 98.1 F

## 2019-01-18 DIAGNOSIS — S32.050A CLOSED COMPRESSION FRACTURE OF FIFTH LUMBAR VERTEBRA, INITIAL ENCOUNTER: ICD-10-CM

## 2019-01-18 PROCEDURE — 74011250636 HC RX REV CODE- 250/636: Performed by: RADIOLOGY

## 2019-01-18 RX ORDER — BUPIVACAINE HYDROCHLORIDE 5 MG/ML
30 INJECTION, SOLUTION EPIDURAL; INTRACAUDAL ONCE
Status: DISCONTINUED | OUTPATIENT
Start: 2019-01-18 | End: 2019-01-18

## 2019-01-18 RX ORDER — ONDANSETRON 2 MG/ML
8 INJECTION INTRAMUSCULAR; INTRAVENOUS
Status: DISCONTINUED | OUTPATIENT
Start: 2019-01-18 | End: 2019-01-18

## 2019-01-18 RX ORDER — LIDOCAINE HYDROCHLORIDE 20 MG/ML
20 INJECTION, SOLUTION INFILTRATION; PERINEURAL ONCE
Status: DISCONTINUED | OUTPATIENT
Start: 2019-01-18 | End: 2019-01-18

## 2019-01-18 RX ORDER — SODIUM CHLORIDE 9 MG/ML
25 INJECTION, SOLUTION INTRAVENOUS ONCE
Status: COMPLETED | OUTPATIENT
Start: 2019-01-18 | End: 2019-01-18

## 2019-01-18 RX ORDER — FENTANYL CITRATE 50 UG/ML
25 INJECTION, SOLUTION INTRAMUSCULAR; INTRAVENOUS
Status: DISCONTINUED | OUTPATIENT
Start: 2019-01-18 | End: 2019-01-18

## 2019-01-18 RX ORDER — MIDAZOLAM HYDROCHLORIDE 1 MG/ML
5 INJECTION, SOLUTION INTRAMUSCULAR; INTRAVENOUS
Status: DISCONTINUED | OUTPATIENT
Start: 2019-01-18 | End: 2019-01-18

## 2019-01-18 RX ORDER — DIPHENHYDRAMINE HYDROCHLORIDE 50 MG/ML
25-50 INJECTION, SOLUTION INTRAMUSCULAR; INTRAVENOUS ONCE
Status: DISCONTINUED | OUTPATIENT
Start: 2019-01-18 | End: 2019-01-18

## 2019-01-18 RX ADMIN — SODIUM CHLORIDE 25 ML/HR: 900 INJECTION, SOLUTION INTRAVENOUS at 11:30

## 2019-01-18 NOTE — PROGRESS NOTES
1230  Kyphoplasty cancelled by Dr. Ruddy Barth at this time as patient denies any pain with standing and walking. \"I have never had much pain. \"  Gait steady with assistance.  aware of same. Saline lock removed with cath tip intact. Taking Po fluids. To auto via wheelchair with  as .

## 2019-01-18 NOTE — PROGRESS NOTES
To angio holding via wheelchair with . Alert and oriented. Dr. Ryan Peñaloza at bedside to speak with patient and wife. Both verbalize understanding of procedure.

## 2019-02-06 RX ORDER — ESCITALOPRAM OXALATE 10 MG/1
10 TABLET ORAL DAILY
Qty: 90 TAB | Refills: 1 | Status: SHIPPED | OUTPATIENT
Start: 2019-02-06 | End: 2020-02-07

## 2019-02-06 RX ORDER — OMEPRAZOLE 20 MG/1
20 CAPSULE, DELAYED RELEASE ORAL DAILY
Qty: 90 CAP | Refills: 2 | Status: SHIPPED | OUTPATIENT
Start: 2019-02-06 | End: 2019-10-24 | Stop reason: ALTCHOICE

## 2019-02-19 NOTE — ADDENDUM NOTE
Addended by: Bob Lozano on: 7/30/2018 10:47 AM     Modules accepted: Anne Consent: The patient's consent was obtained including but not limited to risks of crusting, scabbing, blistering, scarring, darker or lighter pigmentary change, recurrence, incomplete removal and infection. The patient understands that the procedure is cosmetic in nature and is not covered by insurance. Render Post-Care Instructions In Note?: yes Detail Level: Detailed Post-Care Instructions: I reviewed with the patient in detail post-care instructions. Patient is to wear sunprotection, and avoid picking at any of the treated lesions. Pt may apply Vaseline to crusted or scabbing areas. Price (Use Numbers Only, No Special Characters Or $): 150.00

## 2019-03-08 ENCOUNTER — OFFICE VISIT (OUTPATIENT)
Dept: INTERNAL MEDICINE CLINIC | Age: 79
End: 2019-03-08

## 2019-03-08 VITALS
TEMPERATURE: 97.8 F | OXYGEN SATURATION: 89 % | RESPIRATION RATE: 16 BRPM | HEART RATE: 61 BPM | SYSTOLIC BLOOD PRESSURE: 125 MMHG | DIASTOLIC BLOOD PRESSURE: 75 MMHG

## 2019-03-08 DIAGNOSIS — M94.0 COSTOCHONDRITIS, ACUTE: Primary | ICD-10-CM

## 2019-03-08 RX ORDER — LIDOCAINE 50 MG/G
1-3 PATCH TOPICAL EVERY 24 HOURS
Qty: 30 EACH | Refills: 3 | Status: SHIPPED | OUTPATIENT
Start: 2019-03-08 | End: 2019-10-24 | Stop reason: ALTCHOICE

## 2019-03-08 NOTE — PATIENT INSTRUCTIONS

## 2019-03-09 NOTE — PROGRESS NOTES
HPI:  Carmelita Mcdowell is a 77y.o. year old female who is here for a several day history of pain across the rib cage. She notes it mostly with movement. She has been doing a lot more upper body activity with her  at home to try to increase her stamina. She denies any cough or wheeze. Denies shortness of breath. Denies fevers or chills. Denies any nausea or vomiting. No change in bowel or bladder habits. Past Medical History:   Diagnosis Date    Anemia NEC     Asthma     major asthma as a child/only with colds now    Cancer [facility])     BCCA removed    Coagulation disorder [address] [address]     on xarelto    GERD (gastroesophageal reflux disease) 10/22/2010    Hiatal hernia     Hypertension     hx of blood pressure med for short time    Ill-defined condition     increased cholesterol    Other unknown and unspecified cause of morbidity or mortality     eval with Dr Danika Reynolds for SOB, sept 2015:  results unkown    Stroke ([facility] [address] 2013    no deficits    Stroke ([facility] [address]     heat exhaustion    Watermelon stomach 2003       Past Surgical History:   Procedure Laterality Date    EXC SKIN MALIG >4CM FACE,FACIAL      times 3 - BCCA    HX BLADDER SUSPENSION      HX COLONOSCOPY      HX ENDOSCOPY  6/4/15    Dr. Aisha Vinson      vaginal del times 3    HX HERNIA REPAIR  2014    x2, failure    HX OTHER SURGICAL      EGDs    HX TONSILLECTOMY         Prior to Admission medications    Medication Sig Start Date End Date Taking? Authorizing Provider   lidocaine (LIDODERM) 5 % 1-3 Patches by TransDERmal route every twenty-four (24) hours. Apply patch to the affected area for 12 hours a day and remove for 12 hours a day. 3/8/19  Yes Dunia Orellana MD   omeprazole (PRILOSEC) 20 mg capsule Take 1 Cap by mouth daily. 2/6/19  Yes Dunia Orellana MD   escitalopram oxalate (LEXAPRO) 10 mg tablet Take 1 Tab by mouth daily.  2/6/19  Yes Dunia Orellana MD   ondansetron hcl (ZOFRAN) 4 mg tablet Take 1 Tab by mouth every eight (8) hours as needed for Nausea. 1/11/19  Yes Juana Montes MD   famotidine (PEPCID PO) Take  by mouth. Yes Provider, Historical   aspirin delayed-release 81 mg tablet Take  by mouth daily. Yes Provider, Historical   docusate sodium (COLACE) 100 mg capsule Take 100 mg by mouth two (2) times a day. Yes Provider, Historical   acetaminophen (TYLENOL) 325 mg tablet Take  by mouth every four (4) hours as needed for Pain. Yes Provider, Historical   pravastatin (PRAVACHOL) 20 mg tablet TAKE 1 TABLET BY MOUTH AT BEDTIME 10/17/18  Yes Elaine Suresh III, MD   simethicone (MYLICON) 80 mg chewable tablet Take 80 mg by mouth every four (4) hours as needed for Flatulence. Yes Provider, Historical   saliva substitute combo no.9 (BIOTENE DRY MOUTH ORAL RINSE MM) by Mucous Membrane route. Yes Provider, Historical   ALPRAZolam (XANAX) 0.5 mg tablet TAKE 1 TABLET BY MOUTH 3 TIMES A DAY AS NEEDED 9/5/18  Yes Gus Cerda MD   melatonin tab tablet Take 1 Tab by mouth nightly. 7/2/18  Yes Kevin Mcmanus MD   ferrous sulfate 325 mg (65 mg iron) tablet TAKE 1 TABLET BY MOUTH 3 TIMES A DAY WITH MEAL 12/5/17  Yes Gus Cerda MD   cholecalciferol, vitamin d3, (VITAMIN D) 1,000 unit tablet Take 2,000 Units by mouth daily. Yes Provider, Historical   MULTIVITAMINS (MULTIVITAMIN PO) Take 1 Tab by mouth daily. Yes Provider, Historical   Hydrocolloid Dressing (DUODERM HYDROACTIVE) 4 X 4 \" bndg 1 Device by Apply Externally route three (3) times daily as needed. 1/17/19   Elaine Suresh III, MD   mirtazapine (REMERON) 15 mg tablet Take 1 Tab by mouth nightly. 11/30/18   Elaine Suresh III, MD   midodrine (PROAMITINE) 10 mg tablet Take  by mouth three (3) times daily. Provider, Historical   magnesium oxide (MAG-OX) 400 mg tablet Take 400 mg by mouth two (2) times a day. Provider, Historical   senna (SENNA) 8.6 mg tablet Take 2 Tabs by mouth nightly as needed for Constipation. Provider, Historical   hydrOXYzine pamoate (VISTARIL) 25 mg capsule Take 1 Cap by mouth three (3) times daily as needed for Anxiety. 7/2/18   Rohini Boudreaux MD       Social History     Socioeconomic History    Marital status:      Spouse name: Not on file    Number of children: Not on file    Years of education: Not on file    Highest education level: Not on file   Social Needs    Financial resource strain: Not on file    Food insecurity - worry: Not on file    Food insecurity - inability: Not on file    Transportation needs - medical: Not on file   [place] needs - non-medical: Not on file   Occupational History     Employer: RETIRED   Tobacco Use    Smoking status: Never Smoker    Smokeless tobacco: Never Used   Substance and Sexual Activity    Alcohol use: No    Drug use: No    Sexual activity: No   Other Topics Concern    Not on file   Social History Narrative    Not on file          ROS  Per HPI    Visit Vitals  /75   Pulse 61   Temp 97.8 °F (36.6 °C) (Oral)   Resp 16   SpO2 (!) 89%         Physical Exam   Physical Examination: General appearance - alert, well appearing, and in no distress  Chest - clear to auscultation, no wheezes, rales or rhonchi, symmetric air entry, chest wall tenderness noted along the left sternal border as well as the left lower rib cage. No swelling or redness. No bony deformity. Heart - normal rate, regular rhythm, normal S1, S2, no murmurs, rubs, clicks or gallops  Abdomen - soft, nontender, nondistended, no masses or organomegaly  Extremities - peripheral pulses normal, no pedal edema, no clubbing or cyanosis      Assessment/Plan:  Diagnoses and all orders for this visit:    1. Costochondritis, acute -likely related to repetitive activity. Given her prior history of gastritis, would not use anti-inflammatories. At this point will use Lidoderm patches and avoid activity and see if her symptoms will improve.     Other orders  -     lidocaine (LIDODERM) 5 %; 1-3 Patches by TransDERmal route every twenty-four (24) hours. Apply patch to the affected area for 12 hours a day and remove for 12 hours a day. Follow-up Disposition:  Return if symptoms worsen or fail to improve. Advised her to call back or return to office if symptoms worsen/change/persist.  Discussed expected course/resolution/complications of diagnosis in detail with patient. Medication risks/benefits/costs/interactions/alternatives discussed with patient. She was given an after visit summary which includes diagnoses, current medications, & vitals. She expressed understanding with the diagnosis and plan.

## 2019-03-25 DIAGNOSIS — F41.9 ANXIETY: ICD-10-CM

## 2019-03-25 RX ORDER — ALPRAZOLAM 0.5 MG/1
0.5 TABLET ORAL
Qty: 40 TAB | Refills: 1 | OUTPATIENT
Start: 2019-03-25 | End: 2019-07-03 | Stop reason: SDUPTHER

## 2019-03-27 NOTE — TELEPHONE ENCOUNTER
Orders Placed This Encounter    ALPRAZolam (XANAX) 0.5 mg tablet     Sig: Take 1 Tab by mouth three (3) times daily as needed for Anxiety. Max Daily Amount: 1.5 mg.     Dispense:  40 Tab     Refill:  1     Not to exceed 4 additional fills before 09/15/2018. The above controlled substance refill was called into patients pharmacy - [facility]/ - authorized by Dr. Gi Gonzalez.

## 2019-07-03 ENCOUNTER — HOSPITAL ENCOUNTER (OUTPATIENT)
Dept: LAB | Age: 79
Discharge: HOME | End: 2019-07-03
Payer: MEDICARE

## 2019-07-03 ENCOUNTER — OFFICE VISIT (OUTPATIENT)
Dept: INTERNAL MEDICINE CLINIC | Age: 79
End: 2019-07-03

## 2019-07-03 VITALS
TEMPERATURE: 98.1 F | RESPIRATION RATE: 14 BRPM | HEART RATE: 81 BPM | BODY MASS INDEX: 23.96 KG/M2 | HEIGHT: 65 IN | WEIGHT: 143.8 LBS | SYSTOLIC BLOOD PRESSURE: 134 MMHG | DIASTOLIC BLOOD PRESSURE: 72 MMHG | OXYGEN SATURATION: 97 %

## 2019-07-03 DIAGNOSIS — F41.9 ANXIETY: ICD-10-CM

## 2019-07-03 DIAGNOSIS — K52.9 GASTROENTERITIS: ICD-10-CM

## 2019-07-03 DIAGNOSIS — R63.4 WEIGHT LOSS: ICD-10-CM

## 2019-07-03 DIAGNOSIS — H10.32 ACUTE BACTERIAL CONJUNCTIVITIS OF LEFT EYE: Primary | ICD-10-CM

## 2019-07-03 DIAGNOSIS — E44.0 MODERATE PROTEIN-CALORIE MALNUTRITION (HCC): ICD-10-CM

## 2019-07-03 PROCEDURE — 80053 COMPREHEN METABOLIC PANEL: CPT

## 2019-07-03 PROCEDURE — 85025 COMPLETE CBC W/AUTO DIFF WBC: CPT

## 2019-07-03 PROCEDURE — 36415 COLL VENOUS BLD VENIPUNCTURE: CPT

## 2019-07-03 RX ORDER — CHLORPHENIRAMINE MALEATE 4 MG
TABLET ORAL 2 TIMES DAILY
Qty: 15 G | Refills: 0 | Status: SHIPPED | OUTPATIENT
Start: 2019-07-03 | End: 2019-10-24 | Stop reason: ALTCHOICE

## 2019-07-03 RX ORDER — MIRTAZAPINE 15 MG/1
7.5 TABLET, FILM COATED ORAL
Qty: 45 TAB | Refills: 0 | Status: SHIPPED | OUTPATIENT
Start: 2019-07-03 | End: 2019-09-25 | Stop reason: SDUPTHER

## 2019-07-03 RX ORDER — GENTAMICIN SULFATE 3 MG/ML
2 SOLUTION/ DROPS OPHTHALMIC EVERY 4 HOURS
Qty: 15 ML | Refills: 0 | Status: SHIPPED | OUTPATIENT
Start: 2019-07-03 | End: 2019-10-24 | Stop reason: ALTCHOICE

## 2019-07-03 RX ORDER — POLYETHYLENE GLYCOL 3350 17 G/17G
17 POWDER, FOR SOLUTION ORAL AS NEEDED
COMMUNITY

## 2019-07-03 RX ORDER — ALPRAZOLAM 0.5 MG/1
0.5 TABLET ORAL
Qty: 40 TAB | Refills: 1 | Status: ON HOLD | OUTPATIENT
Start: 2019-07-03 | End: 2020-02-12 | Stop reason: SDUPTHER

## 2019-07-03 RX ORDER — LANOLIN ALCOHOL/MO/W.PET/CERES
325 CREAM (GRAM) TOPICAL
Qty: 90 TAB | Refills: 5 | COMMUNITY
Start: 2019-07-03 | End: 2020-04-20

## 2019-07-04 LAB
ALBUMIN SERPL-MCNC: 4.1 G/DL (ref 3.5–4.8)
ALBUMIN/GLOB SERPL: 1.3 {RATIO} (ref 1.2–2.2)
ALP SERPL-CCNC: 60 IU/L (ref 39–117)
ALT SERPL-CCNC: 9 IU/L (ref 0–32)
AST SERPL-CCNC: 19 IU/L (ref 0–40)
BASOPHILS # BLD AUTO: 0 X10E3/UL (ref 0–0.2)
BASOPHILS NFR BLD AUTO: 1 %
BILIRUB SERPL-MCNC: 0.7 MG/DL (ref 0–1.2)
BUN SERPL-MCNC: 14 MG/DL (ref 8–27)
BUN/CREAT SERPL: 13 (ref 12–28)
CALCIUM SERPL-MCNC: 10.1 MG/DL (ref 8.7–10.3)
CHLORIDE SERPL-SCNC: 100 MMOL/L (ref 96–106)
CO2 SERPL-SCNC: 22 MMOL/L (ref 20–29)
CREAT SERPL-MCNC: 1.1 MG/DL (ref 0.57–1)
EOSINOPHIL # BLD AUTO: 0.1 X10E3/UL (ref 0–0.4)
EOSINOPHIL NFR BLD AUTO: 2 %
ERYTHROCYTE [DISTWIDTH] IN BLOOD BY AUTOMATED COUNT: 13.5 % (ref 12.3–15.4)
GLOBULIN SER CALC-MCNC: 3.2 G/DL (ref 1.5–4.5)
GLUCOSE SERPL-MCNC: 108 MG/DL (ref 65–99)
HCT VFR BLD AUTO: 39.6 % (ref 34–46.6)
HGB BLD-MCNC: 13.2 G/DL (ref 11.1–15.9)
IMM GRANULOCYTES # BLD AUTO: 0 X10E3/UL (ref 0–0.1)
IMM GRANULOCYTES NFR BLD AUTO: 0 %
LYMPHOCYTES # BLD AUTO: 0.9 X10E3/UL (ref 0.7–3.1)
LYMPHOCYTES NFR BLD AUTO: 20 %
MCH RBC QN AUTO: 32 PG (ref 26.6–33)
MCHC RBC AUTO-ENTMCNC: 33.3 G/DL (ref 31.5–35.7)
MCV RBC AUTO: 96 FL (ref 79–97)
MONOCYTES # BLD AUTO: 0.4 X10E3/UL (ref 0.1–0.9)
MONOCYTES NFR BLD AUTO: 9 %
NEUTROPHILS # BLD AUTO: 3.2 X10E3/UL (ref 1.4–7)
NEUTROPHILS NFR BLD AUTO: 68 %
PLATELET # BLD AUTO: 206 X10E3/UL (ref 150–450)
POTASSIUM SERPL-SCNC: 4.4 MMOL/L (ref 3.5–5.2)
PROT SERPL-MCNC: 7.3 G/DL (ref 6–8.5)
RBC # BLD AUTO: 4.13 X10E6/UL (ref 3.77–5.28)
SODIUM SERPL-SCNC: 138 MMOL/L (ref 134–144)
WBC # BLD AUTO: 4.6 X10E3/UL (ref 3.4–10.8)

## 2019-07-04 NOTE — PROGRESS NOTES
HPI:  Nori Llanes is a 77y.o. year old female who is here for multiple issues. She has had about a 3-week history of drainage from the left eye with some redness in the left lower eyelid. She notes that it is burning and stinging at times. No history of injury that she is aware of. She is also had several day history of increasing nausea and decreased p.o. intake. She has been drinking liquids. No vomiting. No melena or hematochezia. No fevers or chills. Her bowel movements are intermittently constipated. She is currently using MiraLAX on an as-needed basis. Past Medical History:   Diagnosis Date    Anemia NEC     Asthma     major asthma as a child/only with colds now    Cancer [facility])     BCCA removed    Coagulation disorder ([facility] [address]     on xarelto    GERD (gastroesophageal reflux disease) 10/22/2010    Hiatal hernia     Hypertension     hx of blood pressure med for short time    Ill-defined condition     increased cholesterol    Other unknown and unspecified cause of morbidity or mortality     eval with Dr Annamarie Jones for SOB, sept 2015:  results unkown    Stroke ([facility] [address] 2013    no deficits    Stroke ([facility] [address]     heat exhaustion    Watermelon stomach 2003       Past Surgical History:   Procedure Laterality Date    EXC SKIN MALIG >4CM FACE,FACIAL      times 3 - BCCA    HX BLADDER SUSPENSION      HX COLONOSCOPY      HX ENDOSCOPY  6/4/15    Dr. Elizabeth Vidal      vaginal del times 3    HX HERNIA REPAIR  2014    x2, failure    HX OTHER SURGICAL      EGDs    HX TONSILLECTOMY         Prior to Admission medications    Medication Sig Start Date End Date Taking? Authorizing Provider   polyethylene glycol (MIRALAX) 17 gram/dose powder Take 17 g by mouth as needed. Yes Provider, Historical   lansoprazole (PREVACID PO) Take  by mouth. Yes Provider, Historical   ALPRAZolam (XANAX) 0.5 mg tablet Take 1 Tab by mouth three (3) times daily as needed for Anxiety.  Max Daily Amount: 1.5 mg. 7/3/19  Yes Vincent West MD   mirtazapine (REMERON) 15 mg tablet Take 0.5 Tabs by mouth nightly. 7/3/19  Yes Vincent West MD   clotrimazole (LOTRIMIN) 1 % topical cream Apply  to affected area two (2) times a day. 7/3/19  Yes Vincent West MD   gentamicin (GARAMYCIN) 0.3 % ophthalmic solution Administer 2 Drops to left eye every four (4) hours. 7/3/19  Yes Vincent West MD   ferrous sulfate 325 mg (65 mg iron) tablet Take 1 Tab by mouth Daily (before breakfast). 7/3/19  Yes Vincent West MD   omeprazole (PRILOSEC) 20 mg capsule Take 1 Cap by mouth daily. 2/6/19  Yes Vincent West MD   ondansetron hcl (ZOFRAN) 4 mg tablet Take 1 Tab by mouth every eight (8) hours as needed for Nausea. 1/11/19  Yes Ana Lugo MD   famotidine (PEPCID PO) Take  by mouth. Yes Provider, Historical   aspirin delayed-release 81 mg tablet Take  by mouth daily. Yes Provider, Historical   acetaminophen (TYLENOL) 325 mg tablet Take  by mouth every four (4) hours as needed for Pain. Yes Provider, Historical   pravastatin (PRAVACHOL) 20 mg tablet TAKE 1 TABLET BY MOUTH AT BEDTIME 10/17/18  Yes Saad Rosales III, MD   simethicone (MYLICON) 80 mg chewable tablet Take 80 mg by mouth every four (4) hours as needed for Flatulence. Yes Provider, Historical   melatonin tab tablet Take 1 Tab by mouth nightly. 7/2/18  Yes Doug Reaves MD   cholecalciferol, vitamin d3, (VITAMIN D) 1,000 unit tablet Take 2,000 Units by mouth daily. Yes Provider, Historical   MULTIVITAMINS (MULTIVITAMIN PO) Take 1 Tab by mouth daily. Yes Provider, Historical   lidocaine (LIDODERM) 5 % 1-3 Patches by TransDERmal route every twenty-four (24) hours. Apply patch to the affected area for 12 hours a day and remove for 12 hours a day. 3/8/19   Vincent West MD   escitalopram oxalate (LEXAPRO) 10 mg tablet Take 1 Tab by mouth daily.  2/6/19   Vincent West MD   Hydrocolloid Dressing (DUODERM HYDROACTIVE) 4 X 4 \" bndg 1 Device by Apply Externally route three (3) times daily as needed. 1/17/19   Austin Wilder III, MD   docusate sodium (COLACE) 100 mg capsule Take 100 mg by mouth two (2) times a day. Provider, Historical   mirtazapine (REMERON) 15 mg tablet Take 1 Tab by mouth nightly. 11/30/18   Austin Wilder III, MD   midodrine (PROAMITINE) 10 mg tablet Take  by mouth three (3) times daily. Provider, Historical   magnesium oxide (MAG-OX) 400 mg tablet Take 400 mg by mouth two (2) times a day. Provider, Historical   senna (SENNA) 8.6 mg tablet Take 2 Tabs by mouth nightly as needed for Constipation. Provider, Historical   saliva substitute combo no.9 (BIOTENE DRY MOUTH ORAL RINSE MM) by Mucous Membrane route. Provider, Historical   hydrOXYzine pamoate (VISTARIL) 25 mg capsule Take 1 Cap by mouth three (3) times daily as needed for Anxiety.  7/2/18   Apoorva Harris MD       Social History     Socioeconomic History    Marital status:      Spouse name: Not on file    Number of children: Not on file    Years of education: Not on file    Highest education level: Not on file   Occupational History     Employer: RETIRED   Social Needs    Financial resource strain: Not on file    Food insecurity:     Worry: Not on file     Inability: Not on file    Transportation needs:     Medical: Not on file     Non-medical: Not on file   Tobacco Use    Smoking status: Never Smoker    Smokeless tobacco: Never Used   Substance and Sexual Activity    Alcohol use: No    Drug use: No    Sexual activity: Never   Lifestyle    Physical activity:     Days per week: Not on file     Minutes per session: Not on file    Stress: Not on file   Relationships    Social connections:     Talks on phone: Not on file     Gets together: Not on file     Attends Muslim service: Not on file     Active member of club or organization: Not on file     Attends meetings of clubs or organizations: Not on file Relationship status: Not on file    Intimate partner violence:     Fear of current or ex partner: Not on file     Emotionally abused: Not on file     Physically abused: Not on file     Forced sexual activity: Not on file   Other Topics Concern    Not on file   Social History Narrative    Not on file          ROS  Per HPI    Visit Vitals  /72   Pulse 81   Temp 98.1 °F (36.7 °C) (Oral)   Resp 14   Ht 5' 5\" (1.651 m)   Wt 143 lb 12.8 oz (65.2 kg)   SpO2 97%   BMI 23.93 kg/m²         Physical Exam   Physical Examination: General appearance - alert, well appearing, and in no distress  Eyes - right eye normal, left eye with injection of the left lower lid and a small area of swelling. Pupils equal round reactive to light. Fundi benign. Mouth - mucous membranes moist, pharynx normal without lesions  Chest - clear to auscultation, no wheezes, rales or rhonchi, symmetric air entry  Heart - normal rate, regular rhythm, normal S1, S2, no murmurs, rubs, clicks or gallops  Abdomen - soft, nontender, nondistended, no masses or organomegaly  Extremities - peripheral pulses normal, no pedal edema, no clubbing or cyanosis      Assessment/Plan:  Diagnoses and all orders for this visit:    1. Acute bacterial conjunctivitis of left eye -we will treat with antibiotic drops and warm compresses. She will let me know if not improving.  -     gentamicin (GARAMYCIN) 0.3 % ophthalmic solution; Administer 2 Drops to left eye every four (4) hours. 2. Anxiety -had stopped taking her mirtazapine. Will restart this as it may help some with her appetite and sleep as well. Continue Lexapro. -     ALPRAZolam (XANAX) 0.5 mg tablet; Take 1 Tab by mouth three (3) times daily as needed for Anxiety. Max Daily Amount: 1.5 mg.  -     mirtazapine (REMERON) 15 mg tablet; Take 0.5 Tabs by mouth nightly. 3. Weight loss -? Appetite and anxiety related. Will check labs today to be sure that they are stable.   -     CBC WITH AUTOMATED DIFF  -     METABOLIC PANEL, COMPREHENSIVE    4. Moderate protein-calorie malnutrition (HCC)  -     CBC WITH AUTOMATED DIFF  -     METABOLIC PANEL, COMPREHENSIVE    5. Gastroenteritis -appears stable. Check CBC. If adequate will reduce her iron to 1 a day and recheck levels in 3 months.  -     CBC WITH AUTOMATED DIFF  -     METABOLIC PANEL, COMPREHENSIVE    Other orders  -     clotrimazole (LOTRIMIN) 1 % topical cream; Apply  to affected area two (2) times a day. Advised her to call back or return to office if symptoms worsen/change/persist.  Discussed expected course/resolution/complications of diagnosis in detail with patient. Medication risks/benefits/costs/interactions/alternatives discussed with patient. She was given an after visit summary which includes diagnoses, current medications, & vitals. She expressed understanding with the diagnosis and plan.

## 2019-09-25 DIAGNOSIS — F41.9 ANXIETY: ICD-10-CM

## 2019-09-25 RX ORDER — MIRTAZAPINE 15 MG/1
TABLET, FILM COATED ORAL
Qty: 45 TAB | Refills: 0 | Status: SHIPPED | OUTPATIENT
Start: 2019-09-25 | End: 2019-10-24 | Stop reason: ALTCHOICE

## 2019-10-24 ENCOUNTER — HOSPITAL ENCOUNTER (OUTPATIENT)
Dept: LAB | Age: 79
Discharge: HOME | End: 2019-10-24
Payer: MEDICARE

## 2019-10-24 ENCOUNTER — OFFICE VISIT (OUTPATIENT)
Dept: INTERNAL MEDICINE CLINIC | Age: 79
End: 2019-10-24

## 2019-10-24 VITALS
HEIGHT: 65 IN | BODY MASS INDEX: 23.99 KG/M2 | HEART RATE: 77 BPM | WEIGHT: 144 LBS | OXYGEN SATURATION: 96 % | RESPIRATION RATE: 10 BRPM | DIASTOLIC BLOOD PRESSURE: 71 MMHG | TEMPERATURE: 97.7 F | SYSTOLIC BLOOD PRESSURE: 135 MMHG

## 2019-10-24 DIAGNOSIS — D64.9 ANEMIA, UNSPECIFIED TYPE: ICD-10-CM

## 2019-10-24 DIAGNOSIS — R63.4 WEIGHT LOSS: Primary | ICD-10-CM

## 2019-10-24 DIAGNOSIS — I63.50 CEREBROVASCULAR ACCIDENT (CVA) DUE TO OCCLUSION OF CEREBRAL ARTERY (HCC): ICD-10-CM

## 2019-10-24 DIAGNOSIS — E44.0 MODERATE PROTEIN-CALORIE MALNUTRITION (HCC): ICD-10-CM

## 2019-10-24 PROCEDURE — 85025 COMPLETE CBC W/AUTO DIFF WBC: CPT

## 2019-10-24 PROCEDURE — 36415 COLL VENOUS BLD VENIPUNCTURE: CPT

## 2019-10-24 PROCEDURE — 80053 COMPREHEN METABOLIC PANEL: CPT

## 2019-10-24 PROCEDURE — 84443 ASSAY THYROID STIM HORMONE: CPT

## 2019-10-24 RX ORDER — CHLORPHENIRAMINE MALEATE 4 MG
TABLET ORAL 2 TIMES DAILY
Qty: 15 G | Refills: 0 | Status: SHIPPED | OUTPATIENT
Start: 2019-10-24 | End: 2020-02-12

## 2019-10-24 RX ORDER — TRIAMCINOLONE ACETONIDE 0.25 MG/G
CREAM TOPICAL
Refills: 1 | COMMUNITY
Start: 2019-09-19 | End: 2019-10-24 | Stop reason: ALTCHOICE

## 2019-10-25 LAB
ALBUMIN SERPL-MCNC: 4.3 G/DL (ref 3.5–4.8)
ALBUMIN/GLOB SERPL: 1.6 {RATIO} (ref 1.2–2.2)
ALP SERPL-CCNC: 54 IU/L (ref 39–117)
ALT SERPL-CCNC: 10 IU/L (ref 0–32)
AST SERPL-CCNC: 21 IU/L (ref 0–40)
BASOPHILS # BLD AUTO: 0.1 X10E3/UL (ref 0–0.2)
BASOPHILS NFR BLD AUTO: 1 %
BILIRUB SERPL-MCNC: 0.4 MG/DL (ref 0–1.2)
BUN SERPL-MCNC: 17 MG/DL (ref 8–27)
BUN/CREAT SERPL: 13 (ref 12–28)
CALCIUM SERPL-MCNC: 9.5 MG/DL (ref 8.7–10.3)
CHLORIDE SERPL-SCNC: 103 MMOL/L (ref 96–106)
CO2 SERPL-SCNC: 23 MMOL/L (ref 20–29)
CREAT SERPL-MCNC: 1.26 MG/DL (ref 0.57–1)
EOSINOPHIL # BLD AUTO: 0.2 X10E3/UL (ref 0–0.4)
EOSINOPHIL NFR BLD AUTO: 4 %
ERYTHROCYTE [DISTWIDTH] IN BLOOD BY AUTOMATED COUNT: 12.3 % (ref 12.3–15.4)
GLOBULIN SER CALC-MCNC: 2.7 G/DL (ref 1.5–4.5)
GLUCOSE SERPL-MCNC: 97 MG/DL (ref 65–99)
HCT VFR BLD AUTO: 37.3 % (ref 34–46.6)
HGB BLD-MCNC: 12.9 G/DL (ref 11.1–15.9)
IMM GRANULOCYTES # BLD AUTO: 0 X10E3/UL (ref 0–0.1)
IMM GRANULOCYTES NFR BLD AUTO: 0 %
LYMPHOCYTES # BLD AUTO: 1 X10E3/UL (ref 0.7–3.1)
LYMPHOCYTES NFR BLD AUTO: 18 %
MCH RBC QN AUTO: 33.1 PG (ref 26.6–33)
MCHC RBC AUTO-ENTMCNC: 34.6 G/DL (ref 31.5–35.7)
MCV RBC AUTO: 96 FL (ref 79–97)
MONOCYTES # BLD AUTO: 0.5 X10E3/UL (ref 0.1–0.9)
MONOCYTES NFR BLD AUTO: 10 %
NEUTROPHILS # BLD AUTO: 3.7 X10E3/UL (ref 1.4–7)
NEUTROPHILS NFR BLD AUTO: 67 %
PLATELET # BLD AUTO: 245 X10E3/UL (ref 150–450)
POTASSIUM SERPL-SCNC: 4.2 MMOL/L (ref 3.5–5.2)
PROT SERPL-MCNC: 7 G/DL (ref 6–8.5)
RBC # BLD AUTO: 3.9 X10E6/UL (ref 3.77–5.28)
SODIUM SERPL-SCNC: 142 MMOL/L (ref 134–144)
T4 FREE SERPL-MCNC: 1.45 NG/DL (ref 0.82–1.77)
TSH SERPL DL<=0.005 MIU/L-ACNC: 2.44 UIU/ML (ref 0.45–4.5)
WBC # BLD AUTO: 5.5 X10E3/UL (ref 3.4–10.8)

## 2019-10-25 NOTE — PROGRESS NOTES
HPI:  Lj Bryant is a 77y.o. year old female who is here for a routine visit:    Presents for follow-up visit. She has been cold all the time. She feels like it has gotten worse over the last 2 to 3 months. She denies any nausea or vomiting. No melena or hematochezia. No fevers or chills. Has been eating and drinking reasonably. She does find that liquids are more difficult. She does note that her stamina and strength is improving. She is walking better. She has had no falls. Past Medical History:   Diagnosis Date    Anemia NEC     Asthma     major asthma as a child/only with colds now    Cancer [facility])     BCCA removed    Coagulation disorder ([facility] [address]     on xarelto    GERD (gastroesophageal reflux disease) 10/22/2010    Hiatal hernia     Hypertension     hx of blood pressure med for short time    Ill-defined condition     increased cholesterol    Other unknown and unspecified cause of morbidity or mortality     eval with Dr Hamilton Laughter for SOB, sept 2015:  results unkown    Stroke ([facility] [address] 2013    no deficits    Stroke ([facility] [address]     heat exhaustion    Watermelon stomach 2003       Past Surgical History:   Procedure Laterality Date    EXC SKIN MALIG >4CM FACE,FACIAL      times 3 - BCCA    HX BLADDER SUSPENSION      HX COLONOSCOPY      HX ENDOSCOPY  6/4/15    Dr. Chani Tai      vaginal del times 3    HX HERNIA REPAIR  2014    x2, failure    HX OTHER SURGICAL      EGDs    HX TONSILLECTOMY         Prior to Admission medications    Medication Sig Start Date End Date Taking? Authorizing Provider   clotrimazole (LOTRIMIN) 1 % topical cream Apply  to affected area two (2) times a day. 10/24/19  Yes Virginia Russo MD   polyethylene glycol (MIRALAX) 17 gram/dose powder Take 17 g by mouth as needed. Yes Provider, Historical   lansoprazole (PREVACID PO) Take  by mouth.    Yes Provider, Historical   ALPRAZolam (XANAX) 0.5 mg tablet Take 1 Tab by mouth three (3) times daily as needed for Anxiety. Max Daily Amount: 1.5 mg. 7/3/19  Yes Christina Lyons MD   ferrous sulfate 325 mg (65 mg iron) tablet Take 1 Tab by mouth Daily (before breakfast). 7/3/19  Yes Christina Lyons MD   ondansetron hcl (ZOFRAN) 4 mg tablet Take 1 Tab by mouth every eight (8) hours as needed for Nausea. 1/11/19  Yes Smitha Moody MD   famotidine (PEPCID PO) Take  by mouth. Yes Provider, Historical   aspirin delayed-release 81 mg tablet Take  by mouth daily. Yes Provider, Historical   mirtazapine (REMERON) 15 mg tablet Take 1 Tab by mouth nightly. 11/30/18  Yes Christina Lyons MD   acetaminophen (TYLENOL) 325 mg tablet Take  by mouth every four (4) hours as needed for Pain. Yes Provider, Historical   midodrine (PROAMITINE) 10 mg tablet Take  by mouth two (2) times a day. Yes Provider, Historical   pravastatin (PRAVACHOL) 20 mg tablet TAKE 1 TABLET BY MOUTH AT BEDTIME 10/17/18  Yes Tracy Nieto III, MD   simethicone (MYLICON) 80 mg chewable tablet Take 80 mg by mouth every four (4) hours as needed for Flatulence. Yes Provider, Historical   saliva substitute combo no.9 (BIOTENE DRY MOUTH ORAL RINSE MM) by Mucous Membrane route. Yes Provider, Historical   melatonin tab tablet Take 1 Tab by mouth nightly. 7/2/18  Yes Nelda Navarro MD   cholecalciferol, vitamin d3, (VITAMIN D) 1,000 unit tablet Take 2,000 Units by mouth daily. Yes Provider, Historical   MULTIVITAMINS (MULTIVITAMIN PO) Take 1 Tab by mouth daily. Yes Provider, Historical   triamcinolone acetonide (KENALOG) 0.025 % topical cream APPLY TO IRRITATION ON FACE AND UNDER BREAST DAILY FOR 7-10 DAYS 9/19/19 10/24/19  Provider, Historical   mirtazapine (REMERON) 15 mg tablet TAKE 1/2 TABLET BY MOUTH AT BEDTIME 9/25/19 10/24/19  Tracy Nieto III, MD   clotrimazole (LOTRIMIN) 1 % topical cream Apply  to affected area two (2) times a day.  7/3/19 10/24/19  Christina Lyons MD   gentamicin (GARAMYCIN) 0.3 % ophthalmic solution Administer 2 Drops to left eye every four (4) hours. 7/3/19 10/24/19  Clay Lee MD   lidocaine (LIDODERM) 5 % 1-3 Patches by TransDERmal route every twenty-four (24) hours. Apply patch to the affected area for 12 hours a day and remove for 12 hours a day. 3/8/19 10/24/19  Akilah Quezada III, MD   escitalopram oxalate (LEXAPRO) 10 mg tablet Take 1 Tab by mouth daily. 2/6/19   Clay Lee MD   omeprazole (PRILOSEC) 20 mg capsule Take 1 Cap by mouth daily. 2/6/19 10/24/19  Clay Lee MD   Hydrocolloid Dressing (DUODERM HYDROACTIVE) 4 X 4 \" bndg 1 Device by Apply Externally route three (3) times daily as needed. 1/17/19 10/24/19  Akilah Quezada III, MD   docusate sodium (COLACE) 100 mg capsule Take 100 mg by mouth two (2) times a day. 10/24/19  Provider, Historical   magnesium oxide (MAG-OX) 400 mg tablet Take 400 mg by mouth two (2) times a day. 10/24/19  Provider, Historical   senna (SENNA) 8.6 mg tablet Take 2 Tabs by mouth nightly as needed for Constipation. 10/24/19  Provider, Historical   hydrOXYzine pamoate (VISTARIL) 25 mg capsule Take 1 Cap by mouth three (3) times daily as needed for Anxiety.  7/2/18 10/24/19  Jenny Cordova MD       Social History     Socioeconomic History    Marital status:      Spouse name: Not on file    Number of children: Not on file    Years of education: Not on file    Highest education level: Not on file   Occupational History     Employer: RETIRED   Social Needs    Financial resource strain: Not on file    Food insecurity:     Worry: Not on file     Inability: Not on file    Transportation needs:     Medical: Not on file     Non-medical: Not on file   Tobacco Use    Smoking status: Never Smoker    Smokeless tobacco: Never Used   Substance and Sexual Activity    Alcohol use: No    Drug use: No    Sexual activity: Never   Lifestyle    Physical activity:     Days per week: Not on file     Minutes per session: Not on file    Stress: Not on file   Relationships    Social connections:     Talks on phone: Not on file     Gets together: Not on file     Attends Voodoo service: Not on file     Active member of club or organization: Not on file     Attends meetings of clubs or organizations: Not on file     Relationship status: Not on file    Intimate partner violence:     Fear of current or ex partner: Not on file     Emotionally abused: Not on file     Physically abused: Not on file     Forced sexual activity: Not on file   Other Topics Concern    Not on file   Social History Narrative    Not on file          ROS  Per HPI    Visit Vitals  /71   Pulse 77   Temp 97.7 °F (36.5 °C) (Oral)   Resp 10   Ht 5' 5\" (1.651 m)   Wt 144 lb (65.3 kg)   SpO2 96%   BMI 23.96 kg/m²         Physical Exam   Physical Examination: General appearance - alert, well appearing, and in no distress  Mouth - mucous membranes moist, pharynx normal without lesions  Chest - clear to auscultation, no wheezes, rales or rhonchi, symmetric air entry  Heart - normal rate and regular rhythm  Abdomen - soft, nontender, nondistended, no masses or organomegaly  Extremities - peripheral pulses normal, no pedal edema, no clubbing or cyanosis  Mild erythema under both breast.    Assessment/Plan:  Diagnoses and all orders for this visit:    1. Weight loss -appears to have stabilized. Will repeat protein stores and blood work. Seems to have improved with mirtazapine and will continue this. -     METABOLIC PANEL, COMPREHENSIVE  -     TSH AND FREE T4    2. Moderate protein-calorie malnutrition (HCC)  -     METABOLIC PANEL, COMPREHENSIVE    3. Anemia, unspecified type -previously stable. Given that she is cold we will repeat blood work for that. -     METABOLIC PANEL, COMPREHENSIVE  -     CBC WITH AUTOMATED DIFF    4. Cerebrovascular accident (CVA) due to occlusion of cerebral artery ([facility] [address] -continue her statin drugs.  -     METABOLIC PANEL, COMPREHENSIVE  5.   Tinea corporis-treat with over-the-counter antifungals. Other orders  -     clotrimazole (LOTRIMIN) 1 % topical cream; Apply  to affected area two (2) times a day. Follow-up and Dispositions    · Return in about 6 months (around 4/24/2020). Advised her to call back or return to office if symptoms worsen/change/persist.  Discussed expected course/resolution/complications of diagnosis in detail with patient. Medication risks/benefits/costs/interactions/alternatives discussed with patient. She was given an after visit summary which includes diagnoses, current medications, & vitals. She expressed understanding with the diagnosis and plan.

## 2019-10-28 ENCOUNTER — TELEPHONE (OUTPATIENT)
Dept: INTERNAL MEDICINE CLINIC | Age: 79
End: 2019-10-28

## 2019-10-28 NOTE — TELEPHONE ENCOUNTER
Spoke with pt spouse and advised that ANGEL sent a My Chart message that read. .. These labs look great. Please keep the medications the same and repeat the labs in 6 months.  Please also let me know if you have any new problems      Verbalized understanding

## 2019-11-10 RX ORDER — OMEPRAZOLE 20 MG/1
CAPSULE, DELAYED RELEASE ORAL
Qty: 90 CAP | Refills: 2 | Status: SHIPPED | OUTPATIENT
Start: 2019-11-10 | End: 2020-04-20

## 2019-11-12 ENCOUNTER — HOSPITAL ENCOUNTER (OUTPATIENT)
Dept: LAB | Age: 79
Discharge: HOME | End: 2019-11-12
Payer: MEDICARE

## 2019-11-12 DIAGNOSIS — Z11.1 ENCOUNTER FOR PPD TEST: Primary | ICD-10-CM

## 2019-11-12 PROCEDURE — 36415 COLL VENOUS BLD VENIPUNCTURE: CPT

## 2019-11-12 PROCEDURE — 86480 TB TEST CELL IMMUN MEASURE: CPT

## 2019-11-15 LAB
GAMMA INTERFERON BACKGROUND BLD IA-ACNC: 0.04 IU/ML
M TB IFN-G BLD-IMP: NEGATIVE
M TB IFN-G CD4+ BCKGRND COR BLD-ACNC: 0.05 IU/ML
MITOGEN IGNF BLD-ACNC: 7.96 IU/ML
QUANTIFERON INCUBATION, QF1T: NORMAL
QUANTIFERON TB2 AG: 0.04 IU/ML
SERVICE CMNT-IMP: NORMAL

## 2019-12-24 RX ORDER — MIRTAZAPINE 15 MG/1
TABLET, FILM COATED ORAL
Qty: 45 TAB | Refills: 0 | Status: SHIPPED | OUTPATIENT
Start: 2019-12-24 | End: 2020-02-07

## 2020-01-01 NOTE — PROGRESS NOTES
Mom called because she was done pumping  She got 14ml of breast milk  Baby was now awake and rooting  I assisted mom with positioning in cross cradle hold and getting a deep latch  Mom has almost a bifurcated nipple, so baby often just gets on top half of nipple  Mom is not c/o any discomfort, but I demo  to her how to aim nipple higher to get a deep latch on underside of nipple first and then bring her up over the top of nipple  Baby does latch fairly well, but keeps falling asleep  We were able to get her to feed for about 10 minutes  I then demo  to mom how to syringe feed baby  Baby took additional 8 ml via syringe  I saved the other 6 ml for the next feeding  I enc triple feeds until baby is feeding more efficiently  So mom is to breastfeed first, then pump and supplement  Mom asked about just pumping and bottle feeding  I enc her to try to give it another day or two before she resorts to this  I also discussed paced bottle feeding, in case she feels overwhelmed with syringe feeding, but enc her to try to avoid bottles  Enc her to call if she needs further assistance  Please get vitamin D level as well as PTH level.

## 2020-01-10 ENCOUNTER — HOSPITAL ENCOUNTER (EMERGENCY)
Age: 80
Discharge: HOME | End: 2020-01-10
Attending: EMERGENCY MEDICINE
Payer: MEDICARE

## 2020-01-10 ENCOUNTER — APPOINTMENT (OUTPATIENT)
Dept: GENERAL RADIOLOGY | Age: 80
End: 2020-01-10
Attending: EMERGENCY MEDICINE
Payer: MEDICARE

## 2020-01-10 VITALS
TEMPERATURE: 97.6 F | DIASTOLIC BLOOD PRESSURE: 63 MMHG | OXYGEN SATURATION: 95 % | WEIGHT: 147.27 LBS | HEIGHT: 64 IN | SYSTOLIC BLOOD PRESSURE: 109 MMHG | RESPIRATION RATE: 18 BRPM | BODY MASS INDEX: 25.14 KG/M2 | HEART RATE: 62 BPM

## 2020-01-10 DIAGNOSIS — M79.602 PAIN OF LEFT UPPER EXTREMITY: ICD-10-CM
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
## 2020-01-10 PROCEDURE — 73562 X-RAY EXAM OF KNEE 3: CPT

## 2020-01-10 PROCEDURE — 73030 X-RAY EXAM OF SHOULDER: CPT

## 2020-01-10 PROCEDURE — 99283 EMERGENCY DEPT VISIT LOW MDM: CPT

## 2020-01-10 PROCEDURE — 74011250637 HC RX REV CODE- 250/637: Performed by: EMERGENCY MEDICINE

## 2020-01-10 PROCEDURE — 73060 X-RAY EXAM OF HUMERUS: CPT

## 2020-01-10 RX ORDER — ONDANSETRON 4 MG/1
4 TABLET, ORALLY DISINTEGRATING ORAL
Status: COMPLETED | OUTPATIENT
Start: 2020-01-10 | End: 2020-01-10

## 2020-01-10 RX ADMIN — ONDANSETRON 4 MG: 4 TABLET, ORALLY DISINTEGRATING ORAL at 01:36

## 2020-01-10 NOTE — ED PROVIDER NOTES
Michael Sky is an [de-identified] yo F with left knee pain and upper arm pain after fall. Patient's  states that she fell down 4 steps about 2 hours ago when her leg gave out and she slid down the steps while trying to hold onto the hand rail. She did not pass out and did not hit her head. She has continued to have pain in her upper arm which increases when she tries to move it. She states that her knee pain is mild. She took acetaminophen prior to coming to the ED.             Past Medical History:   Diagnosis Date    Anemia NEC     Asthma     major asthma as a child/only with colds now    Cancer [facility])     BCCA removed    Coagulation disorder ([facility] [address]     on xarelto    GERD (gastroesophageal reflux disease) 10/22/2010    Hiatal hernia     Hypertension     hx of blood pressure med for short time    Ill-defined condition     increased cholesterol    Other unknown and unspecified cause of morbidity or mortality     eval with Dr Julianna Coelho for SOB, sept 2015:  results unkown    Stroke ([facility] [address] 2013    no deficits    Stroke ([facility] [address]     heat exhaustion    Watermelon stomach 2003       Past Surgical History:   Procedure Laterality Date    HX BLADDER SUSPENSION      HX COLONOSCOPY      HX ENDOSCOPY  6/4/15    Dr. Alexsandra Silveira    HX GYN      vaginal del times 3    HX HERNIA REPAIR  2014    x2, failure    HX OTHER SURGICAL      EGDs    HX TONSILLECTOMY      OK EXC SKIN MALIG >4CM FACE,FACIAL      times 3 - BCCA         Family History:   Problem Relation Age of Onset    COPD Mother     Asthma Mother     Heart Attack Father     Other Father         peptic ulcer disease    Anesth Problems Neg Hx        Social History     Socioeconomic History    Marital status:      Spouse name: Not on file    Number of children: Not on file    Years of education: Not on file    Highest education level: Not on file   Occupational History     Employer: RETIRED   Social Needs    Financial resource strain: Not on file   Sophia Peralta Food insecurity:     Worry: Not on file     Inability: Not on file    Transportation needs:     Medical: Not on file     Non-medical: Not on file   Tobacco Use    Smoking status: Never Smoker    Smokeless tobacco: Never Used   Substance and Sexual Activity    Alcohol use: No    Drug use: No    Sexual activity: Never   Lifestyle    Physical activity:     Days per week: Not on file     Minutes per session: Not on file    Stress: Not on file   Relationships    Social connections:     Talks on phone: Not on file     Gets together: Not on file     Attends Mormonism service: Not on file     Active member of club or organization: Not on file     Attends meetings of clubs or organizations: Not on file     Relationship status: Not on file    Intimate partner violence:     Fear of current or ex partner: Not on file     Emotionally abused: Not on file     Physically abused: Not on file     Forced sexual activity: Not on file   Other Topics Concern    Not on file   Social History Narrative    Not on file         ALLERGIES: Latex; Egg; Codeine; Darvon [propoxyphene]; and Pcn [penicillins]    Review of Systems   Constitutional: Negative for fever. HENT: Negative for sore throat. Eyes: Negative for visual disturbance. Respiratory: Negative for cough. Cardiovascular: Negative for chest pain. Gastrointestinal: Negative for abdominal pain. Genitourinary: Negative for dysuria. Musculoskeletal:        Left arm pain and knee pain   Skin: Negative for rash. Neurological: Negative for headaches. Vitals:    01/10/20 0041   BP: 140/68   Pulse: 79   Resp: 22   Temp: 97.5 °F (36.4 °C)   SpO2: 97%   Weight: 66.8 kg (147 lb 4.3 oz)   Height: 5' 4\" (1.626 m)            Physical Exam  Vitals signs and nursing note reviewed. Constitutional:       General: She is not in acute distress. Appearance: She is well-developed. HENT:      Head: Normocephalic and atraumatic.    Eyes:      Conjunctiva/sclera: Conjunctivae normal.   Neck:      Musculoskeletal: Normal range of motion. Trachea: Phonation normal.   Cardiovascular:      Rate and Rhythm: Normal rate. Pulses:           Radial pulses are 2+ on the right side and 2+ on the left side. Pulmonary:      Effort: Pulmonary effort is normal. No respiratory distress. Abdominal:      General: There is no distension. Musculoskeletal: Normal range of motion. Left shoulder: She exhibits no tenderness. Left elbow: No tenderness found. Left upper arm: She exhibits tenderness and swelling. Skin:     General: Skin is warm and dry. Neurological:      Mental Status: She is alert. She is not disoriented. Motor: No abnormal muscle tone. MDM     2:23 AM  XR L knee, humerus and shoulder normal.  Will ambulate patient. 2:30 AM  Patient ambulates with walker without difficulty. Discharged home.    Procedures

## 2020-01-10 NOTE — ED TRIAGE NOTES
Pt fell down 4 steps 2 hrs ago and is c/o of L. Arm pain. No obvious deformity. No there injuries. Denies L.O.C.. fall witnessed by pt's . Also c/o of L. Knee pain. Ambulatory.

## 2020-01-10 NOTE — ED NOTES
Dr. Betzaida Dunham reviewed discharge instructions with the patient. The patient verbalized understanding. Patient ambulated out of the emergency department with assistance of her walker. Patient ambulated with a steady gait. Patient is in no apparent distress.

## 2020-02-07 ENCOUNTER — APPOINTMENT (OUTPATIENT)
Dept: CT IMAGING | Age: 80
DRG: 884 | End: 2020-02-07
Attending: EMERGENCY MEDICINE
Payer: MEDICARE

## 2020-02-07 ENCOUNTER — HOSPITAL ENCOUNTER (INPATIENT)
Age: 80
LOS: 5 days | Discharge: NURSING, REHAB OR LONG-TERM CARE | DRG: 884 | End: 2020-02-12
Attending: EMERGENCY MEDICINE | Admitting: HOSPITALIST
Payer: MEDICARE

## 2020-02-07 DIAGNOSIS — N39.0 URINARY TRACT INFECTION WITH HEMATURIA, SITE UNSPECIFIED: Primary | ICD-10-CM

## 2020-02-07 DIAGNOSIS — N13.30 HYDRONEPHROSIS, UNSPECIFIED HYDRONEPHROSIS TYPE: ICD-10-CM

## 2020-02-07 DIAGNOSIS — Z91.81 AT MAXIMUM RISK FOR FALL: ICD-10-CM

## 2020-02-07 DIAGNOSIS — R63.0 POOR APPETITE: ICD-10-CM

## 2020-02-07 DIAGNOSIS — R53.81 PHYSICAL DEBILITY: ICD-10-CM

## 2020-02-07 DIAGNOSIS — R31.9 URINARY TRACT INFECTION WITH HEMATURIA, SITE UNSPECIFIED: Primary | ICD-10-CM

## 2020-02-07 DIAGNOSIS — R26.2 UNABLE TO AMBULATE: ICD-10-CM

## 2020-02-07 DIAGNOSIS — R53.1 WEAKNESS: ICD-10-CM

## 2020-02-07 DIAGNOSIS — Z71.89 DNR (DO NOT RESUSCITATE) DISCUSSION: ICD-10-CM

## 2020-02-07 DIAGNOSIS — F41.9 ANXIETY: ICD-10-CM

## 2020-02-07 LAB
ALBUMIN SERPL-MCNC: 3.5 G/DL (ref 3.5–5)
ALBUMIN/GLOB SERPL: 0.8 {RATIO} (ref 1.1–2.2)
ALP SERPL-CCNC: 75 U/L (ref 45–117)
ALT SERPL-CCNC: 17 U/L (ref 12–78)
AMORPH CRY URNS QL MICRO: ABNORMAL
ANION GAP SERPL CALC-SCNC: 10 MMOL/L (ref 5–15)
APPEARANCE UR: CLEAR
AST SERPL-CCNC: 41 U/L (ref 15–37)
BACTERIA URNS QL MICRO: ABNORMAL /HPF
BASOPHILS # BLD: 0.1 K/UL (ref 0–0.1)
BASOPHILS NFR BLD: 1 % (ref 0–1)
BILIRUB SERPL-MCNC: 0.6 MG/DL (ref 0.2–1)
BILIRUB UR QL: NEGATIVE
BUN SERPL-MCNC: 13 MG/DL (ref 6–20)
BUN/CREAT SERPL: 12 (ref 12–20)
CALCIUM SERPL-MCNC: 9.8 MG/DL (ref 8.5–10.1)
CHLORIDE SERPL-SCNC: 98 MMOL/L (ref 97–108)
CO2 SERPL-SCNC: 26 MMOL/L (ref 21–32)
COLOR UR: ABNORMAL
COMMENT, HOLDF: NORMAL
CREAT SERPL-MCNC: 1.06 MG/DL (ref 0.55–1.02)
DIFFERENTIAL METHOD BLD: ABNORMAL
EOSINOPHIL # BLD: 0.3 K/UL (ref 0–0.4)
EOSINOPHIL NFR BLD: 5 % (ref 0–7)
EPITH CASTS URNS QL MICRO: ABNORMAL /LPF
ERYTHROCYTE [DISTWIDTH] IN BLOOD BY AUTOMATED COUNT: 13.2 % (ref 11.5–14.5)
GLOBULIN SER CALC-MCNC: 4.3 G/DL (ref 2–4)
GLUCOSE SERPL-MCNC: 108 MG/DL (ref 65–100)
GLUCOSE UR STRIP.AUTO-MCNC: NEGATIVE MG/DL
HCT VFR BLD AUTO: 41.3 % (ref 35–47)
HGB BLD-MCNC: 13.3 G/DL (ref 11.5–16)
HGB UR QL STRIP: ABNORMAL
IMM GRANULOCYTES # BLD AUTO: 0 K/UL (ref 0–0.04)
IMM GRANULOCYTES NFR BLD AUTO: 0 % (ref 0–0.5)
KETONES UR QL STRIP.AUTO: NEGATIVE MG/DL
LEUKOCYTE ESTERASE UR QL STRIP.AUTO: ABNORMAL
LIPASE SERPL-CCNC: 70 U/L (ref 73–393)
LYMPHOCYTES # BLD: 1 K/UL (ref 0.8–3.5)
LYMPHOCYTES NFR BLD: 18 % (ref 12–49)
MCH RBC QN AUTO: 32.4 PG (ref 26–34)
MCHC RBC AUTO-ENTMCNC: 32.2 G/DL (ref 30–36.5)
MCV RBC AUTO: 100.7 FL (ref 80–99)
MONOCYTES # BLD: 0.5 K/UL (ref 0–1)
MONOCYTES NFR BLD: 10 % (ref 5–13)
NEUTS SEG # BLD: 3.7 K/UL (ref 1.8–8)
NEUTS SEG NFR BLD: 66 % (ref 32–75)
NITRITE UR QL STRIP.AUTO: NEGATIVE
NRBC # BLD: 0 K/UL (ref 0–0.01)
NRBC BLD-RTO: 0 PER 100 WBC
PH UR STRIP: 6.5 [PH] (ref 5–8)
PLATELET # BLD AUTO: 252 K/UL (ref 150–400)
PMV BLD AUTO: 8.1 FL (ref 8.9–12.9)
POTASSIUM SERPL-SCNC: 4.6 MMOL/L (ref 3.5–5.1)
PROT SERPL-MCNC: 7.8 G/DL (ref 6.4–8.2)
PROT UR STRIP-MCNC: NEGATIVE MG/DL
RBC # BLD AUTO: 4.1 M/UL (ref 3.8–5.2)
RBC #/AREA URNS HPF: ABNORMAL /HPF (ref 0–5)
SAMPLES BEING HELD,HOLD: NORMAL
SODIUM SERPL-SCNC: 134 MMOL/L (ref 136–145)
SP GR UR REFRACTOMETRY: 1 (ref 1–1.03)
TROPONIN I SERPL-MCNC: <0.05 NG/ML
UROBILINOGEN UR QL STRIP.AUTO: 0.2 EU/DL (ref 0.2–1)
WBC # BLD AUTO: 5.6 K/UL (ref 3.6–11)
WBC URNS QL MICRO: ABNORMAL /HPF (ref 0–4)

## 2020-02-07 PROCEDURE — 36415 COLL VENOUS BLD VENIPUNCTURE: CPT

## 2020-02-07 PROCEDURE — 85025 COMPLETE CBC W/AUTO DIFF WBC: CPT

## 2020-02-07 PROCEDURE — 99285 EMERGENCY DEPT VISIT HI MDM: CPT

## 2020-02-07 PROCEDURE — 74176 CT ABD & PELVIS W/O CONTRAST: CPT

## 2020-02-07 PROCEDURE — 87086 URINE CULTURE/COLONY COUNT: CPT

## 2020-02-07 PROCEDURE — 81001 URINALYSIS AUTO W/SCOPE: CPT

## 2020-02-07 PROCEDURE — 84484 ASSAY OF TROPONIN QUANT: CPT

## 2020-02-07 PROCEDURE — 80053 COMPREHEN METABOLIC PANEL: CPT

## 2020-02-07 PROCEDURE — 65270000032 HC RM SEMIPRIVATE

## 2020-02-07 PROCEDURE — 94762 N-INVAS EAR/PLS OXIMTRY CONT: CPT

## 2020-02-07 PROCEDURE — 74011250636 HC RX REV CODE- 250/636: Performed by: HOSPITALIST

## 2020-02-07 PROCEDURE — 74011636320 HC RX REV CODE- 636/320: Performed by: EMERGENCY MEDICINE

## 2020-02-07 PROCEDURE — 83690 ASSAY OF LIPASE: CPT

## 2020-02-07 PROCEDURE — 74011250636 HC RX REV CODE- 250/636: Performed by: EMERGENCY MEDICINE

## 2020-02-07 PROCEDURE — 93005 ELECTROCARDIOGRAM TRACING: CPT

## 2020-02-07 PROCEDURE — 74011000258 HC RX REV CODE- 258: Performed by: EMERGENCY MEDICINE

## 2020-02-07 PROCEDURE — 74011250637 HC RX REV CODE- 250/637: Performed by: HOSPITALIST

## 2020-02-07 RX ORDER — PRAVASTATIN SODIUM 20 MG/1
20 TABLET ORAL
Status: DISCONTINUED | OUTPATIENT
Start: 2020-02-07 | End: 2020-02-12 | Stop reason: HOSPADM

## 2020-02-07 RX ORDER — SODIUM CHLORIDE 9 MG/ML
50 INJECTION, SOLUTION INTRAVENOUS CONTINUOUS
Status: DISPENSED | OUTPATIENT
Start: 2020-02-07 | End: 2020-02-08

## 2020-02-07 RX ORDER — ASPIRIN 81 MG/1
81 TABLET ORAL DAILY
Status: DISCONTINUED | OUTPATIENT
Start: 2020-02-08 | End: 2020-02-12 | Stop reason: HOSPADM

## 2020-02-07 RX ORDER — ACETAMINOPHEN 325 MG/1
650 TABLET ORAL
Status: DISCONTINUED | OUTPATIENT
Start: 2020-02-07 | End: 2020-02-12 | Stop reason: HOSPADM

## 2020-02-07 RX ORDER — SODIUM CHLORIDE 0.9 % (FLUSH) 0.9 %
5-40 SYRINGE (ML) INJECTION AS NEEDED
Status: DISCONTINUED | OUTPATIENT
Start: 2020-02-07 | End: 2020-02-12 | Stop reason: HOSPADM

## 2020-02-07 RX ORDER — FAMOTIDINE 20 MG/1
20 TABLET, FILM COATED ORAL EVERY 24 HOURS
Status: DISCONTINUED | OUTPATIENT
Start: 2020-02-07 | End: 2020-02-12 | Stop reason: HOSPADM

## 2020-02-07 RX ORDER — ONDANSETRON 2 MG/ML
4 INJECTION INTRAMUSCULAR; INTRAVENOUS
Status: DISCONTINUED | OUTPATIENT
Start: 2020-02-07 | End: 2020-02-12 | Stop reason: HOSPADM

## 2020-02-07 RX ORDER — SODIUM CHLORIDE 0.9 % (FLUSH) 0.9 %
5-40 SYRINGE (ML) INJECTION EVERY 8 HOURS
Status: DISCONTINUED | OUTPATIENT
Start: 2020-02-07 | End: 2020-02-12 | Stop reason: HOSPADM

## 2020-02-07 RX ORDER — ALPRAZOLAM 0.5 MG/1
0.5 TABLET ORAL
Status: DISCONTINUED | OUTPATIENT
Start: 2020-02-07 | End: 2020-02-12 | Stop reason: HOSPADM

## 2020-02-07 RX ORDER — LANOLIN ALCOHOL/MO/W.PET/CERES
325 CREAM (GRAM) TOPICAL
Status: DISCONTINUED | OUTPATIENT
Start: 2020-02-08 | End: 2020-02-12 | Stop reason: HOSPADM

## 2020-02-07 RX ORDER — MIDODRINE HYDROCHLORIDE 5 MG/1
10 TABLET ORAL 2 TIMES DAILY WITH MEALS
Status: DISCONTINUED | OUTPATIENT
Start: 2020-02-07 | End: 2020-02-12 | Stop reason: HOSPADM

## 2020-02-07 RX ADMIN — ALPRAZOLAM 0.5 MG: 0.5 TABLET ORAL at 19:14

## 2020-02-07 RX ADMIN — IOHEXOL 50 ML: 240 INJECTION, SOLUTION INTRATHECAL; INTRAVASCULAR; INTRAVENOUS; ORAL at 13:41

## 2020-02-07 RX ADMIN — PRAVASTATIN SODIUM 20 MG: 20 TABLET ORAL at 21:27

## 2020-02-07 RX ADMIN — SODIUM CHLORIDE 50 ML/HR: 900 INJECTION, SOLUTION INTRAVENOUS at 19:18

## 2020-02-07 RX ADMIN — CEFTRIAXONE 1 G: 1 INJECTION, POWDER, FOR SOLUTION INTRAMUSCULAR; INTRAVENOUS at 14:57

## 2020-02-07 RX ADMIN — MIDODRINE HYDROCHLORIDE 10 MG: 5 TABLET ORAL at 19:14

## 2020-02-07 RX ADMIN — FAMOTIDINE 20 MG: 20 TABLET ORAL at 19:14

## 2020-02-07 RX ADMIN — SODIUM CHLORIDE 1000 ML: 900 INJECTION, SOLUTION INTRAVENOUS at 12:16

## 2020-02-07 NOTE — ED NOTES
Patient placed on bedpan per request. Repositioned for comfort. Warm blanket provided. Updated family on plan of care. Verbalizes understanding. Denies any questions or concerns at this time.

## 2020-02-07 NOTE — ED PROVIDER NOTES
Please note that this dictation was completed with [employer], the computer voice recognition software.  Quite often unanticipated grammatical, syntax, homophones, and other interpretive errors are inadvertently transcribed by the computer software.  Please disregard these errors.  Please excuse any errors that have escaped final proofreading. 39-year-old white female past medical history dementia (per the  it is worsening), anemia, asthma, basal cell carcinoma status post removal, subdural alto, GERD, hiatal hernia, hypertension, high cholesterol, stroke with no residual deficits, and watermelon stomach presents complaining of \"had hernia surgery repair several years ago at [facility] and has had a gradually declining course since then per the . Patient states she has intermittent abdominal pains these pains usually worse when she is hungry, better after she is eaten. Patient states she has not eaten today. Patient's  relates that this morning she was too weak to get up out of bed, and that she has been gradually weakening over the same 2-year time period. He called 911 to assist him lifting her into the bed and then when they got there they decided just to transport her to the ER. She arrives with no complaints currently but states she is very thirsty.  states she had 2 normal bowel movements this morning is on ferrous sulfate supplements so \"her stool is normally dark. \"  He denies her having any recent trauma or any recent illnesses, was seen here 1 week ago for a fall but \"has healed up nicely from that. \"    pt denies HA, vison changes, diff swallowing, CP, SOB, current Abd pain, F/Ch, N/V, D/Cons or other current systemic complaints;     Social/ PSH reviewed in EMR    EMR Chart Reviewed    The history is provided by the EMS personnel, the patient and the spouse.         Past Medical History:   Diagnosis Date    Anemia NEC     Asthma     major asthma as a child/only with colds now   [facility] ([facility]     BCCA removed    Co[facility] disorder ([facility]     on xarelto    [facility]     GERD (gastroesophageal reflux disease) 10/22/2010    Hiatal hernia     Hypertension     hx of blood pressure med for short time    Ill-defined condition     increased cholesterol    Other unknown and unspecified cause of morbidity or mortality     eval with Dr Piyush Ruiz for SOB, sept 2015:  results unkown    Stroke ([facility] 2013    no deficits    Stroke ([facility]     heat exhaustion    Watermelon stomach 2003       Past Surgical History:   Procedure Laterality Date    HX BLADDER SUSPENSION      HX COLONOSCOPY      HX ENDOSCOPY  6/4/15    Dr. Volodymyr Dominique HX GYN      vaginal del times 3    HX HERNIA REPAIR  2014    x2, failure    HX OTHER SURGICAL      EGDs    HX TONSILLECTOMY      NV EXC SKIN MALIG >4CM FACE,FACIAL      times 3 - BCCA         Family History:   Problem Relation Age of Onset    COPD Mother     Asthma Mother     Heart Attack Father     Other Father         peptic ulcer disease    Anesth Problems Neg Hx        Social History     Socioeconomic History    Marital status:      Spouse name: Not on file    Number of children: Not on file    Years of education: Not on file    Highest education level: Not on file   Occupational History     Employer: RETIRED   Social Needs    Financial resource strain: Not on file    Food insecurity:     Worry: Not on file     Inability: Not on file    Transportation needs:     Medical: Not on file     Non-medical: Not on file   Tobacco Use    Smoking status: Never Smoker    Smokeless tobacco: Never Used   Substance and Sexual Activity    Alcohol use: No    Drug use: No    Sexual activity: Never   Lifestyle    Physical activity:     Days per week: Not on file     Minutes per session: Not on file    Stress: Not on file   Relationships    Social connections:     Talks on phone: Not on file     Gets together: Not on file     Attends [employer] service: Not on file     Active member of club or organization: Not on file     Attends meetings of clubs or organizations: Not on file     Relationship status: Not on file    Intimate partner violence:     Fear of current or ex partner: Not on file     Emotionally abused: Not on file     Physically abused: Not on file     Forced sexual activity: Not on file   Other Topics Concern    Not on file   Social History Narrative    Not on file         ALLERGIES: Latex; Egg; Codeine; Darvon [propoxyphene]; and Pcn [penicillins]    Review of Systems   Constitutional: Positive for activity change and appetite change. Negative for chills and fever. HENT: Negative for trouble swallowing and voice change. Eyes: Negative for photophobia and visual disturbance. Respiratory: Positive for cough. Negative for choking, chest tightness, shortness of breath, wheezing and stridor. Cardiovascular: Negative for chest pain, palpitations and leg swelling. Gastrointestinal: Negative for abdominal pain, diarrhea, nausea and vomiting. Genitourinary: Negative for dysuria. Musculoskeletal: Negative for back pain. Skin: Negative for rash. Neurological: Negative for seizures and speech difficulty. Psychiatric/Behavioral: Positive for confusion and decreased concentration. All other systems reviewed and are negative. Vitals:    02/07/20 1140   BP: 144/73   Pulse: 71   Resp: 24   Temp: 98.4 °F (36.9 °C)   SpO2: 98%   Weight: 68.1 kg (150 lb 2.1 oz)            Physical Exam  Vitals signs and nursing note reviewed. Constitutional:       General: She is not in acute distress. Appearance: Normal appearance. She is well-developed. She is not ill-appearing, toxic-appearing or diaphoretic. Comments: NAD, AxOx2, speaking in complete sentences    gcs = 14 (memory)     HENT:      Head: Normocephalic and atraumatic.       Comments: Cn intact         Right Ear: External ear normal.      Left Ear: External ear normal.      Nose: Nose normal.      Mouth/Throat:      Mouth: Mucous membranes are moist.      Pharynx: No oropharyngeal exudate or posterior oropharyngeal erythema. Eyes:      General: No scleral icterus. Right eye: No discharge. Conjunctiva/sclera: Conjunctivae normal.      Pupils: Pupils are equal, round, and reactive to light. Neck:      Musculoskeletal: Normal range of motion and neck supple. No neck rigidity. Vascular: No JVD. Trachea: No tracheal deviation. Cardiovascular:      Rate and Rhythm: Normal rate and regular rhythm. Pulses: Normal pulses. Heart sounds: Normal heart sounds. No murmur. No friction rub. No gallop. Pulmonary:      Effort: Pulmonary effort is normal. No respiratory distress. Breath sounds: Normal breath sounds. No wheezing or rales. Chest:      Chest wall: No tenderness. Abdominal:      General: Bowel sounds are normal. There is no distension. Palpations: Abdomen is soft. There is no mass. Tenderness: There is no abdominal tenderness. There is no guarding or rebound. Hernia: No hernia is present. Comments: nttp     Genitourinary:     Vagina: No vaginal discharge. Comments: Pt denies urinary/ vaginal complaints  Musculoskeletal: Normal range of motion. General: No swelling, tenderness or deformity. Right lower leg: No edema. Left lower leg: No edema. Skin:     General: Skin is warm and dry. Capillary Refill: Capillary refill takes less than 2 seconds. Coloration: Skin is not jaundiced or pale. Findings: No bruising, erythema or rash. Neurological:      General: No focal deficit present. Mental Status: She is alert and oriented to person, place, and time. Cranial Nerves: No cranial nerve deficit. Motor: No abnormal muscle tone.       Coordination: Coordination normal.      Comments: pt has motor/ CV/ Sensation grossly intact to all extremities, R = L in strength;   Psychiatric: Behavior: Behavior normal.         Thought Content: Thought content normal.          MDM       Procedures    Chief Complaint   Patient presents with    Abdominal Pain       11:54 AM  The patients presenting problems have been discussed, and they are in agreement with the care plan formulated and outlined with them. I have encouraged them to ask questions as they arise throughout their visit. MEDICATIONS GIVEN:  Medications - No data to display    LABS REVIEWED:  Labs Reviewed   URINE CULTURE HOLD SAMPLE   CBC WITH AUTOMATED DIFF   METABOLIC PANEL, COMPREHENSIVE   LIPASE   TROPONIN I   SAMPLES BEING HELD   TROPONIN I   URINALYSIS W/ REFLEX CULTURE   LIPASE   URINALYSIS W/MICROSCOPIC       RADIOLOGY RESULTS:  The following have been ordered and reviewed:  _____________________________________________________________________  _____________________________________________________________________    EKG interpretation:   Rhythm: normal sinus rhythm; and regular . Rate (approx.): 62; Axis: normal; P wave: normal; QRS interval: normal ; ST/T wave: normal; Negative acute significant segmental elevations/ compared to study dated 09/28/2018    PROCEDURES:        CONSULTATIONS:       PROGRESS NOTES:      DIAGNOSIS:    1. Urinary tract infection with hematuria, site unspecified    2. Hydronephrosis, unspecified hydronephrosis type    3. Weakness    4. Unable to ambulate        PLAN:  1-UTI / generalized weakness- rocephin/ admit  2 Hydronephrosis - ? Passed a stone      ED COURSE: The patients hospital course has been uncomplicated. Hospitalist Cata Serve for Admission  2:46 PM    ED Room Number: SER06/06  Patient Name and age: Abby Egan [de-identified] y.o.  female  Working Diagnosis:   1. Urinary tract infection with hematuria, site unspecified    2. Hydronephrosis, unspecified hydronephrosis type    3. Weakness    4.  Unable to ambulate      Readmission: no  Isolation Requirements:  no  Recommended Level of Care: telemetry  Code Status:  Full Code  Department:[facility] - [phone]  Other:  UTI/ Rocephin/ increased dementia/ weakness/ new R sided hydronephrosis;

## 2020-02-07 NOTE — PROGRESS NOTES
TRANSFER - IN REPORT:    Verbal report received from [facility]  (name) on Alondra Olivares  being received from [address] [place] [address] (unit) for routine progression of care      Report consisted of patients Situation, Background, Assessment and   Recommendations(SBAR). Information from the following report(s) SBAR, Kardex, [facility] and Recent Results was reviewed with the receiving nurse. Opportunity for questions and clarification was provided. Assessment completed upon patients arrival to unit and care assumed.

## 2020-02-07 NOTE — ED TRIAGE NOTES
Triage Note: Patient arrives by EMS from home for RUQ abdominal pain for several days.  reports nausea, but no vomiting. Normal bowel movement this morning.

## 2020-02-07 NOTE — ROUTINE PROCESS
TRANSFER - OUT REPORT: 
 
Verbal report given to Shahid Lara RN (name) on [facility]  being transferred to [address] [place] [address] (unit) for routine progression of care Report consisted of patients Situation, Background, Assessment and  
Recommendations(SBAR). Information from the following report(s) SBAR, ED Summary, [facility] and Recent Results was reviewed with the receiving nurse. Lines:  
Peripheral IV 02/07/20 Right Wrist (Active) Site Assessment Clean, dry, & intact 2/7/2020 11:50 AM  
Phlebitis Assessment 0 2/7/2020 11:50 AM  
Infiltration Assessment 0 2/7/2020 11:50 AM  
Dressing Status Clean, dry, & intact 2/7/2020 11:50 AM  
Dressing Type Transparent 2/7/2020 11:50 AM  
Hub Color/Line Status Flushed;Patent 2/7/2020 11:50 AM  
  
 
Opportunity for questions and clarification was provided. Patient transported with: SANDR[facility] via [facility]

## 2020-02-07 NOTE — ED NOTES
[facility] here to transport patient to [address] [place] [address] [address] VSS. Respirations equal and unlabored on RA. IV saline locked. Patient in no acute distress upon transfer.

## 2020-02-08 ENCOUNTER — APPOINTMENT (OUTPATIENT)
Dept: CT IMAGING | Age: 80
DRG: 884 | End: 2020-02-08
Attending: INTERNAL MEDICINE
Payer: MEDICARE

## 2020-02-08 LAB
ALBUMIN SERPL-MCNC: 2.6 G/DL (ref 3.5–5)
ALBUMIN/GLOB SERPL: 0.8 {RATIO} (ref 1.1–2.2)
ALP SERPL-CCNC: 56 U/L (ref 45–117)
ALT SERPL-CCNC: 13 U/L (ref 12–78)
ANION GAP SERPL CALC-SCNC: 6 MMOL/L (ref 5–15)
AST SERPL-CCNC: 14 U/L (ref 15–37)
BACTERIA SPEC CULT: NORMAL
BASOPHILS # BLD: 0.1 K/UL (ref 0–0.1)
BASOPHILS NFR BLD: 1 % (ref 0–1)
BILIRUB SERPL-MCNC: 0.7 MG/DL (ref 0.2–1)
BUN SERPL-MCNC: 10 MG/DL (ref 6–20)
BUN/CREAT SERPL: 12 (ref 12–20)
CALCIUM SERPL-MCNC: 8.5 MG/DL (ref 8.5–10.1)
CC UR VC: NORMAL
CHLORIDE SERPL-SCNC: 104 MMOL/L (ref 97–108)
CO2 SERPL-SCNC: 22 MMOL/L (ref 21–32)
CREAT SERPL-MCNC: 0.81 MG/DL (ref 0.55–1.02)
DIFFERENTIAL METHOD BLD: ABNORMAL
EOSINOPHIL # BLD: 0.2 K/UL (ref 0–0.4)
EOSINOPHIL NFR BLD: 5 % (ref 0–7)
ERYTHROCYTE [DISTWIDTH] IN BLOOD BY AUTOMATED COUNT: 12.9 % (ref 11.5–14.5)
GLOBULIN SER CALC-MCNC: 3.4 G/DL (ref 2–4)
GLUCOSE SERPL-MCNC: 84 MG/DL (ref 65–100)
HCT VFR BLD AUTO: 38.2 % (ref 35–47)
HGB BLD-MCNC: 12 G/DL (ref 11.5–16)
IMM GRANULOCYTES # BLD AUTO: 0 K/UL (ref 0–0.04)
IMM GRANULOCYTES NFR BLD AUTO: 0 % (ref 0–0.5)
LYMPHOCYTES # BLD: 0.9 K/UL (ref 0.8–3.5)
LYMPHOCYTES NFR BLD: 20 % (ref 12–49)
MAGNESIUM SERPL-MCNC: 1.8 MG/DL (ref 1.6–2.4)
MCH RBC QN AUTO: 33.5 PG (ref 26–34)
MCHC RBC AUTO-ENTMCNC: 31.4 G/DL (ref 30–36.5)
MCV RBC AUTO: 106.7 FL (ref 80–99)
MONOCYTES # BLD: 0.6 K/UL (ref 0–1)
MONOCYTES NFR BLD: 12 % (ref 5–13)
NEUTS SEG # BLD: 3 K/UL (ref 1.8–8)
NEUTS SEG NFR BLD: 62 % (ref 32–75)
NRBC # BLD: 0 K/UL (ref 0–0.01)
NRBC BLD-RTO: 0 PER 100 WBC
PHOSPHATE SERPL-MCNC: 2.8 MG/DL (ref 2.6–4.7)
PLATELET # BLD AUTO: 185 K/UL (ref 150–400)
PMV BLD AUTO: 8.1 FL (ref 8.9–12.9)
POTASSIUM SERPL-SCNC: 3.8 MMOL/L (ref 3.5–5.1)
PROT SERPL-MCNC: 6 G/DL (ref 6.4–8.2)
RBC # BLD AUTO: 3.58 M/UL (ref 3.8–5.2)
SERVICE CMNT-IMP: NORMAL
SODIUM SERPL-SCNC: 132 MMOL/L (ref 136–145)
WBC # BLD AUTO: 4.8 K/UL (ref 3.6–11)

## 2020-02-08 PROCEDURE — 65270000032 HC RM SEMIPRIVATE

## 2020-02-08 PROCEDURE — 83735 ASSAY OF MAGNESIUM: CPT

## 2020-02-08 PROCEDURE — 36415 COLL VENOUS BLD VENIPUNCTURE: CPT

## 2020-02-08 PROCEDURE — 74011250637 HC RX REV CODE- 250/637: Performed by: INTERNAL MEDICINE

## 2020-02-08 PROCEDURE — 51798 US URINE CAPACITY MEASURE: CPT

## 2020-02-08 PROCEDURE — 80053 COMPREHEN METABOLIC PANEL: CPT

## 2020-02-08 PROCEDURE — 84100 ASSAY OF PHOSPHORUS: CPT

## 2020-02-08 PROCEDURE — 74011250637 HC RX REV CODE- 250/637: Performed by: HOSPITALIST

## 2020-02-08 PROCEDURE — 85025 COMPLETE CBC W/AUTO DIFF WBC: CPT

## 2020-02-08 PROCEDURE — 74011000258 HC RX REV CODE- 258: Performed by: HOSPITALIST

## 2020-02-08 PROCEDURE — 70450 CT HEAD/BRAIN W/O DYE: CPT

## 2020-02-08 PROCEDURE — 74011250636 HC RX REV CODE- 250/636: Performed by: HOSPITALIST

## 2020-02-08 RX ORDER — QUETIAPINE FUMARATE 25 MG/1
12.5 TABLET, FILM COATED ORAL
Status: DISCONTINUED | OUTPATIENT
Start: 2020-02-08 | End: 2020-02-12 | Stop reason: HOSPADM

## 2020-02-08 RX ADMIN — FERROUS SULFATE TAB 325 MG (65 MG ELEMENTAL FE) 325 MG: 325 (65 FE) TAB at 06:50

## 2020-02-08 RX ADMIN — ASPIRIN 81 MG: 81 TABLET, COATED ORAL at 09:19

## 2020-02-08 RX ADMIN — ALPRAZOLAM 0.5 MG: 0.5 TABLET ORAL at 14:34

## 2020-02-08 RX ADMIN — ALPRAZOLAM 0.5 MG: 0.5 TABLET ORAL at 09:19

## 2020-02-08 RX ADMIN — Medication 10 ML: at 21:26

## 2020-02-08 RX ADMIN — QUETIAPINE FUMARATE 12.5 MG: 25 TABLET ORAL at 19:21

## 2020-02-08 RX ADMIN — Medication 10 ML: at 06:54

## 2020-02-08 RX ADMIN — PRAVASTATIN SODIUM 20 MG: 20 TABLET ORAL at 21:26

## 2020-02-08 RX ADMIN — MIDODRINE HYDROCHLORIDE 10 MG: 5 TABLET ORAL at 09:20

## 2020-02-08 RX ADMIN — FAMOTIDINE 20 MG: 20 TABLET ORAL at 19:21

## 2020-02-08 RX ADMIN — CEFTRIAXONE 1 G: 1 INJECTION, POWDER, FOR SOLUTION INTRAMUSCULAR; INTRAVENOUS at 14:28

## 2020-02-08 RX ADMIN — MIDODRINE HYDROCHLORIDE 10 MG: 5 TABLET ORAL at 19:21

## 2020-02-08 NOTE — H&P
History & Physical    Primary Care Provider: Surendra Ceja MD  Source of Information: Patient's      History of Presenting Illness: Taylor Urrutia is a [de-identified] y.o. female who presents with generalized weakness     50-year-old white female past medical history dementia (per the  it is worsening), anemia, asthma, basal cell carcinoma status post removal, subdural alto, GERD, hiatal hernia, hypertension, high cholesterol, stroke with no residual deficits, and watermelon stomach presents complaining of weakness. Per , pt had hernia surgery repair several years ago at [facility] and has had a gradually declining course since then. Patient states she has intermittent abdominal pains these pains usually worse when she is hungry, better after she is eaten. Patient states she has not eaten today. Patient's  relates that this morning she was too weak to get up out of bed, she also was complaining nausea.  could not get the pt into the car due to her weakness so He called 911 to assist him lifting her into the bed and then when they got there they decided just to transport her to the ER. Per , pt was able to ambulating with a walker, but  she has been gradually weakening over the same 2-year time period. She arrives with no complaints currently but states she is very thirsty.  states she had 2 normal bowel movements this morning is on ferrous sulfate supplements so \"her stool is normally dark. \"  He denies her having any recent trauma or any recent illnesses, was seen here 1 week ago for a fall but \"has healed up nicely from that. \"       Review of Systems:  General: HPI, no fever. Low appetite  HEENT: no headache, no vision changes, no nose discharge, no hearing changes   RES: no wheezing, no cough, no sob  CVS: no cp, no palpitation.   Muscular: no joint swelling, no muscle pain, no leg swelling  Skin: no rash, no itching   GI: HPI   : no dysuria, no hematuria  Hemo: no gum bleeding, no petechial   Neuro: no sensation changes, no focal weakness , HPI. Endo: no polydipsia   Psych: denied depression     Past Medical History:   Diagnosis Date    Anemia NEC     Asthma     major asthma as a child/only with colds now    [facility]     BCCA [facility]    Coagulation disorder ([facility]     on xarelto    D[facility] ([facility]     GERD (gastroesophageal reflux disease) 10/22/2010    Hiatal hernia     Hypertension     hx of blood pressure med for short time    Ill-defined condition     increased cholesterol    Other unknown and unspecified cause of morbidity or mortality     eval with Dr Howard Kumari for SOB, sept 2015:  results unkown    Stroke ([facility] 2013    no deficits    [facility] ([facility]     heat exhaustion    Watermelon stomach 2003      Past Surgical History:   Procedure Laterality Date    HX BLADDER SUSPENSION      HX COLONOSCOPY      HX ENDOSCOPY  6/4/15    Dr. Cheryle Bradford      vaginal del times 3    HX HERNIA REPAIR  2014    x2, failure    HX OTHER SURGICAL      EGDs    HX TONSILLECTOMY      OK EXC SKIN MALIG >4CM FACE,FACIAL      times 3 - BCCA     Prior to Admission medications    Medication Sig Start Date End Date Taking? Authorizing Provider   omeprazole (PRILOSEC) 20 mg capsule TAKE 1 CAPSULE BY MOUTH EVERY DAY 11/10/19  Yes Robinson Hinkle III, MD   clotrimazole (LOTRIMIN) 1 % topical cream Apply  to affected area two (2) times a day. 10/24/19  Yes Tania Youngblood MD   polyethylene glycol (MIRALAX) 17 gram/dose powder Take 17 g by mouth as needed. Yes Provider, Historical   lansoprazole (PREVACID PO) Take  by mouth. Yes Provider, Historical   ALPRAZolam (XANAX) 0.5 mg tablet Take 1 Tab by mouth three (3) times daily as needed for Anxiety. Max Daily Amount: 1.5 mg. 7/3/19  Yes Tania Youngblood MD   ferrous sulfate 325 mg (65 mg iron) tablet Take 1 Tab by mouth Daily (before breakfast).  7/3/19  Yes Tania Youngblood MD ondansetron hcl (ZOFRAN) 4 mg tablet Take 1 Tab by mouth every eight (8) hours as needed for Nausea. 1/11/19  Yes Kody Florentino MD   famotidine (PEPCID PO) Take  by mouth. Yes Provider, Historical   aspirin delayed-release 81 mg tablet Take  by mouth daily. Yes Provider, Historical   acetaminophen (TYLENOL) 325 mg tablet Take  by mouth every four (4) hours as needed for Pain. Yes Provider, Historical   midodrine (PROAMITINE) 10 mg tablet Take  by mouth two (2) times a day. Yes Provider, Historical   pravastatin (PRAVACHOL) 20 mg tablet TAKE 1 TABLET BY MOUTH AT BEDTIME 10/17/18  Yes Evon Groves MD   saliva substitute combo no.9 (BIOTENE DRY MOUTH ORAL RINSE MM) by Mucous Membrane route. Yes Provider, Historical   melatonin tab tablet Take 1 Tab by mouth nightly. 7/2/18  Yes Mai Mackay MD   cholecalciferol, vitamin d3, (VITAMIN D) 1,000 unit tablet Take 2,000 Units by mouth daily. Yes Provider, Historical   MULTIVITAMINS (MULTIVITAMIN PO) Take 1 Tab by mouth daily.    Yes Provider, Historical     Allergies   Allergen Reactions    Latex Hives     Latex tape    Egg Anaphylaxis    Codeine Nausea and Vomiting    Darvon [Propoxyphene] Nausea and Vomiting    Pcn [Penicillins] Hives      Family History   Problem Relation Age of Onset    COPD Mother     Asthma Mother     Heart Attack Father     Other Father         peptic ulcer disease    Anesth Problems Neg Hx         SOCIAL HISTORY:  Patient resides:  Independently    Assisted Living    SNF    With family care x      Smoking history: x  Former x   Chronic      Alcohol history:   None x   Social    Chronic      Ambulates:   Independently    w/cane    w/walker    w/wc x   CODE STATUS:  DNR    Full x   Other      Objective:     Physical Exam:     Visit Vitals  /78 (BP 1 Location: Right arm, BP Patient Position: At rest)   Pulse 69   Temp 98.8 °F (37.1 °C)   Resp 17   Wt 68.1 kg (150 lb 2.1 oz)   SpO2 93%   BMI 25.77 kg/m² O2 Device: Room air    General:  Alert, cooperative, no distress, appears stated age. , speak slowly, O to name and place. Head:  Normocephalic, without obvious abnormality, atraumatic. Eyes:  Conjunctivae/corneas clear. PERRL, EOMs intact. no jaundice    Nose: Nares normal. Septum midline. Mucosa normal. No drainage or sinus tenderness. Throat: Lips, mucosa, and tongue dry    Neck: Supple, symmetrical, trachea midline, no adenopathy, thyroid: no enlargement/tenderness/nodules, no carotid bruit and no JVD. Back:   Symmetric, no curvature. ROM normal. No CVA tenderness. Lungs:   Clear to auscultation bilaterally. Chest wall:  No tenderness or deformity. Heart:  Regular rate and rhythm, S1, S2 normal, no murmur, click, rub or gallop. Abdomen:   Soft, mild distended, lower abd tenderness    Extremities: Extremities normal, atraumatic, no cyanosis or edema. Pulses: 2+ and symmetric all extremities. Skin: Skin color, texture, turgor normal. No rashes or lesions   Neurologic: CNII-XII intact. No focal weaknss              Data Review:     Recent Days:  Recent Labs     02/07/20  1151   WBC 5.6   HGB 13.3   HCT 41.3        Recent Labs     02/07/20  1151   *   K 4.6   CL 98   CO2 26   *   BUN 13   CREA 1.06*   CA 9.8   ALB 3.5   SGOT 41*   ALT 17     No results for input(s): PH, PCO2, PO2, HCO3, FIO2 in the last 72 hours. 24 Hour Results:  Recent Results (from the past 24 hour(s))   SAMPLES BEING HELD    Collection Time: 02/07/20 11:51 AM   Result Value Ref Range    SAMPLES BEING HELD 1RED, 1BLUE, 1LAV     COMMENT        Add-on orders for these samples will be processed based on acceptable specimen integrity and analyte stability, which may vary by analyte.    CBC WITH AUTOMATED DIFF    Collection Time: 02/07/20 11:51 AM   Result Value Ref Range    WBC 5.6 3.6 - 11.0 K/uL    RBC 4.10 3.80 - 5.20 M/uL    HGB 13.3 11.5 - 16.0 g/dL    HCT 41.3 35.0 - 47.0 %    .7 (H) 80.0 - 99.0 FL    MCH 32.4 26.0 - 34.0 PG    MCHC 32.2 30.0 - 36.5 g/dL    RDW 13.2 11.5 - 14.5 %    PLATELET 610 292 - 428 K/uL    MPV 8.1 (L) 8.9 - 12.9 FL    NRBC 0.0 0  WBC    ABSOLUTE NRBC 0.00 0.00 - 0.01 K/uL    NEUTROPHILS 66 32 - 75 %    LYMPHOCYTES 18 12 - 49 %    MONOCYTES 10 5 - 13 %    EOSINOPHILS 5 0 - 7 %    BASOPHILS 1 0 - 1 %    IMMATURE GRANULOCYTES 0 0.0 - 0.5 %    ABS. NEUTROPHILS 3.7 1.8 - 8.0 K/UL    ABS. LYMPHOCYTES 1.0 0.8 - 3.5 K/UL    ABS. MONOCYTES 0.5 0.0 - 1.0 K/UL    ABS. EOSINOPHILS 0.3 0.0 - 0.4 K/UL    ABS. BASOPHILS 0.1 0.0 - 0.1 K/UL    ABS. IMM. GRANS. 0.0 0.00 - 0.04 K/UL    DF AUTOMATED     METABOLIC PANEL, COMPREHENSIVE    Collection Time: 02/07/20 11:51 AM   Result Value Ref Range    Sodium 134 (L) 136 - 145 mmol/L    Potassium 4.6 3.5 - 5.1 mmol/L    Chloride 98 97 - 108 mmol/L    CO2 26 21 - 32 mmol/L    Anion gap 10 5 - 15 mmol/L    Glucose 108 (H) 65 - 100 mg/dL    BUN 13 6 - 20 MG/DL    Creatinine 1.06 (H) 0.55 - 1.02 MG/DL    BUN/Creatinine ratio 12 12 - 20      GFR est AA >60 >60 ml/min/1.73m2    GFR est non-AA 50 (L) >60 ml/min/1.73m2    Calcium 9.8 8.5 - 10.1 MG/DL    Bilirubin, total 0.6 0.2 - 1.0 MG/DL    ALT (SGPT) 17 12 - 78 U/L    AST (SGOT) 41 (H) 15 - 37 U/L    Alk.  phosphatase 75 45 - 117 U/L    Protein, total 7.8 6.4 - 8.2 g/dL    Albumin 3.5 3.5 - 5.0 g/dL    Globulin 4.3 (H) 2.0 - 4.0 g/dL    A-G Ratio 0.8 (L) 1.1 - 2.2     LIPASE    Collection Time: 02/07/20 11:51 AM   Result Value Ref Range    Lipase 70 (L) 73 - 393 U/L   TROPONIN I    Collection Time: 02/07/20 11:51 AM   Result Value Ref Range    Troponin-I, Qt. <0.05 <0.05 ng/mL   EKG, 12 LEAD, INITIAL    Collection Time: 02/07/20 11:57 AM   Result Value Ref Range    Ventricular Rate 62 BPM    Atrial Rate 62 BPM    P-R Interval 148 ms    QRS Duration 76 ms    Q-T Interval 416 ms    QTC Calculation (Bezet) 422 ms    Calculated P Axis 33 degrees    Calculated R Axis -11 degrees    Calculated T Axis 45 degrees Diagnosis       Normal sinus rhythm  Moderate voltage criteria for LVH, may be normal variant  Inferior infarct (cited on or before 07-FEB-2020)  Abnormal ECG  When compared with ECG of 28-SEP-2018 20:36,  ST no longer depressed in Lateral leads  T wave inversion no longer evident in Lateral leads  QT has lengthened     URINALYSIS W/MICROSCOPIC    Collection Time: 02/07/20 12:48 PM   Result Value Ref Range    Color YELLOW/STRAW      Appearance CLEAR CLEAR      Specific gravity 1.005 1.003 - 1.030      pH (UA) 6.5 5.0 - 8.0      Protein NEGATIVE  NEG mg/dL    Glucose NEGATIVE  NEG mg/dL    Ketone NEGATIVE  NEG mg/dL    Bilirubin NEGATIVE  NEG      Blood MODERATE (A) NEG      Urobilinogen 0.2 0.2 - 1.0 EU/dL    Nitrites NEGATIVE  NEG      Leukocyte Esterase SMALL (A) NEG      WBC 10-20 0 - 4 /hpf    RBC 10-20 0 - 5 /hpf    Epithelial cells MODERATE (A) FEW /lpf    Bacteria 3+ (A) NEG /hpf    Amorphous Crystals 2+ (A) NEG         Imaging:   Ct Abd Pelv Wo Cont    Result Date: 2/7/2020  IMPRESSION: 1. No acute inflammation. 2. Hiatal hernia. 3. Cholelithiasis. 4. Diverticulosis. 5. Right hydronephrosis. Assessment:     Active Problems:    Generalized weakness (2/7/2020)           Plan:     1. Generalized weakness: may due to UTI, on top of her deconditioning. IV rocephin and pt/ot. May need SNF for rehab  2. UTI: following cultures   3. Right hydronephrosis: mild, no stone noticed, consult urologist   4. Cholelithiasis: this may be the cause of her intermittent abd pain. Will consult general surgeon. 5. Hyponatremia: mild.  Iv ns and following        Signed By: Justus Webb MD     February 7, 2020

## 2020-02-08 NOTE — PROGRESS NOTES
Bedside and Verbal shift change report given to Susan (oncoming nurse) by Malia Patino (offgoing nurse). Report included the following information SBAR, Kardex and MAR.

## 2020-02-08 NOTE — PROGRESS NOTES
Spiritual Care Assessment/Progress Note  [facility]      NAME: Ольга Adler      MRN: [id number]  AGE: [de-identified] y.o. SEX: female  Adventism Affiliation: Faith   Language: English     2/8/2020     Total Time (in minutes): 5     Spiritual Assessment begun in [facility] through conversation with:         []Patient        [] Family    [] Friend(s)        Reason for Consult: Palliative Care, Initial/Spiritual Assessment     Spiritual beliefs: (Please include comment if needed)     [] Identifies with a rosaura tradition:         [] Supported by a rosaura community:            [] Claims no spiritual orientation:           [] Seeking spiritual identity:                [] Adheres to an individual form of spirituality:           [x] Not able to assess:                           Identified resources for coping:      [] Prayer                               [] Music                  [] Guided Imagery     [] Family/friends                 [] Pet visits     [] Devotional reading                         [] Unknown     [] Other:                                              Interventions offered during this visit: (See comments for more details)    Patient Interventions: Initial visit           Plan of Care:     [] Support spiritual and/or cultural needs    [] Support AMD and/or advance care planning process      [] Support grieving process   [] Coordinate Rites and/or Rituals    [] Coordination with community clergy   [] No spiritual needs identified at this time   [] Detailed Plan of Care below (See Comments)  [] Make referral to Music Therapy  [] Make referral to Pet Therapy     [] Make referral to Addiction services  [] Make referral to [facility]  [] Make referral to Spiritual Care Partner  [] No future visits requested        [x] Follow up visits as needed     Attempted to visit pt without success. Pt sleeping and no family present. Chaplains will continue to offer support as needed.   Chaplain Emir, MDiv, MS, [facility]  [phone]

## 2020-02-08 NOTE — PROGRESS NOTES
Hospitalist Progress Note  Tamara Young MD  Answering service: [phone] from in house phone        Date of Service:  2020  NAME:  Arcenio Bauer  :  1940  MRN:  [id number]  PCP: Laura Mcdowell MD    Chief Complaint:   Chief Complaint   Patient presents with    Abdominal Pain         Admission Summary:     Arcenio Bauer is a [de-identified] y.o. female who presented with abdominal pain    Interval history / Subjective:   Patient seen for Follow up of CC: abdominal pain  Patient seen and examined by the bedside, Labs, images and notes reviewed  Awake, cooperative, confused, oriented to self only, feels hungry and wants to eat food. No NVD, no cough, fevers or chills. Discussed with nursing staff, no acute issues overnight, orders reviewed. Assessment & Plan:     - Generalized weakness, recurrent falls and inability to care for self:  Likely multifactorial  Patient appears to have dementia although not documented in the past, could be acute encephalopathy due to UTI  Check CT head  Patient has deconditioning, cont PT/OT/CM consult for DC planning  May need placement  Consult Palliative care as patient seems to have advanced dementia  Supportive care  Fall precautions    - UTI: following cultures, cont IV Ceftriaxone  - Right hydronephrosis: mild, no stone noticed  Urology consult in the ED    - Cholelithiasis: this may be the cause of her intermittent abd pain. Gen Surg consult pending. Patient has no obvious s/s of acute cholecystitis and may not be a suitable candidate for surgery.     - Hyponatremia: mild  IVF, repeat labs in am    - Moderate protein calorie malnourishment, POA  Nutrition consult      Code status: Full Code    DVT prophylaxis: SCDs    Care Plan discussed with: Patient/Family and Nurse    Disposition: TBD    Hospital Problems  Date Reviewed: 2019          Codes Class Noted POA    Generalized weakness ICD-10-CM: R53.1  ICD-9-CM: 780.79  2020 Unknown Review of Systems:   Review of systems not obtained due to patient factors. confusion      Physical Examination:     General appearance: alert, cooperative, no distress, appears stated age, confused, oriented to self only. Not oriented to time, place or person  Head: left frontal skin lesion, likely BCC  Eyes: conjunctivae/corneas clear. Throat: oral mucosa dry  Lungs: clear to auscultation bilaterally, no wheezing, rales  Heart: RRR, S1S2  Abdomen: soft, non-tender. Bowel sounds normal. No masses,  no organomegaly  Extremities: extremities normal, atraumatic, no cyanosis or edema  Pulses: 2+ and symmetric  Neurologic: Grossly normal         Vital Signs:    Last 24hrs VS reviewed since prior progress note. Most recent are:    Visit Vitals  /69 (BP 1 Location: Left arm, BP Patient Position: At rest)   Pulse 65   Temp 98.3 °F (36.8 °C)   Resp 18   Wt 69.4 kg (153 lb)   SpO2 96%   BMI 26.26 kg/m²         Intake/Output Summary (Last 24 hours) at 2020 4663  Last data filed at 2020 0230  Gross per 24 hour   Intake 1050 ml   Output 300 ml   Net 750 ml        Tmax:  Temp (24hrs), Av.4 °F (36.9 °C), Min:98.2 °F (36.8 °C), Max:98.8 °F (37.1 °C)      Data Review:   Data reviewed by myself:  Xr Shoulder Lt Ap/lat Min 2 V    Result Date: 1/10/2020  EXAM: XR SHOULDER LT AP/LAT MIN 2 V INDICATION: shoulder pain. Left shoulder pain. COMPARISON: None. FINDINGS: Three views of the left shoulder demonstrate no fracture, dislocation or other acute abnormality. There is osteopenia. IMPRESSION: No acute abnormality. Xr Humerus Lt    Result Date: 1/10/2020  EXAM: XR HUMERUS LT INDICATION: pain after fall. COMPARISON: None. FINDINGS: Two views of the left humerus demonstrate no fracture or other acute osseous, articular or soft tissue abnormality. IMPRESSION: No acute abnormality.     Ct Abd Pelv Wo Cont    Result Date: 2020  INDICATION: intermittent abd pain/ x 2 years/ worsening/ increased today; EXAM: CT Abdomen and Pelvis without IV contrast. Oral contrast was administered. CT dose reduction was achieved through use of a standardized protocol tailored for this examination and automatic exposure control for dose modulation. FINDINGS: There is a hiatal hernia. There is cholelithiasis. There are colonic diverticula. No urinary tract stones are seen. Mild right hydronephrosis is progressed since 8030, etiology uncertain. There is no left hydroureteronephrosis. The kidneys are normal in size. Bladder is moderately distended. There is no perirenal fluid or ascites. Liver shows no apparent significant finding without contrast. Pancreas, adrenal glands, and spleen show no significant enlargement. Aorta is calcified without aneurysm. No inflammation is seen. There is no pneumoperitoneum or significant adenopathy. There is no apparent pelvic mass. The appendix is not seen. Bowels are not dilated. There are chronic compression fractures of T9 and L5. IMPRESSION: 1. No acute inflammation. 2. Hiatal hernia. 3. Cholelithiasis. 4. Diverticulosis. 5. Right hydronephrosis. Xr Knee Lt 3 V    Result Date: 1/10/2020  EXAM: XR KNEE LT 3 V INDICATION: knee pain after fall. COMPARISON: None. FINDINGS: Three views of the left knee demonstrate no fracture or other acute osseous or articular abnormality. There is no effusion. There are degenerative changes of the knee with pointing of the tibial spines and mild patellofemoral osteophyte formation. There are vascular calcifications. IMPRESSION: No acute abnormality.     Lab Results   Component Value Date/Time    Specimen Description: URINE 06/01/2013 09:30 PM     Lab Results   Component Value Date/Time    Culture result: NO GROWTH 2 DAYS 08/13/2015 05:08 PM    Culture result: NO SIGNIFICANT GROWTH 06/01/2013 09:30 PM     All Micro Results     Procedure Component Value Units Date/Time    CULTURE, URINE [[id number]] Collected:  02/07/20 1248    Order Status: Completed Specimen:  Urine from Clean catch Updated:  02/07/20 1835    URINE CULTURE HOLD SAMPLE [[id number]]     Order Status:  Sent Specimen:  Urine           Labs: reviewed by myself. Recent Labs     02/08/20  0309 02/07/20  1151   WBC 4.8 5.6   HGB 12.0 13.3   HCT 38.2 41.3    252     Recent Labs     02/08/20  0309 02/07/20  1151   * 134*   K 3.8 4.6    98   CO2 22 26   BUN 10 13   CREA 0.81 1.06*   GLU 84 108*   CA 8.5 9.8   MG 1.8  --    PHOS 2.8  --      Recent Labs     02/08/20  0309 02/07/20  1151   SGOT 14* 41*   ALT 13 17   AP 56 75   TBILI 0.7 0.6   TP 6.0* 7.8   ALB 2.6* 3.5   GLOB 3.4 4.3*   LPSE  --  70*     No results for input(s): INR, PTP, APTT, INREXT in the last 72 hours. No results for input(s): FE, TIBC, PSAT, FERR in the last 72 hours. Lab Results   Component Value Date/Time    Folate 17.3 10/08/2015 11:30 AM      No results for input(s): PH, PCO2, PO2 in the last 72 hours.   Recent Labs     02/07/20  1151   TROIQ <0.05     Lab Results   Component Value Date/Time    Cholesterol, total 145 04/04/2017 09:46 AM    HDL Cholesterol 61 04/04/2017 09:46 AM    LDL, calculated 67 04/04/2017 09:46 AM    Triglyceride 87 04/04/2017 09:46 AM    CHOL/HDL Ratio 3.4 06/02/2013 06:30 AM     Lab Results   Component Value Date/Time    Glucose (POC) 115 (H) 09/28/2018 08:46 PM     Lab Results   Component Value Date/Time    Color YELLOW/STRAW 02/07/2020 12:48 PM    Appearance CLEAR 02/07/2020 12:48 PM    Specific gravity 1.005 02/07/2020 12:48 PM    pH (UA) 6.5 02/07/2020 12:48 PM    Protein NEGATIVE  02/07/2020 12:48 PM    Glucose NEGATIVE  02/07/2020 12:48 PM    Ketone NEGATIVE  02/07/2020 12:48 PM    Bilirubin NEGATIVE  02/07/2020 12:48 PM    Urobilinogen 0.2 02/07/2020 12:48 PM    Nitrites NEGATIVE  02/07/2020 12:48 PM    Leukocyte Esterase SMALL (A) 02/07/2020 12:48 PM    Epithelial cells MODERATE (A) 02/07/2020 12:48 PM    Bacteria 3+ (A) 02/07/2020 12:48 PM    WBC 10-20 02/07/2020 12:48 PM    RBC 10-20 02/07/2020 12:48 PM         Medications Reviewed:     Current Facility-Administered Medications   Medication Dose Route Frequency    sodium chloride (NS) flush 5-40 mL  5-40 mL IntraVENous Q8H    sodium chloride (NS) flush 5-40 mL  5-40 mL IntraVENous PRN    acetaminophen (TYLENOL) tablet 650 mg  650 mg Oral Q4H PRN    ondansetron (ZOFRAN) injection 4 mg  4 mg IntraVENous Q4H PRN    ALPRAZolam (XANAX) tablet 0.5 mg  0.5 mg Oral TID PRN    aspirin delayed-release tablet 81 mg  81 mg Oral DAILY    famotidine (PEPCID) tablet 20 mg  20 mg Oral Q24H    ferrous sulfate tablet 325 mg  325 mg Oral ACB    midodrine (PROAMITINE) tablet 10 mg  10 mg Oral BID WITH MEALS    pravastatin (PRAVACHOL) tablet 20 mg  20 mg Oral QHS    cefTRIAXone (ROCEPHIN) 1 g in 0.9% sodium chloride (MBP/ADV) 50 mL  1 g IntraVENous Q24H    0.9% sodium chloride infusion  50 mL/hr IntraVENous CONTINUOUS     ______________________________________________________________________  EXPECTED LENGTH OF STAY: - - -  ACTUAL LENGTH OF STAY:          1                 Teo Lind MD     Patient's emergency contacts:  Extended Emergency Contact Information  Primary Emergency Contact: Munir Ann  Address: [address] [place] [address] [address] [facility] Phone: [phone]  Mobile Phone: [phone]  Relation: Spouse  Secondary Emergency Contact: [address] Phone: [phone]  Mobile Phone: [phone]  Relation: Daughter

## 2020-02-08 NOTE — CONSULTS
Chief Complaint:  Cholelithiasis    HPI: [de-identified] yo woman with hx dementia, CVA, hiatal hernia s/p repair at [facility] who was admitted for weakness who per  developed intermittent abdominal pain. Spouse reported pain has periumbilical pain radiating across her abdomen which is not associated with food. Patient denied any nausea or vomiting. CT scan incidentally noted cholelithiasis. Past Medical History:   Diagnosis Date    Anemia NEC     Asthma     major asthma as a child/only with colds now    Cancer ([facility] [address]     BCCA removed    Coagulation disorder ([facility] [address]     on xarelto    Dementia ([facility] [address]     GERD (gastroesophageal reflux disease) 10/22/2010    Hiatal hernia     Hypertension     hx of blood pressure med for short time    Ill-defined condition     increased cholesterol    Other unknown and unspecified cause of morbidity or mortality     eval with Dr Howard Kumari for SOB, sept 2015:  results unkown    Stroke ([facility] [address] 2013    no deficits    Stroke ([facility] [address]     heat exhaustion    Watermelon stomach 2003       Past Surgical History:   Procedure Laterality Date    HX BLADDER SUSPENSION      HX COLONOSCOPY      HX ENDOSCOPY  6/4/15    Dr. Cheryle Bradford      vaginal del times 3    HX HERNIA REPAIR  2014    x2, failure    HX OTHER SURGICAL      EGDs    HX TONSILLECTOMY      TN EXC SKIN MALIG >4CM FACE,FACIAL      times 3 - BCCA       No current facility-administered medications on file prior to encounter. Current Outpatient Medications on File Prior to Encounter   Medication Sig Dispense Refill    omeprazole (PRILOSEC) 20 mg capsule TAKE 1 CAPSULE BY MOUTH EVERY DAY 90 Cap 2    clotrimazole (LOTRIMIN) 1 % topical cream Apply  to affected area two (2) times a day. 15 g 0    polyethylene glycol (MIRALAX) 17 gram/dose powder Take 17 g by mouth as needed.  lansoprazole (PREVACID PO) Take  by mouth.  ALPRAZolam (XANAX) 0.5 mg tablet Take 1 Tab by mouth three (3) times daily as needed for Anxiety. Max Daily Amount: 1.5 mg. 40 Tab 1    ferrous sulfate 325 mg (65 mg iron) tablet Take 1 Tab by mouth Daily (before breakfast). 90 Tab 5    ondansetron hcl (ZOFRAN) 4 mg tablet Take 1 Tab by mouth every eight (8) hours as needed for Nausea. 20 Tab 0    famotidine (PEPCID PO) Take  by mouth.  aspirin delayed-release 81 mg tablet Take  by mouth daily.  acetaminophen (TYLENOL) 325 mg tablet Take  by mouth every four (4) hours as needed for Pain.  midodrine (PROAMITINE) 10 mg tablet Take  by mouth two (2) times a day.  pravastatin (PRAVACHOL) 20 mg tablet TAKE 1 TABLET BY MOUTH AT BEDTIME 90 Tab 1    saliva substitute combo no.9 (BIOTENE DRY MOUTH ORAL RINSE MM) by Mucous Membrane route.  melatonin tab tablet Take 1 Tab by mouth nightly. 30 Tab 0    cholecalciferol, vitamin d3, (VITAMIN D) 1,000 unit tablet Take 2,000 Units by mouth daily.  MULTIVITAMINS (MULTIVITAMIN PO) Take 1 Tab by mouth daily.          Allergies   Allergen Reactions    Latex Hives     Latex tape    Egg Anaphylaxis    Codeine Nausea and Vomiting    Darvon [Propoxyphene] Nausea and Vomiting    Pcn [Penicillins] Hives       Review of Systems - General ROS: negative  Psychological ROS: negative  Respiratory ROS: negative  Cardiovascular ROS: negative  Gastrointestinal ROS: positive for - abdominal pain     Visit Vitals  /70 (BP 1 Location: Left arm, BP Patient Position: At rest)   Pulse 80   Temp 97.7 °F (36.5 °C)   Resp 18   Wt 153 lb (69.4 kg)   SpO2 97%   BMI 26.26 kg/m²         Physical Exam:    Gen:  NAD  Pulm:  Unlabored  Abd:  S/ND/protuberant/Non- TTP    Recent Results (from the past 24 hour(s))   CBC WITH AUTOMATED DIFF    Collection Time: 02/08/20  3:09 AM   Result Value Ref Range    WBC 4.8 3.6 - 11.0 K/uL    RBC 3.58 (L) 3.80 - 5.20 M/uL    HGB 12.0 11.5 - 16.0 g/dL    HCT 38.2 35.0 - 47.0 %    .7 (H) 80.0 - 99.0 FL    MCH 33.5 26.0 - 34.0 PG    MCHC 31.4 30.0 - 36.5 g/dL    RDW 12.9 11.5 - 14.5 %    PLATELET 778 433 - 978 K/uL    MPV 8.1 (L) 8.9 - 12.9 FL    NRBC 0.0 0  WBC    ABSOLUTE NRBC 0.00 0.00 - 0.01 K/uL    NEUTROPHILS 62 32 - 75 %    LYMPHOCYTES 20 12 - 49 %    MONOCYTES 12 5 - 13 %    EOSINOPHILS 5 0 - 7 %    BASOPHILS 1 0 - 1 %    IMMATURE GRANULOCYTES 0 0.0 - 0.5 %    ABS. NEUTROPHILS 3.0 1.8 - 8.0 K/UL    ABS. LYMPHOCYTES 0.9 0.8 - 3.5 K/UL    ABS. MONOCYTES 0.6 0.0 - 1.0 K/UL    ABS. EOSINOPHILS 0.2 0.0 - 0.4 K/UL    ABS. BASOPHILS 0.1 0.0 - 0.1 K/UL    ABS. IMM. GRANS. 0.0 0.00 - 0.04 K/UL    DF AUTOMATED     METABOLIC PANEL, COMPREHENSIVE    Collection Time: 02/08/20  3:09 AM   Result Value Ref Range    Sodium 132 (L) 136 - 145 mmol/L    Potassium 3.8 3.5 - 5.1 mmol/L    Chloride 104 97 - 108 mmol/L    CO2 22 21 - 32 mmol/L    Anion gap 6 5 - 15 mmol/L    Glucose 84 65 - 100 mg/dL    BUN 10 6 - 20 MG/DL    Creatinine 0.81 0.55 - 1.02 MG/DL    BUN/Creatinine ratio 12 12 - 20      GFR est AA >60 >60 ml/min/1.73m2    GFR est non-AA >60 >60 ml/min/1.73m2    Calcium 8.5 8.5 - 10.1 MG/DL    Bilirubin, total 0.7 0.2 - 1.0 MG/DL    ALT (SGPT) 13 12 - 78 U/L    AST (SGOT) 14 (L) 15 - 37 U/L    Alk. phosphatase 56 45 - 117 U/L    Protein, total 6.0 (L) 6.4 - 8.2 g/dL    Albumin 2.6 (L) 3.5 - 5.0 g/dL    Globulin 3.4 2.0 - 4.0 g/dL    A-G Ratio 0.8 (L) 1.1 - 2.2     MAGNESIUM    Collection Time: 02/08/20  3:09 AM   Result Value Ref Range    Magnesium 1.8 1.6 - 2.4 mg/dL   PHOSPHORUS    Collection Time: 02/08/20  3:09 AM   Result Value Ref Range    Phosphorus 2.8 2.6 - 4.7 MG/DL       AP: [de-identified] yo woman consulted for cholelithiasis    - Cholelithiasis:  No biliary colic symptoms. No surgical indicatio for cholecystectomy.   Spouse also not wanting to pursue with any operation  - Diet as tolerated  - Will signoff

## 2020-02-08 NOTE — PROGRESS NOTES
Patient resting in bed, pleasantly confused, urology consult has asked for bladder scans for PVR. Family at bedside, appetite is moderate. Problem: Pressure Injury - Risk of  Goal: *Prevention of pressure injury  Description  Document Hawk Scale and appropriate interventions in the flowsheet. Outcome: Progressing Towards Goal  Note: Pressure Injury Interventions:  Sensory Interventions: Keep linens dry and wrinkle-free, Minimize linen layers    Moisture Interventions: Apply protective barrier, creams and emollients, Absorbent underpads    Activity Interventions: Increase time out of bed, Pressure redistribution bed/mattress(bed type)    Mobility Interventions: Pressure redistribution bed/mattress (bed type), PT/OT evaluation, Turn and reposition approx. every two hours(pillow and wedges)    Nutrition Interventions: Document food/fluid/supplement intake    Friction and Shear Interventions: Apply protective barrier, creams and emollients, Lift sheet, Minimize layers                Problem: Patient Education: Go to Patient Education Activity  Goal: Patient/Family Education  Outcome: Progressing Towards Goal     Problem: Falls - Risk of  Goal: *Absence of Falls  Description  Document Marilynn Cabrera Fall Risk and appropriate interventions in the flowsheet.   Outcome: Progressing Towards Goal  Note: Fall Risk Interventions:  Mobility Interventions: Communicate number of staff needed for ambulation/transfer    Mentation Interventions: Adequate sleep, hydration, pain control    Medication Interventions: Evaluate medications/consider consulting pharmacy    Elimination Interventions: Patient to call for help with toileting needs, Toileting schedule/hourly rounds    History of Falls Interventions: Evaluate medications/consider consulting pharmacy, Room close to nurse's station         Problem: Patient Education: Go to Patient Education Activity  Goal: Patient/Family Education  Outcome: Progressing Towards Goal

## 2020-02-08 NOTE — PROGRESS NOTES
Bedside shift change report given to [address] (oncoming nurse) by Lori Estrella RN (offgoing nurse). Report included the following information SBAR, Kardex, MAR and Recent Results.

## 2020-02-09 LAB
ANION GAP SERPL CALC-SCNC: 7 MMOL/L (ref 5–15)
ATRIAL RATE: 62 BPM
BASOPHILS # BLD: 0.1 K/UL (ref 0–0.1)
BASOPHILS NFR BLD: 1 % (ref 0–1)
BUN SERPL-MCNC: 11 MG/DL (ref 6–20)
BUN/CREAT SERPL: 12 (ref 12–20)
CALCIUM SERPL-MCNC: 9.2 MG/DL (ref 8.5–10.1)
CALCULATED P AXIS, ECG09: 33 DEGREES
CALCULATED R AXIS, ECG10: -11 DEGREES
CALCULATED T AXIS, ECG11: 45 DEGREES
CHLORIDE SERPL-SCNC: 107 MMOL/L (ref 97–108)
CO2 SERPL-SCNC: 24 MMOL/L (ref 21–32)
CREAT SERPL-MCNC: 0.93 MG/DL (ref 0.55–1.02)
DIAGNOSIS, 93000: NORMAL
DIFFERENTIAL METHOD BLD: ABNORMAL
EOSINOPHIL # BLD: 0.3 K/UL (ref 0–0.4)
EOSINOPHIL NFR BLD: 5 % (ref 0–7)
ERYTHROCYTE [DISTWIDTH] IN BLOOD BY AUTOMATED COUNT: 12.9 % (ref 11.5–14.5)
GLUCOSE SERPL-MCNC: 104 MG/DL (ref 65–100)
HCT VFR BLD AUTO: 37.3 % (ref 35–47)
HGB BLD-MCNC: 12.6 G/DL (ref 11.5–16)
IMM GRANULOCYTES # BLD AUTO: 0 K/UL (ref 0–0.04)
IMM GRANULOCYTES NFR BLD AUTO: 0 % (ref 0–0.5)
LYMPHOCYTES # BLD: 0.9 K/UL (ref 0.8–3.5)
LYMPHOCYTES NFR BLD: 13 % (ref 12–49)
MCH RBC QN AUTO: 33.6 PG (ref 26–34)
MCHC RBC AUTO-ENTMCNC: 33.8 G/DL (ref 30–36.5)
MCV RBC AUTO: 99.5 FL (ref 80–99)
MONOCYTES # BLD: 0.8 K/UL (ref 0–1)
MONOCYTES NFR BLD: 12 % (ref 5–13)
NEUTS SEG # BLD: 4.7 K/UL (ref 1.8–8)
NEUTS SEG NFR BLD: 69 % (ref 32–75)
NRBC # BLD: 0 K/UL (ref 0–0.01)
NRBC BLD-RTO: 0 PER 100 WBC
P-R INTERVAL, ECG05: 148 MS
PLATELET # BLD AUTO: 226 K/UL (ref 150–400)
PMV BLD AUTO: 8.1 FL (ref 8.9–12.9)
POTASSIUM SERPL-SCNC: 3.9 MMOL/L (ref 3.5–5.1)
Q-T INTERVAL, ECG07: 416 MS
QRS DURATION, ECG06: 76 MS
QTC CALCULATION (BEZET), ECG08: 422 MS
RBC # BLD AUTO: 3.75 M/UL (ref 3.8–5.2)
SODIUM SERPL-SCNC: 138 MMOL/L (ref 136–145)
VENTRICULAR RATE, ECG03: 62 BPM
WBC # BLD AUTO: 6.8 K/UL (ref 3.6–11)

## 2020-02-09 PROCEDURE — 85025 COMPLETE CBC W/AUTO DIFF WBC: CPT

## 2020-02-09 PROCEDURE — 36415 COLL VENOUS BLD VENIPUNCTURE: CPT

## 2020-02-09 PROCEDURE — 80048 BASIC METABOLIC PNL TOTAL CA: CPT

## 2020-02-09 PROCEDURE — 74011250637 HC RX REV CODE- 250/637: Performed by: INTERNAL MEDICINE

## 2020-02-09 PROCEDURE — 74011250637 HC RX REV CODE- 250/637: Performed by: HOSPITALIST

## 2020-02-09 PROCEDURE — 65270000032 HC RM SEMIPRIVATE

## 2020-02-09 PROCEDURE — 97165 OT EVAL LOW COMPLEX 30 MIN: CPT

## 2020-02-09 PROCEDURE — 97535 SELF CARE MNGMENT TRAINING: CPT

## 2020-02-09 PROCEDURE — 51798 US URINE CAPACITY MEASURE: CPT

## 2020-02-09 PROCEDURE — 74011000258 HC RX REV CODE- 258: Performed by: HOSPITALIST

## 2020-02-09 PROCEDURE — 74011250636 HC RX REV CODE- 250/636: Performed by: HOSPITALIST

## 2020-02-09 RX ADMIN — FAMOTIDINE 20 MG: 20 TABLET ORAL at 17:02

## 2020-02-09 RX ADMIN — ASPIRIN 81 MG: 81 TABLET, COATED ORAL at 09:58

## 2020-02-09 RX ADMIN — Medication 10 ML: at 07:01

## 2020-02-09 RX ADMIN — CEFTRIAXONE 1 G: 1 INJECTION, POWDER, FOR SOLUTION INTRAMUSCULAR; INTRAVENOUS at 15:30

## 2020-02-09 RX ADMIN — FERROUS SULFATE TAB 325 MG (65 MG ELEMENTAL FE) 325 MG: 325 (65 FE) TAB at 06:58

## 2020-02-09 RX ADMIN — ALPRAZOLAM 0.5 MG: 0.5 TABLET ORAL at 09:57

## 2020-02-09 RX ADMIN — QUETIAPINE FUMARATE 12.5 MG: 25 TABLET ORAL at 09:58

## 2020-02-09 RX ADMIN — PRAVASTATIN SODIUM 20 MG: 20 TABLET ORAL at 21:06

## 2020-02-09 RX ADMIN — Medication 10 ML: at 21:06

## 2020-02-09 RX ADMIN — ALPRAZOLAM 0.5 MG: 0.5 TABLET ORAL at 17:02

## 2020-02-09 RX ADMIN — MIDODRINE HYDROCHLORIDE 10 MG: 5 TABLET ORAL at 07:01

## 2020-02-09 RX ADMIN — MIDODRINE HYDROCHLORIDE 10 MG: 5 TABLET ORAL at 17:02

## 2020-02-09 RX ADMIN — QUETIAPINE FUMARATE 12.5 MG: 25 TABLET ORAL at 17:02

## 2020-02-09 NOTE — PROGRESS NOTES
Patient incontinent, voided in brief, bladder scanned for 731 cc. Patient voided 200cc in the bedpan. Post void bladder scanned was 532.  Patient straight cath per protocol for 500 cc

## 2020-02-09 NOTE — PROGRESS NOTES
Problem: Self Care Deficits Care Plan (Adult)  Goal: *Acute Goals and Plan of Care (Insert Text)  Description  Occupational Therapy Goals  Initiated: 2/9/2020   1. Patient will perform grooming with supervision/set-up sitting in chair within 7 day(s). 2.  Patient will perform bathing with min A from chair within 7 day(s). 3.  Patient will perform upper body dressing and lower body dressing with min A within 7 day(s). 4.  Patient will perform toilet transfers with CGA within 7 day(s). 5.  Patient will perform all aspects of toileting with CGA within 7 day(s). FUNCTIONAL STATUS PRIOR TO ADMISSION: Pt lives with her  of 61 years. They lives in a 2 story home and remain on the first floor. Pt very confused and unable to provide any information correctly regarding PLOF or home situation.  assisted with all IADL activities and moderate assistance for basic ADL as needed. Some days were better than others per his report. Mentation has waxed and waned for a while however her confusion is greater now that every before. HOME SUPPORT: Lives with . Uses RW for mobility. Outcome: Progressing Towards Goal     OCCUPATIONAL THERAPY EVALUATION  Patient: Candance Munch ([de-identified] y.o. female)  Date: 2/9/2020  Primary Diagnosis: Generalized weakness [R53.1]        Precautions:   Fall, WBAT    ASSESSMENT  Based on the objective data described below, the patient presents with decreased independence with all self care and functional mobility following admission for UTI and increased confusion. Pt does continue to with urinary retention requiring straight caths intermittently. Awaiting urology consult. Pt pleasant but confused during intervention. She is oriented to person only.  assisted with reorienting her as therapist entered room. Pt generally debilitated but agreeable to transfer OOB to chair for grooming activities. She requires mod A for transfer to chair using gait belt. Recommend home pending progress, but at this time, if discharged today, she would require SNF setting due to debility. Recommend OOB to chair TID for meals. Recommend progression to and from bathroom or BSC with use of walker and gait belt for toileting. Of note, pt also with fall 1/10/20 resulting with large hematoma on left humerus. Pt has persisted with non-use of LUE. X-ray at that time was negative. Current Level of Function Impacting Discharge (ADLs/self-care): mod A for transfers and max A to mod A for ADL activities    Functional Outcome Measure: The patient scored 30 on the Barthel Index outcome measure which is indicative of maximal impairment with ADL function. Other factors to consider for discharge: debility     Patient will benefit from skilled therapy intervention to address the above noted impairments. PLAN :  Recommendations and Planned Interventions: self care training, functional mobility training, therapeutic exercise, balance training, visual/perceptual training, therapeutic activities, endurance activities, patient education, home safety training, and family training/education    Frequency/Duration: Patient will be followed by occupational therapy 5 times a week to address goals. Recommendation for discharge: (in order for the patient to meet his/her long term goals)  Therapy up to 5 days/week in SNF setting or an intensive home health therapy program    This discharge recommendation:  Has been made in collaboration with the attending provider and/or case management    IF patient discharges home will need the following DME: TBD       SUBJECTIVE:   Patient stated I think Roscoe Richmond is coming.     OBJECTIVE DATA SUMMARY:   HISTORY:   Past Medical History:   Diagnosis Date    Anemia NEC     Asthma     major asthma as a child/only with colds now    Cancer [facility])     BCCA removed    Coagulation disorder ([facility] [place] [address]     on xarelto    Dementia ([facility] [place] [address]     GERD (gastroesophageal reflux disease) 10/22/2010    Hiatal hernia     Hypertension     hx of blood pressure med for short time    Ill-defined condition     increased cholesterol    Other unknown and unspecified cause of morbidity or mortality     eval with Dr Silvia Reyes for SOB, sept 2015:  results unkown    Stroke ([facility] [address] 2013    no deficits    Stroke ([facility] [address]     heat exhaustion    Watermelon stomach 2003     Past Surgical History:   Procedure Laterality Date    HX BLADDER SUSPENSION      HX COLONOSCOPY      HX ENDOSCOPY  6/4/15    Dr. Renner Figures GYN      vaginal del times 3    HX HERNIA REPAIR  2014    x2, failure    HX OTHER SURGICAL      EGDs    HX TONSILLECTOMY      IA EXC SKIN MALIG >4CM FACE,FACIAL      times 3 - BCCA       Expanded or extensive additional review of patient history:     Home Situation  Home Environment: Private residence  # Steps to Enter: 4  Rails to Enter: Yes  Hand Rails : Bilateral(wideset)  Wheelchair Ramp: No  One/Two Story Residence: Two story, live on 1st floor  Living Alone: No  Support Systems: Family member(s)  Patient Expects to be Discharged to[de-identified] Private residence  Current DME Used/Available at Home: Tammi Pronto, rolling, Wheelchair, [address] chair, Walker, rollator  Tub or Shower Type: Shower    Hand dominance: Right    EXAMINATION OF PERFORMANCE DEFICITS:  Cognitive/Behavioral Status:  Neurologic State: Alert;Confused  Orientation Level: Oriented to person;Disoriented to time;Disoriented to situation;Disoriented to place  Cognition: Follows commands  Perception: Appears intact  Perseveration: Perseverates during conversation  Safety/Judgement: Decreased insight into deficits; Decreased awareness of need for safety;Decreased awareness of need for assistance;Decreased awareness of environment    Skin: see nursing notes    Edema: none noted    Hearing:   Auditory  Auditory Impairment: None    Vision/Perceptual:                           Acuity: Within Defined Limits         Range of Motion:    AROM: Within functional limits(LUE noted with non use since fall 1/10/20)  PROM: Within functional limits(LUE noted with non use since fall 1/10/20)                      Strength:    Strength: Within functional limits(LUE noted with non use since fall 1/10/20)                Coordination:  Coordination: Within functional limits(LUE noted with non use since fall 1/10/20)  Fine Motor Skills-Upper: Right Intact; Left Intact    Gross Motor Skills-Upper: Right Intact; Left Impaired    Tone & Sensation:    Tone: Normal  Sensation: Intact                      Balance:  Sitting: Intact  Standing: Impaired; With support  Standing - Static: Poor  Standing - Dynamic : Poor    Functional Mobility and Transfers for ADLs:  Bed Mobility:  Supine to Sit: Additional time; Moderate assistance  Sit to Supine: (remained OOB in chair)    Transfers:  Sit to Stand: Moderate assistance  Stand to Sit: Moderate assistance  Bed to Chair: Moderate assistance  Bathroom Mobility: Moderate assistance  Toilet Transfer : Moderate assistance    ADL Assessment:  Feeding: Supervision    Oral Facial Hygiene/Grooming: Supervision    Bathing: Maximum assistance    Upper Body Dressing: Maximum assistance    Lower Body Dressing: Maximum assistance    Toileting: Maximum assistance                ADL Intervention and task modifications:   Pt progressed OOB to chair with cues and education for safety.  present and provided a lot of background information. Pt requires mod A for sitting balance EOB to attempt LB dressing. She was able to reach her left toes using cross leg technique but unable to don sock. She was unable to reach her right foot at all. Pt then progressed to chair with mod A and cues. Pt did well with activities. Completed grooming from chair level. Cognitive Retraining  Safety/Judgement: Decreased insight into deficits; Decreased awareness of need for safety;Decreased awareness of need for assistance;Decreased awareness of environment    Functional Measure:  Barthel Index:    Bathin  Bladder: 5  Bowels: 10  Groomin  Dressin  Feedin  Mobility: 0  Stairs: 0  Toilet Use: 5  Transfer (Bed to Chair and Back): 5  Total: 30/100        The Barthel ADL Index: Guidelines  1. The index should be used as a record of what a patient does, not as a record of what a patient could do. 2. The main aim is to establish degree of independence from any help, physical or verbal, however minor and for whatever reason. 3. The need for supervision renders the patient not independent. 4. A patient's performance should be established using the best available evidence. Asking the patient, friends/relatives and nurses are the usual sources, but direct observation and common sense are also important. However direct testing is not needed. 5. Usually the patient's performance over the preceding 24-48 hours is important, but occasionally longer periods will be relevant. 6. Middle categories imply that the patient supplies over 50 per cent of the effort. 7. Use of aids to be independent is allowed. Link ., Barthel, D.W. (5711). Functional evaluation: the Barthel Index. [facility] (14)2. Jael Landa pelon TORITO [place], Chris Pugh., Edgar Echevarria., Sandra, [address] [place] [address] (). Measuring the change indisability after inpatient rehabilitation; comparison of the responsiveness of the Barthel Index and Functional [place] Measure. Journal of Neurology, Neurosurgery, and Psychiatry, 66(4), 894-202. Jill Kaba, N.J.A, RONAK Salgado, & Monica Alcazar MEduarA. (2004.) Assessment of post-stroke quality of life in cost-effectiveness studies: The usefulness of the Barthel Index and the EuroQoL-5D.  Quality of Life Research, 15, 973-93         Occupational Therapy Evaluation Charge Determination   History Examination Decision-Making   HIGH Complexity : Extensive review of history including physical, cognitive and psychosocial history  HIGH Complexity : 5 or more performance deficits relating to physical, cognitive , or psychosocial skils that result in activity limitations and / or participation restrictions HIGH Complexity : Patient presents with comorbidities that affect occupational performance. Signifigant modification of tasks or assistance (eg, physical or verbal) with assessment (s) is necessary to enable patient to complete evaluation       Based on the above components, the patient evaluation is determined to be of the following complexity level: HIGH   Pain Rating:  No pain reported    Activity Tolerance:   Fair  Please refer to the flowsheet for vital signs taken during this treatment. After treatment patient left in no apparent distress:    Sitting in chair, Call bell within reach, Bed / chair alarm activated, and Caregiver / family present    COMMUNICATION/EDUCATION:   The patients plan of care was discussed with: Physical Therapist and Registered Nurse. Home safety education was provided and the patient/caregiver indicated understanding. and Patient/family have participated as able in goal setting and plan of care. This patients plan of care is appropriate for delegation to [facility].     Thank you for this referral.  Ailyn Delarosa OT  Time Calculation: 30 mins

## 2020-02-09 NOTE — PROGRESS NOTES
Hospitalist Progress Note  Oneida Badillo MD  Answering service: [phone] from in house phone        Date of Service:  2020  NAME:  Matt Rosales  :  1940  MRN:  [id number]  PCP: Blu Aleman MD    Chief Complaint:   Chief Complaint   Patient presents with    Abdominal Pain         Admission Summary:     Matt Rosales is a [de-identified] y.o. female who presented with abdominal pain    Interval history / Subjective:   Patient seen for Follow up of CC: abdominal pain  Patient seen and examined by the bedside, Labs, images and notes reviewed  Comfortable, confused, oriented to self only, eating and drinking well, noted to have urinary retention and required straight cath x2. Awaiting urology eval for further input. Discussed with nursing staff, no acute issues overnight, orders reviewed. Assessment & Plan:     - Generalized weakness, recurrent falls and inability to care for self:  Likely multifactorial  Patient appears to have dementia although not documented in the past, could be acute encephalopathy due to UTI  CT head negative for acute pathology. Patient has deconditioning, cont PT/OT/CM consult for DC planning  May need placement  Consult Palliative care as patient seems to have advanced dementia  Supportive care  Fall precautions    - UTI: following cultures, cont IV Ceftriaxone    - Right hydronephrosis in setting of urinary retention: mild, no stone noticed  Bladder scans and straight cath as needed, may require Ilz but I am avoiding as patien. Awaiting urology evaluation.    - Cholelithiasis: this may be the cause of her intermittent abd pain. Gen Surg consult noted, no acute surgical intervention,  does not want patient to have any surgery done. No further surgical work-up. - Hyponatremia: mild  Resolved.   Lab holiday in the morning.    - Moderate protein calorie malnourishment, POA  Nutrition consult      Code status: Full Code    DVT prophylaxis: SCDs    Care Plan discussed with: Patient/Family and Nurse    Disposition: TBD    Hospital Problems  Date Reviewed: 2019          Codes Class Noted POA    Generalized weakness ICD-10-CM: R53.1  ICD-9-CM: 780.79  2020 Unknown                Review of Systems:   Review of systems not obtained due to patient factors. confusion      Physical Examination:     General appearance: alert, cooperative, no distress, appears stated age, confused, oriented to self only. Not oriented to time, place or person  Throat: oral mucosa dry  Lungs: clear to auscultation bilaterally, no wheezing, rales  Heart: RRR, S1S2  Abdomen: soft, non-tender. Bowel sounds normal. No masses,  no organomegaly  Extremities: extremities normal, atraumatic, no cyanosis or edema  Pulses: 2+ and symmetric  Neurologic: Grossly normal         Vital Signs:    Last 24hrs VS reviewed since prior progress note. Most recent are:    Visit Vitals  /61 (BP 1 Location: Left arm, BP Patient Position: At rest)   Pulse (!) 57   Temp 98.2 °F (36.8 °C)   Resp 16   Wt 65.4 kg (144 lb 3.2 oz)   SpO2 93%   BMI 24.75 kg/m²         Intake/Output Summary (Last 24 hours) at 2020 0950  Last data filed at 2020 8950  Gross per 24 hour   Intake 240 ml   Output 1700 ml   Net -1460 ml        Tmax:  Temp (24hrs), Av °F (36.7 °C), Min:97.7 °F (36.5 °C), Max:98.6 °F (37 °C)      Data Review:   Data reviewed by myself:  Xr Shoulder Lt Ap/lat Min 2 V    Result Date: 1/10/2020  EXAM: XR SHOULDER LT AP/LAT MIN 2 V INDICATION: shoulder pain. Left shoulder pain. COMPARISON: None. FINDINGS: Three views of the left shoulder demonstrate no fracture, dislocation or other acute abnormality. There is osteopenia. IMPRESSION: No acute abnormality. Xr Humerus Lt    Result Date: 1/10/2020  EXAM: XR HUMERUS LT INDICATION: pain after fall. COMPARISON: None.  FINDINGS: Two views of the left humerus demonstrate no fracture or other acute osseous, articular or soft tissue abnormality. IMPRESSION: No acute abnormality. Ct Abd Pelv Wo Cont    Result Date: 2/7/2020  INDICATION: intermittent abd pain/ x 2 years/ worsening/ increased today; EXAM: CT Abdomen and Pelvis without IV contrast. Oral contrast was administered. CT dose reduction was achieved through use of a standardized protocol tailored for this examination and automatic exposure control for dose modulation. FINDINGS: There is a hiatal hernia. There is cholelithiasis. There are colonic diverticula. No urinary tract stones are seen. Mild right hydronephrosis is progressed since 3372, etiology uncertain. There is no left hydroureteronephrosis. The kidneys are normal in size. Bladder is moderately distended. There is no perirenal fluid or ascites. Liver shows no apparent significant finding without contrast. Pancreas, adrenal glands, and spleen show no significant enlargement. Aorta is calcified without aneurysm. No inflammation is seen. There is no pneumoperitoneum or significant adenopathy. There is no apparent pelvic mass. The appendix is not seen. Bowels are not dilated. There are chronic compression fractures of T9 and L5. IMPRESSION: 1. No acute inflammation. 2. Hiatal hernia. 3. Cholelithiasis. 4. Diverticulosis. 5. Right hydronephrosis. Xr Knee Lt 3 V    Result Date: 1/10/2020  EXAM: XR KNEE LT 3 V INDICATION: knee pain after fall. COMPARISON: None. FINDINGS: Three views of the left knee demonstrate no fracture or other acute osseous or articular abnormality. There is no effusion. There are degenerative changes of the knee with pointing of the tibial spines and mild patellofemoral osteophyte formation. There are vascular calcifications. IMPRESSION: No acute abnormality.     Lab Results   Component Value Date/Time    Specimen Description: URINE 06/01/2013 09:30 PM     Lab Results   Component Value Date/Time    Culture result: NO SIGNIFICANT GROWTH 02/07/2020 12:48 PM    Culture result: NO GROWTH 2 DAYS 08/13/2015 05:08 PM    Culture result: NO SIGNIFICANT GROWTH 06/01/2013 09:30 PM     All Micro Results     Procedure Component Value Units Date/Time    CULTURE, URINE [[id number]] Collected:  02/07/20 1248    Order Status:  Completed Specimen:  Urine from Clean catch Updated:  02/08/20 1601     Special Requests: NO SPECIAL REQUESTS        [place] Count --        <10,000  COLONIES/mL       Culture result: NO SIGNIFICANT GROWTH       URINE CULTURE HOLD SAMPLE [[id number]]     Order Status:  Sent Specimen:  Urine           Labs: reviewed by myself. Recent Labs     02/09/20  0235 02/08/20  0309   WBC 6.8 4.8   HGB 12.6 12.0   HCT 37.3 38.2    185     Recent Labs     02/09/20  0445 02/08/20  0309 02/07/20  1151    132* 134*   K 3.9 3.8 4.6    104 98   CO2 24 22 26   BUN 11 10 13   CREA 0.93 0.81 1.06*   * 84 108*   CA 9.2 8.5 9.8   MG  --  1.8  --    PHOS  --  2.8  --      Recent Labs     02/08/20  0309 02/07/20  1151   SGOT 14* 41*   ALT 13 17   AP 56 75   TBILI 0.7 0.6   TP 6.0* 7.8   ALB 2.6* 3.5   GLOB 3.4 4.3*   LPSE  --  70*     No results for input(s): INR, PTP, APTT, INREXT, INREXT in the last 72 hours. No results for input(s): FE, TIBC, PSAT, FERR in the last 72 hours. Lab Results   Component Value Date/Time    Folate 17.3 10/08/2015 11:30 AM      No results for input(s): PH, PCO2, PO2 in the last 72 hours.   Recent Labs     02/07/20  1151   TROIQ <0.05     Lab Results   Component Value Date/Time    Cholesterol, total 145 04/04/2017 09:46 AM    HDL Cholesterol 61 04/04/2017 09:46 AM    LDL, calculated 67 04/04/2017 09:46 AM    Triglyceride 87 04/04/2017 09:46 AM    CHOL/HDL Ratio 3.4 06/02/2013 06:30 AM     Lab Results   Component Value Date/Time    Glucose (POC) 115 (H) 09/28/2018 08:46 PM     Lab Results   Component Value Date/Time    Color YELLOW/STRAW 02/07/2020 12:48 PM    Appearance CLEAR 02/07/2020 12:48 PM    Specific gravity 1.005 02/07/2020 12:48 PM    pH (UA) 6.5 02/07/2020 12:48 PM    Protein NEGATIVE  02/07/2020 12:48 PM    Glucose NEGATIVE  02/07/2020 12:48 PM    Ketone NEGATIVE  02/07/2020 12:48 PM    Bilirubin NEGATIVE  02/07/2020 12:48 PM    Urobilinogen 0.2 02/07/2020 12:48 PM    Nitrites NEGATIVE  02/07/2020 12:48 PM    Leukocyte Esterase SMALL (A) 02/07/2020 12:48 PM    Epithelial cells MODERATE (A) 02/07/2020 12:48 PM    Bacteria 3+ (A) 02/07/2020 12:48 PM    WBC 10-20 02/07/2020 12:48 PM    RBC 10-20 02/07/2020 12:48 PM         Medications Reviewed:     Current Facility-Administered Medications   Medication Dose Route Frequency    QUEtiapine (SEROquel) tablet 12.5 mg  12.5 mg Oral Q8H PRN    sodium chloride (NS) flush 5-40 mL  5-40 mL IntraVENous Q8H    sodium chloride (NS) flush 5-40 mL  5-40 mL IntraVENous PRN    acetaminophen (TYLENOL) tablet 650 mg  650 mg Oral Q4H PRN    ondansetron (ZOFRAN) injection 4 mg  4 mg IntraVENous Q4H PRN    ALPRAZolam (XANAX) tablet 0.5 mg  0.5 mg Oral TID PRN    aspirin delayed-release tablet 81 mg  81 mg Oral DAILY    famotidine (PEPCID) tablet 20 mg  20 mg Oral Q24H    ferrous sulfate tablet 325 mg  325 mg Oral ACB    midodrine (PROAMITINE) tablet 10 mg  10 mg Oral BID WITH MEALS    pravastatin (PRAVACHOL) tablet 20 mg  20 mg Oral QHS    cefTRIAXone (ROCEPHIN) 1 g in 0.9% sodium chloride (MBP/ADV) 50 mL  1 g IntraVENous Q24H     ______________________________________________________________________  EXPECTED LENGTH OF STAY: - - -  ACTUAL LENGTH OF STAY:          2                 Ella Dobbs MD     Patient's emergency contacts:  Extended Emergency Contact Information  Primary Emergency Contact: Munir Ann  Address: [address], [address] Phone: [phone]  Mobile Phone: [phone]  Relation: Spouse  Secondary Emergency Contact: [address] Phone: [phone]  Mobile Phone: [phone]  Relation: Daughter

## 2020-02-09 NOTE — CONSULTS
Urology Consult    Subjective:     Date of Consultation:  February 9, 2020    Referring Physician: Anamika Mcdaniel    Reason for Consultation:  Lukasz Lute, uti, [place]    Patient Name: Katia Fisher  MRN: [id number]    History of Present Illness:   Patient is a [de-identified] y.o.  female who is being seen for same. She was admitted to the hospital for Generalized weakness [R53.1].       Past Medical History:   Diagnosis Date    Anemia NEC     Asthma     major asthma as a child/only with colds now    [facility]     BCCA removed    Coagulation disorder ([facility]     on xarelto    Dementia ([facility]     GERD (gastroesophageal reflux disease) 10/22/2010    Hiatal hernia     Hypertension     hx of blood pressure med for short time    Ill-defined condition     increased cholesterol    Other unknown and unspecified cause of morbidity or mortality     eval with Dr Rm Combs for SOB, sept 2015:  results unkown    Stroke ([facility] 2013    no deficits    Stroke ([facility]     heat exhaustion    Watermelon stomach 2003      Past Surgical History:   Procedure Laterality Date    HX BLADDER SUSPENSION      HX COLONOSCOPY      HX ENDOSCOPY  6/4/15    Dr. Tere Davies    HX GYN      vaginal del times 3    HX HERNIA REPAIR  2014    x2, failure    HX OTHER SURGICAL      EGDs    HX TONSILLECTOMY      ND EXC SKIN MALIG >4CM FACE,FACIAL      times 3 - BCCA      Family History   Problem Relation Age of Onset    COPD Mother     Asthma Mother     Heart Attack Father     Other Father         peptic ulcer disease    Anesth Problems Neg Hx       Social History     Tobacco Use    Smoking status: Never Smoker    Smokeless tobacco: Never Used   Substance Use Topics    Alcohol use: No     Allergies   Allergen Reactions    Latex Hives     Latex tape    Egg Anaphylaxis    Codeine Nausea and Vomiting    Darvon [Propoxyphene] Nausea and Vomiting    Pcn [Penicillins] Hives      Prior to Admission medications    Medication Sig Start Date End Date Taking? Authorizing Provider   omeprazole (PRILOSEC) 20 mg capsule TAKE 1 CAPSULE BY MOUTH EVERY DAY 11/10/19  Yes Darci Layne III, MD   clotrimazole (LOTRIMIN) 1 % topical cream Apply  to affected area two (2) times a day. 10/24/19  Yes Blair Sorensen MD   polyethylene glycol (MIRALAX) 17 gram/dose powder Take 17 g by mouth as needed. Yes Provider, Historical   lansoprazole (PREVACID PO) Take  by mouth. Yes Provider, Historical   ALPRAZolam (XANAX) 0.5 mg tablet Take 1 Tab by mouth three (3) times daily as needed for Anxiety. Max Daily Amount: 1.5 mg. 7/3/19  Yes Blair Sorensen MD   ferrous sulfate 325 mg (65 mg iron) tablet Take 1 Tab by mouth Daily (before breakfast). 7/3/19  Yes Blair Sorensen MD   ondansetron hcl (ZOFRAN) 4 mg tablet Take 1 Tab by mouth every eight (8) hours as needed for Nausea. 1/11/19  Yes Khoa Tripp MD   famotidine (PEPCID PO) Take  by mouth. Yes Provider, Historical   aspirin delayed-release 81 mg tablet Take  by mouth daily. Yes Provider, Historical   acetaminophen (TYLENOL) 325 mg tablet Take  by mouth every four (4) hours as needed for Pain. Yes Provider, Historical   midodrine (PROAMITINE) 10 mg tablet Take  by mouth two (2) times a day. Yes Provider, Historical   pravastatin (PRAVACHOL) 20 mg tablet TAKE 1 TABLET BY MOUTH AT BEDTIME 10/17/18  Yes Blair Sorensen MD   saliva substitute combo no.9 (BIOTENE DRY MOUTH ORAL RINSE MM) by Mucous Membrane route. Yes Provider, Historical   melatonin tab tablet Take 1 Tab by mouth nightly. 7/2/18  Yes Shraddha Quinteros MD   cholecalciferol, vitamin d3, (VITAMIN D) 1,000 unit tablet Take 2,000 Units by mouth daily. Yes Provider, Historical   MULTIVITAMINS (MULTIVITAMIN PO) Take 1 Tab by mouth daily. Yes Provider, Historical         Review of Systems:  A comprehensive review of systems was negative except for that written in the HPI.     Objective:     Data Review (Labs):    Recent Labs 20  0445 20  0235 20  0309 20  1151   WBC  --  6.8 4.8 5.6   HGB  --  12.6 12.0 13.3   MCV  --  99.5* 106.7* 100.7*   PLT  --  226 185 252     --  132* 134*   K 3.9  --  3.8 4.6   CREA 0.93  --  0.81 1.06*   BUN 11  --  10 13   ALB  --   --  2.6* 3.5   TBILI  --   --  0.7 0.6   SGOT  --   --  14* 41*   ALT  --   --  13 17   AP  --   --  56 75   LPSE  --   --   --  70*       Patient Vitals for the past 8 hrs:   BP Temp Pulse Resp SpO2 Weight   20 0823 131/61 98.2 °F (36.8 °C) (!) 57 16 93 %    20 0701 125/60 98.6 °F (37 °C) 63 16 93 %    20 0454  97.8 °F (36.6 °C)    65.4 kg (144 lb 3.2 oz)     Temp (24hrs), Av °F (36.7 °C), Min:97.7 °F (36.5 °C), Max:98.6 °F (37 °C)      Intake and Output:    1901 -  0700  In: 360 [P.O.:360]  Out: 2929 [Urine:1750]    Physical Exam:            General:    alert, cooperative, no distress, appears stated age                     Skin:  Normal.   Lymph nodes:  Cervical, supraclavicular, and axillary nodes normal.             Abdomen[de-identified]  soft, non-tender. Bowel sounds normal. No masses,  no organomegaly. No cva tenderness             Genitalia:  defer exam          Extremities:  negative       Assessment:     Active Problems:    Generalized weakness (2020)          Pt adm w AMS and found to have UTI. Ct shows distended bladder and dilated r collecting system. Review of prev CT from early  shows similar dilated r collecting system. No evid stone. This si chronic and likely congenital.  No evid of pyelonephritis. Bladder does no empty well and pt getting straight caths for high pvrs. No meds on list that might lead to retention. No hx incontinence or poor bladder emptying in past    Plan:     Continue abiotics. Agree w trying to avoid [place] in this pt which may not be well tolerated. try to clear uti w abiotics and straight cath as needed while here.   Discussed double voiding and timed voiding to try and improve emptying. Liz only if clinically worsens . No rx needed for mild chronic right hydro.      Signed By: Viri Waddell MD                         February 9, 2020

## 2020-02-09 NOTE — PROGRESS NOTES
Patient straight cath, scanned for 580mL, emptied 320.  Patient had previously urinated on bedside commode with max assist.

## 2020-02-10 PROCEDURE — 97110 THERAPEUTIC EXERCISES: CPT

## 2020-02-10 PROCEDURE — 74011250637 HC RX REV CODE- 250/637: Performed by: HOSPITALIST

## 2020-02-10 PROCEDURE — 65270000032 HC RM SEMIPRIVATE

## 2020-02-10 PROCEDURE — 97161 PT EVAL LOW COMPLEX 20 MIN: CPT

## 2020-02-10 PROCEDURE — 97116 GAIT TRAINING THERAPY: CPT

## 2020-02-10 PROCEDURE — 51798 US URINE CAPACITY MEASURE: CPT

## 2020-02-10 RX ADMIN — ALPRAZOLAM 0.5 MG: 0.5 TABLET ORAL at 22:17

## 2020-02-10 RX ADMIN — Medication 10 ML: at 22:18

## 2020-02-10 RX ADMIN — Medication 10 ML: at 14:02

## 2020-02-10 RX ADMIN — MIDODRINE HYDROCHLORIDE 10 MG: 5 TABLET ORAL at 06:39

## 2020-02-10 RX ADMIN — MIDODRINE HYDROCHLORIDE 10 MG: 5 TABLET ORAL at 19:09

## 2020-02-10 RX ADMIN — FERROUS SULFATE TAB 325 MG (65 MG ELEMENTAL FE) 325 MG: 325 (65 FE) TAB at 06:35

## 2020-02-10 RX ADMIN — ACETAMINOPHEN 650 MG: 325 TABLET ORAL at 08:31

## 2020-02-10 RX ADMIN — PRAVASTATIN SODIUM 20 MG: 20 TABLET ORAL at 22:21

## 2020-02-10 RX ADMIN — FAMOTIDINE 20 MG: 20 TABLET ORAL at 19:09

## 2020-02-10 RX ADMIN — ASPIRIN 81 MG: 81 TABLET, COATED ORAL at 08:28

## 2020-02-10 RX ADMIN — Medication 10 ML: at 06:35

## 2020-02-10 NOTE — PROGRESS NOTES
Problem: Pressure Injury - Risk of  Goal: *Prevention of pressure injury  Description  Document Hawk Scale and appropriate interventions in the flowsheet. Outcome: Progressing Towards Goal  Note: Pressure Injury Interventions:  Sensory Interventions: Discuss PT/OT consult with provider, Float heels    Moisture Interventions: Check for incontinence Q2 hours and as needed    Activity Interventions: Increase time out of bed, Pressure redistribution bed/mattress(bed type), PT/OT evaluation    Mobility Interventions: Float heels, HOB 30 degrees or less, Pressure redistribution bed/mattress (bed type), PT/OT evaluation    Nutrition Interventions: Document food/fluid/supplement intake    Friction and Shear Interventions: Feet elevated on foot rest, HOB 30 degrees or less, Lift sheet                Problem: Falls - Risk of  Goal: *Absence of Falls  Description  Document Mika Fall Risk and appropriate interventions in the flowsheet.   Outcome: Progressing Towards Goal  Note: Fall Risk Interventions:  Mobility Interventions: Bed/chair exit alarm, Communicate number of staff needed for ambulation/transfer, Patient to call before getting OOB    Mentation Interventions: Bed/chair exit alarm, Door open when patient unattended, Adequate sleep, hydration, pain control    Medication Interventions: Evaluate medications/consider consulting pharmacy, Patient to call before getting OOB, Bed/chair exit alarm    Elimination Interventions: Call light in reach, Bed/chair exit alarm, Stay With Me (per policy), Toilet paper/wipes in reach    History of Falls Interventions: Evaluate medications/consider consulting pharmacy, Door open when patient unattended, Bed/chair exit alarm

## 2020-02-10 NOTE — CONSULTS
Palliative Medicine Consult  Kevin: [phone] ([phone])    Patient Name: Matt Rosales  YOB: 1940    Date of Initial Consult: February 10, 2020  Reason for Consult: Care Decisions  Requesting Provider: Dr. Gerhardt Gillis  Primary Care Physician: Blu Aleman MD     SUMMARY:   Matt Rosales is a [de-identified] y.o. with past history of dementia, anemia, asthma, basal cell carcinoma s/p removal, hiatal hernia repair, stroke,   Cholesterolemia, watermelon stomach, recent fall, who was admitted on 2/7/2020 from home with a diagnosis of   Generalized weakness, ? UTI, rt hydronephrosis, cholelithiasis, hyponatremia. Current medical issues leading to Palliative Medicine involvement include: support with care decisions given medical condition and risk of decline. 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 Poor Appetite  2. DNR discussion  3. Fall risk  4. Dementia  5. Physical debility      PLAN:   1. Discussion: pt unable to participate in discussion due to somnolence. Lengthy discussion held with spouse about care at home, concerns, dementia progression, symptoms, code status, options for care including hospice  2. Decision: spouse would like to continue current care. He would like to return to the hospital for acute needs. He is not interested in hospice care. He is unsure of rehab for the pt but open to discussing. I explained that the rehab goal would be aimed at trying to prevent further debility rather than regaining function. 3. Code status: changed to DNR  4. Disposition: home with spouse. Hired care 4 hours per day. Spouse able to hire additional care if needed. He provides the care when the aid is off.   Spouse realizes that the pt may require facility placement in the near future if not now. He feels the pt would be very unhappy so he is having a difficult time with that decision. 5. Plan: dc home when ready. Continue current level of care with DNR. 6. Please call us for any additional needs   7. Initial consult note routed to primary continuity provider and/or primary health care team members  8. Communicated plan of care with: Palliative IDT, Preeti Maciel Team     GOALS OF CARE / TREATMENT PREFERENCES:     GOALS OF CARE:  Patient/Health Care Proxy Stated Goals: Prolong life    TREATMENT PREFERENCES:   Code Status: DNR    Advance Care Planning:  [x] [facility] Interdisciplinary Team has updated the ACP Navigator with Health Care Decision Maker and Patient Capacity       Primary Decision Maker: Tong Marina Spouse - [phone]    Advance Care Planning 2/7/2020   Confirm Advance Directive Yes, not on file       Medical Interventions: Limited additional interventions     Other Instructions:   Artificially Administered Nutrition: No feeding tube     Other:    As far as possible, the palliative care team has discussed with patient / health care proxy about goals of care / treatment preferences for patient.      HISTORY:     History obtained from: chart, spouse, team    CHIEF COMPLAINT: pt admitted with aforementioned history and issues    HPI/SUBJECTIVE:    The patient is:   [] Verbal and participatory  [x] Non-participatory due to:   Medical condition     Clinical Pain Assessment (nonverbal scale for severity on nonverbal patients):   Clinical Pain Assessment  Severity: 0          Duration: for how long has pt been experiencing pain (e.g., 2 days, 1 month, years)  Frequency: how often pain is an issue (e.g., several times per day, once every few days, constant)     FUNCTIONAL ASSESSMENT:     Palliative Performance Scale (PPS):  PPS: 40       PSYCHOSOCIAL/SPIRITUAL SCREENING:     Palliative IDT has assessed this patient for cultural preferences / practices and a referral made as appropriate to needs (Cultural Services, Patient Advocacy, Ethics, etc.)    Any spiritual / Mandaen concerns:  [] Yes /  [x] No    Caregiver Burnout:  [] Yes /  [x] No /  [] No Caregiver Present      Anticipatory grief assessment:   [x] Normal  / [] Maladaptive       ESAS Anxiety: Anxiety: 0    ESAS Depression:          REVIEW OF SYSTEMS:     Positive and pertinent negative findings in ROS are noted above in HPI. The following systems were [x] reviewed objectively  / [] unable to be reviewed as noted in HPI  Other findings are noted below. Systems: constitutional, ears/nose/mouth/throat, respiratory, gastrointestinal, genitourinary, musculoskeletal, integumentary, neurologic, psychiatric, endocrine. Positive findings noted below. Modified ESAS Completed by: provider   Fatigue: 10 Drowsiness: 7     Pain: 0   Anxiety: 0 Nausea: 0   Anorexia: 3 Dyspnea: 0                    PHYSICAL EXAM:     From RN flowsheet:  Wt Readings from Last 3 Encounters:   02/11/20 146 lb 9.7 oz (66.5 kg)   01/10/20 147 lb 4.3 oz (66.8 kg)   10/24/19 144 lb (65.3 kg)     Blood pressure 108/71, pulse 69, temperature 98.6 °F (37 °C), resp. rate 16, height 5' 4\" (1.626 m), weight 146 lb 9.7 oz (66.5 kg), SpO2 94 %.     Pain Scale 1: Numeric (0 - 10)  Pain Intensity 1: 3  Pain Onset 1: Several Days  Pain Location 1: Neck  Pain Orientation 1: Left  Pain Description 1: Aching  Pain Intervention(s) 1: Medication (see MAR)  Last bowel movement, if known:     Constitutional: somnolent  Eyes:   ENMT: no nasal discharge, moist mucous membranes  Cardiovascular: regular rhythm, distal pulses intact  Respiratory: breathing not labored, symmetric  Gastrointestinal: soft non-tender, +bowel sounds  Musculoskeletal: no deformity, no tenderness to palpation  Skin: warm, dry  Neurologic: dementia   Psychiatric:   Other:       HISTORY:     Active Problems:    Generalized weakness (2/7/2020)      Past Medical History:   Diagnosis Date    Anemia NEC     Asthma     major asthma as a child/only with colds now    Cancer ([facility]     BCCA removed    Coagulation disorder ([facility]     on xarelto    Dementia ([facility]     GERD (gastroesophageal reflux disease) 10/22/2010    Hiatal hernia     Hypertension     hx of blood pressure med for short time    Ill-defined condition     increased cholesterol    Other unknown and unspecified cause of morbidity or mortality     eval with Dr Eli Garsia for SOB, sept 2015:  results unkown    Stroke ([facility] 2013    no deficits    Stroke ([facility]     heat exhaustion    Watermelon stomach 2003      Past Surgical History:   Procedure Laterality Date    HX BLADDER SUSPENSION      HX COLONOSCOPY      HX ENDOSCOPY  6/4/15    Dr. Martha Corado HX GYN      vaginal del times 3    HX HERNIA REPAIR  2014    x2, failure    HX OTHER SURGICAL      EGDs    HX TONSILLECTOMY      OR EXC SKIN MALIG >4CM FACE,FACIAL      times 3 - BCCA      Family History   Problem Relation Age of Onset    COPD Mother     Asthma Mother     Heart Attack Father     Other Father         peptic ulcer disease    Anesth Problems Neg Hx       History reviewed, no pertinent family history.   Social History     Tobacco Use    Smoking status: Never Smoker    Smokeless tobacco: Never Used   Substance Use Topics    Alcohol use: No     Allergies   Allergen Reactions    Latex Hives     Latex tape    Codeine Nausea and Vomiting    Darvon [Propoxyphene] Nausea and Vomiting    Other Medication Unknown (comments)     Flu shot (per )    Pcn [Penicillins] Hives      Current Facility-Administered Medications   Medication Dose Route Frequency    QUEtiapine (SEROquel) tablet 12.5 mg  12.5 mg Oral Q8H PRN    sodium chloride (NS) flush 5-40 mL  5-40 mL IntraVENous Q8H    sodium chloride (NS) flush 5-40 mL  5-40 mL IntraVENous PRN    acetaminophen (TYLENOL) tablet 650 mg  650 mg Oral Q4H PRN    ondansetron (ZOFRAN) injection 4 mg  4 mg IntraVENous Q4H PRN    ALPRAZolam (XANAX) tablet 0.5 mg  0.5 mg Oral TID PRN    aspirin delayed-release tablet 81 mg  81 mg Oral DAILY    famotidine (PEPCID) tablet 20 mg  20 mg Oral Q24H    ferrous sulfate tablet 325 mg  325 mg Oral ACB    midodrine (PROAMITINE) tablet 10 mg  10 mg Oral BID WITH MEALS    pravastatin (PRAVACHOL) tablet 20 mg  20 mg Oral QHS          LAB AND IMAGING FINDINGS:     Lab Results   Component Value Date/Time    WBC 6.8 02/09/2020 02:35 AM    HGB 12.6 02/09/2020 02:35 AM    PLATELET 885 13/14/4902 02:35 AM     Lab Results   Component Value Date/Time    Sodium 138 02/09/2020 04:45 AM    Potassium 3.9 02/09/2020 04:45 AM    Chloride 107 02/09/2020 04:45 AM    CO2 24 02/09/2020 04:45 AM    BUN 11 02/09/2020 04:45 AM    Creatinine 0.93 02/09/2020 04:45 AM    Calcium 9.2 02/09/2020 04:45 AM    Magnesium 1.8 02/08/2020 03:09 AM    Phosphorus 2.8 02/08/2020 03:09 AM      Lab Results   Component Value Date/Time    AST (SGOT) 14 (L) 02/08/2020 03:09 AM    Alk.  phosphatase 56 02/08/2020 03:09 AM    Protein, total 6.0 (L) 02/08/2020 03:09 AM    Albumin 2.6 (L) 02/08/2020 03:09 AM    Globulin 3.4 02/08/2020 03:09 AM     Lab Results   Component Value Date/Time    INR 1.1 07/14/2014 05:08 PM    Prothrombin time 11.2 07/14/2014 05:08 PM    aPTT 28.9 12/21/2013 11:50 AM      Lab Results   Component Value Date/Time    Iron 63 02/15/2016 02:38 PM    TIBC 321 02/15/2016 02:38 PM    Iron % saturation 20 02/15/2016 02:38 PM    Ferritin 34 11/18/2015 03:18 PM      No results found for: PH, PCO2, PO2  No components found for: [place]   Lab Results   Component Value Date/Time    CK 38 08/13/2015 02:50 PM    CK - MB <0.5 (L) 08/13/2015 02:50 PM                Total time: 70 min  Counseling / coordination time, spent as noted above: 60 min  > 50% counseling / coordination?: y    Prolonged service was provided for  []30 min   []75 min in face to face time in the presence of the patient, spent as noted above. Time Start:   Time End:   Note: this can only be billed with 57019 (initial) or 65495 (follow up). If multiple start / stop times, list each separately.

## 2020-02-10 NOTE — PROGRESS NOTES
Transition of Care Plan     Disposition TBD pending therapy recommendations, prefers skilled rehab if recommended  (prefers 1. [facility] and 2. [facility])    Transport:  Maximus Iglesias [phone]   [address]     Follow up with PCP/Specialist     Reason for Admission:   RUQ abdominal pain for several days                    RUR Score: 15 % low risks                     Plan for utilizing home health:  Patient is open to [facility] for daily therapy at home                         Current Advanced Directive/Advance Care Plan:  Patient is full code, not interested at this time                          Transition of Care Plan: To be determined by therapy, subject to change pending care provider's recommendation. CM met patient and  at bedside, patient was working with OT, spoke with . CM introduced role, verified demographics, and offered assistance.  would like wife go to a skilled rehab facility if possible. Patient needs lives with her  in a tri-level house with 4 steps to enter on garage side. Patient needs assistance with ADLs but self feeds. Patient uses a walker to ambulate. Patient receives therapy services from [facility] from 12 noon to 4 pm 5 days/week.  assists as needed. Patient uses [facility] at [facility].  to transport at discharge. The Plan for Transition of Care is related to the following treatment goals: SNF (if recommended)     The Patient and/or patient representative Maximus Iglesias () was provided with a choice of provider and agrees   with the discharge plan. [x] Yes [] No    Freedom of choice list was provided with basic dialogue that supports the patient's individualized plan of care/goals, treatment preferences and shares the quality data associated with the providers.  [x] Yes [] No     A Freedom of Choice was provided, signed by the  Hermes Banda, placed on the bedside chart. CM will send referrals upon therapy recommendation.      Care Management Interventions  PCP Verified by CM: Yes(PCP seen few months ago )  Mode of Transport at Discharge: ALS  Transition of Care Consult (CM Consult): Discharge Planning  MyChart Signup: No  Discharge Durable Medical Equipment: No  Health Maintenance Reviewed: Yes  Physical Therapy Consult: Yes  Occupational Therapy Consult: Yes  Speech Therapy Consult: No  Current Support Network: Own Home, Lives with Spouse  Confirm Follow Up Transport: Family( )  The Plan for Transition of Care is Related to the Following Treatment Goals : (discussed with  Destiny Chou [phone])  The Patient and/or Patient Representative was Provided with a Choice of Provider and Agrees with the Discharge Plan?: Yes  Name of the Patient Representative Who was Provided with a Choice of Provider and Agrees with the Discharge Plan: (discussed with  Destiny Chou [phone])  Freedom of Choice List was Provided with Basic Dialogue that Supports the Patient's Individualized Plan of Care/Goals, Treatment Preferences and Shares the Quality Data Associated with the Providers?: Yes  [place] Resource Information Provided?: No  Discharge Location  Discharge Placement: Other:(subject to change: pending therapy and provider's recommendation )    Monik Kern  Clinical     [phone]

## 2020-02-10 NOTE — PROGRESS NOTES
Patient states that she does not feel like she has to go, put on the bed pan but did not void. no complaint of pain or discomfort. Bladder scanned for 392. Will continue to monitor.

## 2020-02-10 NOTE — PROGRESS NOTES
Bedside and Verbal shift change report given to Susan (oncoming nurse) by Ted Cullen (offgoing nurse). Report included the following information SBAR, Kardex and MAR.

## 2020-02-10 NOTE — PROGRESS NOTES
Orders received, chart reviewed and patient evaluated by physical therapy. Pending progression with skilled acute physical therapy, recommend:  No skilled physical therapy/ follow up rehabilitation needs identified at this time. - Per  present during evaluation has caregiver x 5days/week to help with wife's needs and provides basic exercise.  declined home health PT services at this time. Recommend with nursing patient to complete as able in order to maintain strength, endurance and independence: OOB to chair 3x/day with min assist x 1  and ambulating with RW and gait belt. Thank you for your assistance. Full evaluation to follow.

## 2020-02-10 NOTE — CONSULTS
PALLIATIVE MEDICINE      Consult noted and appreciated. Will see tomorrow 2/11/20. Thank you. Alessandro Lebron.  [address] MSN, FNP-BC, [facility]

## 2020-02-10 NOTE — PROGRESS NOTES
Bedside shift change report given to Whit Horvath RN (oncoming nurse) by Dilma Menjivar RN (offgoing nurse). Report included the following information SBAR, Kardex, Intake/Output and MAR.

## 2020-02-10 NOTE — PROGRESS NOTES
Problem: Mobility Impaired (Adult and Pediatric)  Goal: *Acute Goals and Plan of Care (Insert Text)  Description  FUNCTIONAL STATUS PRIOR TO ADMISSION: At baseline patient uses a RW w/ stand by assist to ambulate household distances. Has 4 steps to enter home not sure of level of assist previously required. HOME SUPPORT PRIOR TO ADMISSION: The patient lived with  and had visiting angels visit x5 days/week to help assist w/ ADLs and perform basic exercise program.     Physical Therapy Goals  Initiated 2/10/2020      1. Patient will perform sit to stand with contact guard assist within 7 day(s). 2.  Patient will ambulate with contact guard assist for 150 feet with the least restrictive device within 7 day(s). 3.  Patient will ascend/descend 4 stairs with bilateral handrail(s) with minimal assistance/contact guard assist within 7 day(s). 4. Patient will improve Tinetti outcome measure by 4 points in order to decrease fall risk and caregiver burden within 7 days. Outcome: Progressing Towards Goal    PHYSICAL THERAPY EVALUATION  Patient: Miroslava Brito ([de-identified] y.o. female)  Date: 2/10/2020  Primary Diagnosis: Generalized weakness [R53.1]        Precautions:   Fall, WBAT      ASSESSMENT  Based on the objective data described below, the patient presents with decreased activity tolerance, decreased functional mobility, and confusion. Pt is A&O x 1  present in room during evaluation. Pt requires constant verbal cueing and extra time during bed mobility and ambulation today but is min A overall. Pt close to functional baseline but will continue to benefit from skilled care while in acute care to focus on standing activity tolerance, transfers, and gait training with RW in order to prevent functional decline while in hospital.      Current Level of Function Impacting Discharge (mobility/balance): min a x 1 for transfers     Functional Outcome Measure:   The patient scored 13/28 on the Tinetti outcome measure which is indicative of high fall risk. Other factors to consider for discharge: has good family/caregiver support      Patient will benefit from skilled therapy intervention to address the above noted impairments. PLAN :  Recommendations and Planned Interventions: bed mobility training, transfer training, gait training, therapeutic exercises, patient and family training/education, and therapeutic activities      Frequency/Duration: Patient will be followed by physical therapy:  5 times a week to address goals. Recommendation for discharge: (in order for the patient to meet his/her long term goals)  No skilled physical therapy/ follow up rehabilitation needs identified at this time. -  declined home health PT due to having caregiver in home 5 days a week already     This discharge recommendation:  Has been made in collaboration with the attending provider and/or case management    IF patient discharges home will need the following DME: patient owns DME required for discharge         SUBJECTIVE:   Patient stated I can still take care of her for now. Navi Lemon -  about wife, Pt speaks very little other than repeating instructions or saying 'ok'.      OBJECTIVE DATA SUMMARY:   HISTORY:    Past Medical History:   Diagnosis Date    Anemia NEC     Asthma     major asthma as a child/only with colds now    Cancer [facility])     BCCA removed    Coagulation disorder ([facility] [address]     on xarelto    Dementia ([facility] [address]     GERD (gastroesophageal reflux disease) 10/22/2010    Hiatal hernia     Hypertension     hx of blood pressure med for short time    Ill-defined condition     increased cholesterol    Other unknown and unspecified cause of morbidity or mortality     eval with Dr Howard Kumari for SOB, sept 2015:  results unkown    Stroke ([facility] [address] 2013    no deficits    Stroke ([facility] [address]     heat exhaustion    Watermelon stomach 2003     Past Surgical History:   Procedure Laterality Date    HX BLADDER SUSPENSION      HX COLONOSCOPY HX ENDOSCOPY  6/4/15    Dr. Zo Quiñonez GYN      vaginal del times 3    HX HERNIA REPAIR  2014    x2, failure    HX OTHER SURGICAL      EGDs    HX TONSILLECTOMY      OR EXC SKIN MALIG >4CM FACE,FACIAL      times 3 - BCCA           Home Situation  Home Environment: Private residence  # Steps to Enter: 4  Rails to Enter: Yes  Hand Rails : Bilateral(wideset)  Wheelchair Ramp: No  One/Two Story Residence: Two story, live on 1st floor  Living Alone: No  Support Systems: Family member(s)  Patient Expects to be Discharged to[de-identified] Private residence  Current DME Used/Available at Home: Ki Josephine, rolling, Wheelchair, [address] [employer] chair, Walker, rollator  Tub or Shower Type: Shower    EXAMINATION/PRESENTATION/DECISION MAKING:   Critical Behavior:  Neurologic State: Alert, Confused  Orientation Level: Oriented to person  Cognition: Follows commands  Safety/Judgement: Decreased insight into deficits, Decreased awareness of need for safety, Decreased awareness of need for assistance, Decreased awareness of environment  Hearing: Auditory  Auditory Impairment: None    Range Of Motion:  AROM: Within functional limits           PROM: Within functional limits           Strength:    Strength: Generally decreased, functional                    Tone & Sensation:   Tone: Normal              Sensation: Intact               Coordination:  Coordination: Within functional limits  Vision:      Functional Mobility:  Bed Mobility:  Rolling: Bed Modified;Stand-by assistance; Additional time  Supine to Sit: Stand-by assistance; Additional time;Bed Modified        Transfers:  Sit to Stand: Minimum assistance;Assist x1- requires additional time  Stand to Sit: Minimum assistance;Assist x1- will sit early requires cueing to correct         Bed to Chair: Minimum assistance;Assist x1              Balance:   Sitting: Intact; Without support  Standing: Impaired; With support  Standing - Static: Fair;Constant support  Standing - Dynamic : Fair;Constant support  Ambulation/Gait Training:  Distance (ft): 25 Feet (ft)  Assistive Device: Gait belt;Walker, rolling  Ambulation - Level of Assistance: Contact guard assistance;Assist x1     Gait Description (WDL): Exceptions to WDL  Gait Abnormalities: Decreased step clearance;Shuffling gait(excessive trunk flexion )        Base of Support: Widened;Center of gravity altered     Speed/Alexis: Shuffled; Slow  Step Length: Left shortened;Right shortened  Swing Pattern: Right asymmetrical - step to gait pattern initially that resolves with cueing for increased alexis                   Stairs: Did not assess pt has 4 steps in home              Therapeutic Exercises:   Seated LAQ  Seated Marching  Supine ankle pumps     Functional Measure:  Tinetti test:    Sitting Balance: 1  Arises: 1  Attempts to Rise: 2  Immediate Standing Balance: 1  Standing Balance: 1  Nudged: 0  Eyes Closed: 1  Turn 360 Degrees - Continuous/Discontinuous: 0  Turn 360 Degrees - Steady/Unsteady: 0  Sitting Down: 1  Balance Score: 8 Balance total score  Indication of Gait: 1  R Step Length/Height: 0  L Step Length/Height: 0  R Foot Clearance: 1  L Foot Clearance: 1  Step Symmetry: 0  Step Continuity: 0  Path: 1  Trunk: 0  Walking Time: 1  Gait Score: 5 Gait total score  Total Score: 13/28 Overall total score         Tinetti Tool Score Risk of Falls  <19 = High Fall Risk  19-24 = Moderate Fall Risk  25-28 = Low Fall Risk  Tinetti ME. Performance-Oriented Assessment of Mobility Problems in Elderly Patients. [place] 66; [id number].  (Scoring Description: PT Bulletin Feb. 10, 1993)    Older adults: Jeremiah Schwab et al, 2009; n = 1000 [facility] elderly evaluated with ABC, VIKAS, ADL, and IADL)  · Mean VIKAS score for males aged 69-68 years = 26.21(3.40)  · Mean VIKAS score for females age 69-68 years = 25.16(4.30)  · Mean VIKAS score for males over 80 years = 23.29(6.02)  · Mean VIKAS score for females over 80 years = 17.20(8.32)           Physical Therapy Evaluation Charge Determination   History Examination Presentation Decision-Making   MEDIUM  Complexity : 1-2 comorbidities / personal factors will impact the outcome/ POC  LOW Complexity : 1-2 Standardized tests and measures addressing body structure, function, activity limitation and / or participation in recreation  LOW Complexity : Stable, uncomplicated  LOW Complexity : FOTO score of       Based on the above components, the patient evaluation is determined to be of the following complexity level: LOW     Pain Rating:  Pt did not report during session    Activity Tolerance:   Fair and tolerates short distance ambulation without rest breaks  Please refer to the flowsheet for vital signs taken during this treatment. After treatment patient left in no apparent distress:   Sitting in chair, Call bell within reach, Bed / chair alarm activated, and Caregiver / family present    COMMUNICATION/EDUCATION:   The patients plan of care was discussed with: Registered Nurse. Fall prevention education was provided and the patient/caregiver indicated understanding., Patient/family have participated as able in goal setting and plan of care. , and Patient/family agree to work toward stated goals and plan of care. Regarding student involvement in patient care:  A student participated in this treatment session. Per CMS Medicare statements and APTA guidelines I certify that the following was true:  1. I was present and directly observed the entire session. 2. I made all skilled judgments and clinical decisions regarding care. 3. I am the practitioner responsible for assessment, treatment, and documentation.       Thank you for this referral.  Jairo Virk

## 2020-02-10 NOTE — PROGRESS NOTES
Hospitalist Progress Note  Ina Soler MD  Answering service: [phone] from in house phone        Date of Service:  2/10/2020  NAME:  Taylor Urrutia  :  1940  MRN:  [id number]  PCP: Surendra Ceja MD    Chief Complaint:   Chief Complaint   Patient presents with    Abdominal Pain         Admission Summary:     Taylor Urrutia is a [de-identified] y.o. female who presented with abdominal pain    Interval history / Subjective:   Patient seen for Follow up of CC: abdominal pain  Patient seen and examined by the bedside, Labs, images and notes reviewed  Comfortable, afebrile, no NVD  dw ns     Assessment & Plan:     - Generalized weakness, recurrent falls and inability to care for self:  Likely multifactorial  Patient appears to have dementia although not documented. Doubt acute encephalopathy. CT head negative for acute pathology. Patient has deconditioning, cont PT/OT/CM consult for DC planning  will need placement  Palliative care eval pending  Supportive care  Fall precautions    - Abnormal UA, no UTI noted  Cx neg  Stop Ceftriazone    - Right hydronephrosis in setting of urinary retention: mild, no stone noticed  Bladder scans and straight cath as needed, Agree with Urology, avoid [place]. - Cholelithiasis: this may be the cause of her intermittent abd pain. Gen Surg consult noted, no acute surgical intervention,  does not want patient to have any surgery done. No further surgical work-up. - Hyponatremia: mild  Resolved.   Lab holiday in the morning.    - Moderate protein calorie malnourishment, POA  Nutrition consult      Code status: Full Code    DVT prophylaxis: SCDs    Care Plan discussed with: Patient/Family and Nurse    Disposition: TBD    Hospital Problems  Date Reviewed: 2019          Codes Class Noted POA    Generalized weakness ICD-10-CM: R53.1  ICD-9-CM: 780.79  2020 Unknown                Review of Systems:   Review of systems not obtained due to patient factors. confusion      Physical Examination:     General appearance: alert, cooperative, no distress, appears stated age, confused, oriented to self only. Not oriented to time, place or person  Throat: oral mucosa dry  Lungs: clear to auscultation bilaterally, no wheezing, rales  Heart: RRR, S1S2  Abdomen: soft, non-tender. Bowel sounds normal. No masses,  no organomegaly  Extremities: extremities normal, atraumatic, no cyanosis or edema  Pulses: 2+ and symmetric  Neurologic: Grossly normal         Vital Signs:    Last 24hrs VS reviewed since prior progress note. Most recent are:    Visit Vitals  /68 (BP 1 Location: Right arm, BP Patient Position: At rest)   Pulse 61   Temp 96.4 °F (35.8 °C)   Resp 16   Ht 5' 4\" (1.626 m)   Wt 66.6 kg (146 lb 14.4 oz)   SpO2 95%   BMI 25.22 kg/m²         Intake/Output Summary (Last 24 hours) at 2/10/2020 0848  Last data filed at 2/10/2020 0531  Gross per 24 hour   Intake 680 ml   Output 520 ml   Net 160 ml        Tmax:  Temp (24hrs), Av.7 °F (36.5 °C), Min:96.4 °F (35.8 °C), Max:98.5 °F (36.9 °C)      Data Review:   Data reviewed by myself:  Xr Shoulder Lt Ap/lat Min 2 V    Result Date: 1/10/2020  EXAM: XR SHOULDER LT AP/LAT MIN 2 V INDICATION: shoulder pain. Left shoulder pain. COMPARISON: None. FINDINGS: Three views of the left shoulder demonstrate no fracture, dislocation or other acute abnormality. There is osteopenia. IMPRESSION: No acute abnormality. Xr Humerus Lt    Result Date: 1/10/2020  EXAM: XR HUMERUS LT INDICATION: pain after fall. COMPARISON: None. FINDINGS: Two views of the left humerus demonstrate no fracture or other acute osseous, articular or soft tissue abnormality. IMPRESSION: No acute abnormality. Ct Abd Pelv Wo Cont    Result Date: 2020  INDICATION: intermittent abd pain/ x 2 years/ worsening/ increased today; EXAM: CT Abdomen and Pelvis without IV contrast. Oral contrast was administered.  CT dose reduction was achieved through use of a standardized protocol tailored for this examination and automatic exposure control for dose modulation. FINDINGS: There is a hiatal hernia. There is cholelithiasis. There are colonic diverticula. No urinary tract stones are seen. Mild right hydronephrosis is progressed since 9347, etiology uncertain. There is no left hydroureteronephrosis. The kidneys are normal in size. Bladder is moderately distended. There is no perirenal fluid or ascites. Liver shows no apparent significant finding without contrast. Pancreas, adrenal glands, and spleen show no significant enlargement. Aorta is calcified without aneurysm. No inflammation is seen. There is no pneumoperitoneum or significant adenopathy. There is no apparent pelvic mass. The appendix is not seen. Bowels are not dilated. There are chronic compression fractures of T9 and L5. IMPRESSION: 1. No acute inflammation. 2. Hiatal hernia. 3. Cholelithiasis. 4. Diverticulosis. 5. Right hydronephrosis. Xr Knee Lt 3 V    Result Date: 1/10/2020  EXAM: XR KNEE LT 3 V INDICATION: knee pain after fall. COMPARISON: None. FINDINGS: Three views of the left knee demonstrate no fracture or other acute osseous or articular abnormality. There is no effusion. There are degenerative changes of the knee with pointing of the tibial spines and mild patellofemoral osteophyte formation. There are vascular calcifications. IMPRESSION: No acute abnormality.     Lab Results   Component Value Date/Time    Specimen Description: URINE 06/01/2013 09:30 PM     Lab Results   Component Value Date/Time    Culture result: NO SIGNIFICANT GROWTH 02/07/2020 12:48 PM    Culture result: NO GROWTH 2 DAYS 08/13/2015 05:08 PM    Culture result: NO SIGNIFICANT GROWTH 06/01/2013 09:30 PM     All Micro Results     Procedure Component Value Units Date/Time    CULTURE, URINE [[id number]] Collected:  02/07/20 1248    Order Status:  Completed Specimen:  Urine from Clean catch Updated:  02/08/20 1601     Special Requests: NO SPECIAL REQUESTS        Reliance Count --        <10,000  COLONIES/mL       Culture result: NO SIGNIFICANT GROWTH       URINE CULTURE HOLD SAMPLE [[id number]]     Order Status:  Canceled Specimen:  Urine           Labs: reviewed by myself. Recent Labs     02/09/20  0235 02/08/20  0309   WBC 6.8 4.8   HGB 12.6 12.0   HCT 37.3 38.2    185     Recent Labs     02/09/20  0445 02/08/20  0309 02/07/20  1151    132* 134*   K 3.9 3.8 4.6    104 98   CO2 24 22 26   BUN 11 10 13   CREA 0.93 0.81 1.06*   * 84 108*   CA 9.2 8.5 9.8   MG  --  1.8  --    PHOS  --  2.8  --      Recent Labs     02/08/20  0309 02/07/20  1151   SGOT 14* 41*   ALT 13 17   AP 56 75   TBILI 0.7 0.6   TP 6.0* 7.8   ALB 2.6* 3.5   GLOB 3.4 4.3*   LPSE  --  70*     No results for input(s): INR, PTP, APTT, INREXT, INREXT in the last 72 hours. No results for input(s): FE, TIBC, PSAT, FERR in the last 72 hours. Lab Results   Component Value Date/Time    Folate 17.3 10/08/2015 11:30 AM      No results for input(s): PH, PCO2, PO2 in the last 72 hours.   Recent Labs     02/07/20  1151   TROIQ <0.05     Lab Results   Component Value Date/Time    Cholesterol, total 145 04/04/2017 09:46 AM    HDL Cholesterol 61 04/04/2017 09:46 AM    LDL, calculated 67 04/04/2017 09:46 AM    Triglyceride 87 04/04/2017 09:46 AM    CHOL/HDL Ratio 3.4 06/02/2013 06:30 AM     Lab Results   Component Value Date/Time    Glucose (POC) 115 (H) 09/28/2018 08:46 PM     Lab Results   Component Value Date/Time    Color YELLOW/STRAW 02/07/2020 12:48 PM    Appearance CLEAR 02/07/2020 12:48 PM    Specific gravity 1.005 02/07/2020 12:48 PM    pH (UA) 6.5 02/07/2020 12:48 PM    Protein NEGATIVE  02/07/2020 12:48 PM    Glucose NEGATIVE  02/07/2020 12:48 PM    Ketone NEGATIVE  02/07/2020 12:48 PM    Bilirubin NEGATIVE  02/07/2020 12:48 PM    Urobilinogen 0.2 02/07/2020 12:48 PM    Nitrites NEGATIVE  02/07/2020 12:48 PM    Leukocyte Esterase SMALL (A) 02/07/2020 12:48 PM    Epithelial cells MODERATE (A) 02/07/2020 12:48 PM    Bacteria 3+ (A) 02/07/2020 12:48 PM    WBC 10-20 02/07/2020 12:48 PM    RBC 10-20 02/07/2020 12:48 PM         Medications Reviewed:     Current Facility-Administered Medications   Medication Dose Route Frequency    QUEtiapine (SEROquel) tablet 12.5 mg  12.5 mg Oral Q8H PRN    sodium chloride (NS) flush 5-40 mL  5-40 mL IntraVENous Q8H    sodium chloride (NS) flush 5-40 mL  5-40 mL IntraVENous PRN    acetaminophen (TYLENOL) tablet 650 mg  650 mg Oral Q4H PRN    ondansetron (ZOFRAN) injection 4 mg  4 mg IntraVENous Q4H PRN    ALPRAZolam (XANAX) tablet 0.5 mg  0.5 mg Oral TID PRN    aspirin delayed-release tablet 81 mg  81 mg Oral DAILY    famotidine (PEPCID) tablet 20 mg  20 mg Oral Q24H    ferrous sulfate tablet 325 mg  325 mg Oral ACB    midodrine (PROAMITINE) tablet 10 mg  10 mg Oral BID WITH MEALS    pravastatin (PRAVACHOL) tablet 20 mg  20 mg Oral QHS    cefTRIAXone (ROCEPHIN) 1 g in 0.9% sodium chloride (MBP/ADV) 50 mL  1 g IntraVENous Q24H     ______________________________________________________________________  EXPECTED LENGTH OF STAY: - - -  ACTUAL LENGTH OF STAY:          3                 Mikal Galvan MD     Patient's emergency contacts:  Extended Emergency Contact Information  Primary Emergency Contact: Munir Ann  Address: [address]  [address] Phone: [phone]  Mobile Phone: [phone]  Relation: Spouse  Secondary Emergency Contact: [address] Phone: [phone]  Mobile Phone: [phone]  Relation: Daughter

## 2020-02-10 NOTE — PROGRESS NOTES
Problem: Self Care Deficits Care Plan (Adult)  Goal: *Acute Goals and Plan of Care (Insert Text)  Description  Occupational Therapy Goals  Initiated: 2/9/2020   1. Patient will perform grooming with supervision/set-up sitting in chair within 7 day(s). 2.  Patient will perform bathing with min A from chair within 7 day(s). 3.  Patient will perform upper body dressing and lower body dressing with min A within 7 day(s). 4.  Patient will perform toilet transfers with CGA within 7 day(s). 5.  Patient will perform all aspects of toileting with CGA within 7 day(s). FUNCTIONAL STATUS PRIOR TO ADMISSION: Pt lives with her  of 61 years. They lives in a 2 story home and remain on the first floor. Pt very confused and unable to provide any information correctly regarding PLOF or home situation.  assisted with all IADL activities and moderate assistance for basic ADL as needed. Some days were better than others per his report. Mentation has waxed and waned for a while however her confusion is greater now that every before. HOME SUPPORT: Lives with . Uses RW for mobility. Outcome: Progressing Towards Goal       OCCUPATIONAL THERAPY TREATMENT  Patient: Katia Fisher ([de-identified] y.o. female)  Date: 2/10/2020  Diagnosis: Generalized weakness [R53.1]   <principal problem not specified>       Precautions: Fall, WBAT  Chart, occupational therapy assessment, plan of care, and goals were reviewed. ASSESSMENT  Patient continues with skilled OT services and is progressing towards goals. Participated and tolerated BUE exercise. Needed AAROM but able to get reasonable range in shoulder flexion and external rotation on LUE. Also not hesitant or resisting movement. . Multiple reps provided with rest breaks built into session. 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     Current Level of Function Impacting Discharge (ADLs): up to Max A    Other factors to consider for discharge: none         PLAN :  Patient continues to benefit from skilled intervention to address the above impairments. Continue treatment per established plan of care. to address goals. Recommend with staff: assist to chair or BSC    Recommend next OT session: continue POC    Recommendation for discharge: (in order for the patient to meet his/her long term goals)  Therapy up to 5 days/week in SNF setting    This discharge recommendation:  A follow-up discussion with the attending provider and/or case management is planned    IF patient discharges home will need the following DME: none       SUBJECTIVE:   Patient stated it doesn't hurt much anymore.     OBJECTIVE DATA SUMMARY:   Cognitive/Behavioral Status:  Neurologic State: Alert;Confused  Orientation Level: Oriented to person  Cognition: Follows commands             Functional Mobility and Transfers for ADLs:  Bed Mobility:  Rolling: Bed Modified;Stand-by assistance; Additional time  Supine to Sit: Stand-by assistance; Additional time;Bed Modified    Transfers:  Sit to Stand: Minimum assistance;Assist x1     Bed to Chair: Minimum assistance;Assist x1    Balance:  Sitting: Intact; Without support  Standing: Impaired; With support  Standing - Static: Fair;Constant support  Standing - Dynamic : Fair;Constant support      Therapeutic Exercises:   AAROM R & L. Able to actively move both put preferred tactile feedback and assistance. Performed hand wrist, elbow multiple reps and Shoulder flexion and E multiple reps. No complaints of pain and tolerated about 120 degrees shoulder flexion. Pain:  None reported    Activity Tolerance:   Good  Please refer to the flowsheet for vital signs taken during this treatment.     After treatment patient left in no apparent distress:   Supine in bed    COMMUNICATION/COLLABORATION:   The patients plan of care was discussed with: Physical Therapist and Registered Nurse    Paulette Lange Calculation: 23 mins

## 2020-02-10 NOTE — PROGRESS NOTES
NUTRITION COMPLETE ASSESSMENT    RECOMMENDATIONS:   1. Prompting and assistance with meals to encourage PO    - document % meals consumed in flowsheet   - use supplements with medpass  2. Add PRN bowel regimen   - no BM since admit and currently on iron supplements  3. Zero bedscale and reweigh    Please document  Severe Chronic Protein Calorie Malnutrition in patient diagnoses. Patient meets criteria for as evidenced by:   ASPEN Malnutrition Criteria  Acute Illness, Chronic Illness, or Social/Enviornmental: Chronic illness  Energy Intake: Less than/equal to 75% of est energy req for greater than/equal to 1 month  Muscle Mass Loss: Severe  ASPEN Malnutrition Score - Chronic Illness: 12  Chronic Illness - Malnutrition Diagnosis: Severe malnutrition. Interventions/Plan:   Food/Nutrient Delivery:  (regular diet) Commercial supplement(Ensure Compact BID) Feeding assistance at meals      Assessment:   Reason for Assessment: Provider Consult    Diet: Cardiac   Supplements: None  Nutritionally Significant Medications: [x] Reviewed & Includes: pepcid, iron, midodrine; PRN: zofran, seroquel  Meal Intake:   Patient Vitals for the past 100 hrs:   % Diet Eaten   02/09/20 1921 90 %   02/09/20 1353 75 %   02/08/20 1800 75 %   02/08/20 1332 50 %   02/08/20 0921 50 %     Pre-Hospitalization:  Usual Appetite: Fair  Diet at Home: regular    Current Hospitalization:   Appetite: Fair  PO Ability: With assist Average po intake:25-50%  Average supplements intake:        Subjective: \"I eat eggs. \" Confirmed that pt does not have egg allergy with  via phone. He notes fair intake at home, occasionally will drink Chocolate Boost.     Objective:  Pt admitted for weakness. PMHx: dementia, GERD, hiatal hernia, HTN, stroke, watermelon stomach. AMS with  baseline dementia, abx rx. Plans for palliative meeting noted. Pt visited this morning. Reports fair appetite but drowsy and baseline dementia so not a good historian.  Spoke with  (see above). Will add Ensure Compact BID (440kcal, 18g protein). Likely would benefit from prompting and assistance with meals. With dementia would not recommend aggressive nutrition intervention even if oral intake remains inadequate. Estimated Nutrition Needs:   Kcals/day: 1300 Kcals/day(1300-1408kcal)  Protein: 70 g(70-76g (1.1-1.2g/kg))  Fluid: 1400 ml(1ml/kca)  Based On: [place] Jeor(x 1.2-1.3)  Weight Used: UBW(63.6kg)    Pt expected to meet estimated nutrient needs:  []   Yes     []  No [x] Unable to predict at this time  Nutrition Diagnosis:   1. Malnutrition related to inadequate protein/energy intake 2/2 poor appetite and dementia as evidenced by <75% meals consumed, severe temporal wasting; pt hx    Goals:     Consumption of at least 50% meals and 2 supplements/day in 5-7 days     Monitoring & Evaluation:    - Total energy intake, Liquid meal replacement, Protein intake   - Weight/weight change    Previous Nutrition Goals Met:   N/A  Previous Recommendations:    N/A    Education & Discharge Needs:   [x] None Identified   [] Identified and addressed    [x] Participated in care plan, discharge planning, and/or interdisciplinary rounds        Nutrition Discharge Plan:   [] Too soon to determine  [x] Other: May be discharged to SNF. Added supplements to continue after discharge onto d/c paperwork.        Cultural, Hoahaoism and ethnic food preferences identified: None    Skin Integrity: [x]Intact  []Other  Edema: [x]None []Other  Last BM: 2/7  Food Allergies: [x]None []Other  Diet Restrictions: Cultural/Gnosticist Preference(s): None     Anthropometrics:    Weight Loss Metrics 2/10/2020 1/10/2020 10/24/2019 7/3/2019 3/8/2019 1/18/2019 1/17/2019   Today's Wt 146 lb 14.4 oz 147 lb 4.3 oz 144 lb 143 lb 12.8 oz - 138 lb 138 lb   BMI 25.22 kg/m2 25.28 kg/m2 23.96 kg/m2 23.93 kg/m2 - 22.96 kg/m2 22.96 kg/m2      Weight Source: Bed  Height: 5' 4\" (260.6 cm)(drivers license),    Body mass index is 25.22 kg/m².      IBW : 54.4 kg (120 lb), % IBW (Calculated): 122.42 %  Usual Body Weight: 63.5 kg (140 lb),      Labs:    Lab Results   Component Value Date/Time    Sodium 138 02/09/2020 04:45 AM    Potassium 3.9 02/09/2020 04:45 AM    Chloride 107 02/09/2020 04:45 AM    CO2 24 02/09/2020 04:45 AM    Glucose 104 (H) 02/09/2020 04:45 AM    BUN 11 02/09/2020 04:45 AM    Creatinine 0.93 02/09/2020 04:45 AM    Calcium 9.2 02/09/2020 04:45 AM    Magnesium 1.8 02/08/2020 03:09 AM    Phosphorus 2.8 02/08/2020 03:09 AM    Albumin 2.6 (L) 02/08/2020 03:09 AM     Lab Results   Component Value Date/Time    Hemoglobin A1c 5.5 06/24/2013 12:00 AM     Teri Ray RD  Formerly [facility], Pager #[phone] or via [employer]

## 2020-02-11 PROCEDURE — 97116 GAIT TRAINING THERAPY: CPT

## 2020-02-11 PROCEDURE — 97535 SELF CARE MNGMENT TRAINING: CPT

## 2020-02-11 PROCEDURE — 51798 US URINE CAPACITY MEASURE: CPT

## 2020-02-11 PROCEDURE — 65270000032 HC RM SEMIPRIVATE

## 2020-02-11 PROCEDURE — 74011250637 HC RX REV CODE- 250/637: Performed by: INTERNAL MEDICINE

## 2020-02-11 PROCEDURE — 74011250637 HC RX REV CODE- 250/637: Performed by: HOSPITALIST

## 2020-02-11 RX ADMIN — FAMOTIDINE 20 MG: 20 TABLET ORAL at 17:12

## 2020-02-11 RX ADMIN — ASPIRIN 81 MG: 81 TABLET, COATED ORAL at 09:04

## 2020-02-11 RX ADMIN — QUETIAPINE FUMARATE 12.5 MG: 25 TABLET ORAL at 21:34

## 2020-02-11 RX ADMIN — FERROUS SULFATE TAB 325 MG (65 MG ELEMENTAL FE) 325 MG: 325 (65 FE) TAB at 08:31

## 2020-02-11 RX ADMIN — Medication 10 ML: at 07:05

## 2020-02-11 RX ADMIN — MIDODRINE HYDROCHLORIDE 10 MG: 5 TABLET ORAL at 17:12

## 2020-02-11 RX ADMIN — Medication 10 ML: at 14:16

## 2020-02-11 RX ADMIN — Medication 10 ML: at 21:34

## 2020-02-11 RX ADMIN — PRAVASTATIN SODIUM 20 MG: 20 TABLET ORAL at 21:34

## 2020-02-11 NOTE — PROGRESS NOTES
Hospitalist Progress Note  Deann Sauceda MD  Answering service: [phone] from in house phone        Date of Service:  2020  NAME:  Ольга Adler  :  1940  MRN:  [id number]  PCP: Matt Luna MD    Chief Complaint:   Chief Complaint   Patient presents with    Abdominal Pain         Admission Summary:     Ольга Adler is a [de-identified] y.o. female who presented with abdominal pain    Interval history / Subjective:   Patient seen for Follow up of CC: abdominal pain  Patient seen and examined by the bedside, Labs, images and notes reviewed  comfortable, Denies any chest pain, abdominal pain, fevers, chills. No N/V/D  Discussed with nursing staff, no acute issues overnight, orders reviewed. Assessment & Plan:     - Generalized weakness, recurrent falls and inability to care for self:  Likely multifactorial  Patient appears to have dementia although not documented. Doubt acute encephalopathy. CT head negative for acute pathology. OT recs SNF  will need placement  Palliative care eval pending  Supportive care  Fall precautions    - Abnormal UA, no UTI noted  Cx neg  Off abx    - Right hydronephrosis in setting of urinary retention: mild, no stone noticed  Bladder scans and straight cath as needed, Agree with Urology, avoid [place]. - Cholelithiasis: this may be the cause of her intermittent abd pain. Gen Surg consult noted, no acute surgical intervention,  does not want patient to have any surgery done. No further surgical work-up. - Hyponatremia: mild  Resolved.   Lab holiday in the morning.    - Moderate protein calorie malnourishment, POA  Nutrition consult      Code status: Full Code    DVT prophylaxis: SCDs    Care Plan discussed with: Patient/Family and Nurse    Disposition: TBD    Hospital Problems  Date Reviewed: 2019          Codes Class Noted POA    Generalized weakness ICD-10-CM: R53.1  ICD-9-CM: 780.79  2020 Unknown                Review of Systems:   Review of systems not obtained due to patient factors. confusion      Physical Examination:     General appearance: alert, cooperative, no distress, appears stated age, confused, oriented to self only. Not oriented to time, place or person  Lungs: clear to auscultation bilaterally, no wheezing, rales  Heart: RRR, S1S2  Abdomen: soft, non-tender. Bowel sounds normal. No masses,  no organomegaly  Neurologic: Grossly normal      Vital Signs:    Last 24hrs VS reviewed since prior progress note. Most recent are:    Visit Vitals  /69 (BP 1 Location: Left arm, BP Patient Position: At rest)   Pulse 69   Temp 98.3 °F (36.8 °C)   Resp 16   Ht 5' 4\" (1.626 m)   Wt 66.5 kg (146 lb 9.7 oz)   SpO2 93%   BMI 25.16 kg/m²         Intake/Output Summary (Last 24 hours) at 2020 0911  Last data filed at 2020 0734  Gross per 24 hour   Intake    Output 990 ml   Net -990 ml        Tmax:  Temp (24hrs), Av.4 °F (36.9 °C), Min:98.1 °F (36.7 °C), Max:98.5 °F (36.9 °C)      Data Review:   Data reviewed by myself:  Xr Shoulder Lt Ap/lat Min 2 V    Result Date: 1/10/2020  EXAM: XR SHOULDER LT AP/LAT MIN 2 V INDICATION: shoulder pain. Left shoulder pain. COMPARISON: None. FINDINGS: Three views of the left shoulder demonstrate no fracture, dislocation or other acute abnormality. There is osteopenia. IMPRESSION: No acute abnormality. Xr Humerus Lt    Result Date: 1/10/2020  EXAM: XR HUMERUS LT INDICATION: pain after fall. COMPARISON: None. FINDINGS: Two views of the left humerus demonstrate no fracture or other acute osseous, articular or soft tissue abnormality. IMPRESSION: No acute abnormality. Ct Abd Pelv Wo Cont    Result Date: 2020  INDICATION: intermittent abd pain/ x 2 years/ worsening/ increased today; EXAM: CT Abdomen and Pelvis without IV contrast. Oral contrast was administered.  CT dose reduction was achieved through use of a standardized protocol tailored for this examination and automatic exposure control for dose modulation. FINDINGS: There is a hiatal hernia. There is cholelithiasis. There are colonic diverticula. No urinary tract stones are seen. Mild right hydronephrosis is progressed since 2497, etiology uncertain. There is no left hydroureteronephrosis. The kidneys are normal in size. Bladder is moderately distended. There is no perirenal fluid or ascites. Liver shows no apparent significant finding without contrast. Pancreas, adrenal glands, and spleen show no significant enlargement. Aorta is calcified without aneurysm. No inflammation is seen. There is no pneumoperitoneum or significant adenopathy. There is no apparent pelvic mass. The appendix is not seen. Bowels are not dilated. There are chronic compression fractures of T9 and L5. IMPRESSION: 1. No acute inflammation. 2. Hiatal hernia. 3. Cholelithiasis. 4. Diverticulosis. 5. Right hydronephrosis. Xr Knee Lt 3 V    Result Date: 1/10/2020  EXAM: XR KNEE LT 3 V INDICATION: knee pain after fall. COMPARISON: None. FINDINGS: Three views of the left knee demonstrate no fracture or other acute osseous or articular abnormality. There is no effusion. There are degenerative changes of the knee with pointing of the tibial spines and mild patellofemoral osteophyte formation. There are vascular calcifications. IMPRESSION: No acute abnormality.     Lab Results   Component Value Date/Time    Specimen Description: URINE 06/01/2013 09:30 PM     Lab Results   Component Value Date/Time    Culture result: NO SIGNIFICANT GROWTH 02/07/2020 12:48 PM    Culture result: NO GROWTH 2 DAYS 08/13/2015 05:08 PM    Culture result: NO SIGNIFICANT GROWTH 06/01/2013 09:30 PM     All Micro Results     Procedure Component Value Units Date/Time    CULTURE, URINE [[id number]] Collected:  02/07/20 1248    Order Status:  Completed Specimen:  Urine from Clean catch Updated:  02/08/20 1601     Special Requests: NO SPECIAL REQUESTS        [place] Count -- <10,000  COLONIES/mL       Culture result: NO SIGNIFICANT GROWTH       URINE CULTURE HOLD SAMPLE [[id number]]     Order Status:  Canceled Specimen:  Urine           Labs: reviewed by myself. Recent Labs     02/09/20  0235   WBC 6.8   HGB 12.6   HCT 37.3        Recent Labs     02/09/20  0445      K 3.9      CO2 24   BUN 11   CREA 0.93   *   CA 9.2     No results for input(s): SGOT, GPT, ALT, AP, TBIL, TBILI, TP, ALB, GLOB, GGT, AML, LPSE in the last 72 hours. No lab exists for component: AMYP, HLPSE  No results for input(s): INR, PTP, APTT, INREXT, INREXT in the last 72 hours. No results for input(s): FE, TIBC, PSAT, FERR in the last 72 hours. Lab Results   Component Value Date/Time    Folate 17.3 10/08/2015 11:30 AM      No results for input(s): PH, PCO2, PO2 in the last 72 hours. No results for input(s): CPK, CKNDX, TROIQ in the last 72 hours.     No lab exists for component: CPKMB  Lab Results   Component Value Date/Time    Cholesterol, total 145 04/04/2017 09:46 AM    HDL Cholesterol 61 04/04/2017 09:46 AM    LDL, calculated 67 04/04/2017 09:46 AM    Triglyceride 87 04/04/2017 09:46 AM    CHOL/HDL Ratio 3.4 06/02/2013 06:30 AM     Lab Results   Component Value Date/Time    Glucose (POC) 115 (H) 09/28/2018 08:46 PM     Lab Results   Component Value Date/Time    Color YELLOW/STRAW 02/07/2020 12:48 PM    Appearance CLEAR 02/07/2020 12:48 PM    Specific gravity 1.005 02/07/2020 12:48 PM    pH (UA) 6.5 02/07/2020 12:48 PM    Protein NEGATIVE  02/07/2020 12:48 PM    Glucose NEGATIVE  02/07/2020 12:48 PM    Ketone NEGATIVE  02/07/2020 12:48 PM    Bilirubin NEGATIVE  02/07/2020 12:48 PM    Urobilinogen 0.2 02/07/2020 12:48 PM    Nitrites NEGATIVE  02/07/2020 12:48 PM    Leukocyte Esterase SMALL (A) 02/07/2020 12:48 PM    Epithelial cells MODERATE (A) 02/07/2020 12:48 PM    Bacteria 3+ (A) 02/07/2020 12:48 PM    WBC 10-20 02/07/2020 12:48 PM    RBC 10-20 02/07/2020 12:48 PM Medications Reviewed:     Current Facility-Administered Medications   Medication Dose Route Frequency    QUEtiapine (SEROquel) tablet 12.5 mg  12.5 mg Oral Q8H PRN    sodium chloride (NS) flush 5-40 mL  5-40 mL IntraVENous Q8H    sodium chloride (NS) flush 5-40 mL  5-40 mL IntraVENous PRN    acetaminophen (TYLENOL) tablet 650 mg  650 mg Oral Q4H PRN    ondansetron (ZOFRAN) injection 4 mg  4 mg IntraVENous Q4H PRN    ALPRAZolam (XANAX) tablet 0.5 mg  0.5 mg Oral TID PRN    aspirin delayed-release tablet 81 mg  81 mg Oral DAILY    famotidine (PEPCID) tablet 20 mg  20 mg Oral Q24H    ferrous sulfate tablet 325 mg  325 mg Oral ACB    midodrine (PROAMITINE) tablet 10 mg  10 mg Oral BID WITH MEALS    pravastatin (PRAVACHOL) tablet 20 mg  20 mg Oral QHS     ______________________________________________________________________  EXPECTED LENGTH OF STAY: - - -  ACTUAL LENGTH OF STAY:          4                 Rin Juarez MD     Patient's emergency contacts:  Extended Emergency Contact Information  Primary Emergency Contact: Munir Ann  Address: [address] [place] [address]           [facility] Phone: [phone]  Mobile Phone: [phone]  Relation: Spouse  Secondary Emergency Contact: [address] Phone: [phone]  Mobile Phone: [phone]  Relation: Daughter

## 2020-02-11 NOTE — ROUTINE PROCESS
Bedside shift change report given to anaya satnos rn (oncoming nurse) by sheridan rn (offgoing nurse). Report included the following information SBAR and Kardex.

## 2020-02-11 NOTE — PROGRESS NOTES
Problem: Mobility Impaired (Adult and Pediatric)  Goal: *Acute Goals and Plan of Care (Insert Text)  Description  FUNCTIONAL STATUS PRIOR TO ADMISSION: At baseline patient uses a RW w/ stand by assist to ambulate household distances. Has 4 steps to enter home not sure of level of assist previously required. HOME SUPPORT PRIOR TO ADMISSION: The patient lived with  and had visiting angels visit x5 days/week to help assist w/ ADLs and perform basic exercise program.     Physical Therapy Goals  Initiated 2/10/2020      1. Patient will perform sit to stand with contact guard assist within 7 day(s). 2.  Patient will ambulate with contact guard assist for 150 feet with the least restrictive device within 7 day(s). 3.  Patient will ascend/descend 4 stairs with bilateral handrail(s) with minimal assistance/contact guard assist within 7 day(s). 4. Patient will improve Tinetti outcome measure by 4 points in order to decrease fall risk and caregiver burden within 7 days. Outcome: Progressing Towards Goal   PHYSICAL THERAPY TREATMENT  Patient: Arcenio Bauer ([de-identified] y.o. female)  Date: 2/11/2020  Diagnosis: Generalized weakness [R53.1]   <principal problem not specified>       Precautions: Fall, WBAT  Chart, physical therapy assessment, plan of care and goals were reviewed. ASSESSMENT  Patient continues with skilled PT services and is progressing towards goals. Pt remains A&O x 1 with  and paid caregiver in room. Pt min A overall but requires max verbal cueing, extra time, and encouragement in order to complete bed mobility, gait training, and transfers with that level of assist.  expressed concern about caring for patient upon return home - therapist initiated conversation on options - increasing hrs of skilled care in home, SNF or memory care unit.   Pt will continue to benefit from skilled PT to address decreased activity tolerance, decreased functional mobility, and family/caregiver education on transfers. Current Level of Function Impacting Discharge (mobility/balance): min assist for functional mobility with additional time and cueing    Other factors to consider for discharge: has caregiver x 5/days a week to help w/ ADLs         PLAN :  Patient continues to benefit from skilled intervention to address the above impairments. Continue treatment per established plan of care. to address goals. Recommendation for discharge: (in order for the patient to meet his/her long term goals)  To be determined: SNF vs LTC (memory unit)    This discharge recommendation:  Has been made in collaboration with the attending provider and/or case management    IF patient discharges home will need the following DME: to be determined (TBD)       SUBJECTIVE:   Patient stated you have to be jessica demarco to lift her.  -  referring to transferring patient to [facility]. Patient only communicates in short broken sentences currently. OBJECTIVE DATA SUMMARY:   Critical Behavior:  Neurologic State: Alert, Confused  Orientation Level: Oriented to person  Cognition: Follows commands, Decreased attention/concentration  Safety/Judgement: Decreased insight into deficits, Decreased awareness of need for safety, Decreased awareness of need for assistance, Decreased awareness of environment  Functional Mobility Training:  Bed Mobility:  Rolling: Minimum assistance; Additional time; Adaptive equipment(HOB elevated )  Supine to Sit: Minimum assistance; Adaptive equipment; Additional time(HOB elevated )     Scooting: Contact guard assistance; Additional time(max verbal cueing )        Transfers:  Sit to Stand: Minimum assistance; Additional time(max verbal cueing )  Stand to Sit: Contact guard assistance(cueing for hand placement )                             Balance:  Sitting: Intact  Standing: Impaired; With support  Standing - Static: Fair;Constant support  Standing - Dynamic : Fair;Constant support  Ambulation/Gait Training:  Distance (ft): 25 Feet (ft)  Assistive Device: Gait belt;Walker, rolling  Ambulation - Level of Assistance: Minimal assistance;Assist x1;Additional time(requires min assist to manage RW for turns )     Gait Description (WDL): Exceptions to WDL  Gait Abnormalities: Decreased step clearance;Shuffling gait(excessive trunk flexion )        Base of Support: Widened;Center of gravity altered     Speed/Eloise: Slow;Shuffled;Pace decreased (<100 feet/min)  Step Length: Right shortened;Left shortened                        Therapeutic Activity:  Discussion and education with , case management, and paid caregiver about SNF vs HHPT vs no skilled care, and about physical needs of patient if she were to return to home. Activity Tolerance:   Fair - tolerates short distance ambulation (25ft) visible fatigue at completion   Please refer to the flowsheet for vital signs taken during this treatment. After treatment patient left in no apparent distress:   Sitting in chair, Call bell within reach, Bed / chair alarm activated, and Caregiver / family present    COMMUNICATION/COLLABORATION:   The patients plan of care was discussed with: Registered Nurse case management     Regarding student involvement in patient care:  A student participated in this treatment session. Per CMS Medicare statements and APTA guidelines I certify that the following was true:  1. I was present and directly observed the entire session. 2. I made all skilled judgments and clinical decisions regarding care. 3. I am the practitioner responsible for assessment, treatment, and documentation.       Chinyere Sawant

## 2020-02-11 NOTE — PROGRESS NOTES
Rounded on Pent[employer]al[employer] patients and provided Anointing of the Sick at request of patient    Fr. Rene Warren

## 2020-02-11 NOTE — PROGRESS NOTES
Transition of Care Plan     · Disposition: SNF then transition to memory care unit, referral sent to [facility], pending review  · Transport:  Jono Mood [phone]   · [address]   · Follow up with PCP/Specialist     CM spoke with Selam Calderon at [facility], she is reviewing the application now. CM continues to follow. 4:38 PM: Received a call from Selam Calderon from [facility], they are waiting on Director of Nursing to review the referral, will get back to this writer tomorrow morning.      SERJIO Silva

## 2020-02-11 NOTE — PROGRESS NOTES
Problem: Pressure Injury - Risk of  Goal: *Prevention of pressure injury  Description  Document Hawk Scale and appropriate interventions in the flowsheet. Outcome: Progressing Towards Goal  Note: Pressure Injury Interventions:  Sensory Interventions: Maintain/enhance activity level, Assess need for specialty bed    Moisture Interventions: Maintain skin hydration (lotion/cream), Absorbent underpads    Activity Interventions: Increase time out of bed    Mobility Interventions: Pressure redistribution bed/mattress (bed type)    Nutrition Interventions: Offer support with meals,snacks and hydration    Friction and Shear Interventions: Apply protective barrier, creams and emollients                Problem: Falls - Risk of  Goal: *Absence of Falls  Description  Document Mika Fall Risk and appropriate interventions in the flowsheet.   Outcome: Progressing Towards Goal  Note: Fall Risk Interventions:  Mobility Interventions: Bed/chair exit alarm, Communicate number of staff needed for ambulation/transfer    Mentation Interventions: Bed/chair exit alarm, Door open when patient unattended, More frequent rounding    Medication Interventions: Bed/chair exit alarm    Elimination Interventions: Bed/chair exit alarm    History of Falls Interventions: Bed/chair exit alarm, Door open when patient unattended         Problem: Nutrition Deficit  Goal: *Optimize nutritional status  Outcome: Progressing Towards Goal     Problem: Patient Education: Go to Patient Education Activity  Goal: Patient/Family Education  Outcome: Progressing Towards Goal

## 2020-02-11 NOTE — PROGRESS NOTES
Bedside shift change report given to Misha RN (oncoming nurse) by Annis Kanner, RN (offgoing nurse). Report included the following information SBAR and Kardex.

## 2020-02-11 NOTE — PROGRESS NOTES
Occupational Therapy: First attempt:  requesting to allow patient to sleep a little longer. States she has been asleep for approximatly 30 min. Re attempted and found patinet with ,  and sitter. Will follow in AM. Earlier OT recommendation is for SNF.   SAMY Torres/WILFRIDO

## 2020-02-11 NOTE — PROGRESS NOTES
Problem: Pressure Injury - Risk of  Goal: *Prevention of pressure injury  Description  Document Hawk Scale and appropriate interventions in the flowsheet. Outcome: Progressing Towards Goal  Note: Pressure Injury Interventions:  Sensory Interventions: Keep linens dry and wrinkle-free    Moisture Interventions: Absorbent underpads, Check for incontinence Q2 hours and as needed    Activity Interventions: Increase time out of bed, Pressure redistribution bed/mattress(bed type)    Mobility Interventions: Pressure redistribution bed/mattress (bed type)    Nutrition Interventions: Document food/fluid/supplement intake    Friction and Shear Interventions: Apply protective barrier, creams and emollients                Problem: Falls - Risk of  Goal: *Absence of Falls  Description  Document Mika Fall Risk and appropriate interventions in the flowsheet.   Outcome: Progressing Towards Goal  Note: Fall Risk Interventions:  Mobility Interventions: Bed/chair exit alarm, Patient to call before getting OOB    Mentation Interventions: Adequate sleep, hydration, pain control, Bed/chair exit alarm    Medication Interventions: Bed/chair exit alarm, Teach patient to arise slowly    Elimination Interventions: Bed/chair exit alarm, Call light in reach    History of Falls Interventions: Evaluate medications/consider consulting pharmacy, Door open when patient unattended, Bed/chair exit alarm

## 2020-02-11 NOTE — PROGRESS NOTES
Bedside shift change report given to Ava Dooley (oncoming nurse) by Sherry Rodrigues (offgoing nurse). Report included the following information SBAR, Kardex, MAR and Recent Results.

## 2020-02-12 VITALS
OXYGEN SATURATION: 95 % | RESPIRATION RATE: 16 BRPM | WEIGHT: 157.5 LBS | BODY MASS INDEX: 26.89 KG/M2 | HEART RATE: 86 BPM | TEMPERATURE: 97.6 F | DIASTOLIC BLOOD PRESSURE: 75 MMHG | SYSTOLIC BLOOD PRESSURE: 131 MMHG | HEIGHT: 64 IN

## 2020-02-12 PROCEDURE — 74011250637 HC RX REV CODE- 250/637: Performed by: HOSPITALIST

## 2020-02-12 RX ORDER — ALPRAZOLAM 0.5 MG/1
0.5 TABLET ORAL
Qty: 10 TAB | Refills: 0 | Status: SHIPPED | OUTPATIENT
Start: 2020-02-12

## 2020-02-12 RX ORDER — QUETIAPINE FUMARATE 25 MG/1
12.5 TABLET, FILM COATED ORAL
Qty: 10 TAB | Refills: 0 | Status: ON HOLD | OUTPATIENT
Start: 2020-02-12 | End: 2020-04-26

## 2020-02-12 RX ADMIN — ASPIRIN 81 MG: 81 TABLET, COATED ORAL at 09:17

## 2020-02-12 RX ADMIN — MIDODRINE HYDROCHLORIDE 10 MG: 5 TABLET ORAL at 09:18

## 2020-02-12 RX ADMIN — FERROUS SULFATE TAB 325 MG (65 MG ELEMENTAL FE) 325 MG: 325 (65 FE) TAB at 07:27

## 2020-02-12 RX ADMIN — Medication 10 ML: at 07:29

## 2020-02-12 NOTE — PROGRESS NOTES
Bedside shift change report given to Erlinda Cortes (oncoming nurse) by Roberto Wills RN (offgoing nurse). Report included the following information SBAR and Kardex.

## 2020-02-12 NOTE — DISCHARGE INSTRUCTIONS
Discharge Instructions       PATIENT ID: Steven Vasquez  MRN: [id number]   YOB: 1940    DATE OF ADMISSION: 2/7/2020 11:35 AM    DATE OF DISCHARGE: 2/12/2020    PRIMARY CARE PROVIDER: Diane Peters MD     ATTENDING PHYSICIAN: Hima Lobato MD  DISCHARGING PROVIDER: Eric Hough MD    To contact this individual call [phone] and ask the  to page. If unavailable ask to be transferred the Adult Hospitalist Department. DISCHARGE DIAGNOSES      - Generalized weakness, recurrent falls and inability to care for self:  Likely multifactorial  Patient appears to have dementia although not documented. Doubt acute encephalopathy. CT head negative for acute pathology. OT recs SNF  Fall precautions     - Abnormal UA, no UTI noted  Cx neg  Off abx     - Right hydronephrosis in setting of urinary retention: mild, no stone noticed  Bladder scans and straight cath as needed, Agree with Urology, avoid [place].     - Cholelithiasis: this may be the cause of her intermittent abd pain. Gen Surg consult noted, no acute surgical intervention,  does not want patient to have any surgery done. No further surgical work-up.     - Hyponatremia: mild  Resolved     - Moderate protein calorie malnourishment, POA  Nutrition recommendations noted.     CONSULTATIONS: IP CONSULT TO UROLOGY  IP CONSULT TO PALLIATIVE CARE - PROVIDER  IP CONSULT TO GENERAL SURGERY    PROCEDURES/SURGERIES: * No surgery found *    PENDING TEST RESULTS:   At the time of discharge the following test results are still pending: n/a    FOLLOW UP APPOINTMENTS:   Follow-up Information     Follow up With Specialties Details Why Contact Info    [address] [facility], [address]   [address] [facility] [address] [place]    Diane Peters MD Internal Medicine Schedule an appointment as soon as possible for a visit in 1 week for post-hospitalization follow up, with in 7 days Beth 46:   · It is important that you take the medication exactly as they are prescribed. · Keep your medication in the bottles provided by the pharmacist and keep a list of the medication names, dosages, and times to be taken in your wallet. · Do not take other medications without consulting your doctor. · No drinking alcohol or driving car or operating machinery if you are on narcotic pain medications. Donot take sedating mediations if you are sleepy or confused. · Fall Precautions  · Keep Well Hydrated  · Report to your medical provider if you feel you have  developed allergies to medications  · Follow up with your PCP or Consultant for medication adjustments and refills  · Monitor for signs of fevers,chills,bleeding,chest pain and seek medical attention if you do so. DIET: Regular Diet  Nutrition Recommendations for Discharge:    Continue Oral Nutrition Supplements at discharge:   Ensure Enlive, Ensure Plus or similar product  Once daily for 30 days unless otherwise directed by your Primary Care Physician. This product can be purchased at your local grocery store, pharmacy and/or online. Brandon Santiago RD                           ACTIVITY: PT/OT Eval and Treat    WOUND CARE: n/a    EQUIPMENT needed: n/a      DISCHARGE MEDICATIONS:   See Medication Reconciliation Form    · It is important that you take the medication exactly as they are prescribed. · Keep your medication in the bottles provided by the pharmacist and keep a list of the medication names, dosages, and times to be taken in your wallet. · Do not take other medications without consulting your doctor. NOTIFY YOUR PHYSICIAN FOR ANY OF THE FOLLOWING:   Fever over 101 degrees for 24 hours.    Chest pain, shortness of breath, fever, chills, nausea, vomiting, diarrhea, change in mentation, falling, weakness, bleeding. Severe pain or pain not relieved by medications. Or, any other signs or symptoms that you may have questions about. DISPOSITION:    Home With:   OT  PT  HH  RN      x SNF/Inpatient Rehab/LTAC    Independent/assisted living    Hospice    Other:         Information obtained by :   I understand that if any problems occur once I am at home I am to contact my physician. I understand and acknowledge receipt of the instructions indicated above.                                                                                                                                              Physician's or R.N.'s Signature                                                                  Date/Time                                                                                                                                              Patient or Representative Signature                                                          Date/Time          Signed:   April Moreira MD  2/12/2020  10:57 AM

## 2020-02-12 NOTE — PROGRESS NOTES
Bedside shift change report given to India Pond (oncoming nurse) by Justo Swanson (offgoing nurse). Report included the following information SBAR, Kardex, MAR and Recent Results.

## 2020-02-12 NOTE — DISCHARGE SUMMARY
Inpatient hospitalist discharge summary                Brief Overview    PATIENT ID: Marlene Winchester    MRN: [id number]     YOB: 1940    Admitting Provider: Brett Samayoa MD    Discharging Provider: Apolinar Fuchs MD   To contact this individual call [phone] and ask the  to page. If unavailable ask to be transferred the Adult Hospitalist Department. PCP at discharge: Jose Acrher [phone]   [address]  [address] [facility] [address] / [address]    Admission date: 2/7/2020  Date of Discharge: 02/12/20    Chief complaint:   Chief Complaint   Patient presents with    Abdominal Pain     Patient Active Problem List   Diagnosis Code    Watermelon stomach K27.26    GERD (gastroesophageal reflux disease) K21.9    Anemia D64.9    CVA (cerebral vascular accident) ([address] I63.9    Chronic cholecystitis K81.1    Paraesophageal hernia K44.9    Moderate protein-calorie malnutrition ([address] E44.0    Advance care planning Z71.89    Pulmonary hypertension ([address] I27.20    Shortness of breath on exertion R06.02    Aortic valve regurgitation I35.1    Blood loss anemia D50.0    Status post gastrostomy ([address] Z93.1    Generalized weakness R53.1         Discharge diagnosis, hospital course/plan:    - Generalized weakness, recurrent falls and inability to care for self:  Likely multifactorial  Patient appears to have dementia although not documented. Doubt acute encephalopathy. CT head negative for acute pathology. OT recs SNF  Fall precautions     - Abnormal UA, no UTI noted  Cx neg  Off abx     - Right hydronephrosis in setting of urinary retention: mild, no stone noticed  Bladder scans and straight cath as needed, Agree with Urology, avoid [place].     - Cholelithiasis: this may be the cause of her intermittent abd pain. Gen Surg consult noted, no acute surgical intervention,  does not want patient to have any surgery done. No further surgical work-up.      - Hyponatremia: mild  Resolved     - Severe Protein Calorie [place] with normal BMI, POA  Nutrition recommendations noted. On the date of discharge, diagnostic face to face encounter was performed. Patient was hemodynamically stable, offering no new complaints. Denies any shortness of breath at rest, no fevers or chills, no diarrhea or constipation. Patient is agreeable for discharge. Patient understood and verbalized the understanding of the discharge plan. Patient was advised to seek medical help/ care or return to ED, if symptoms recur, worsen or new symptoms develop. Discharge Disposition:  SNF    Discharge activity:  PT/OT Eval and Treat    Code status at discharge:  DNR     Active issues requiring follow up:  dementia    Outpatient follow up:      Future appointments-  No future appointments. Follow-up Information     Follow up With Specialties Details Why Jacquelyn Ricealejandraestela Aashish Appiah) [facility], [facility] [address]   [address] [facility] [address] [place]    Rey Ortiz MD Internal Medicine Schedule an appointment as soon as possible for a visit in 1 week for post-hospitalization follow up, with in 7 days [address] [facility]  [phone]            Test results pending upon discharge:  n/a    Operative procedures performed:      Treatments: IV hydration    Consults:  IP CONSULT TO UROLOGY  IP CONSULT TO PALLIATIVE CARE - PROVIDER  IP CONSULT TO GENERAL SURGERY    Procedures:    * No surgery found *    Diet:  DIET REGULAR  DIET NUTRITIONAL SUPPLEMENTS    Pertinent test results:  Ct Head Wo Cont    Result Date: 2/8/2020  EXAM: CT HEAD WO CONT INDICATION: confusion COMPARISON: 6/1/2013. CONTRAST: None. TECHNIQUE: Unenhanced CT of the head was performed using 5 mm images. Brain and bone windows were generated.   CT dose reduction was achieved through use of a standardized protocol tailored for this examination and automatic exposure control for dose modulation. FINDINGS: The ventricles and sulci are normal in size, shape and configuration and midline. Extensive small vessel ischemic changes are stable compared to the prior exam. There is no intracranial hemorrhage, extra-axial collection, mass, mass effect or midline shift. The basilar cisterns are open. No acute infarct is identified. The bone windows demonstrate no abnormalities. The visualized portions of the paranasal sinuses and mastoid air cells are clear. The vascular calcification is noted. IMPRESSION: No acute process. Stable extensive small vessel ischemic changes. Ct Abd Pelv Wo Cont    Result Date: 2/7/2020  INDICATION: intermittent abd pain/ x 2 years/ worsening/ increased today; EXAM: CT Abdomen and Pelvis without IV contrast. Oral contrast was administered. CT dose reduction was achieved through use of a standardized protocol tailored for this examination and automatic exposure control for dose modulation. FINDINGS: There is a hiatal hernia. There is cholelithiasis. There are colonic diverticula. No urinary tract stones are seen. Mild right hydronephrosis is progressed since 2340, etiology uncertain. There is no left hydroureteronephrosis. The kidneys are normal in size. Bladder is moderately distended. There is no perirenal fluid or ascites. Liver shows no apparent significant finding without contrast. Pancreas, adrenal glands, and spleen show no significant enlargement. Aorta is calcified without aneurysm. No inflammation is seen. There is no pneumoperitoneum or significant adenopathy. There is no apparent pelvic mass. The appendix is not seen. Bowels are not dilated. There are chronic compression fractures of T9 and L5. IMPRESSION: 1. No acute inflammation. 2. Hiatal hernia. 3. Cholelithiasis. 4. Diverticulosis. 5. Right hydronephrosis.        Recent Results (from the past 336 hour(s))   SAMPLES BEING HELD    Collection Time: 02/07/20 11:51 AM   Result Value Ref Range    SAMPLES BEING HELD 1RED, 1BLUE, 1LAV     COMMENT        Add-on orders for these samples will be processed based on acceptable specimen integrity and analyte stability, which may vary by analyte. CBC WITH AUTOMATED DIFF    Collection Time: 02/07/20 11:51 AM   Result Value Ref Range    WBC 5.6 3.6 - 11.0 K/uL    RBC 4.10 3.80 - 5.20 M/uL    HGB 13.3 11.5 - 16.0 g/dL    HCT 41.3 35.0 - 47.0 %    .7 (H) 80.0 - 99.0 FL    MCH 32.4 26.0 - 34.0 PG    MCHC 32.2 30.0 - 36.5 g/dL    RDW 13.2 11.5 - 14.5 %    PLATELET 578 184 - 872 K/uL    MPV 8.1 (L) 8.9 - 12.9 FL    NRBC 0.0 0  WBC    ABSOLUTE NRBC 0.00 0.00 - 0.01 K/uL    NEUTROPHILS 66 32 - 75 %    LYMPHOCYTES 18 12 - 49 %    MONOCYTES 10 5 - 13 %    EOSINOPHILS 5 0 - 7 %    BASOPHILS 1 0 - 1 %    IMMATURE GRANULOCYTES 0 0.0 - 0.5 %    ABS. NEUTROPHILS 3.7 1.8 - 8.0 K/UL    ABS. LYMPHOCYTES 1.0 0.8 - 3.5 K/UL    ABS. MONOCYTES 0.5 0.0 - 1.0 K/UL    ABS. EOSINOPHILS 0.3 0.0 - 0.4 K/UL    ABS. BASOPHILS 0.1 0.0 - 0.1 K/UL    ABS. IMM. GRANS. 0.0 0.00 - 0.04 K/UL    DF AUTOMATED     METABOLIC PANEL, COMPREHENSIVE    Collection Time: 02/07/20 11:51 AM   Result Value Ref Range    Sodium 134 (L) 136 - 145 mmol/L    Potassium 4.6 3.5 - 5.1 mmol/L    Chloride 98 97 - 108 mmol/L    CO2 26 21 - 32 mmol/L    Anion gap 10 5 - 15 mmol/L    Glucose 108 (H) 65 - 100 mg/dL    BUN 13 6 - 20 MG/DL    Creatinine 1.06 (H) 0.55 - 1.02 MG/DL    BUN/Creatinine ratio 12 12 - 20      GFR est AA >60 >60 ml/min/1.73m2    GFR est non-AA 50 (L) >60 ml/min/1.73m2    Calcium 9.8 8.5 - 10.1 MG/DL    Bilirubin, total 0.6 0.2 - 1.0 MG/DL    ALT (SGPT) 17 12 - 78 U/L    AST (SGOT) 41 (H) 15 - 37 U/L    Alk.  phosphatase 75 45 - 117 U/L    Protein, total 7.8 6.4 - 8.2 g/dL    Albumin 3.5 3.5 - 5.0 g/dL    Globulin 4.3 (H) 2.0 - 4.0 g/dL    A-G Ratio 0.8 (L) 1.1 - 2.2     LIPASE    Collection Time: 02/07/20 11:51 AM   Result Value Ref Range Lipase 70 (L) 73 - 393 U/L   TROPONIN I    Collection Time: 02/07/20 11:51 AM   Result Value Ref Range    Troponin-I, Qt. <0.05 <0.05 ng/mL   EKG, 12 LEAD, INITIAL    Collection Time: 02/07/20 11:57 AM   Result Value Ref Range    Ventricular Rate 62 BPM    Atrial Rate 62 BPM    P-R Interval 148 ms    QRS Duration 76 ms    Q-T Interval 416 ms    QTC Calculation (Bezet) 422 ms    Calculated P Axis 33 degrees    Calculated R Axis -11 degrees    Calculated T Axis 45 degrees    Diagnosis       Normal sinus rhythm  Moderate voltage criteria for LVH, may be normal variant  Inferior infarct (cited on or before 07-FEB-2020)  Abnormal ECG  When compared with ECG of 28-SEP-2018 20:36,  No significant change    Confirmed by Chio Mccauley ([id number]) on 2/9/2020 12:22:13 AM     URINALYSIS W/MICROSCOPIC    Collection Time: 02/07/20 12:48 PM   Result Value Ref Range    Color YELLOW/STRAW      Appearance CLEAR CLEAR      Specific gravity 1.005 1.003 - 1.030      pH (UA) 6.5 5.0 - 8.0      Protein NEGATIVE  NEG mg/dL    Glucose NEGATIVE  NEG mg/dL    Ketone NEGATIVE  NEG mg/dL    Bilirubin NEGATIVE  NEG      Blood MODERATE (A) NEG      Urobilinogen 0.2 0.2 - 1.0 EU/dL    Nitrites NEGATIVE  NEG      Leukocyte Esterase SMALL (A) NEG      WBC 10-20 0 - 4 /hpf    RBC 10-20 0 - 5 /hpf    Epithelial cells MODERATE (A) FEW /lpf    Bacteria 3+ (A) NEG /hpf    Amorphous Crystals 2+ (A) NEG   CULTURE, URINE    Collection Time: 02/07/20 12:48 PM   Result Value Ref Range    Special Requests: NO SPECIAL REQUESTS      [place] Count <10,000  COLONIES/mL        Culture result: NO SIGNIFICANT GROWTH     CBC WITH AUTOMATED DIFF    Collection Time: 02/08/20  3:09 AM   Result Value Ref Range    WBC 4.8 3.6 - 11.0 K/uL    RBC 3.58 (L) 3.80 - 5.20 M/uL    HGB 12.0 11.5 - 16.0 g/dL    HCT 38.2 35.0 - 47.0 %    .7 (H) 80.0 - 99.0 FL    MCH 33.5 26.0 - 34.0 PG    MCHC 31.4 30.0 - 36.5 g/dL    RDW 12.9 11.5 - 14.5 %    PLATELET 115 233 - 317 K/uL    MPV 8.1 (L) 8.9 - 12.9 FL    NRBC 0.0 0  WBC    ABSOLUTE NRBC 0.00 0.00 - 0.01 K/uL    NEUTROPHILS 62 32 - 75 %    LYMPHOCYTES 20 12 - 49 %    MONOCYTES 12 5 - 13 %    EOSINOPHILS 5 0 - 7 %    BASOPHILS 1 0 - 1 %    IMMATURE GRANULOCYTES 0 0.0 - 0.5 %    ABS. NEUTROPHILS 3.0 1.8 - 8.0 K/UL    ABS. LYMPHOCYTES 0.9 0.8 - 3.5 K/UL    ABS. MONOCYTES 0.6 0.0 - 1.0 K/UL    ABS. EOSINOPHILS 0.2 0.0 - 0.4 K/UL    ABS. BASOPHILS 0.1 0.0 - 0.1 K/UL    ABS. IMM. GRANS. 0.0 0.00 - 0.04 K/UL    DF AUTOMATED     METABOLIC PANEL, COMPREHENSIVE    Collection Time: 02/08/20  3:09 AM   Result Value Ref Range    Sodium 132 (L) 136 - 145 mmol/L    Potassium 3.8 3.5 - 5.1 mmol/L    Chloride 104 97 - 108 mmol/L    CO2 22 21 - 32 mmol/L    Anion gap 6 5 - 15 mmol/L    Glucose 84 65 - 100 mg/dL    BUN 10 6 - 20 MG/DL    Creatinine 0.81 0.55 - 1.02 MG/DL    BUN/Creatinine ratio 12 12 - 20      GFR est AA >60 >60 ml/min/1.73m2    GFR est non-AA >60 >60 ml/min/1.73m2    Calcium 8.5 8.5 - 10.1 MG/DL    Bilirubin, total 0.7 0.2 - 1.0 MG/DL    ALT (SGPT) 13 12 - 78 U/L    AST (SGOT) 14 (L) 15 - 37 U/L    Alk.  phosphatase 56 45 - 117 U/L    Protein, total 6.0 (L) 6.4 - 8.2 g/dL    Albumin 2.6 (L) 3.5 - 5.0 g/dL    Globulin 3.4 2.0 - 4.0 g/dL    A-G Ratio 0.8 (L) 1.1 - 2.2     MAGNESIUM    Collection Time: 02/08/20  3:09 AM   Result Value Ref Range    Magnesium 1.8 1.6 - 2.4 mg/dL   PHOSPHORUS    Collection Time: 02/08/20  3:09 AM   Result Value Ref Range    Phosphorus 2.8 2.6 - 4.7 MG/DL   CBC WITH AUTOMATED DIFF    Collection Time: 02/09/20  2:35 AM   Result Value Ref Range    WBC 6.8 3.6 - 11.0 K/uL    RBC 3.75 (L) 3.80 - 5.20 M/uL    HGB 12.6 11.5 - 16.0 g/dL    HCT 37.3 35.0 - 47.0 %    MCV 99.5 (H) 80.0 - 99.0 FL    MCH 33.6 26.0 - 34.0 PG    MCHC 33.8 30.0 - 36.5 g/dL    RDW 12.9 11.5 - 14.5 %    PLATELET 588 030 - 007 K/uL    MPV 8.1 (L) 8.9 - 12.9 FL    NRBC 0.0 0  WBC    ABSOLUTE NRBC 0.00 0.00 - 0.01 K/uL    NEUTROPHILS 69 32 - 75 %    LYMPHOCYTES 13 12 - 49 %    MONOCYTES 12 5 - 13 %    EOSINOPHILS 5 0 - 7 %    BASOPHILS 1 0 - 1 %    IMMATURE GRANULOCYTES 0 0.0 - 0.5 %    ABS. NEUTROPHILS 4.7 1.8 - 8.0 K/UL    ABS. LYMPHOCYTES 0.9 0.8 - 3.5 K/UL    ABS. MONOCYTES 0.8 0.0 - 1.0 K/UL    ABS. EOSINOPHILS 0.3 0.0 - 0.4 K/UL    ABS. BASOPHILS 0.1 0.0 - 0.1 K/UL    ABS. IMM. GRANS. 0.0 0.00 - 0.04 K/UL    DF AUTOMATED     METABOLIC PANEL, BASIC    Collection Time: 02/09/20  4:45 AM   Result Value Ref Range    Sodium 138 136 - 145 mmol/L    Potassium 3.9 3.5 - 5.1 mmol/L    Chloride 107 97 - 108 mmol/L    CO2 24 21 - 32 mmol/L    Anion gap 7 5 - 15 mmol/L    Glucose 104 (H) 65 - 100 mg/dL    BUN 11 6 - 20 MG/DL    Creatinine 0.93 0.55 - 1.02 MG/DL    BUN/Creatinine ratio 12 12 - 20      GFR est AA >60 >60 ml/min/1.73m2    GFR est non-AA 58 (L) >60 ml/min/1.73m2    Calcium 9.2 8.5 - 10.1 MG/DL           Physical Exam on Discharge:    Discharge condition: fair    Vital signs:   Patient Vitals for the past 12 hrs:   Temp Pulse Resp BP SpO2   02/12/20 0825 97.6 °F (36.4 °C) 86 16 131/75 95 %   02/12/20 0727  74  123/78    02/12/20 0305 98.1 °F (36.7 °C) 67 16 138/74 93 %       General appearance: fatigued, no distress, appears older than stated age  Lungs: clear to auscultation bilaterally    Current Discharge Medication List      START taking these medications    Details   QUEtiapine (SEROQUEL) 25 mg tablet Take 0.5 Tabs by mouth every eight (8) hours as needed for Other (agitation). Qty: 10 Tab, Refills: 0         CONTINUE these medications which have CHANGED    Details   ALPRAZolam (XANAX) 0.5 mg tablet Take 1 Tab by mouth three (3) times daily as needed for Anxiety. Max Daily Amount: 1.5 mg.  Qty: 10 Tab, Refills: 0    Comments: Not to exceed 4 additional fills before 09/15/2018.   Associated Diagnoses: Anxiety         CONTINUE these medications which have NOT CHANGED    Details   omeprazole (PRILOSEC) 20 mg capsule TAKE 1 CAPSULE BY MOUTH EVERY DAY  Qty: 90 Cap, Refills: 2      polyethylene glycol (MIRALAX) 17 gram/dose powder Take 17 g by mouth as needed. ferrous sulfate 325 mg (65 mg iron) tablet Take 1 Tab by mouth Daily (before breakfast). Qty: 90 Tab, Refills: 5      ondansetron hcl (ZOFRAN) 4 mg tablet Take 1 Tab by mouth every eight (8) hours as needed for Nausea. Qty: 20 Tab, Refills: 0      famotidine (PEPCID PO) Take  by mouth. aspirin delayed-release 81 mg tablet Take  by mouth daily. acetaminophen (TYLENOL) 325 mg tablet Take  by mouth every four (4) hours as needed for Pain.      midodrine (PROAMITINE) 10 mg tablet Take  by mouth two (2) times a day. pravastatin (PRAVACHOL) 20 mg tablet TAKE 1 TABLET BY MOUTH AT BEDTIME  Qty: 90 Tab, Refills: 1      saliva substitute combo no.9 (BIOTENE DRY MOUTH ORAL RINSE MM) by Mucous Membrane route. melatonin tab tablet Take 1 Tab by mouth nightly. Qty: 30 Tab, Refills: 0      cholecalciferol, vitamin d3, (VITAMIN D) 1,000 unit tablet Take 2,000 Units by mouth daily. Associated Diagnoses: Watermelon stomach; GERD (gastroesophageal reflux disease); Hyperglycemia; Dry mouth; Effect, radiation      MULTIVITAMINS (MULTIVITAMIN PO) Take 1 Tab by mouth daily. STOP taking these medications       clotrimazole (LOTRIMIN) 1 % topical cream Comments:   Reason for Stopping:         lansoprazole (PREVACID PO) Comments:   Reason for Stopping:                  Total time spent on discharge planning, counseling and co-ordination of care:   33 minutes    Ina Soler MD  02/12/20  11:00 AM

## 2020-02-12 NOTE — PROGRESS NOTES
Problem: Pressure Injury - Risk of  Goal: *Prevention of pressure injury  Description  Document Hawk Scale and appropriate interventions in the flowsheet. Outcome: Progressing Towards Goal  Note: Pressure Injury Interventions:  Sensory Interventions: Keep linens dry and wrinkle-free    Moisture Interventions: Absorbent underpads, Apply protective barrier, creams and emollients    Activity Interventions: Increase time out of bed, Pressure redistribution bed/mattress(bed type)    Mobility Interventions: Pressure redistribution bed/mattress (bed type)    Nutrition Interventions: Document food/fluid/supplement intake    Friction and Shear Interventions: Apply protective barrier, creams and emollients, Lift sheet                Problem: Falls - Risk of  Goal: *Absence of Falls  Description  Document Mika Fall Risk and appropriate interventions in the flowsheet.   Outcome: Progressing Towards Goal  Note: Fall Risk Interventions:  Mobility Interventions: Bed/chair exit alarm, Patient to call before getting OOB    Mentation Interventions: Adequate sleep, hydration, pain control, Bed/chair exit alarm    Medication Interventions: Bed/chair exit alarm, Patient to call before getting OOB, Teach patient to arise slowly    Elimination Interventions: Bed/chair exit alarm, Call light in reach    History of Falls Interventions: Bed/chair exit alarm, Door open when patient unattended

## 2020-02-12 NOTE — PROGRESS NOTES
Report called to Srinivas Hernandez to Nurse on Unit 1, opportunity to ask and answer questions granted. Pt transport to arrive at 1400 today.

## 2020-02-12 NOTE — PROGRESS NOTES
Transition of Care Plan to SNF/Rehab    SNF/Rehab Transition:  Patient has been accepted to [facility] and meets criteria for admission. Patient will transported by [facility] and expected to leave at 2 pm.    Communication to Patient/Family:  Met with patient and  Edilberto Manrique at bedside and they are agreeable to the transition plan. Communication to SNF/Rehab:  Bedside RN, Fransisco/Mina, has been notified to update the transition plan to the facility and call report (phone number [phone] and ask for Unit 1). Discharge information has been updated on the AVS.     Discharge instructions to be accessed via cc link. SNF/Rehab Transition:  Patient to follow-up with Home Health: [facility], other  ,none)  PCP/Specialist:   Ambulatory Care Management:     Reviewed and confirmed with facility, Jenene Epley at admissions they can manage the patient care needs for the following:     Debby Lovell with (X) only those applicable:    Medication:  [x]  Medications will be available at the facility  []  IV Antibiotics   []  Controlled Substance - hard copy to be sent with patient   []  Weekly Labs   Documents:  [] Hard RX  [] MAR  [] Kardex  [] AVS  []Transfer Summary  []Discharge   Equipment:  []  CPAP/BiPAP  []  Wound Vacuum  []  Liz or Urinary Device  []  PICC/Central Line  []  Nebulizer  []  Ventilator   Treatment:  []Isolation (for MRSA, VRE, etc.)  []Surgical Drain Management  []Tracheostomy Care  []Dressing Changes  []Dialysis with transportation and chair time . []PEG Care  []Oxygen  []Daily Weights for Heart Failure   Dietary:  []Any diet limitations  []Tube Feedings   []Total Parenteral Management (TPN)   Eligible for Medicaid Long Term Services and Supports  Yes:  [] Eligible for medical assistance or will become eligible within 180 days and UAI completed. [] Provider/Patient and/or support system has requested screening.   [] UAI copy provided to patient or responsible party, .  [] UAI unavailable at discharge will send once processed to SNF provider. [] UAI unavailable at discharged mailed to patient  No:   [x] Private pay and is not financially eligible for Medicaid within the next 180 days. [] Reside out-of-state. [] A residents of a state owned/operated facility that is licensed  by [facility], [facility] or [facility]  [] Enrollment in [facility] hospice services  []  Medical [facility]  [] Patient /Family declines to have screening completed or provide financial information for screening     Financial Resources:  Medicaid    [] Initiated and application pending   [] Full coverage     Advanced Care Plan:  []Surrogate Decision Maker of Care  []POA  [x]Communicated Code Status  (DNR)    Other  02/07/20 1500  INITIAL PHYSICIAN ORDER: INPATIENT Medical; 3.  Patient receiving treatment that can only be provided in an inpatient setting (further clarification in H&P documentation) (ADMISSION ORDERS) ONE TIME               SERJIO Palacio

## 2020-02-13 ENCOUNTER — PATIENT OUTREACH (OUTPATIENT)
Dept: CARDIOLOGY CLINIC | Age: 80
End: 2020-02-13

## 2020-02-13 NOTE — PROGRESS NOTES
Transition of care outreach postponed for 14 days due to patient's discharge to SNF. Per EMR patient discharged to [facility] SNF.

## 2020-02-18 ENCOUNTER — PATIENT OUTREACH (OUTPATIENT)
Dept: CASE MANAGEMENT | Age: 80
End: 2020-02-18

## 2020-02-18 NOTE — PROGRESS NOTES
Community Care Team documentation for patient in [facility]  Initial Follow Up       Patient was discharged to [facility]. Information included in this progress note has been provided to SNF. Hospital Admission and Diagnosis: [facility] 02/07-02/12/2020 Generalized weakness, recurrent falls and inability to care for self    PCP : Evon Groves MD    SNF Attending:  Lin Huynh with SNF team.  PT/OT Update: supervision to standby assist for feeding and grooming; mod assist toileting; cga to min assist bed mobility; transfers max assist; ambulating 100 feet with FW walker CGA; 4 stairs with CGA; upper and lower body ADLS min assist;  Speech Therapy: difficulty swallowing; goals: safe swallowing strategies; diet tolerance. LOS/Disposition: no projected date at this time. Community Care Team will follow up weekly with [facility] until discharge. Medications were not reconciled and general patient assessment was not completed during this [facility] outreach. [place], [place]  [phone]  This note will not be viewable in [address]

## 2020-02-25 ENCOUNTER — PATIENT OUTREACH (OUTPATIENT)
Dept: CASE MANAGEMENT | Age: 80
End: 2020-02-25

## 2020-02-25 NOTE — PROGRESS NOTES
Community Care Team Documentation for Patient in [facility]  Subsequent Follow up     Patient remains at [facility] ([facility]). See previous [facility] Team notes. Spoke with SNF team.  PT/OT Update: grooming supervision to standby assist; upper body ADLS CGA; Lower body ADLS mod assist; toileting min assist; bed mobility standby assist; ambulating 125 feet; transfers min to mod assist. Can ascend and descend 4 steps. LOS/Disposition: projected discharge date 03/05/2020 home with [facility]. Medications were not reconciled and general patient assessment was not completed during this skilled nursing facility outreach. Felicity Moody [place], [place]  [phone]  This note will not be viewable in [address]

## 2020-02-27 ENCOUNTER — PATIENT OUTREACH (OUTPATIENT)
Dept: INTERNAL MEDICINE CLINIC | Age: 80
End: 2020-02-27

## 2020-02-27 NOTE — CDMP QUERY
RETRO QUERY Pt admitted with weakness and dementia Pt noted to have conflicting documentation of moderate protein calorie malnutrition and also nutritional consult documentation of severe protein calorie malnutrition Cecy Arreola If possible, please document in the progress notes and d/c summary if you are evaluating and / or treating any of the following: 
 
? Severe Protein Calorie Malnutriton with normal BMI ? Moderate Protein Calorie Malnutrition ruled out 
? Other, please specify ? Clinically unable to determine The medical record reflects the following: 
  Risk Factors: dementoia Clinical Indicators:  
2/10 nutritional consult note 
meets  Severe Chronic Protein Calorie Malnutrition i 
  
Patient meets criteria for as evidenced by:  
ASPEN Malnutrition Criteria Acute Illness, Chronic Illness, or Social/Enviornmental: Chronic illness Energy Intake: Less than/equal to 75% of est energy req for greater than/equal to 1 month Muscle Mass Loss: Severe ASPEN Malnutrition Score - Chronic Illness: 12 
Chronic Illness - Malnutrition Diagnosis: Severe malnutrition. Will add Ensure Compact BID (440kcal, 18g protein). Likely would benefit from prompting and assistance with meals. With dementia would not recommend aggressive nutrition intervention even if oral intake remains inadequate. Treatment: nutritional consult, ensure compact, assist with meals Thank you, 
       Rodrigue Up [facility], [address] [facility]

## 2020-03-03 ENCOUNTER — PATIENT OUTREACH (OUTPATIENT)
Dept: CASE MANAGEMENT | Age: 80
End: 2020-03-03

## 2020-03-03 NOTE — PROGRESS NOTES
Community Care Team Documentation for Patient in [facility]  Subsequent Follow up     Patient remains at [facility] ([facility] See previous [facility] Team notes. Spoke with SNF team. PT/OT updates bed mobility SBA, transfers CGA, ambulates 100-300 ft with front wheel walker and CGA,  UB and LB ADL's Mod assist, feeding and grooming SBA, standing balance CGA. Los/Disposition- Plan to discharge to home on 3/5/2020 with [facility]. Medications were not reconciled and general patient assessment was not completed during this skilled nursing facility outreach.      Beena Huang RN-CCT  [phone]

## 2020-03-06 ENCOUNTER — PATIENT OUTREACH (OUTPATIENT)
Dept: INTERNAL MEDICINE CLINIC | Age: 80
End: 2020-03-06

## 2020-03-06 NOTE — PROGRESS NOTES
Outgoing call placed to patient home, spoke with patient's spouse Dang August. Patient identified utilizing 2 identifiers. Introduced self, role of LPN Care Coordinator. Patient's spouse reports patient was not discharged from [facility]. Per . Sol Coy patient is to be transition into the Memory Care Unit at [facility].

## 2020-03-10 ENCOUNTER — PATIENT OUTREACH (OUTPATIENT)
Dept: CASE MANAGEMENT | Age: 80
End: 2020-03-10

## 2020-03-10 NOTE — PROGRESS NOTES
Community Care Team Documentation for Patient in [facility]  Subsequent Follow up     Patient remains at [facility] ([facility]). See previous [facility] Team notes. Spoke with SNF team. LOS/Disposition:  Reported that patient's discharge plan changed; her last skilled day at the facility was 03/09/2020. She is personal pay for a few days until she transitions to group home at [facility] with [facility]. Medications were not reconciled and general patient assessment was not completed during this skilled nursing facility outreach. Luis E Blood, [place]  [phone]  This note will not be viewable in [address]

## 2020-03-11 ENCOUNTER — PATIENT OUTREACH (OUTPATIENT)
Dept: INTERNAL MEDICINE CLINIC | Age: 80
End: 2020-03-11

## 2020-03-17 ENCOUNTER — PATIENT OUTREACH (OUTPATIENT)
Dept: CASE MANAGEMENT | Age: 80
End: 2020-03-17

## 2020-03-17 NOTE — PROGRESS NOTES
Community Care Team Documentation for Patient in [facility]  Subsequent Follow up     Patient remains at [facility] ([facility]). See previous [facility] Team notes. Spoke with SNF team.   SNF Medical update:  Patient has been having increased confusion and has recently declined medically, labs drawn and awaiting results. PT/OT: Patients last therapy date was 3/9. LOS/Disposition:  Plan for pt to transition to [facility] at [facility] with [facility] [address] date TBD    Medications were not reconciled and general patient assessment was not completed during this skilled nursing facility outreach.      Brown Schirmer RN-CCT  [phone]

## 2020-03-19 ENCOUNTER — TELEPHONE (OUTPATIENT)
Dept: INTERNAL MEDICINE CLINIC | Age: 80
End: 2020-03-19

## 2020-03-19 NOTE — TELEPHONE ENCOUNTER
[phone]  [facility] with [facility]     Will you follow the pt for home health ?

## 2020-03-24 ENCOUNTER — PATIENT OUTREACH (OUTPATIENT)
Dept: CASE MANAGEMENT | Age: 80
End: 2020-03-24

## 2020-03-24 NOTE — PROGRESS NOTES
Community Care Team Documentation for Patient in [facility]  Discharge Note    Patient has been discharged from [facility] ([facility]). See previous [facility] Team notes. PCP : Jeanna Romero MD    Spoke with SNF team. LOS/Disposition:  [facility] discharged to [facility] with [facility] on 3/18. Community Care Team will sign off at this time. Medications were not reconciled and general patient assessment was not completed during this skilled nursing facility outreach.      Beena Huang RN-CCT  [phone]

## 2020-03-25 RX ORDER — MIRTAZAPINE 15 MG/1
TABLET, FILM COATED ORAL
Qty: 45 TAB | Refills: 0 | Status: ON HOLD | OUTPATIENT
Start: 2020-03-25 | End: 2020-04-26

## 2020-04-07 ENCOUNTER — APPOINTMENT (OUTPATIENT)
Dept: GENERAL RADIOLOGY | Age: 80
DRG: 470 | End: 2020-04-07
Attending: EMERGENCY MEDICINE
Payer: MEDICARE

## 2020-04-07 ENCOUNTER — HOSPITAL ENCOUNTER (INPATIENT)
Age: 80
LOS: 13 days | Discharge: HOSPICE | DRG: 470 | End: 2020-04-20
Attending: EMERGENCY MEDICINE | Admitting: INTERNAL MEDICINE
Payer: MEDICARE

## 2020-04-07 DIAGNOSIS — S72.001A CLOSED FRACTURE OF RIGHT HIP, INITIAL ENCOUNTER (HCC): Primary | ICD-10-CM

## 2020-04-07 DIAGNOSIS — R13.10 DYSPHAGIA, UNSPECIFIED TYPE: ICD-10-CM

## 2020-04-07 DIAGNOSIS — E86.0 DEHYDRATION: ICD-10-CM

## 2020-04-07 DIAGNOSIS — R63.30 FEEDING DIFFICULTIES: ICD-10-CM

## 2020-04-07 DIAGNOSIS — F03.90 DEMENTIA WITHOUT BEHAVIORAL DISTURBANCE, UNSPECIFIED DEMENTIA TYPE: ICD-10-CM

## 2020-04-07 DIAGNOSIS — E88.09 HYPOALBUMINEMIA: ICD-10-CM

## 2020-04-07 DIAGNOSIS — R41.0 DELIRIUM: ICD-10-CM

## 2020-04-07 PROBLEM — S72.009A HIP FRACTURE (HCC): Status: ACTIVE | Noted: 2020-04-07

## 2020-04-07 LAB
ALBUMIN SERPL-MCNC: 2.7 G/DL (ref 3.5–5)
ALBUMIN/GLOB SERPL: 0.6 {RATIO} (ref 1.1–2.2)
ALP SERPL-CCNC: 52 U/L (ref 45–117)
ALT SERPL-CCNC: 26 U/L (ref 12–78)
ANION GAP SERPL CALC-SCNC: 7 MMOL/L (ref 5–15)
APPEARANCE UR: CLEAR
AST SERPL-CCNC: 36 U/L (ref 15–37)
ATRIAL RATE: 96 BPM
BACTERIA URNS QL MICRO: NEGATIVE /HPF
BASOPHILS # BLD: 0 K/UL (ref 0–0.1)
BASOPHILS NFR BLD: 0 % (ref 0–1)
BILIRUB SERPL-MCNC: 0.6 MG/DL (ref 0.2–1)
BILIRUB UR QL: NEGATIVE
BUN SERPL-MCNC: 37 MG/DL (ref 6–20)
BUN/CREAT SERPL: 36 (ref 12–20)
CALCIUM SERPL-MCNC: 9.5 MG/DL (ref 8.5–10.1)
CALCULATED P AXIS, ECG09: 13 DEGREES
CALCULATED R AXIS, ECG10: -10 DEGREES
CALCULATED T AXIS, ECG11: 45 DEGREES
CHLORIDE SERPL-SCNC: 107 MMOL/L (ref 97–108)
CO2 SERPL-SCNC: 25 MMOL/L (ref 21–32)
COLOR UR: ABNORMAL
COMMENT, HOLDF: NORMAL
CREAT SERPL-MCNC: 1.03 MG/DL (ref 0.55–1.02)
DIAGNOSIS, 93000: NORMAL
DIFFERENTIAL METHOD BLD: ABNORMAL
EOSINOPHIL # BLD: 0 K/UL (ref 0–0.4)
EOSINOPHIL NFR BLD: 0 % (ref 0–7)
EPITH CASTS URNS QL MICRO: ABNORMAL /LPF
ERYTHROCYTE [DISTWIDTH] IN BLOOD BY AUTOMATED COUNT: 14.2 % (ref 11.5–14.5)
GLOBULIN SER CALC-MCNC: 4.3 G/DL (ref 2–4)
GLUCOSE SERPL-MCNC: 185 MG/DL (ref 65–100)
GLUCOSE UR STRIP.AUTO-MCNC: 100 MG/DL
HCT VFR BLD AUTO: 36.3 % (ref 35–47)
HGB BLD-MCNC: 11.6 G/DL (ref 11.5–16)
HGB UR QL STRIP: ABNORMAL
HYALINE CASTS URNS QL MICRO: ABNORMAL /LPF (ref 0–5)
IMM GRANULOCYTES # BLD AUTO: 0.1 K/UL (ref 0–0.04)
IMM GRANULOCYTES NFR BLD AUTO: 1 % (ref 0–0.5)
KETONES UR QL STRIP.AUTO: NEGATIVE MG/DL
LEUKOCYTE ESTERASE UR QL STRIP.AUTO: NEGATIVE
LYMPHOCYTES # BLD: 0.9 K/UL (ref 0.8–3.5)
LYMPHOCYTES NFR BLD: 7 % (ref 12–49)
MCH RBC QN AUTO: 33.2 PG (ref 26–34)
MCHC RBC AUTO-ENTMCNC: 32 G/DL (ref 30–36.5)
MCV RBC AUTO: 104 FL (ref 80–99)
MONOCYTES # BLD: 1 K/UL (ref 0–1)
MONOCYTES NFR BLD: 7 % (ref 5–13)
NEUTS SEG # BLD: 11.3 K/UL (ref 1.8–8)
NEUTS SEG NFR BLD: 85 % (ref 32–75)
NITRITE UR QL STRIP.AUTO: NEGATIVE
NRBC # BLD: 0 K/UL (ref 0–0.01)
NRBC BLD-RTO: 0 PER 100 WBC
P-R INTERVAL, ECG05: 118 MS
PH UR STRIP: 6 [PH] (ref 5–8)
PLATELET # BLD AUTO: 242 K/UL (ref 150–400)
PMV BLD AUTO: 9.9 FL (ref 8.9–12.9)
POTASSIUM SERPL-SCNC: 4 MMOL/L (ref 3.5–5.1)
PROT SERPL-MCNC: 7 G/DL (ref 6.4–8.2)
PROT UR STRIP-MCNC: 30 MG/DL
Q-T INTERVAL, ECG07: 322 MS
QRS DURATION, ECG06: 82 MS
QTC CALCULATION (BEZET), ECG08: 406 MS
RBC # BLD AUTO: 3.49 M/UL (ref 3.8–5.2)
RBC #/AREA URNS HPF: ABNORMAL /HPF (ref 0–5)
SAMPLES BEING HELD,HOLD: NORMAL
SODIUM SERPL-SCNC: 139 MMOL/L (ref 136–145)
SP GR UR REFRACTOMETRY: 1.02 (ref 1–1.03)
UR CULT HOLD, URHOLD: NORMAL
UROBILINOGEN UR QL STRIP.AUTO: 0.2 EU/DL (ref 0.2–1)
VENTRICULAR RATE, ECG03: 96 BPM
WBC # BLD AUTO: 13.3 K/UL (ref 3.6–11)
WBC URNS QL MICRO: ABNORMAL /HPF (ref 0–4)

## 2020-04-07 PROCEDURE — 74011250637 HC RX REV CODE- 250/637: Performed by: INTERNAL MEDICINE

## 2020-04-07 PROCEDURE — 73502 X-RAY EXAM HIP UNI 2-3 VIEWS: CPT

## 2020-04-07 PROCEDURE — 96374 THER/PROPH/DIAG INJ IV PUSH: CPT

## 2020-04-07 PROCEDURE — 96361 HYDRATE IV INFUSION ADD-ON: CPT

## 2020-04-07 PROCEDURE — 65270000032 HC RM SEMIPRIVATE

## 2020-04-07 PROCEDURE — 99285 EMERGENCY DEPT VISIT HI MDM: CPT

## 2020-04-07 PROCEDURE — 93005 ELECTROCARDIOGRAM TRACING: CPT

## 2020-04-07 PROCEDURE — 71045 X-RAY EXAM CHEST 1 VIEW: CPT

## 2020-04-07 PROCEDURE — 85025 COMPLETE CBC W/AUTO DIFF WBC: CPT

## 2020-04-07 PROCEDURE — 81001 URINALYSIS AUTO W/SCOPE: CPT

## 2020-04-07 PROCEDURE — 51702 INSERT TEMP BLADDER CATH: CPT

## 2020-04-07 PROCEDURE — 74011250636 HC RX REV CODE- 250/636: Performed by: EMERGENCY MEDICINE

## 2020-04-07 PROCEDURE — 73560 X-RAY EXAM OF KNEE 1 OR 2: CPT

## 2020-04-07 PROCEDURE — 36415 COLL VENOUS BLD VENIPUNCTURE: CPT

## 2020-04-07 PROCEDURE — 80053 COMPREHEN METABOLIC PANEL: CPT

## 2020-04-07 PROCEDURE — 74011250637 HC RX REV CODE- 250/637: Performed by: PHYSICIAN ASSISTANT

## 2020-04-07 PROCEDURE — 77030040830 HC CATH URETH FOL MDII -A

## 2020-04-07 RX ORDER — SODIUM CHLORIDE 0.9 % (FLUSH) 0.9 %
5-40 SYRINGE (ML) INJECTION AS NEEDED
Status: DISCONTINUED | OUTPATIENT
Start: 2020-04-07 | End: 2020-04-20 | Stop reason: HOSPADM

## 2020-04-07 RX ORDER — MIDODRINE HYDROCHLORIDE 5 MG/1
10 TABLET ORAL 2 TIMES DAILY WITH MEALS
Status: DISCONTINUED | OUTPATIENT
Start: 2020-04-07 | End: 2020-04-20 | Stop reason: HOSPADM

## 2020-04-07 RX ORDER — ACETAMINOPHEN 325 MG/1
650 TABLET ORAL EVERY 6 HOURS
Status: DISCONTINUED | OUTPATIENT
Start: 2020-04-07 | End: 2020-04-12

## 2020-04-07 RX ORDER — NALOXONE HYDROCHLORIDE 0.4 MG/ML
0.4 INJECTION, SOLUTION INTRAMUSCULAR; INTRAVENOUS; SUBCUTANEOUS AS NEEDED
Status: DISCONTINUED | OUTPATIENT
Start: 2020-04-07 | End: 2020-04-20 | Stop reason: HOSPADM

## 2020-04-07 RX ORDER — FENTANYL CITRATE 50 UG/ML
25 INJECTION, SOLUTION INTRAMUSCULAR; INTRAVENOUS
Status: COMPLETED | OUTPATIENT
Start: 2020-04-07 | End: 2020-04-07

## 2020-04-07 RX ORDER — ALPRAZOLAM 0.5 MG/1
0.5 TABLET ORAL
Status: DISCONTINUED | OUTPATIENT
Start: 2020-04-07 | End: 2020-04-20 | Stop reason: HOSPADM

## 2020-04-07 RX ORDER — PRAVASTATIN SODIUM 20 MG/1
20 TABLET ORAL
Status: DISCONTINUED | OUTPATIENT
Start: 2020-04-07 | End: 2020-04-20 | Stop reason: HOSPADM

## 2020-04-07 RX ORDER — FAMOTIDINE 20 MG/1
20 TABLET, FILM COATED ORAL DAILY
Status: DISCONTINUED | OUTPATIENT
Start: 2020-04-08 | End: 2020-04-13 | Stop reason: DRUGHIGH

## 2020-04-07 RX ORDER — MIRTAZAPINE 15 MG/1
7.5 TABLET, FILM COATED ORAL
Status: DISCONTINUED | OUTPATIENT
Start: 2020-04-07 | End: 2020-04-20 | Stop reason: HOSPADM

## 2020-04-07 RX ORDER — MORPHINE SULFATE 2 MG/ML
2 INJECTION, SOLUTION INTRAMUSCULAR; INTRAVENOUS
Status: DISCONTINUED | OUTPATIENT
Start: 2020-04-07 | End: 2020-04-20 | Stop reason: HOSPADM

## 2020-04-07 RX ORDER — ONDANSETRON 2 MG/ML
4 INJECTION INTRAMUSCULAR; INTRAVENOUS
Status: DISCONTINUED | OUTPATIENT
Start: 2020-04-07 | End: 2020-04-20 | Stop reason: HOSPADM

## 2020-04-07 RX ORDER — QUETIAPINE FUMARATE 25 MG/1
12.5 TABLET, FILM COATED ORAL
Status: DISCONTINUED | OUTPATIENT
Start: 2020-04-07 | End: 2020-04-20 | Stop reason: HOSPADM

## 2020-04-07 RX ORDER — TRAMADOL HYDROCHLORIDE 50 MG/1
50 TABLET ORAL
Status: DISCONTINUED | OUTPATIENT
Start: 2020-04-07 | End: 2020-04-20 | Stop reason: HOSPADM

## 2020-04-07 RX ORDER — SODIUM CHLORIDE 0.9 % (FLUSH) 0.9 %
5-40 SYRINGE (ML) INJECTION EVERY 8 HOURS
Status: DISCONTINUED | OUTPATIENT
Start: 2020-04-07 | End: 2020-04-20 | Stop reason: HOSPADM

## 2020-04-07 RX ORDER — ACETAMINOPHEN 325 MG/1
650 TABLET ORAL
Status: DISCONTINUED | OUTPATIENT
Start: 2020-04-07 | End: 2020-04-20 | Stop reason: HOSPADM

## 2020-04-07 RX ORDER — MORPHINE SULFATE 2 MG/ML
1 INJECTION, SOLUTION INTRAMUSCULAR; INTRAVENOUS
Status: DISCONTINUED | OUTPATIENT
Start: 2020-04-07 | End: 2020-04-20 | Stop reason: HOSPADM

## 2020-04-07 RX ADMIN — PRAVASTATIN SODIUM 20 MG: 20 TABLET ORAL at 21:21

## 2020-04-07 RX ADMIN — ACETAMINOPHEN 650 MG: 325 TABLET, FILM COATED ORAL at 17:30

## 2020-04-07 RX ADMIN — SODIUM CHLORIDE 1000 ML: 900 INJECTION, SOLUTION INTRAVENOUS at 14:53

## 2020-04-07 RX ADMIN — FENTANYL CITRATE 25 MCG: 50 INJECTION INTRAMUSCULAR; INTRAVENOUS at 13:42

## 2020-04-07 RX ADMIN — Medication 10 ML: at 18:00

## 2020-04-07 RX ADMIN — Medication 10 ML: at 21:21

## 2020-04-07 RX ADMIN — MIRTAZAPINE 7.5 MG: 15 TABLET, FILM COATED ORAL at 21:20

## 2020-04-07 NOTE — PROGRESS NOTES
Primary Nurse Navya Tidwell and Jd RN, RN performed a dual skin assessment on this patient Impairment noted- see wound doc flow sheet  Hawk score is 9. Patient bottom is dark and there is also generalized bruising.  Patient left foot - 2nd toe is bruised

## 2020-04-07 NOTE — ROUTINE PROCESS
TRANSFER - OUT REPORT: 
 
Verbal report given to 11 W RN (name) on Pako Myles  being transferred to %w room 540 (unit) for routine progression of care Report consisted of patients Situation, Background, Assessment and  
Recommendations(SBAR). Information from the following report(s) SBAR and ED Summary was reviewed with the receiving nurse. Lines:  
Peripheral IV 04/07/20 Left Wrist (Active) Site Assessment Clean, dry, & intact 4/7/2020  1:26 PM  
Phlebitis Assessment 0 4/7/2020  1:26 PM  
Infiltration Assessment 0 4/7/2020  1:26 PM  
Dressing Status Clean, dry, & intact 4/7/2020  1:26 PM  
  
 
Opportunity for questions and clarification was provided. Patient transported with: 
 [employer]

## 2020-04-07 NOTE — ED PROVIDER NOTES
HPI .  History is from EMS. She has a history of anemia, asthma, dementia, hypertension, stroke, GERD, and history of anticoagulation. She lives at the nursing home and at baseline is reportedly oriented to name only. She reportedly fell 5 days ago. A subsequent x-ray I believe yesterday or today showed a right hip fracture.     Past Medical History:   Diagnosis Date    Anemia NEC     Asthma     major asthma as a child/only with colds now    [facility] ([facility]     B[facility] removed    Coagulation disorder ([facility]     on xarelto    Dementia ([facility]     GERD (gastroesophageal reflux disease) 10/22/2010    Hiatal hernia     Hypertension     hx of blood pressure med for short time    Ill-defined condition     increased cholesterol    Other unknown and unspecified cause of morbidity or mortality     eval with Dr Geovany Montgomery for SOB, sept 2015:  results unkown    Stroke ([facility] 2013    no deficits    Stroke ([facility]     heat exhaustion    Watermelon stomach 2003       Past Surgical History:   Procedure Laterality Date    HX BLADDER SUSPENSION      HX COLONOSCOPY      HX ENDOSCOPY  6/4/15    Dr. Campos Mention    HX GYN      vaginal del times 3    HX HERNIA REPAIR  2014    x2, failure    HX OTHER SURGICAL      EGDs    HX TONSILLECTOMY      MN EXC SKIN MALIG >4CM FACE,FACIAL      times 3 - BCCA         Family History:   Problem Relation Age of Onset    COPD Mother     Asthma Mother     Heart Attack Father     Other Father         peptic ulcer disease    Anesth Problems Neg Hx        Social History     Socioeconomic History    Marital status:      Spouse name: Not on file    Number of children: Not on file    Years of education: Not on file    Highest education level: Not on file   Occupational History     Employer: RETIRED   Social Needs    Financial resource strain: Not on file    Food insecurity     Worry: Not on file     Inability: Not on file    Transportation needs     Medical: Not on file     Non-medical: Not on file   Tobacco Use    Smoking status: Never Smoker    Smokeless tobacco: Never Used   Substance and Sexual Activity    Alcohol use: No    Drug use: No    Sexual activity: Never   Lifestyle    Physical activity     Days per week: Not on file     Minutes per session: Not on file    Stress: Not on file   Relationships    Social connections     Talks on phone: Not on file     Gets together: Not on file     Attends Buddhist service: Not on file     Active member of club or organization: Not on file     Attends meetings of clubs or organizations: Not on file     Relationship status: Not on file    Intimate partner violence     Fear of current or ex partner: Not on file     Emotionally abused: Not on file     Physically abused: Not on file     Forced sexual activity: Not on file   Other Topics Concern    Not on file   Social History Narrative    Not on file         ALLERGIES: Latex; Codeine; Darvon [propoxyphene]; Other medication; and Pcn [penicillins]    Review of Systems   Reason unable to perform ROS: Dementia/oriented to name only/not answering simple questions. Vitals:    04/07/20 1300 04/07/20 1311   BP: 145/60    Pulse: 93    Resp: 18    Temp: 98.9 °F (37.2 °C)    SpO2: 93%    Weight:  62.6 kg (138 lb)            Physical Exam  Vitals signs and nursing note reviewed. Constitutional:       Appearance: She is well-developed. Comments: Awake   HENT:      Head: Normocephalic and atraumatic. Eyes:      Pupils: Pupils are equal, round, and reactive to light. Neck:      Musculoskeletal: Normal range of motion and neck supple. Cardiovascular:      Rate and Rhythm: Normal rate and regular rhythm. Heart sounds: Normal heart sounds. No murmur. No friction rub. No gallop. Pulmonary:      Effort: Pulmonary effort is normal. No respiratory distress. Breath sounds: No wheezing or rales. Abdominal:      Palpations: Abdomen is soft. Tenderness:  There is no abdominal tenderness. There is no rebound. Genitourinary:     Comments: Liz catheter was placed by nurses and patient had a liter of urine in her bladder. Musculoskeletal:         General: No tenderness. Comments: Right hip tender; range of motion not tested   Skin:     Findings: No erythema. Neurological:      Mental Status: She is alert. Cranial Nerves: No cranial nerve deficit. Comments: Motor; symmetric   Psychiatric:         Behavior: Behavior normal.          MDM       Procedures          ED EKG interpretation:  Rhythm: normal sinus rhythm and PAC's; and regular . Rate (approx.): 95; Axis: normal; P wave: normal; QRS interval: normal ; ST/T wave: ; in  Lead: III; q  and aVF; q ; Other findings: left ventricular hypertrophy. This EKG was interpreted by Tegan Oswald MD,ED Provider. 1:53 PM          Hospitalist Mckinney Text for Admission  3:53 PM    ED Room Number: ER08/08  Patient Name and age: Jaileneern Loretta [de-identified] y.o.  female  Working Diagnosis:   1.  Closed fracture of right hip, [facility]    2. Dehydration      Readmission: no  Isolation Requirements:  no  Recommended Level of Care:  med/surg  Code Status:  FULL  Other: Orthopedics has seen the patient; Mr. Shaneka Fernandez and Dr. Fabio Ricketts aware  Department:[facility] - [phone]

## 2020-04-07 NOTE — ED TRIAGE NOTES
Pt to ED via EMS from nursing home with R hip fracture. Pt fell on Friday, X-rays came back today confirming fracture. Pt alerted to self only at baseline. Complaining of pain only, no other medical complaints at this time.

## 2020-04-07 NOTE — H&P
[address] [facility] Adult  Hospitalist Group  History and Physical    Primary Care Provider: Danilo Gtz MD  Date of Service:  4/7/2020    Subjective: Ariana Mullins is a [de-identified] y.o. female who is demented at the history cannot be taken from her. History is taken from the chart. She has history of COPD asthma peptic ulcer disease hypertension in the past.  She resides at the nursing home  Per EMS patient had fallen on Friday and today hip x-ray was done and it confirmed that she had a fracture. She has been complaining of pain on right side of the hip. No nausea vomiting diarrhea no shortness of breath. Family available on phone at bedside. Review of Systems:    CANNOT OBTAIN DUE TO DEMENTIA     Past Medical History:   Diagnosis Date    Anemia NEC     Asthma     major asthma as a child/only with colds now    Cancer ([facility]     BCCA removed    Coagulation disorder ([facility] [facility]     on xarelto    Dementia ([facility] [facility]     GERD (gastroesophageal reflux disease) 10/22/2010    Hiatal hernia     Hypertension     hx of blood pressure med for short time    Ill-defined condition     increased cholesterol    Other unknown and unspecified cause of morbidity or mortality     eval with Dr Bean Fletcher for SOB, sept 2015:  results unkown    Stroke ([facility] 2013    no deficits    Stroke ([facility] [facility]     heat exhaustion    Watermelon stomach 2003      Past Surgical History:   Procedure Laterality Date    HX BLADDER SUSPENSION      HX COLONOSCOPY      HX ENDOSCOPY  6/4/15    Dr. Radha Up      vaginal del times 3    HX HERNIA REPAIR  2014    x2, failure    HX OTHER SURGICAL      EGDs    HX TONSILLECTOMY      OR EXC SKIN MALIG >4CM FACE,FACIAL      times 3 - BCCA     Prior to Admission medications    Medication Sig Start Date End Date Taking?  Authorizing Provider   mirtazapine (REMERON) 15 mg tablet TAKE 1/2 TABLET BY MOUTH EVERY DAY AT BEDTIME 3/25/20   Danilo Gtz MD   ALPRAZolam Sonali Zuleta) 0.5 mg tablet Take 1 Tab by mouth three (3) times daily as needed for Anxiety. Max Daily Amount: 1.5 mg. 2/12/20   Cricket Ritchie MD   QUEtiapine (SEROQUEL) 25 mg tablet Take 0.5 Tabs by mouth every eight (8) hours as needed for Other (agitation). 2/12/20   Cricket Ritchie MD   omeprazole (PRILOSEC) 20 mg capsule TAKE 1 CAPSULE BY MOUTH EVERY DAY 11/10/19   Moustapha Martinez III, MD   polyethylene glycol (MIRALAX) 17 gram/dose powder Take 17 g by mouth as needed. Provider, Historical   ferrous sulfate 325 mg (65 mg iron) tablet Take 1 Tab by mouth Daily (before breakfast). 7/3/19   Moustapha Martinez III, MD   ondansetron hcl (ZOFRAN) 4 mg tablet Take 1 Tab by mouth every eight (8) hours as needed for Nausea. 1/11/19   Ila Orozco MD   famotidine (PEPCID PO) Take  by mouth. Provider, Historical   aspirin delayed-release 81 mg tablet Take  by mouth daily. Provider, Historical   acetaminophen (TYLENOL) 325 mg tablet Take  by mouth every four (4) hours as needed for Pain. Provider, Historical   midodrine (PROAMITINE) 10 mg tablet Take  by mouth two (2) times a day. Provider, Historical   pravastatin (PRAVACHOL) 20 mg tablet TAKE 1 TABLET BY MOUTH AT BEDTIME 10/17/18   Moustapha Martinez III, MD   saliva substitute combo no.9 (BIOTENE DRY MOUTH ORAL RINSE MM) by Mucous Membrane route. Provider, Historical   melatonin tab tablet Take 1 Tab by mouth nightly. 7/2/18   Seng Albarado MD   cholecalciferol, vitamin d3, (VITAMIN D) 1,000 unit tablet Take 2,000 Units by mouth daily. Provider, Historical   MULTIVITAMINS (MULTIVITAMIN PO) Take 1 Tab by mouth daily.     Provider, Historical     Allergies   Allergen Reactions    Latex Hives     Latex tape    Codeine Nausea and Vomiting    Darvon [Propoxyphene] Nausea and Vomiting    Other Medication Unknown (comments)     Flu shot (per )    Pcn [Penicillins] Hives      Family History   Problem Relation Age of Onset    COPD Mother     Asthma Mother    Senait Blandon Heart Attack Father     Other Father         peptic ulcer disease    Anesth Problems Neg Hx         SOCIAL HISTORY:  Patient resides at Home. Patient ambulates with . Smoking history: never  Alcohol history:         Objective:       Physical Exam:   Visit Vitals  /73   Pulse 94   Temp 98.9 °F (37.2 °C)   Resp 20   Wt 62.6 kg (138 lb)   SpO2 95%   BMI 23.69 kg/m²     General:  Alert,CONFUSED    Head:  Normocephalic, without obvious abnormality, atraumatic. Eyes:  Conjunctivae/corneas clear. PERRL, EOMs intact. Fundi benign. Ears:  Normal TMs and external ear canals both ears. Nose: Nares normal. Septum midline. Mucosa normal. No drainage or sinus tenderness. Throat: Lips, mucosa, and tongue normal. Teeth and gums normal.   Neck: Supple, symmetrical, trachea midline, no adenopathy, thyroid: no enlargement/tenderness/nodules, no carotid bruit and no JVD. Back:   Symmetric, no curvature. ROM normal. No CVA tenderness. Lungs:   Clear to auscultation bilaterally. Chest wall:  No tenderness or deformity. Heart:  Regular rate and rhythm, S1, S2 normal, no murmur, click, rub or gallop. Breast Exam:  No tenderness, masses, or nipple abnormality. Abdomen:   Soft, non-tender. Bowel sounds normal. No masses,  No organomegaly. Genitalia:  Normal female without lesion, discharge or tenderness. Rectal:  Normal tone,  no masses or tenderness  Guaiac negative stool. Extremities: EXT ROTATED LEG    Pulses: 2+ and symmetric all extremities. Skin: Skin color, texture, turgor normal. No rashes or lesions. Lymph nodes: Cervical, supraclavicular, and axillary nodes normal.   Neurologic: CONFUSED      Cap refill: Brisk, less than 3 seconds  Pulses: 2+, symmetric in all extremities    ECG: LVH no ST-T wave depression seen    Data Review: All diagnostic labs and studies have been reviewed. Chest x-ray was negative for infiltrate, effusion, pneumothorax, or wide mediastinum.     Assessment: Active Problems:    Hip fracture ([facility] (4/7/2020)        Plan:     1. Hip fracture due to ground-level fall  Acute versus subacute right femoral neck fracture  Ortho has seen the patient and will do surgical management. We were asked to see the patient for preop clearance. Per chart review she does not have any cardiac histORY . Test x-ray without any acute abnormalities. EKG looks normal.  We will get 1 troponin.   Talked to the  and he says no heart condition at all , non smoker, no copd   Stroke many years ago     She is low risk for mod risk surgery    #2 hyperlipidemia  Continue statin    #3 depression    Remeron and Xanax  Continue the same  Seroquel as needed for confusion agitation      #4 DVT prophylaxis  Start Lovenox postop        FUNCTIONAL STATUS PRIOR TO HOSPITALIZATION (including history of recent falls):     Signed By: Corinne Johnson MD     April 7, 2020

## 2020-04-07 NOTE — PROGRESS NOTES
TRANSFER - IN REPORT:    Verbal report received from [place]) on Matilda Todd  being received from ED(unit) for routine progression of care      Report consisted of patients Situation, Background, Assessment and   Recommendations(SBAR). Information from the following report(s) SBAR was reviewed with the receiving nurse. Opportunity for questions and clarification was provided. Assessment completed upon patients arrival to unit and care assumed.

## 2020-04-07 NOTE — CONSULTS
ORTHOPAEDIC CONSULT NOTE    Subjective:     Date of Consultation:  April 7, 2020  Referring Physician:  Toshia Nielsen is a [de-identified] y.o. female with PMH significant for prior CVAs, and advancing dementia is seen for right hip pain. Patient with advanced dementia and is unable to provide any useful history so chart review was utilized as well as speaking with her  Mireya Bush [facility] [phone]) as well as her son Mireya Bush (physician in [place] [phone]) who relay that mother has had significant advancing dementia ove rthe past 6mos or so. She is able to walk some with assistance and a walker but son indicates this is minimal. She has a daily private caregiver who assists her primarily. She reportedly had an unwitnessed fall a few days ago and then ultimately had an x-ray showing a fractured hip for which she was sent here for management.      Patient Active Problem List    Diagnosis Date Noted    Pulmonary hypertension ([facility] [address] 10/05/2015     Priority: 1 - One    Shortness of breath on exertion 10/05/2015     Priority: 1 - One    Aortic valve regurgitation 10/05/2015     Priority: 1 - One    Generalized weakness 02/07/2020    Status post gastrostomy ([address] 03/08/2018    Blood loss anemia 10/29/2015    Advance care planning 09/24/2015    Chronic cholecystitis 02/10/2015    Paraesophageal hernia 02/10/2015    Moderate protein-calorie malnutrition ([address] 02/10/2015    CVA (cerebral vascular accident) ([facility] [address] 01/13/2014    Anemia 12/30/2013    Watermelon stomach 10/22/2010    GERD (gastroesophageal reflux disease) 10/22/2010     Family History   Problem Relation Age of Onset    COPD Mother     Asthma Mother     Heart Attack Father     Other Father         peptic ulcer disease    Anesth Problems Neg Hx       Social History     Tobacco Use    Smoking status: Never Smoker    Smokeless tobacco: Never Used   Substance Use Topics    Alcohol use: No     Past Medical History:   Diagnosis Date    Anemia NEC  Asthma     major asthma as a child/only with colds now    Cancer ([facility] [address] [facility]     BCCA removed    Coagulation disorder ([facility] [address] [facility]     on xarelto    Dementia ([facility] [facility]     GERD (gastroesophageal reflux disease) 10/22/2010    Hiatal hernia     Hypertension     hx of blood pressure med for short time    Ill-defined condition     increased cholesterol    Other unknown and unspecified cause of morbidity or mortality     eval with Dr Aftab Cavazos for SOB, sept 2015:  results unkown    Stroke ([facility] [address] 2013    no deficits    Stroke ([facility] [address]     heat exhaustion    Watermelon stomach 2003      Past Surgical History:   Procedure Laterality Date    HX BLADDER SUSPENSION      HX COLONOSCOPY      HX ENDOSCOPY  6/4/15    Dr. Lynette Bear      vaginal del times 3    HX HERNIA REPAIR  2014    x2, failure    HX OTHER SURGICAL      EGDs    HX TONSILLECTOMY      NH EXC SKIN MALIG >4CM FACE,FACIAL      times 3 - BCCA      Prior to Admission medications    Medication Sig Start Date End Date Taking? Authorizing Provider   mirtazapine (REMERON) 15 mg tablet TAKE 1/2 TABLET BY MOUTH EVERY DAY AT BEDTIME 3/25/20   Abby Liu III, MD   ALPRAZolam Sanjana Kramer) 0.5 mg tablet Take 1 Tab by mouth three (3) times daily as needed for Anxiety. Max Daily Amount: 1.5 mg. 2/12/20   Arun Peterson MD   QUEtiapine (SEROQUEL) 25 mg tablet Take 0.5 Tabs by mouth every eight (8) hours as needed for Other (agitation). 2/12/20   Arun Peterson MD   omeprazole (PRILOSEC) 20 mg capsule TAKE 1 CAPSULE BY MOUTH EVERY DAY 11/10/19   Abby Liu III, MD   polyethylene glycol (MIRALAX) 17 gram/dose powder Take 17 g by mouth as needed. Provider, Historical   ferrous sulfate 325 mg (65 mg iron) tablet Take 1 Tab by mouth Daily (before breakfast). 7/3/19   Abby Liu III, MD   ondansetron hcl (ZOFRAN) 4 mg tablet Take 1 Tab by mouth every eight (8) hours as needed for Nausea. 1/11/19   Yrn Ahn MD   famotidine (PEPCID PO) Take  by mouth. Provider, Historical   aspirin delayed-release 81 mg tablet Take  by mouth daily. Provider, Historical   acetaminophen (TYLENOL) 325 mg tablet Take  by mouth every four (4) hours as needed for Pain. Provider, Historical   midodrine (PROAMITINE) 10 mg tablet Take  by mouth two (2) times a day. Provider, Historical   pravastatin (PRAVACHOL) 20 mg tablet TAKE 1 TABLET BY MOUTH AT BEDTIME 10/17/18   Tracy Nayak III, MD   saliva substitute combo no.9 (BIOTENE DRY MOUTH ORAL RINSE MM) by Mucous Membrane route. Provider, Historical   melatonin tab tablet Take 1 Tab by mouth nightly. 7/2/18   Vidal Martinez MD   cholecalciferol, vitamin d3, (VITAMIN D) 1,000 unit tablet Take 2,000 Units by mouth daily. Provider, Historical   MULTIVITAMINS (MULTIVITAMIN PO) Take 1 Tab by mouth daily. Provider, Historical     Current Facility-Administered Medications   Medication Dose Route Frequency    sodium chloride 0.9 % bolus infusion 1,000 mL  1,000 mL IntraVENous ONCE     Current Outpatient Medications   Medication Sig    mirtazapine (REMERON) 15 mg tablet TAKE 1/2 TABLET BY MOUTH EVERY DAY AT BEDTIME    ALPRAZolam (XANAX) 0.5 mg tablet Take 1 Tab by mouth three (3) times daily as needed for Anxiety. Max Daily Amount: 1.5 mg.    QUEtiapine (SEROQUEL) 25 mg tablet Take 0.5 Tabs by mouth every eight (8) hours as needed for Other (agitation).  omeprazole (PRILOSEC) 20 mg capsule TAKE 1 CAPSULE BY MOUTH EVERY DAY    polyethylene glycol (MIRALAX) 17 gram/dose powder Take 17 g by mouth as needed.  ferrous sulfate 325 mg (65 mg iron) tablet Take 1 Tab by mouth Daily (before breakfast).  ondansetron hcl (ZOFRAN) 4 mg tablet Take 1 Tab by mouth every eight (8) hours as needed for Nausea.  famotidine (PEPCID PO) Take  by mouth.  aspirin delayed-release 81 mg tablet Take  by mouth daily.  acetaminophen (TYLENOL) 325 mg tablet Take  by mouth every four (4) hours as needed for Pain.     midodrine (PROAMITINE) 10 mg tablet Take  by mouth two (2) times a day.  pravastatin (PRAVACHOL) 20 mg tablet TAKE 1 TABLET BY MOUTH AT BEDTIME    saliva substitute combo no.9 (BIOTENE DRY MOUTH ORAL RINSE MM) by Mucous Membrane route.  melatonin tab tablet Take 1 Tab by mouth nightly.  cholecalciferol, vitamin d3, (VITAMIN D) 1,000 unit tablet Take 2,000 Units by mouth daily.  MULTIVITAMINS (MULTIVITAMIN PO) Take 1 Tab by mouth daily. Allergies   Allergen Reactions    Latex Hives     Latex tape    Codeine Nausea and Vomiting    Darvon [Propoxyphene] Nausea and Vomiting    Other Medication Unknown (comments)     Flu shot (per )    Pcn [Penicillins] Hives        Review of Systems:  Pertinent items are noted in HPI. Mental Status: severe dementia    Objective:     Patient Vitals for the past 8 hrs:   BP Temp Pulse Resp SpO2 Weight   20 1445 141/68  90 19 96 %    20 1330 126/65  97 20 95 %    20 1311      62.6 kg (138 lb)   20 1300 145/60 98.9 °F (37.2 °C) 93 18 93 %      Temp (24hrs), Av.9 °F (37.2 °C), Min:98.9 °F (37.2 °C), Max:98.9 °F (37.2 °C)      EXAM: Awake and oriented to self only; appears uncomfortable  Right leg shortened and externally rotated  Pain with palpation and gentle log roll  Right knee with mild swelling noted but no ecchymosis or erythema  Left knee with splotchy old appearing ecchymosis about the knee globally but not significant TTP  Does not respond to sensory or motor function requests  Moves sponatneously    Imaging Review: EXAM: XR HIP RT W OR WO PELV 2-3 VWS     INDICATION: fall.     COMPARISON: CT abdomen pelvis 2020.     FINDINGS: An AP view of the pelvis and a frogleg lateral view of the right hip  demonstrate an acute displaced subcapital right femoral neck fracture with  foreshortening and varus angulation. The femoral head remains within the right  acetabulum. No other acute fractures are identified. Osteopenia. Vascular  calcifications. Catheter in the pelvis. Degenerative changes of the lower lumbar  spine.     IMPRESSION  IMPRESSION:   1. Acute displaced subcapital right femoral neck fracture with foreshortening  and varus angulation. Labs:   Recent Results (from the past 24 hour(s))   CBC WITH AUTOMATED DIFF    Collection Time: 04/07/20  1:08 PM   Result Value Ref Range    WBC 13.3 (H) 3.6 - 11.0 K/uL    RBC 3.49 (L) 3.80 - 5.20 M/uL    HGB 11.6 11.5 - 16.0 g/dL    HCT 36.3 35.0 - 47.0 %    .0 (H) 80.0 - 99.0 FL    MCH 33.2 26.0 - 34.0 PG    MCHC 32.0 30.0 - 36.5 g/dL    RDW 14.2 11.5 - 14.5 %    PLATELET 017 250 - 902 K/uL    MPV 9.9 8.9 - 12.9 FL    NRBC 0.0 0  WBC    ABSOLUTE NRBC 0.00 0.00 - 0.01 K/uL    NEUTROPHILS 85 (H) 32 - 75 %    LYMPHOCYTES 7 (L) 12 - 49 %    MONOCYTES 7 5 - 13 %    EOSINOPHILS 0 0 - 7 %    BASOPHILS 0 0 - 1 %    IMMATURE GRANULOCYTES 1 (H) 0.0 - 0.5 %    ABS. NEUTROPHILS 11.3 (H) 1.8 - 8.0 K/UL    ABS. LYMPHOCYTES 0.9 0.8 - 3.5 K/UL    ABS. MONOCYTES 1.0 0.0 - 1.0 K/UL    ABS. EOSINOPHILS 0.0 0.0 - 0.4 K/UL    ABS. BASOPHILS 0.0 0.0 - 0.1 K/UL    ABS. IMM. GRANS. 0.1 (H) 0.00 - 0.04 K/UL    DF AUTOMATED     METABOLIC PANEL, COMPREHENSIVE    Collection Time: 04/07/20  1:08 PM   Result Value Ref Range    Sodium 139 136 - 145 mmol/L    Potassium 4.0 3.5 - 5.1 mmol/L    Chloride 107 97 - 108 mmol/L    CO2 25 21 - 32 mmol/L    Anion gap 7 5 - 15 mmol/L    Glucose 185 (H) 65 - 100 mg/dL    BUN 37 (H) 6 - 20 MG/DL    Creatinine 1.03 (H) 0.55 - 1.02 MG/DL    BUN/Creatinine ratio 36 (H) 12 - 20      GFR est AA >60 >60 ml/min/1.73m2    GFR est non-AA 52 (L) >60 ml/min/1.73m2    Calcium 9.5 8.5 - 10.1 MG/DL    Bilirubin, total 0.6 0.2 - 1.0 MG/DL    ALT (SGPT) 26 12 - 78 U/L    AST (SGOT) 36 15 - 37 U/L    Alk.  phosphatase 52 45 - 117 U/L    Protein, total 7.0 6.4 - 8.2 g/dL    Albumin 2.7 (L) 3.5 - 5.0 g/dL    Globulin 4.3 (H) 2.0 - 4.0 g/dL    A-G Ratio 0.6 (L) 1.1 - 2.2     EKG, 12 LEAD, INITIAL    Collection Time: 04/07/20  1:09 PM   Result Value Ref Range    Ventricular Rate 96 BPM    Atrial Rate 96 BPM    P-R Interval 118 ms    QRS Duration 82 ms    Q-T Interval 322 ms    QTC Calculation (Bezet) 406 ms    Calculated P Axis 13 degrees    Calculated R Axis -10 degrees    Calculated T Axis 45 degrees    Diagnosis       Sinus rhythm with premature atrial complexes  Left ventricular hypertrophy  Inferior infarct (cited on or before 07-FEB-2020)  When compared with ECG of 07-FEB-2020 11:57,  premature atrial complexes are now present  Vent. rate has increased BY  34 BPM  ST now depressed in Lateral leads     URINALYSIS W/MICROSCOPIC    Collection Time: 04/07/20  2:00 PM   Result Value Ref Range    Color YELLOW/STRAW      Appearance CLEAR CLEAR      Specific gravity 1.022 1.003 - 1.030      pH (UA) 6.0 5.0 - 8.0      Protein 30 (A) NEG mg/dL    Glucose 100 (A) NEG mg/dL    Ketone Negative NEG mg/dL    Bilirubin Negative NEG      Blood SMALL (A) NEG      Urobilinogen 0.2 0.2 - 1.0 EU/dL    Nitrites Negative NEG      Leukocyte Esterase Negative NEG      WBC 0-4 0 - 4 /hpf    RBC 0-5 0 - 5 /hpf    Epithelial cells FEW FEW /lpf    Bacteria Negative NEG /hpf    Hyaline cast 0-2 0 - 5 /lpf   URINE CULTURE HOLD SAMPLE    Collection Time: 04/07/20  2:00 PM   Result Value Ref Range    Urine culture hold        Urine on hold in Microbiology dept for 2 days. If unpreserved urine is submitted, it cannot be used for addtional testing after 24 hours, recollection will be required. SAMPLES BEING HELD    Collection Time: 04/07/20  2:05 PM   Result Value Ref Range    SAMPLES BEING HELD 1RED,1BLU,2BC(SILVER)     COMMENT        Add-on orders for these samples will be processed based on acceptable specimen integrity and analyte stability, which may vary by analyte. Impression:     Active Problems:    * No active hospital problems.  *      Plan:     Acute vs subacute right femoral neck fracture - will require surgical fixation; have spoken with  Obdulio Arnett [facility] [place] [facility]) as well as son Obdulio Arnett (physician out of state) regarding risks and benefits of fixation via hemiarthroplasty and both are in agreement  Admit to Medicine for pre-op eval and medical management  Pain control for now  Bed rest  Liz placement completed due to retention on arrival  NPO after midnight tonight  Consent for Right hip hemiarthroplasty w Dr Joy Weathers  To OR Wed late morning assuming no clearance issues (posted)    Dr Yakov Fields aware of patient and in agreement with current plan of care    Kelle Berg PA-C  Orthopedic Trauma Service  [address]

## 2020-04-07 NOTE — PROGRESS NOTES
Care Management     Transition of Care Plan:        · RUR 19%  · Plan OR tomorrow. · Await PT and OT evaluation and recommendations. · Probable SNF ([address]; 2nd [facility]a [place]       [id number] to 2- Inpatient at [facility].   2- Patient went to [facility]. Per  she was there about 2 weeks and returned to [facility] assisted living. Spoke with patient's  on the phone. Patient lives in [facility] assisted living. She is oriented to place and who she is at times. She is rarely oriented to time. She has an aid Debsyed Lennox) from [facility], Monday to Friday for 8 hours a day.  said patient fell while Sushma Jewel was not there. Sushma Jewel helps patient's with feeding, bathing, and dressing. Patient does walk with a walker and physical assistance. Currently the aid is feeing her. The facility administers patient's medications. Discussed options after surgery and physical therapy. If patient needs SNF at discharge, 's 1st choice is [facility] and his 2nd [facility]. Reason for Admission:    Right femoral neck fracture. Plan right hemiarthroplasty. RUR Score:   19%               PCP: First and Last name:  Dr. Sydnee Cramer   Name of Practice:    Are you a current patient: Yes/No:    Approximate date of last visit:     Do you (patient/family) have any concerns for transition/discharge? Plan for utilizing home health:   TBD, likely SNF    Current Advanced Directive/Advance Care Plan:   On file    Care Management Interventions  PCP Verified by CM: Yes(Dr. Sydnee Cramer III)  Last Visit to PCP: 12/01/19  Palliative Care Criteria Met (RRAT>21 & CHF Dx)?: No  Mode of Transport at Discharge: BLS  Transition of Care Consult (CM Consult): SNF, Discharge Planning  Discharge Durable Medical Equipment: No  Current Support Network: Assisted Living(Patient resides at [address] [place] [address] Provided?: No  Discharge Location  Discharge Placement: Skilled nursing facility    SERJIO Torrez

## 2020-04-08 ENCOUNTER — ANESTHESIA EVENT (OUTPATIENT)
Dept: SURGERY | Age: 80
DRG: 470 | End: 2020-04-08
Payer: MEDICARE

## 2020-04-08 ENCOUNTER — ANESTHESIA (OUTPATIENT)
Dept: SURGERY | Age: 80
DRG: 470 | End: 2020-04-08
Payer: MEDICARE

## 2020-04-08 ENCOUNTER — APPOINTMENT (OUTPATIENT)
Dept: GENERAL RADIOLOGY | Age: 80
DRG: 470 | End: 2020-04-08
Attending: ORTHOPAEDIC SURGERY
Payer: MEDICARE

## 2020-04-08 LAB
BASOPHILS # BLD: 0 K/UL (ref 0–0.1)
BASOPHILS NFR BLD: 0 % (ref 0–1)
DIFFERENTIAL METHOD BLD: ABNORMAL
EOSINOPHIL # BLD: 0.1 K/UL (ref 0–0.4)
EOSINOPHIL NFR BLD: 1 % (ref 0–7)
ERYTHROCYTE [DISTWIDTH] IN BLOOD BY AUTOMATED COUNT: 14 % (ref 11.5–14.5)
GLUCOSE BLD STRIP.AUTO-MCNC: 120 MG/DL (ref 65–100)
HCT VFR BLD AUTO: 33.9 % (ref 35–47)
HGB BLD-MCNC: 10.8 G/DL (ref 11.5–16)
IMM GRANULOCYTES # BLD AUTO: 0.1 K/UL (ref 0–0.04)
IMM GRANULOCYTES NFR BLD AUTO: 1 % (ref 0–0.5)
LYMPHOCYTES # BLD: 1.1 K/UL (ref 0.8–3.5)
LYMPHOCYTES NFR BLD: 9 % (ref 12–49)
MCH RBC QN AUTO: 33 PG (ref 26–34)
MCHC RBC AUTO-ENTMCNC: 31.9 G/DL (ref 30–36.5)
MCV RBC AUTO: 103.7 FL (ref 80–99)
MONOCYTES # BLD: 1.2 K/UL (ref 0–1)
MONOCYTES NFR BLD: 10 % (ref 5–13)
NEUTS SEG # BLD: 9.7 K/UL (ref 1.8–8)
NEUTS SEG NFR BLD: 79 % (ref 32–75)
NRBC # BLD: 0 K/UL (ref 0–0.01)
NRBC BLD-RTO: 0 PER 100 WBC
PLATELET # BLD AUTO: 190 K/UL (ref 150–400)
PMV BLD AUTO: 8.9 FL (ref 8.9–12.9)
RBC # BLD AUTO: 3.27 M/UL (ref 3.8–5.2)
SERVICE CMNT-IMP: ABNORMAL
WBC # BLD AUTO: 12.2 K/UL (ref 3.6–11)

## 2020-04-08 PROCEDURE — 74011250636 HC RX REV CODE- 250/636: Performed by: ORTHOPAEDIC SURGERY

## 2020-04-08 PROCEDURE — 65270000032 HC RM SEMIPRIVATE

## 2020-04-08 PROCEDURE — 0SRR0JA REPLACEMENT OF RIGHT HIP JOINT, FEMORAL SURFACE WITH SYNTHETIC SUBSTITUTE, UNCEMENTED, OPEN APPROACH: ICD-10-PCS | Performed by: ORTHOPAEDIC SURGERY

## 2020-04-08 PROCEDURE — 77030008684 HC TU ET CUF COVD -B: Performed by: NURSE ANESTHETIST, CERTIFIED REGISTERED

## 2020-04-08 PROCEDURE — 76060000034 HC ANESTHESIA 1.5 TO 2 HR: Performed by: ORTHOPAEDIC SURGERY

## 2020-04-08 PROCEDURE — C1776 JOINT DEVICE (IMPLANTABLE): HCPCS | Performed by: ORTHOPAEDIC SURGERY

## 2020-04-08 PROCEDURE — 82962 GLUCOSE BLOOD TEST: CPT

## 2020-04-08 PROCEDURE — 77030008462 HC STPLR SKN PROX J&J -A: Performed by: ORTHOPAEDIC SURGERY

## 2020-04-08 PROCEDURE — 74011250636 HC RX REV CODE- 250/636: Performed by: INTERNAL MEDICINE

## 2020-04-08 PROCEDURE — P9045 ALBUMIN (HUMAN), 5%, 250 ML: HCPCS | Performed by: NURSE ANESTHETIST, CERTIFIED REGISTERED

## 2020-04-08 PROCEDURE — 74011250637 HC RX REV CODE- 250/637: Performed by: INTERNAL MEDICINE

## 2020-04-08 PROCEDURE — 77030040241 HC ABD PLLW HIP MDII -B: Performed by: ORTHOPAEDIC SURGERY

## 2020-04-08 PROCEDURE — 85025 COMPLETE CBC W/AUTO DIFF WBC: CPT

## 2020-04-08 PROCEDURE — 77030041397 HC DRSG PRIMASEAL AG MDII -B: Performed by: ORTHOPAEDIC SURGERY

## 2020-04-08 PROCEDURE — 73501 X-RAY EXAM HIP UNI 1 VIEW: CPT

## 2020-04-08 PROCEDURE — 77030018673: Performed by: ORTHOPAEDIC SURGERY

## 2020-04-08 PROCEDURE — 74011250636 HC RX REV CODE- 250/636: Performed by: ANESTHESIOLOGY

## 2020-04-08 PROCEDURE — 77030018836 HC SOL IRR NACL ICUM -A: Performed by: ORTHOPAEDIC SURGERY

## 2020-04-08 PROCEDURE — 74011000250 HC RX REV CODE- 250: Performed by: NURSE ANESTHETIST, CERTIFIED REGISTERED

## 2020-04-08 PROCEDURE — 76210000016 HC OR PH I REC 1 TO 1.5 HR: Performed by: ORTHOPAEDIC SURGERY

## 2020-04-08 PROCEDURE — 77030018846 HC SOL IRR STRL H20 ICUM -A: Performed by: ORTHOPAEDIC SURGERY

## 2020-04-08 PROCEDURE — 74011250636 HC RX REV CODE- 250/636: Performed by: NURSE ANESTHETIST, CERTIFIED REGISTERED

## 2020-04-08 PROCEDURE — 77030002933 HC SUT MCRYL J&J -A: Performed by: ORTHOPAEDIC SURGERY

## 2020-04-08 PROCEDURE — 77030040922 HC BLNKT HYPOTHRM STRY -A

## 2020-04-08 PROCEDURE — 77030012935 HC DRSG AQUACEL BMS -B: Performed by: ORTHOPAEDIC SURGERY

## 2020-04-08 PROCEDURE — 77030006784 HC BLD SAW OSC MCRA -B: Performed by: ORTHOPAEDIC SURGERY

## 2020-04-08 PROCEDURE — 36415 COLL VENOUS BLD VENIPUNCTURE: CPT

## 2020-04-08 PROCEDURE — 77030026438 HC STYL ET INTUB CARD -A: Performed by: NURSE ANESTHETIST, CERTIFIED REGISTERED

## 2020-04-08 PROCEDURE — 77030011640 HC PAD GRND REM COVD -A: Performed by: ORTHOPAEDIC SURGERY

## 2020-04-08 PROCEDURE — 77030031139 HC SUT VCRL2 J&J -A: Performed by: ORTHOPAEDIC SURGERY

## 2020-04-08 PROCEDURE — 74011000250 HC RX REV CODE- 250: Performed by: ORTHOPAEDIC SURGERY

## 2020-04-08 PROCEDURE — 76010000153 HC OR TIME 1.5 TO 2 HR: Performed by: ORTHOPAEDIC SURGERY

## 2020-04-08 DEVICE — HIP PRI POR BIPLR STD HD ECHO -- H4: Type: IMPLANTABLE DEVICE | Site: HIP | Status: FUNCTIONAL

## 2020-04-08 DEVICE — IMPLANTABLE DEVICE: Type: IMPLANTABLE DEVICE | Site: HIP | Status: FUNCTIONAL

## 2020-04-08 DEVICE — IMPLANTABLE DEVICE
Type: IMPLANTABLE DEVICE | Site: HIP | Status: FUNCTIONAL
Brand: BIOMET HIP SYSTEM

## 2020-04-08 DEVICE — IMPLANTABLE DEVICE
Type: IMPLANTABLE DEVICE | Site: HIP | Status: FUNCTIONAL
Brand: RINGLOC BI-POLAR HIP SYSTEM

## 2020-04-08 RX ORDER — ALBUMIN HUMAN 50 G/1000ML
SOLUTION INTRAVENOUS AS NEEDED
Status: DISCONTINUED | OUTPATIENT
Start: 2020-04-08 | End: 2020-04-08 | Stop reason: HOSPADM

## 2020-04-08 RX ORDER — SODIUM CHLORIDE 0.9 % (FLUSH) 0.9 %
5-40 SYRINGE (ML) INJECTION AS NEEDED
Status: DISCONTINUED | OUTPATIENT
Start: 2020-04-08 | End: 2020-04-20 | Stop reason: HOSPADM

## 2020-04-08 RX ORDER — FENTANYL CITRATE 50 UG/ML
25 INJECTION, SOLUTION INTRAMUSCULAR; INTRAVENOUS
Status: DISCONTINUED | OUTPATIENT
Start: 2020-04-08 | End: 2020-04-08 | Stop reason: HOSPADM

## 2020-04-08 RX ORDER — SODIUM CHLORIDE, SODIUM LACTATE, POTASSIUM CHLORIDE, CALCIUM CHLORIDE 600; 310; 30; 20 MG/100ML; MG/100ML; MG/100ML; MG/100ML
75 INJECTION, SOLUTION INTRAVENOUS CONTINUOUS
Status: DISCONTINUED | OUTPATIENT
Start: 2020-04-08 | End: 2020-04-08 | Stop reason: HOSPADM

## 2020-04-08 RX ORDER — ONDANSETRON 2 MG/ML
INJECTION INTRAMUSCULAR; INTRAVENOUS AS NEEDED
Status: DISCONTINUED | OUTPATIENT
Start: 2020-04-08 | End: 2020-04-08 | Stop reason: HOSPADM

## 2020-04-08 RX ORDER — PROPOFOL 10 MG/ML
INJECTION, EMULSION INTRAVENOUS AS NEEDED
Status: DISCONTINUED | OUTPATIENT
Start: 2020-04-08 | End: 2020-04-08 | Stop reason: HOSPADM

## 2020-04-08 RX ORDER — CEFAZOLIN SODIUM 1 G/3ML
INJECTION, POWDER, FOR SOLUTION INTRAMUSCULAR; INTRAVENOUS AS NEEDED
Status: DISCONTINUED | OUTPATIENT
Start: 2020-04-08 | End: 2020-04-08 | Stop reason: HOSPADM

## 2020-04-08 RX ORDER — SODIUM CHLORIDE 9 MG/ML
50 INJECTION, SOLUTION INTRAVENOUS CONTINUOUS
Status: DISCONTINUED | OUTPATIENT
Start: 2020-04-08 | End: 2020-04-08 | Stop reason: HOSPADM

## 2020-04-08 RX ORDER — MIDAZOLAM HYDROCHLORIDE 1 MG/ML
0.5 INJECTION, SOLUTION INTRAMUSCULAR; INTRAVENOUS
Status: DISCONTINUED | OUTPATIENT
Start: 2020-04-08 | End: 2020-04-08 | Stop reason: HOSPADM

## 2020-04-08 RX ORDER — FENTANYL CITRATE 50 UG/ML
50 INJECTION, SOLUTION INTRAMUSCULAR; INTRAVENOUS AS NEEDED
Status: DISCONTINUED | OUTPATIENT
Start: 2020-04-08 | End: 2020-04-08 | Stop reason: HOSPADM

## 2020-04-08 RX ORDER — MIDAZOLAM HYDROCHLORIDE 1 MG/ML
1 INJECTION, SOLUTION INTRAMUSCULAR; INTRAVENOUS AS NEEDED
Status: DISCONTINUED | OUTPATIENT
Start: 2020-04-08 | End: 2020-04-08 | Stop reason: HOSPADM

## 2020-04-08 RX ORDER — HYDROMORPHONE HYDROCHLORIDE 1 MG/ML
0.2 INJECTION, SOLUTION INTRAMUSCULAR; INTRAVENOUS; SUBCUTANEOUS
Status: DISCONTINUED | OUTPATIENT
Start: 2020-04-08 | End: 2020-04-08 | Stop reason: HOSPADM

## 2020-04-08 RX ORDER — SODIUM CHLORIDE 0.9 % (FLUSH) 0.9 %
5-40 SYRINGE (ML) INJECTION AS NEEDED
Status: DISCONTINUED | OUTPATIENT
Start: 2020-04-08 | End: 2020-04-08 | Stop reason: HOSPADM

## 2020-04-08 RX ORDER — DIPHENHYDRAMINE HYDROCHLORIDE 50 MG/ML
12.5 INJECTION, SOLUTION INTRAMUSCULAR; INTRAVENOUS AS NEEDED
Status: DISCONTINUED | OUTPATIENT
Start: 2020-04-08 | End: 2020-04-08 | Stop reason: HOSPADM

## 2020-04-08 RX ORDER — CEFAZOLIN SODIUM/WATER 2 G/20 ML
2 SYRINGE (ML) INTRAVENOUS EVERY 8 HOURS
Status: COMPLETED | OUTPATIENT
Start: 2020-04-08 | End: 2020-04-09

## 2020-04-08 RX ORDER — SODIUM CHLORIDE 0.9 % (FLUSH) 0.9 %
5-40 SYRINGE (ML) INJECTION EVERY 8 HOURS
Status: DISCONTINUED | OUTPATIENT
Start: 2020-04-08 | End: 2020-04-08 | Stop reason: HOSPADM

## 2020-04-08 RX ORDER — MORPHINE SULFATE 10 MG/ML
2 INJECTION, SOLUTION INTRAMUSCULAR; INTRAVENOUS
Status: DISCONTINUED | OUTPATIENT
Start: 2020-04-08 | End: 2020-04-08 | Stop reason: HOSPADM

## 2020-04-08 RX ORDER — FENTANYL CITRATE 50 UG/ML
INJECTION, SOLUTION INTRAMUSCULAR; INTRAVENOUS AS NEEDED
Status: DISCONTINUED | OUTPATIENT
Start: 2020-04-08 | End: 2020-04-08 | Stop reason: HOSPADM

## 2020-04-08 RX ORDER — DEXAMETHASONE SODIUM PHOSPHATE 4 MG/ML
INJECTION, SOLUTION INTRA-ARTICULAR; INTRALESIONAL; INTRAMUSCULAR; INTRAVENOUS; SOFT TISSUE AS NEEDED
Status: DISCONTINUED | OUTPATIENT
Start: 2020-04-08 | End: 2020-04-08 | Stop reason: HOSPADM

## 2020-04-08 RX ORDER — SODIUM CHLORIDE, SODIUM LACTATE, POTASSIUM CHLORIDE, CALCIUM CHLORIDE 600; 310; 30; 20 MG/100ML; MG/100ML; MG/100ML; MG/100ML
INJECTION, SOLUTION INTRAVENOUS
Status: DISCONTINUED | OUTPATIENT
Start: 2020-04-08 | End: 2020-04-08 | Stop reason: HOSPADM

## 2020-04-08 RX ORDER — LIDOCAINE HYDROCHLORIDE 10 MG/ML
0.1 INJECTION, SOLUTION EPIDURAL; INFILTRATION; INTRACAUDAL; PERINEURAL AS NEEDED
Status: DISCONTINUED | OUTPATIENT
Start: 2020-04-08 | End: 2020-04-08 | Stop reason: HOSPADM

## 2020-04-08 RX ORDER — NALOXONE HYDROCHLORIDE 0.4 MG/ML
0.4 INJECTION, SOLUTION INTRAMUSCULAR; INTRAVENOUS; SUBCUTANEOUS AS NEEDED
Status: DISCONTINUED | OUTPATIENT
Start: 2020-04-08 | End: 2020-04-20 | Stop reason: HOSPADM

## 2020-04-08 RX ORDER — ENOXAPARIN SODIUM 100 MG/ML
40 INJECTION SUBCUTANEOUS DAILY
Status: DISCONTINUED | OUTPATIENT
Start: 2020-04-09 | End: 2020-04-16

## 2020-04-08 RX ORDER — SODIUM CHLORIDE 9 MG/ML
125 INJECTION, SOLUTION INTRAVENOUS CONTINUOUS
Status: DISCONTINUED | OUTPATIENT
Start: 2020-04-08 | End: 2020-04-09

## 2020-04-08 RX ORDER — FERROUS SULFATE, DRIED 160(50) MG
1 TABLET, EXTENDED RELEASE ORAL
Status: DISCONTINUED | OUTPATIENT
Start: 2020-04-09 | End: 2020-04-20 | Stop reason: HOSPADM

## 2020-04-08 RX ORDER — ONDANSETRON 2 MG/ML
4 INJECTION INTRAMUSCULAR; INTRAVENOUS AS NEEDED
Status: DISCONTINUED | OUTPATIENT
Start: 2020-04-08 | End: 2020-04-08 | Stop reason: HOSPADM

## 2020-04-08 RX ORDER — SUCCINYLCHOLINE CHLORIDE 20 MG/ML
INJECTION INTRAMUSCULAR; INTRAVENOUS AS NEEDED
Status: DISCONTINUED | OUTPATIENT
Start: 2020-04-08 | End: 2020-04-08 | Stop reason: HOSPADM

## 2020-04-08 RX ORDER — SODIUM CHLORIDE 0.9 % (FLUSH) 0.9 %
5-40 SYRINGE (ML) INJECTION EVERY 8 HOURS
Status: DISCONTINUED | OUTPATIENT
Start: 2020-04-08 | End: 2020-04-20 | Stop reason: HOSPADM

## 2020-04-08 RX ORDER — AMOXICILLIN 250 MG
1 CAPSULE ORAL 2 TIMES DAILY
Status: DISCONTINUED | OUTPATIENT
Start: 2020-04-08 | End: 2020-04-20 | Stop reason: HOSPADM

## 2020-04-08 RX ORDER — LIDOCAINE HYDROCHLORIDE 20 MG/ML
INJECTION, SOLUTION EPIDURAL; INFILTRATION; INTRACAUDAL; PERINEURAL AS NEEDED
Status: DISCONTINUED | OUTPATIENT
Start: 2020-04-08 | End: 2020-04-08 | Stop reason: HOSPADM

## 2020-04-08 RX ORDER — OXYCODONE AND ACETAMINOPHEN 5; 325 MG/1; MG/1
1 TABLET ORAL AS NEEDED
Status: DISCONTINUED | OUTPATIENT
Start: 2020-04-08 | End: 2020-04-08 | Stop reason: HOSPADM

## 2020-04-08 RX ORDER — EPHEDRINE SULFATE/0.9% NACL/PF 50 MG/5 ML
SYRINGE (ML) INTRAVENOUS AS NEEDED
Status: DISCONTINUED | OUTPATIENT
Start: 2020-04-08 | End: 2020-04-08 | Stop reason: HOSPADM

## 2020-04-08 RX ORDER — ROCURONIUM BROMIDE 10 MG/ML
INJECTION, SOLUTION INTRAVENOUS AS NEEDED
Status: DISCONTINUED | OUTPATIENT
Start: 2020-04-08 | End: 2020-04-08 | Stop reason: HOSPADM

## 2020-04-08 RX ORDER — FACIAL-BODY WIPES
10 EACH TOPICAL DAILY PRN
Status: DISCONTINUED | OUTPATIENT
Start: 2020-04-10 | End: 2020-04-20 | Stop reason: HOSPADM

## 2020-04-08 RX ORDER — PHENYLEPHRINE HCL IN 0.9% NACL 0.4MG/10ML
SYRINGE (ML) INTRAVENOUS AS NEEDED
Status: DISCONTINUED | OUTPATIENT
Start: 2020-04-08 | End: 2020-04-08 | Stop reason: HOSPADM

## 2020-04-08 RX ORDER — POLYETHYLENE GLYCOL 3350 17 G/17G
17 POWDER, FOR SOLUTION ORAL DAILY
Status: DISCONTINUED | OUTPATIENT
Start: 2020-04-09 | End: 2020-04-20 | Stop reason: HOSPADM

## 2020-04-08 RX ORDER — ONDANSETRON 2 MG/ML
4 INJECTION INTRAMUSCULAR; INTRAVENOUS
Status: ACTIVE | OUTPATIENT
Start: 2020-04-08 | End: 2020-04-09

## 2020-04-08 RX ADMIN — Medication 120 MCG: at 13:31

## 2020-04-08 RX ADMIN — Medication 10 ML: at 21:32

## 2020-04-08 RX ADMIN — MORPHINE SULFATE 1 MG: 2 INJECTION, SOLUTION INTRAMUSCULAR; INTRAVENOUS at 18:00

## 2020-04-08 RX ADMIN — ROCURONIUM BROMIDE 20 MG: 10 SOLUTION INTRAVENOUS at 13:31

## 2020-04-08 RX ADMIN — MIRTAZAPINE 7.5 MG: 15 TABLET, FILM COATED ORAL at 21:32

## 2020-04-08 RX ADMIN — FENTANYL CITRATE 25 MCG: 50 INJECTION, SOLUTION INTRAMUSCULAR; INTRAVENOUS at 14:04

## 2020-04-08 RX ADMIN — Medication 10 ML: at 06:42

## 2020-04-08 RX ADMIN — DEXAMETHASONE SODIUM PHOSPHATE 2 MG: 4 INJECTION, SOLUTION INTRAMUSCULAR; INTRAVENOUS at 14:18

## 2020-04-08 RX ADMIN — Medication 120 MCG: at 13:50

## 2020-04-08 RX ADMIN — SUCCINYLCHOLINE CHLORIDE 50 MG: 20 INJECTION, SOLUTION INTRAMUSCULAR; INTRAVENOUS at 13:31

## 2020-04-08 RX ADMIN — ROCURONIUM BROMIDE 20 MG: 10 SOLUTION INTRAVENOUS at 14:07

## 2020-04-08 RX ADMIN — SODIUM CHLORIDE, POTASSIUM CHLORIDE, SODIUM LACTATE AND CALCIUM CHLORIDE: 600; 310; 30; 20 INJECTION, SOLUTION INTRAVENOUS at 13:13

## 2020-04-08 RX ADMIN — PRAVASTATIN SODIUM 20 MG: 20 TABLET ORAL at 21:31

## 2020-04-08 RX ADMIN — SUGAMMADEX 125 MG: 100 INJECTION, SOLUTION INTRAVENOUS at 14:36

## 2020-04-08 RX ADMIN — Medication 20 MG: at 13:50

## 2020-04-08 RX ADMIN — ALBUMIN (HUMAN) 250 ML: 12.5 INJECTION, SOLUTION INTRAVENOUS at 14:40

## 2020-04-08 RX ADMIN — CEFAZOLIN 1500 MG: 330 INJECTION, POWDER, FOR SOLUTION INTRAMUSCULAR; INTRAVENOUS at 14:01

## 2020-04-08 RX ADMIN — MIDODRINE HYDROCHLORIDE 10 MG: 5 TABLET ORAL at 06:41

## 2020-04-08 RX ADMIN — MORPHINE SULFATE 1 MG: 2 INJECTION, SOLUTION INTRAMUSCULAR; INTRAVENOUS at 11:42

## 2020-04-08 RX ADMIN — Medication 10 MG: at 13:59

## 2020-04-08 RX ADMIN — FAMOTIDINE 20 MG: 20 TABLET ORAL at 09:37

## 2020-04-08 RX ADMIN — LIDOCAINE HYDROCHLORIDE 40 MG: 20 INJECTION, SOLUTION EPIDURAL; INFILTRATION; INTRACAUDAL; PERINEURAL at 13:31

## 2020-04-08 RX ADMIN — PHENYLEPHRINE HYDROCHLORIDE 100 MCG: 10 INJECTION INTRAVENOUS at 13:31

## 2020-04-08 RX ADMIN — MORPHINE SULFATE 1 MG: 2 INJECTION, SOLUTION INTRAMUSCULAR; INTRAVENOUS at 22:30

## 2020-04-08 RX ADMIN — SODIUM CHLORIDE 10 ML: 9 INJECTION INTRAMUSCULAR; INTRAVENOUS; SUBCUTANEOUS at 18:05

## 2020-04-08 RX ADMIN — PROPOFOL 30 MG: 10 INJECTION, EMULSION INTRAVENOUS at 13:29

## 2020-04-08 RX ADMIN — FENTANYL CITRATE 25 MCG: 50 INJECTION, SOLUTION INTRAMUSCULAR; INTRAVENOUS at 13:31

## 2020-04-08 RX ADMIN — SODIUM CHLORIDE 125 ML/HR: 900 INJECTION, SOLUTION INTRAVENOUS at 15:00

## 2020-04-08 RX ADMIN — SODIUM CHLORIDE, SODIUM LACTATE, POTASSIUM CHLORIDE, AND CALCIUM CHLORIDE 75 ML/HR: 600; 310; 30; 20 INJECTION, SOLUTION INTRAVENOUS at 12:44

## 2020-04-08 RX ADMIN — SODIUM CHLORIDE 10 ML: 9 INJECTION INTRAMUSCULAR; INTRAVENOUS; SUBCUTANEOUS at 21:32

## 2020-04-08 RX ADMIN — PROPOFOL 40 MG: 10 INJECTION, EMULSION INTRAVENOUS at 13:31

## 2020-04-08 RX ADMIN — CEFAZOLIN SODIUM 2 G: 300 INJECTION, POWDER, LYOPHILIZED, FOR SOLUTION INTRAVENOUS at 21:31

## 2020-04-08 RX ADMIN — ONDANSETRON HYDROCHLORIDE 2 MG: 2 INJECTION, SOLUTION INTRAMUSCULAR; INTRAVENOUS at 14:18

## 2020-04-08 RX ADMIN — ACETAMINOPHEN 650 MG: 325 TABLET, FILM COATED ORAL at 06:42

## 2020-04-08 RX ADMIN — ALBUMIN (HUMAN) 250 ML: 12.5 INJECTION, SOLUTION INTRAVENOUS at 14:03

## 2020-04-08 RX ADMIN — Medication 20 MG: at 13:34

## 2020-04-08 NOTE — PERIOP NOTES
TRANSFER - OUT REPORT:    Verbal report given to [place] (name) on Víctor Woodall  being transferred to  (unit) for routine post - op       Report consisted of patients Situation, Background, Assessment and   Recommendations(SBAR). Time Pre op antibiotic given:1401  Anesthesia Stop time: 4268  Liz Present on Transfer to floor:yes  Order for Liz on Chart:yes  Discharge Prescriptions with Chart:no    Information from the following report(s) SBAR, OR Summary, Procedure Summary, Intake/Output, MAR and Cardiac Rhythm NSR was reviewed with the receiving nurse. Opportunity for questions and clarification was provided. Is the patient on 02? YES       L/Min 3    Is the patient on a monitor? NO    Is the nurse transporting with the patient? NO    Surgical Waiting Area notified of patient's transfer from PACU? YES;  called via cell number. The following personal items collected during your admission accompanied patient upon transfer:   Dental Appliance: Dental Appliances: At bedside  Vision: Visual Aid: None  Hearing Aid:    Jewelry: Jewelry: None  Clothing: Clothing: At bedside  Other Valuables: Other Valuables:  At bedside  Valuables sent to safe:      **No belongings checked in to PACU **

## 2020-04-08 NOTE — BRIEF OP NOTE
Brief Postoperative Note    Patient: Philippe Quintero  YOB: 1940  MRN: [id number]    Date of Procedure: 4/8/2020     Pre-Op Diagnosis: R femoral neck fracture    Post-Op Diagnosis: Same as preoperative diagnosis. Procedure(s):  RIGHT HIP HEMIARTHROPLASTY/ BIOMET/ ER TO IP/ REQ [id number]  Bio-met/ ESSENTIAL    Surgeon(s):  Uche Silverman MD    Surgical Assistant: None    Anesthesia: General     Estimated Blood Loss (mL): 862 cc    Complications: None    Specimens: * No specimens in log *     Implants:   Implant Name Type Inv.  Item Serial No.  Lot No. LRB No. Used Action   STEM FEM ECHO BI-MTRC [id number] --  - SN/A  STEM FEM ECHO BI-MTRC [id number] --  N/A [employer] [id number] Right 1 Implanted   CUP ACET BPLR RINGLOK [id number] --  - SN/A  CUP ACET BPLR RINGLOK [id number] --  N/A [employer] [id number] Right 1 Implanted   HEAD MODULAR 28M +6 NECK - SN/A Joint Component HEAD MODULAR 28M +6 NECK N/A [employer] [id number] Right 1 Implanted       Drains: * No LDAs found *    Findings: as above     Electronically Signed by Vanda Silva MD on 4/8/2020 at 2:53 PM

## 2020-04-08 NOTE — PROGRESS NOTES
Ortho  Spoke with patient's  and Elizabeth Juan, to get consent for surgery. Patient consented for right hip hemiarthroplasty.  To OR  Karen Perez PA-C

## 2020-04-08 NOTE — ANESTHESIA PREPROCEDURE EVALUATION
Relevant Problems   No relevant active problems       Anesthetic History   No history of anesthetic complications            Review of Systems / Medical History  Patient summary reviewed, nursing notes reviewed and pertinent labs reviewed    Pulmonary    COPD: mild        Asthma        Neuro/Psych       CVA  Dementia     Cardiovascular  Within defined limits  Hypertension              Exercise tolerance: <4 METS     GI/Hepatic/Renal  Within defined limits   GERD: well controlled           Endo/Other        Anemia     Other Findings   Comments: on xarelto  Watermelon stomach            Physical Exam    Airway  Mallampati: II  TM Distance: > 6 cm  Neck ROM: normal range of motion   Mouth opening: Normal     Cardiovascular  Regular rate and rhythm,  S1 and S2 normal,  no murmur, click, rub, or gallop             Dental  No notable dental hx       Pulmonary  Breath sounds clear to auscultation               Abdominal  GI exam deferred       Other Findings            Anesthetic Plan    ASA: 3, emergent  Anesthesia type: general          Induction: Intravenous  Anesthetic plan and risks discussed with: Patient

## 2020-04-08 NOTE — ANESTHESIA POSTPROCEDURE EVALUATION
Post-Anesthesia Evaluation and Assessment    Patient: Sallyanne Cockayne MRN: [id number]  SSN: [id number]    YOB: 1940  Age: [de-identified] y.o. Sex: female      I have evaluated the patient and they are stable and ready for discharge from the PACU. Cardiovascular Function/Vital Signs  Visit Vitals  /53   Pulse 79   Temp 36.3 °C (97.3 °F)   Resp 20   Wt 62.6 kg (138 lb)   SpO2 95%   BMI 23.69 kg/m²       Patient is status post General anesthesia for Procedure(s):  RIGHT HIP HEMIARTHROPLASTY/ BIOMET/ ER TO IP/ REQ [id number]  Bio-met/ ESSENTIAL. Nausea/Vomiting: None    Postoperative hydration reviewed and adequate. Pain:  Pain Scale 1: Adult Nonverbal Pain Scale (04/08/20 1501)  Pain Intensity 1: 0 (04/08/20 0935)   Managed    Neurological Status:   Neuro (WDL): Exceptions to WDL (04/08/20 1501)  Neuro  Neurologic State: Drowsy; Confused (04/08/20 1501)  Orientation Level: Unable to verbalize (04/08/20 1501)  Cognition: (hx of dementia) (04/08/20 1501)  Speech: Delayed responses;Garbled (04/08/20 1501)  Size L Pupil (mm): 3 (04/07/20 1718)  Size R Pupil (mm): 3 (04/07/20 1718)  LUE Motor Response: Spontaneous  (04/08/20 1501)  LLE Motor Response: Weak (04/07/20 2103)  RUE Motor Response: Spontaneous  (04/08/20 1501)  RLE Motor Response: Weak (04/07/20 2103)   At baseline    Mental Status, Level of Consciousness: Alert and  oriented to person, place, and time    Pulmonary Status:   O2 Device: CO2 nasal cannula (04/08/20 1501)   Adequate oxygenation and airway patent    Complications related to anesthesia: None    Post-anesthesia assessment completed. No concerns    Signed By: Claudell Donath, MD     April 8, 2020              Procedure(s):  RIGHT HIP HEMIARTHROPLASTY/ BIOMET/ ER TO IP/ REQ [id number]  Bio-met/ ESSENTIAL.     general    <BSHSIANPOST>    Vitals Value Taken Time   /48 4/8/2020  3:10 PM   Temp 36.3 °C (97.3 °F) 4/8/2020  3:01 PM   Pulse 83 4/8/2020  3:16 PM   Resp 18 4/8/2020  3:16 PM   SpO2 90 % 4/8/2020  3:16 PM   Vitals shown include unvalidated device data.

## 2020-04-08 NOTE — PROGRESS NOTES

## 2020-04-08 NOTE — ROUTINE PROCESS
Occupational Therapy Screening: 
Services maybe indicated at this time. [facility] for [facility] therapy based on results obtained during the nursing admission assessment. The patients chart was reviewed . Please order a consult for occupational therapy if patient has had a decline in function from baseline and you would like an evaluation to be completed. Thank you.

## 2020-04-09 LAB
ANION GAP SERPL CALC-SCNC: 4 MMOL/L (ref 5–15)
BUN SERPL-MCNC: 29 MG/DL (ref 6–20)
BUN/CREAT SERPL: 33 (ref 12–20)
CALCIUM SERPL-MCNC: 9 MG/DL (ref 8.5–10.1)
CHLORIDE SERPL-SCNC: 114 MMOL/L (ref 97–108)
CO2 SERPL-SCNC: 28 MMOL/L (ref 21–32)
CREAT SERPL-MCNC: 0.89 MG/DL (ref 0.55–1.02)
GLUCOSE SERPL-MCNC: 139 MG/DL (ref 65–100)
HGB BLD-MCNC: 8.4 G/DL (ref 11.5–16)
POTASSIUM SERPL-SCNC: 3.8 MMOL/L (ref 3.5–5.1)
SODIUM SERPL-SCNC: 146 MMOL/L (ref 136–145)

## 2020-04-09 PROCEDURE — 97165 OT EVAL LOW COMPLEX 30 MIN: CPT

## 2020-04-09 PROCEDURE — 77010033678 HC OXYGEN DAILY

## 2020-04-09 PROCEDURE — 80048 BASIC METABOLIC PNL TOTAL CA: CPT

## 2020-04-09 PROCEDURE — 74011000250 HC RX REV CODE- 250: Performed by: ORTHOPAEDIC SURGERY

## 2020-04-09 PROCEDURE — 97530 THERAPEUTIC ACTIVITIES: CPT

## 2020-04-09 PROCEDURE — 74011250637 HC RX REV CODE- 250/637: Performed by: ORTHOPAEDIC SURGERY

## 2020-04-09 PROCEDURE — 36415 COLL VENOUS BLD VENIPUNCTURE: CPT

## 2020-04-09 PROCEDURE — 92610 EVALUATE SWALLOWING FUNCTION: CPT

## 2020-04-09 PROCEDURE — 65270000032 HC RM SEMIPRIVATE

## 2020-04-09 PROCEDURE — 97161 PT EVAL LOW COMPLEX 20 MIN: CPT

## 2020-04-09 PROCEDURE — 74011250637 HC RX REV CODE- 250/637: Performed by: INTERNAL MEDICINE

## 2020-04-09 PROCEDURE — 74011000258 HC RX REV CODE- 258: Performed by: INTERNAL MEDICINE

## 2020-04-09 PROCEDURE — 74011250636 HC RX REV CODE- 250/636: Performed by: ORTHOPAEDIC SURGERY

## 2020-04-09 PROCEDURE — 85018 HEMOGLOBIN: CPT

## 2020-04-09 PROCEDURE — 74011250636 HC RX REV CODE- 250/636: Performed by: INTERNAL MEDICINE

## 2020-04-09 PROCEDURE — 51798 US URINE CAPACITY MEASURE: CPT

## 2020-04-09 RX ORDER — DEXTROSE MONOHYDRATE AND SODIUM CHLORIDE 5; .45 G/100ML; G/100ML
100 INJECTION, SOLUTION INTRAVENOUS CONTINUOUS
Status: DISCONTINUED | OUTPATIENT
Start: 2020-04-09 | End: 2020-04-13

## 2020-04-09 RX ADMIN — MORPHINE SULFATE 2 MG: 2 INJECTION, SOLUTION INTRAMUSCULAR; INTRAVENOUS at 07:00

## 2020-04-09 RX ADMIN — SODIUM CHLORIDE 10 ML: 9 INJECTION INTRAMUSCULAR; INTRAVENOUS; SUBCUTANEOUS at 06:58

## 2020-04-09 RX ADMIN — POLYETHYLENE GLYCOL 3350 17 G: 17 POWDER, FOR SOLUTION ORAL at 10:10

## 2020-04-09 RX ADMIN — FAMOTIDINE 20 MG: 20 TABLET ORAL at 10:10

## 2020-04-09 RX ADMIN — SODIUM CHLORIDE 125 ML/HR: 900 INJECTION, SOLUTION INTRAVENOUS at 03:53

## 2020-04-09 RX ADMIN — ACETAMINOPHEN 650 MG: 325 TABLET, FILM COATED ORAL at 13:48

## 2020-04-09 RX ADMIN — ENOXAPARIN SODIUM 40 MG: 40 INJECTION SUBCUTANEOUS at 10:10

## 2020-04-09 RX ADMIN — SENNOSIDES AND DOCUSATE SODIUM 1 TABLET: 8.6; 5 TABLET ORAL at 10:10

## 2020-04-09 RX ADMIN — OYSTER SHELL CALCIUM WITH VITAMIN D 1 TABLET: 500; 200 TABLET, FILM COATED ORAL at 07:01

## 2020-04-09 RX ADMIN — CEFAZOLIN SODIUM 2 G: 300 INJECTION, POWDER, LYOPHILIZED, FOR SOLUTION INTRAVENOUS at 06:58

## 2020-04-09 RX ADMIN — MORPHINE SULFATE 2 MG: 2 INJECTION, SOLUTION INTRAMUSCULAR; INTRAVENOUS at 21:30

## 2020-04-09 RX ADMIN — Medication 10 ML: at 06:58

## 2020-04-09 RX ADMIN — OYSTER SHELL CALCIUM WITH VITAMIN D 1 TABLET: 500; 200 TABLET, FILM COATED ORAL at 13:48

## 2020-04-09 RX ADMIN — ACETAMINOPHEN 650 MG: 325 TABLET, FILM COATED ORAL at 06:57

## 2020-04-09 RX ADMIN — DEXTROSE MONOHYDRATE AND SODIUM CHLORIDE 50 ML/HR: 5; .45 INJECTION, SOLUTION INTRAVENOUS at 13:50

## 2020-04-09 RX ADMIN — MIDODRINE HYDROCHLORIDE 10 MG: 5 TABLET ORAL at 07:00

## 2020-04-09 NOTE — PROGRESS NOTES
Problem: Dysphagia (Adult)  Goal: *Acute Goals and Plan of Care (Insert Text)  Description: Speech pathology goals  Initiated 4/9/2020  1. Patient will participate in re-evaluation of swallow function within 7 days   Outcome: [facility] EVALUATION  Patient: Ariana Mullins ([de-identified] y.o. female)  Date: 4/9/2020  Primary Diagnosis: Hip fracture ([address] [S72.009A]  Procedure(s) (LRB):  RIGHT HIP HEMIARTHROPLASTY/ BIOMET/ ER TO IP/ REQ [id number]  Bio-met/ ESSENTIAL (Right) 1 Day Post-Op   Precautions: swallow,  Fall, WBAT, Skin(R posterior hip precautions)    ASSESSMENT :  Based on the objective data described below, the patient presents with severe oral dysphagia that is related to significant confusion. Patient with no command following, poor attention, and no appropriate verbalizations with patient mumbling constantly. Patient with absent bolus acceptance with thin liquid via straw and only intermittent acceptance of scant amount of puree. Patient then with prolonged oral holding while she mumbled constantly requiring verbal and tactile cues to swallow. Although no overt s/s aspiration observed, patient is at high risk for aspiration secondary to confusion and poor bolus acceptance. Recommend NPO. Discussed with RN and Dr. Caleb Contreras. Patient will benefit from skilled intervention to address the above impairments. Patients rehabilitation potential is considered to be Guarded     PLAN :  Recommendations and Planned Interventions:  -NPO, including non-oral route for medications  -SLP to follow to re-evaluate swallowing as mental status improves  Frequency/Duration: Patient will be followed by speech-language pathology 3 times a week to address goals. Discharge Recommendations: To Be Determined     SUBJECTIVE:   Patient stated no no no no no.  RN reported that she spoke with caregiver and patient was on a regular diet at baseline, however she had increased confusion and difficulty with regular diet for ~1 week PTA. OBJECTIVE:     Past Medical History:   Diagnosis Date    Anemia NEC     Asthma     major asthma as a child/only with colds now    Cancer [facility])     BCCA removed    Coagulation disorder ([facility]     on xarelto    Dementia ([facility]     GERD (gastroesophageal reflux disease) 10/22/2010    Hiatal hernia     Hypertension     hx of blood pressure med for short time    Ill-defined condition     increased cholesterol    Other unknown and unspecified cause of morbidity or mortality     eval with Dr Anni Ruvalcaba for SOB, sept 2015:  results unkown    Stroke ([facility] [address] 2013    no deficits    Stroke ([facility] [address]     heat exhaustion    Watermelon stomach 2003     Past Surgical History:   Procedure Laterality Date    HX BLADDER SUSPENSION      HX COLONOSCOPY      HX ENDOSCOPY  6/4/15    Dr. Deven Brian GYN      vaginal del times 3    HX HERNIA REPAIR  2014    x2, failure    HX OTHER SURGICAL      EGDs    HX TONSILLECTOMY      AZ EXC SKIN MALIG >4CM FACE,FACIAL      times 3 - BCCA     Prior Level of Function/Home Situation:   Home Situation  Home Environment: Assisted living  One/Two Story Residence: Other (Comment)  Living Alone: No  Support Systems: Family member(s)  Patient Expects to be Discharged to[de-identified] Assisted living  Current DME Used/Available at Home: Other (comment)  Diet prior to admission: regular/thin  Current Diet:  clear liquid   Cognitive and Communication Status:  Neurologic State: Alert, Confused  Orientation Level: Disoriented X4  Cognition: Decreased attention/concentration, No command following  Perception: Cues to maintain midline in sitting(L gaze/cervical rotation preference)  Perseveration: Perseverates during conversation(mumbling throughout)  Safety/Judgement: Decreased awareness of environment, Lack of insight into deficits  Oral Assessment:  Oral Assessment  Labial: Other (comment)(unable to assess; no command following)  P.O.  Trials:  Patient Position: upright in bed  Vocal quality prior to P.O.: Low volume  Consistency Presented: Ice chips; Thin liquid;Puree  How Presented: SLP-fed/presented;Spoon;Straw     Bolus Acceptance: Absent(with straw, intermittently with spoon)  Bolus Formation/Control: Impaired  Type of Impairment: Delayed; Other (comment)(prolonged oral holding)  Propulsion: Delayed (# of seconds)           Aspiration Signs/Symptoms: None        Oral Phase Severity: Severe  Pharyngeal Phase Severity : Other (comment)(unable to assess due to severity of oral dysphagia)    NOMS:   The NOMS functional outcome measure was used to quantify this patient's level of swallowing impairment. Based on the NOMS, the patient was determined to be at level 1 for swallow function       NOMS Swallowing Levels:  Level 1 (CN): NPO  Level 2 (CM): NPO but takes consistency in therapy  Level 3 (CL): Takes less than 50% of nutrition p.o. and continues with nonoral feedings; and/or safe with mod cues; and/or max diet restriction  Level 4 (CK): Safe swallow but needs mod cues; and/or mod diet restriction; and/or still requires some nonoral feeding/supplements  Level 5 (CJ): Safe swallow with min diet restriction; and/or needs min cues  Level 6 (CI): Independent with p.o.; rare cues; usually self cues; may need to avoid some foods or needs extra time  Level 7 ([address] [place] [address]): Independent for all p.o.  NICOLE. (2003). National Outcomes Measurement System (NOMS): Adult Speech-Language Pathology User's Guide. Pain:             After treatment:   Patient left in no apparent distress in bed, Call bell within reach, Nursing notified, and Safety precautions posted at beside. COMMUNICATION/EDUCATION:   The patient's plan of care including recommendations, planned interventions, and recommended diet changes were discussed with: Registered nurse. Patient is unable to participate in goal setting and plan of care.     Thank you for this referral.  ANISH Watson  Time Calculation: 17 mins

## 2020-04-09 NOTE — PROGRESS NOTES
NUTRITION  Pt seen for:     [x]           Supplements  [x]             PO intake check   []           Food Allergies  []             Food Preferences/tolerances    []           Rescreen   []             Education    []           Diet order clarification []             Other            RECOMMENDATIONS:     As tolerated, advance diet to regular    RD hs added Ensure Compact TID    SUBJECTIVE/OBJECTIVE:   Information obtained from:   Chart reviewed remotely. Consult received for nutrition management, thank you for the consult. Pt has h/o dementia at baseline, GERD, watermelon stomach, past CVA. She was admitted on 4/7 with a right hip fracture (apparently fell at home several days ago, fracture was just recently confirmed). Pt underwent hip repair on 4/8. Currently on clear liquids but should be able to advance to regular today. Diet:  clears  Supplements: added ensure compact TID  Intake: []           Good     [x]           Fair      []           Poor   No data found.     Weight Changes:   []            Loss  []            Gain  [x]            Stable  Wt Readings from Last 10 Encounters:   04/09/20 71.4 kg (157 lb 8 oz)   02/12/20 71.4 kg (157 lb 8 oz)   01/10/20 66.8 kg (147 lb 4.3 oz)   10/24/19 65.3 kg (144 lb)   07/03/19 65.2 kg (143 lb 12.8 oz)   01/18/19 62.6 kg (138 lb)   01/17/19 62.6 kg (138 lb)   01/11/19 67 kg (147 lb 11.3 oz)   01/02/19 63.5 kg (140 lb)   12/14/18 62.8 kg (138 lb 6.4 oz)       Nutrition Problems Identified:  [x]       None: not at nutrition risk  []           Specified food preferences   []           Dislikes supplements              []           Allergies  []           Difficulty chewing or swallowing   []           Poor dentition   []           Nausea/Vomiting  []           Constipation  []           Diarrhea    PLAN:   []           Obtained/adjusted food preferences/tolerances and/or snacks options   []           Dislikes supplements will try a substitution   [] Modify diet for food allergies  []           Adjust texture due to difficulty chewing   []    Continue current diet  []           Educated patient  [x]           Add Supplements  []          Andria Duffy [address], [address], CSP  C: [phone]

## 2020-04-09 NOTE — PROGRESS NOTES
[facility] Adult  Hospitalist Group                                                                                          Hospitalist Progress Note  Elicia Bliss MD  Answering service: [phone] from in house phone        Date of Service:  2020  NAME:  Víctor Woodall  :  1940  MRN:  [id number]      Admission Summary:   Víctor Woodall is a [de-identified] y.o. female who is demented at the history cannot be taken from her. History is taken from the chart. She has history of COPD asthma peptic ulcer disease hypertension in the past.  She resides at the nursing home  Per EMS patient had fallen on Friday and today hip x-ray was done and it confirmed that she had a fracture. She has been complaining of pain on right side of the hip. No nausea vomiting diarrhea no shortness of breath. Family available on phone at bedside.       Interval history / Subjective: Follow up Rt Femoral fracture  Patient seen and examined by the bedside, Labs, images and notes reviewed  Pt sleeping, not responding to commands much, has sitter at bedside. No events reported by nursinig  Discussed with nursing staff, orders reviewed.    Plan discussed with patient/Family       Assessment & Plan:     Hip fracture due to ground-level fall  -Acute versus subacute right femoral neck fracture   -s/p right hip hemiarthroplasty   - Ortho  -Pain controlled        Delirium  May be related to pain medicines and or post op  monitor       hyperlipidemia  Continue statin     depression    Remeron   Continue the same  Seroquel as needed for confusion agitation     NPO for surgery    Code status: DNR  DVT prophylaxis: Lovenox  [facility]    Plan: Follow Ortho    Care Plan discussed with: Patient/Family  Anticipated Disposition: SNF/LTC  Anticipated Discharge: Greater than 48 hours     Hospital Problems  Date Reviewed: 2020          Codes Class Noted POA    * (Principal) Hip fracture [facility]) ICD-10-CM: H43.744M  ICD-9-CM: 820.8  4/7/2020 Yes                Review of Systems:   A comprehensive review of systems was negative except for that written in the HPI. Vital Signs:    Last 24hrs VS reviewed since prior progress note. Most recent are:  Visit Vitals  /56 (BP 1 Location: Right arm, BP Patient Position: At rest)   Pulse 78   Temp 97.9 °F (36.6 °C)   Resp 16   Wt 71.4 kg (157 lb 8 oz)   SpO2 100%   BMI 27.03 kg/m²         Intake/Output Summary (Last 24 hours) at 4/9/2020 1617  Last data filed at 4/9/2020 0517  Gross per 24 hour   Intake    Output 450 ml   Net -450 ml        Physical Examination:             Constitutional:  No acute distress   ENT:  Oral mucosa moist, oropharynx benign. Resp:  CTA bilaterally. No wheezing/rhonchi/rales. No accessory muscle use   CV:  Regular rhythm, normal rate, no murmurs, gallops, rubs    GI:  Soft, non distended, non tender. normoactive bowel sounds, no hepatosplenomegaly     Musculoskeletal:  No edema, warm, 2+ pulses throughout    Neurologic:  Moves all extremities. AAOx0, CN II-XII reviewed     Psych:  Poor insight       Data Review:    Review and/or order of clinical lab test      Labs:     Recent Labs     04/09/20  0233 04/08/20  0425 04/07/20  1308   WBC  --  12.2* 13.3*   HGB 8.4* 10.8* 11.6   HCT  --  33.9* 36.3   PLT  --  190 242     Recent Labs     04/09/20  0233 04/07/20  1308   * 139   K 3.8 4.0   * 107   CO2 28 25   BUN 29* 37*   CREA 0.89 1.03*   * 185*   CA 9.0 9.5     Recent Labs     04/07/20  1308   SGOT 36   ALT 26   AP 52   TBILI 0.6   TP 7.0   ALB 2.7*   GLOB 4.3*     No results for input(s): INR, PTP, APTT, INREXT, INREXT in the last 72 hours. No results for input(s): FE, TIBC, PSAT, FERR in the last 72 hours. Lab Results   Component Value Date/Time    Folate 17.3 10/08/2015 11:30 AM      No results for input(s): PH, PCO2, PO2 in the last 72 hours.   No results for input(s): CPK, CKNDX, TROIQ in the last 72 hours.     No lab exists for component: CPKMB  Lab Results   Component Value Date/Time    Cholesterol, total 145 04/04/2017 09:46 AM    HDL Cholesterol 61 04/04/2017 09:46 AM    LDL, calculated 67 04/04/2017 09:46 AM    Triglyceride 87 04/04/2017 09:46 AM    CHOL/HDL Ratio 3.4 06/02/2013 06:30 AM     Lab Results   Component Value Date/Time    Glucose (POC) 120 (H) 04/08/2020 11:17 AM    Glucose (POC) 115 (H) 09/28/2018 08:46 PM     Lab Results   Component Value Date/Time    Color YELLOW/STRAW 04/07/2020 02:00 PM    Appearance CLEAR 04/07/2020 02:00 PM    Specific gravity 1.022 04/07/2020 02:00 PM    pH (UA) 6.0 04/07/2020 02:00 PM    Protein 30 (A) 04/07/2020 02:00 PM    Glucose 100 (A) 04/07/2020 02:00 PM    Ketone Negative 04/07/2020 02:00 PM    Bilirubin Negative 04/07/2020 02:00 PM    Urobilinogen 0.2 04/07/2020 02:00 PM    Nitrites Negative 04/07/2020 02:00 PM    Leukocyte Esterase Negative 04/07/2020 02:00 PM    Epithelial cells FEW 04/07/2020 02:00 PM    Bacteria Negative 04/07/2020 02:00 PM    WBC 0-4 04/07/2020 02:00 PM    RBC 0-5 04/07/2020 02:00 PM         Medications Reviewed:     Current Facility-Administered Medications   Medication Dose Route Frequency    dextrose 5 % - 0.45% NaCl infusion  50 mL/hr IntraVENous CONTINUOUS    sodium chloride (NS) flush 5-40 mL  5-40 mL IntraVENous Q8H    sodium chloride (NS) flush 5-40 mL  5-40 mL IntraVENous PRN    naloxone (NARCAN) injection 0.4 mg  0.4 mg IntraVENous PRN    calcium-vitamin D (OS-SAE) 500 mg-200 unit tablet  1 Tab Oral TID WITH MEALS    senna-docusate (PERICOLACE) 8.6-50 mg per tablet 1 Tab  1 Tab Oral BID    polyethylene glycol (MIRALAX) packet 17 g  17 g Oral DAILY    [START ON 4/10/2020] bisacodyL (DULCOLAX) suppository 10 mg  10 mg Rectal DAILY PRN    ondansetron (ZOFRAN) injection 4 mg  4 mg IntraVENous Q4H PRN    enoxaparin (LOVENOX) injection 40 mg  40 mg SubCUTAneous DAILY    acetaminophen (TYLENOL) tablet 650 mg  650 mg Oral Q6H    morphine injection 2 mg  2 mg IntraVENous Q3H PRN    traMADoL (ULTRAM) tablet 50 mg  50 mg Oral Q6H PRN    ALPRAZolam (XANAX) tablet 0.5 mg  0.5 mg Oral TID PRN    famotidine (PEPCID) tablet 20 mg  20 mg Oral DAILY    midodrine (PROAMITINE) tablet 10 mg  10 mg Oral BID WITH MEALS    mirtazapine (REMERON) tablet 7.5 mg  7.5 mg Oral QHS    QUEtiapine (SEROquel) tablet 12.5 mg  12.5 mg Oral Q8H PRN    pravastatin (PRAVACHOL) tablet 20 mg  20 mg Oral QHS    sodium chloride (NS) flush 5-40 mL  5-40 mL IntraVENous Q8H    sodium chloride (NS) flush 5-40 mL  5-40 mL IntraVENous PRN    acetaminophen (TYLENOL) tablet 650 mg  650 mg Oral Q4H PRN    naloxone (NARCAN) injection 0.4 mg  0.4 mg IntraVENous PRN    morphine injection 1 mg  1 mg IntraVENous Q4H PRN    ondansetron (ZOFRAN) injection 4 mg  4 mg IntraVENous Q4H PRN     ______________________________________________________________________  EXPECTED LENGTH OF STAY: 2d 21h  ACTUAL LENGTH OF STAY:          2                 Bhaskar Guillen MD

## 2020-04-09 NOTE — PROGRESS NOTES
Ortho Daily Progress Note      Patient: Pako Myles                   MRN: [id number]  Sex: female  YOB: 1940           Age: [de-identified] y.o.    1 Day Post-Op    Procedure(s): RIGHT HIP HEMIARTHROPLASTY    Subjective: Non-verbal, working with therapy     Visit Vitals  /63 (BP 1 Location: Right arm, BP Patient Position: At rest)   Pulse 89   Temp 98.6 °F (37 °C)   Resp 18   Wt 71.4 kg (157 lb 8 oz)   SpO2 100%   BMI 27.03 kg/m²        Lab Results:  HGB   Date/Time Value Ref Range Status   04/09/2020 02:33 AM 8.4 (L) 11.5 - 16.0 g/dL Final     INR   Date/Time Value Ref Range Status   07/14/2014 05:08 PM 1.1 0.9 - 1.1   Final     Comment:     A single therapeutic range for Vit K antagonists may not be optimal for all indications - see June, 2008 issue of Chest, American College of Chest Physicians Evidence-Based Clinical Practice Guidelines, 8th Edition. Physical Exam:    GENERAL: [de-identified] female, confused, disoriented  DRESSING: Aquacel in place right hip  SWELLING: mild  NEUROLOGICAL: does not move toes on command  PULSE:yes   DVT Exam: No cords or calf tenderness.       Plan:    DVT prophylaxisLovenox  Weight bearing restrictionWBAT  Pain Control:ongoing significant pain in which is stable and controlled by current meds  Dispo: Likely SNF placement    MIKEY Yin  4/9/2020   10:35 AM      .

## 2020-04-09 NOTE — PROGRESS NOTES
[address] [facility]  Hospitalist Group                                                                                          Hospitalist Progress Note  Kirt Love MD  Answering service: [phone] from in house phone        Date of Service:  2020  NAME:  Rosanne Oconnell  :  1940  MRN:  [id number]      Admission Summary:   Rosanne Oconnell is a [de-identified] y.o. female who is demented at the history cannot be taken from her. History is taken from the chart. She has history of COPD asthma peptic ulcer disease hypertension in the past.  She resides at the nursing home  Per EMS patient had fallen on Friday and today hip x-ray was done and it confirmed that she had a fracture. She has been complaining of pain on right side of the hip. No nausea vomiting diarrhea no shortness of breath. Family available on phone at bedside.       Interval history / Subjective:     F/u hip fracture  The patient is comfortably lying on bed  No new issues     Assessment & Plan:     Hip fracture due to ground-level fall  -Acute versus subacute right femoral neck fracture   -s/p right hip hemiarthroplasty  Ortho  -Pain controlled        hyperlipidemia  Continue statin     depression    Remeron and Xanax  Continue the same  Seroquel as needed for confusion agitation     NPO for surgery    Code status: DNR  DVT prophylaxis: Lovenox  PTA: [facility]/long-term    Plan: Follow Ortho    Care Plan discussed with: Patient/Family  Anticipated Disposition: SNF/LTC  Anticipated Discharge: Greater than 48 hours     Hospital Problems  Date Reviewed: 2020          Codes Class Noted POA    * (Principal) Hip fracture ([facility] [address] ICD-10-CM: F06.435B  ICD-9-CM: 820.8  2020 Yes                Review of Systems:   A comprehensive review of systems was negative except for that written in the HPI. Vital Signs:    Last 24hrs VS reviewed since prior progress note.  Most recent are:  Visit Vitals  /71 (BP 1 Location: Right arm, BP Patient Position: At rest)   Pulse 89   Temp 97.6 °F (36.4 °C)   Resp 20   Wt 62.6 kg (138 lb)   SpO2 100%   BMI 23.69 kg/m²         Intake/Output Summary (Last 24 hours) at 4/8/2020 2255  Last data filed at 4/8/2020 1501  Gross per 24 hour   Intake 600 ml   Output 750 ml   Net -150 ml        Physical Examination:             Constitutional:  No acute distress   ENT:  Oral mucosa moist, oropharynx benign. Resp:  CTA bilaterally. No wheezing/rhonchi/rales. No accessory muscle use   CV:  Regular rhythm, normal rate, no murmurs, gallops, rubs    GI:  Soft, non distended, non tender. normoactive bowel sounds, no hepatosplenomegaly     Musculoskeletal:  No edema, warm, 2+ pulses throughout    Neurologic:  Moves all extremities. AAOx0, CN II-XII reviewed     Psych:  Poor insight       Data Review:    Review and/or order of clinical lab test      Labs:     Recent Labs     04/08/20  0425 04/07/20  1308   WBC 12.2* 13.3*   HGB 10.8* 11.6   HCT 33.9* 36.3    242     Recent Labs     04/07/20  1308      K 4.0      CO2 25   BUN 37*   CREA 1.03*   *   CA 9.5     Recent Labs     04/07/20  1308   SGOT 36   ALT 26   AP 52   TBILI 0.6   TP 7.0   ALB 2.7*   GLOB 4.3*     No results for input(s): INR, PTP, APTT, INREXT in the last 72 hours. No results for input(s): FE, TIBC, PSAT, FERR in the last 72 hours. Lab Results   Component Value Date/Time    Folate 17.3 10/08/2015 11:30 AM      No results for input(s): PH, PCO2, PO2 in the last 72 hours. No results for input(s): CPK, CKNDX, TROIQ in the last 72 hours.     No lab exists for component: CPKMB  Lab Results   Component Value Date/Time    Cholesterol, total 145 04/04/2017 09:46 AM    HDL Cholesterol 61 04/04/2017 09:46 AM    LDL, calculated 67 04/04/2017 09:46 AM    Triglyceride 87 04/04/2017 09:46 AM    CHOL/HDL Ratio 3.4 06/02/2013 06:30 AM     Lab Results   Component Value Date/Time    Glucose (POC) 120 (H) 04/08/2020 11:17 AM    Glucose (POC) 115 (H) 09/28/2018 08:46 PM     Lab Results   Component Value Date/Time    Color YELLOW/STRAW 04/07/2020 02:00 PM    Appearance CLEAR 04/07/2020 02:00 PM    Specific gravity 1.022 04/07/2020 02:00 PM    pH (UA) 6.0 04/07/2020 02:00 PM    Protein 30 (A) 04/07/2020 02:00 PM    Glucose 100 (A) 04/07/2020 02:00 PM    Ketone Negative 04/07/2020 02:00 PM    Bilirubin Negative 04/07/2020 02:00 PM    Urobilinogen 0.2 04/07/2020 02:00 PM    Nitrites Negative 04/07/2020 02:00 PM    Leukocyte Esterase Negative 04/07/2020 02:00 PM    Epithelial cells FEW 04/07/2020 02:00 PM    Bacteria Negative 04/07/2020 02:00 PM    WBC 0-4 04/07/2020 02:00 PM    RBC 0-5 04/07/2020 02:00 PM         Medications Reviewed:     Current Facility-Administered Medications   Medication Dose Route Frequency    0.9% sodium chloride infusion  125 mL/hr IntraVENous CONTINUOUS    sodium chloride (NS) flush 5-40 mL  5-40 mL IntraVENous Q8H    sodium chloride (NS) flush 5-40 mL  5-40 mL IntraVENous PRN    naloxone (NARCAN) injection 0.4 mg  0.4 mg IntraVENous PRN    [START ON 4/9/2020] calcium-vitamin D (OS-SAE) 500 mg-200 unit tablet  1 Tab Oral TID WITH MEALS    senna-docusate (PERICOLACE) 8.6-50 mg per tablet 1 Tab  1 Tab Oral BID    [START ON 4/9/2020] polyethylene glycol (MIRALAX) packet 17 g  17 g Oral DAILY    [START ON 4/10/2020] bisacodyL (DULCOLAX) suppository 10 mg  10 mg Rectal DAILY PRN    ceFAZolin (ANCEF) 2 g/20 mL in sterile water IV syringe  2 g IntraVENous Q8H    ondansetron (ZOFRAN) injection 4 mg  4 mg IntraVENous Q4H PRN    [START ON 4/9/2020] enoxaparin (LOVENOX) injection 40 mg  40 mg SubCUTAneous DAILY    acetaminophen (TYLENOL) tablet 650 mg  650 mg Oral Q6H    morphine injection 2 mg  2 mg IntraVENous Q3H PRN    traMADoL (ULTRAM) tablet 50 mg  50 mg Oral Q6H PRN    ALPRAZolam (XANAX) tablet 0.5 mg  0.5 mg Oral TID PRN    famotidine (PEPCID) tablet 20 mg  20 mg Oral DAILY    midodrine (PROAMITINE) tablet 10 mg  10 mg Oral BID WITH MEALS    mirtazapine (REMERON) tablet 7.5 mg  7.5 mg Oral QHS    QUEtiapine (SEROquel) tablet 12.5 mg  12.5 mg Oral Q8H PRN    pravastatin (PRAVACHOL) tablet 20 mg  20 mg Oral QHS    sodium chloride (NS) flush 5-40 mL  5-40 mL IntraVENous Q8H    sodium chloride (NS) flush 5-40 mL  5-40 mL IntraVENous PRN    acetaminophen (TYLENOL) tablet 650 mg  650 mg Oral Q4H PRN    naloxone (NARCAN) injection 0.4 mg  0.4 mg IntraVENous PRN    morphine injection 1 mg  1 mg IntraVENous Q4H PRN    ondansetron (ZOFRAN) injection 4 mg  4 mg IntraVENous Q4H PRN     ______________________________________________________________________  EXPECTED LENGTH OF STAY: 2d 21h  ACTUAL LENGTH OF STAY:          1                 Charla Peters MD

## 2020-04-09 NOTE — OP NOTES
[address] [facility]  OPERATIVE REPORT    Name:  Paco Mai  MR#:  [id number]  :  1940  ACCOUNT #:  [de-identified]  DATE OF SERVICE:  2020      PREOPERATIVE DIAGNOSIS:  Displaced right femoral neck hip fracture. POSTOPERATIVE DIAGNOSIS:  Displaced right femoral neck hip fracture. PROCEDURE PERFORMED:  Right hip hemiarthroplasty. SURGEON:  Chloe Zuniga. MD Serg Prabhakar    ANESTHESIA:  General.    COMPLICATIONS:  None. SPECIMENS REMOVED:  none    IMPLANTS:  Biomet size 13 Press-Fit femoral stem, 44 mm head, +6 mm neck. ESTIMATED BLOOD LOSS:  250 mL. DISPOSITION:  The patient was taken to the recovery room in stable condition. OPERATIVE INDICATIONS:  The patient is an 26-year-old female who presented to [facility], was found to have a displaced right femoral neck fracture. She was admitted to the hospitalist service and cleared for surgery. The orthopedic physician assistant and myself met with the patient. Consent was obtained from her family members as she does have some dementia, including fixation of her right hip injury with a hemiarthroplasty. Informed consent was obtained including all risks and benefits and they wished to proceed. OPERATIVE PROCEDURE:  The patient was identified in the preoperative holding area. The right lower extremity was marked to the patient. All preoperative questions were answered. She was seen by Anesthesia Department, taken to the operating room, and transferred to the operating table in supine position. Once appropriate anesthesia was obtained, she was flipped to a lateral position with the right hip up. A pegboard was used to hold it there. All bony prominences were padded. The right hip was prepped and draped in the usual sterile fashion. Time-out was conducted indicating correct operative site. The patient received IV Ancef, cephalosporin antibiotics prior to incision being made.   Next, an incision was made centered over the greater trochanter, dissected down through the soft tissue to the fascia. The fascia was split in line with the skin. A Charnley retractor was placed. The hip was internally rotated while electrocautery was used to remove the external rotators followed by opening of the capsule. There was a fracture of her femoral neck. Her femoral head was removed. I trialed acetabular sizes and selected a size 44 mm head. I then approached the proximal femur and sequential reaming and broaching was undertaken until a size 13 Press-Fit stem was placed with good fixation and purchase. Once the stem was in place, I trialed head and neck sizes. I selected a +6 mm neck with a 44 mm head. The real implants were implanted. The hip was quite stable when allowed with the hip flexed to 90 degrees, internal rotation to about 65 degrees. Once this was taken through range of motion, it was quite stable and looked quite good. Then, the whole area was irrigated with saline. Vicryl sutures were used to close the capsule followed by external rotators, fascia and then subcuticular layer with staples on the skin. Sterile dressings were placed. The patient was awoken and taken to the recovery room in stable condition.         Vale Mohs, MD MB/ARNOL_UYEN_I/  D:  04/08/2020 15:16  T:  04/08/2020 23:15  JOB #:  [id number]

## 2020-04-09 NOTE — PROGRESS NOTES
BAKARI Plan   -PT/OT recommend SNF  -Patient previously provided options [facility] and [facility]-referrals sent to both as back up, awaiting a response  -[facility] declined, unable to accommodate the patient needs.   -[facility] still pending. CM spoke with Dean Christian, liaison who advised that referral is being reviewed.      CM will follow     BLANCA Allen/SHERRI

## 2020-04-09 NOTE — PROGRESS NOTES
During shift change report I noticed no urine output. Liz catheter was removed at 0700. Immediately bladder scanned her at 4PM and the highest number was 171ml. Paging doctor for no output.

## 2020-04-09 NOTE — PROGRESS NOTES
Problem: Self Care Deficits Care Plan (Adult)  Goal: *Acute Goals and Plan of Care (Insert Text)  Description:   FUNCTIONAL STATUS PRIOR TO ADMISSION: Pateint is a poor historian, perseverative with mumbling, unable to provide PLOF. Per chart review, patient lives at [address] [place] [address], walks with a RW with assistance, has caregivers and assist for all ADLs. Hx of dementia. HOME SUPPORT: The patient lived at [facility], required assist from staff and caregiver for ADLs and mobility. Occupational Therapy Goals  Initiated 4/9/2020  1. Patient will perform self-feeding with maximal assistance within 7 day(s). 2.  Patient will follow 10% of commands with functional activities throughout session within 7 day(s). 3.  Patient will perform rolling in supine for toilet transfers with maximal assistance x2 within 7 day(s). 4.  Patient will participate in upper extremity therapeutic exercise/activities with maximal assistance for 3 minutes within 7 day(s). Outcome: Not Met     OCCUPATIONAL THERAPY EVALUATION  Patient: Katerina Sol ([de-identified] y.o. female)  Date: 4/9/2020  Primary Diagnosis: Hip fracture ([facility] [address] [S72.009A]  Procedure(s) (LRB):  RIGHT HIP HEMIARTHROPLASTY/ BIOMET/ ER TO IP/ REQ [id number]  Bio-met/ ESSENTIAL (Right) 1 Day Post-Op   Precautions: Fall, WBAT, Skin(R posterior hip precautions)    ASSESSMENT  Based on the objective data described below, the patient presents with impaired cognition (A&O x1), perseveration with conversation, attention, sitting balance, pain management, activity tolerance, and coordination, as well as nonfunctional ROM and strength of BUEs/BLEs and no command following s/p R hip hemiarthroplasty POD #1, GLF at [facility] prior to admission. Per chart review, patient resides at [facility] with assist for ADLs and mobility with RW, however she presents significantly below her baseline at this time.  She was unable to initiate any movement, comprehend education on posterior hip precautions, and ultimately required Total A x2 for brief bed mobility to EOB with high pain reported (only able to acknowledge pain, unable to specify where, anticipate R hip). Returned to supine, unable to assist with positioning with R lateral lean, ?tone  vs resistance in BUEs. Recommend d/c to SNF vs TBD pending progress with acute care therapies. Current Level of Function Impacting Discharge (ADLs/self-care): Total A x1-2 for ADLs and bed mobility    Functional Outcome Measure: The patient scored Total: 0/100 on the Barthel Index outcome measure which is indicative of 100% impaired ability to care for basic self needs/dependency on others; inferred 100% dependency on others for instrumental ADLs. Other factors to consider for discharge: PMH, PLOF, dementia, poor command following, pain, physical assist of 2     Patient will benefit from skilled therapy intervention to address the above noted impairments. PLAN :  Recommendations and Planned Interventions: self care training, functional mobility training, therapeutic exercise, balance training, therapeutic activities, endurance activities, patient education, home safety training, and family training/education    Frequency/Duration: Patient will be followed by occupational therapy 3 times a week to address goals. Recommendation for discharge: (in order for the patient to meet his/her long term goals)  Therapy up to 5 days/week in SNF setting vs TBD    This discharge recommendation:  Has not yet been discussed the attending provider and/or case management    IF patient discharges home will need the following DME: To be determined (TBD)         SUBJECTIVE:   Patient stated Oh nevin, oh nevin, oh oh.  perseverative, unable to answer orientation questions, some yes/no but inconsistent    OBJECTIVE DATA SUMMARY:   HISTORY:   Past Medical History:   Diagnosis Date    Anemia NEC     Asthma     major asthma as a child/only with colds now    [facility]    [facility]agulation disorder ([facility] [address]     on xarelto    Dementia ([facility] [address]     GERD (gastroesophageal reflux disease) 10/22/2010    Hiatal hernia     Hypertension     hx of blood pressure med for short time    Ill-defined condition     increased cholesterol    Other unknown and unspecified cause of morbidity or mortality     eval with Dr Racquel Delarosa for SOB, sept 2015:  results unkown    Stroke ([facility] [address] 2013    no deficits    Stroke ([facility] [address]     heat exhaustion    Watermelon stomach 2003     Past Surgical History:   Procedure Laterality Date    HX BLADDER SUSPENSION      HX COLONOSCOPY      HX ENDOSCOPY  6/4/15    Dr. Caleb Majano GYN      vaginal del times 3    HX HERNIA REPAIR  2014    x2, failure    HX OTHER SURGICAL      EGDs    HX TONSILLECTOMY      TN EXC SKIN MALIG >4CM FACE,FACIAL      times 3 - [facility]       Expanded or extensive additional review of patient history:     Home Situation  Home Environment: Assisted living  One/Two Story Residence: Other (Comment)  Living Alone: No  Support Systems: Family member(s)  Patient Expects to be Discharged to[de-identified] Assisted living  Current DME Used/Available at Home: Other (comment)    EXAMINATION OF PERFORMANCE DEFICITS:  Cognitive/Behavioral Status:  Neurologic State: Alert;Confused  Orientation Level: Oriented to person;Disoriented to place; Disoriented to time;Disoriented to situation  Cognition: Decreased attention/concentration; No command following;Poor safety awareness  Perception: Cues to maintain midline in sitting(L gaze/cervical rotation preference)  Perseveration: Perseverates during conversation(mumbling throughout)  Safety/Judgement: Decreased awareness of environment;Lack of insight into deficits    Skin: Scattered bruising from GLF, R hip incision    Edema: Typical post-op RLE swelling    Hearing:   Auditory  Auditory Impairment: None    Vision/Perceptual:    Tracking: Unable to test secondary due to decreased visual attention                                Range of Motion:  AROM: Grossly decreased, non-functional  PROM: Grossly decreased, non-functional(BUE tone vs resistance?)                      Strength:  Strength: Grossly decreased, non-functional                Coordination:  Coordination: Grossly decreased, non-functional  Fine Motor Skills-Upper: Left Impaired;Right Impaired    Gross Motor Skills-Upper: Left Impaired;Right Impaired    Tone & Sensation:                              Balance:  Sitting: Impaired; With support  Sitting - Static: Poor (constant support)    Functional Mobility and Transfers for ADLs:  Bed Mobility:  Rolling: Total assistance  Supine to Sit: Total assistance;Assist x2  Sit to Supine: Total assistance;Assist x2  Scooting: Total assistance;Assist x2    Transfers: Toilet Transfer : Total assistance;Assist x2(infer per bed mobility)    ADL Assessment:  Feeding: Total assistance    Oral Facial Hygiene/Grooming: Total assistance    Bathing: Total assistance    Upper Body Dressing: Total assistance    Lower Body Dressing: Total assistance    Toileting: Total assistance                ADL Intervention and task modifications:     Lower Body Dressing Assistance  Dressing Assistance: Total assistance(dependent)  Socks: Total assistance (dependent)    Toileting  Toileting Assistance: Total assistance(dependent)    Cognitive Retraining  Safety/Judgement: Decreased awareness of environment;Lack of insight into deficits    Therapeutic Exercise: Total A x2 to EOB, limited tolerance ~1 minute with Total A for sitting balance     Functional Measure:  Barthel Index:    Bathin  Bladder: 0  Bowels: 0  Groomin  Dressin  Feedin  Mobility: 0  Stairs: 0  Toilet Use: 0  Transfer (Bed to Chair and Back): 0  Total: 0/100        The Barthel ADL Index: Guidelines  1. The index should be used as a record of what a patient does, not as a record of what a patient could do.   2. The main aim is to establish degree of independence from any help, physical or verbal, however minor and for whatever reason. 3. The need for supervision renders the patient not independent. 4. A patient's performance should be established using the best available evidence. Asking the patient, friends/relatives and nurses are the usual sources, but direct observation and common sense are also important. However direct testing is not needed. 5. Usually the patient's performance over the preceding 24-48 hours is important, but occasionally longer periods will be relevant. 6. Middle categories imply that the patient supplies over 50 per cent of the effort. 7. Use of aids to be independent is allowed. Lamine Kee., Barthel, DEduarW. (1582). Functional evaluation: the Barthel Index. [address] [facility] (14)2. Shanon Keys pelon TORITO [place], Lluvia Hoskins, Julianna Kennedy., Sandra, [facility] (1999). Measuring the change indisability after inpatient rehabilitation; comparison of the responsiveness of the Barthel Index and Functional Chase Measure. Journal of Neurology, Neurosurgery, and Psychiatry, 66(4), 973-170. Estrada Montague, N.J.A, RONAK Salgado, & Tamanna Trujillo, MVIKAS. (2004.) Assessment of post-stroke quality of life in cost-effectiveness studies: The usefulness of the Barthel Index and the EuroQoL-5D. Quality of Life Research, 15, 593-88         Occupational Therapy Evaluation Charge Determination   History Examination Decision-Making   LOW Complexity : Brief history review  HIGH Complexity : 5 or more performance deficits relating to physical, cognitive , or psychosocial skils that result in activity limitations and / or participation restrictions HIGH Complexity : Patient presents with comorbidities that affect occupational performance.  Signifigant modification of tasks or assistance (eg, physical or verbal) with assessment (s) is necessary to enable patient to complete evaluation       Based on the above components, the patient evaluation is determined to be of the following complexity level: LOW   Pain Rating:  Grimacing and vocalizing \"oww\" with mobility, indicating yes to pain when asked, unable to expand upon    Activity Tolerance:   Poor  Please refer to the flowsheet for vital signs taken during this treatment. After treatment patient left in no apparent distress:    Call bell within reach, Bed / chair alarm activated, Side rails x 3, and seated in modified bed in chair position, sitter present     COMMUNICATION/EDUCATION:   The patients plan of care was discussed with: Physical therapist, Registered nurse, and Ortho PA. Patient is unable to participate in goal setting and plan of care. This patients plan of care is appropriate for delegation to [facility].     Thank you for this referral.  DANNA Kennedy, OTR/L  Time Calculation: 23 mins

## 2020-04-09 NOTE — PROGRESS NOTES
Primary Nurse Sarkis Alonzo and Leona Schaeffer RN performed a dual skin assessment on this patient  Hawk score is 16

## 2020-04-09 NOTE — PROGRESS NOTES
Problem: Mobility Impaired (Adult and Pediatric)  Goal: *Acute Goals and Plan of Care (Insert Text)  Description: FUNCTIONAL STATUS PRIOR TO ADMISSION: Patient is a poor historian. Per documentation she ambulated short distance with RW and physical assistance. HOME SUPPORT PRIOR TO ADMISSION: The patient lived with spouse and has caregivers 1-2 hours daily. Physical Therapy Goals  Initiated 4/9/2020    1. Patient will move from supine to sit and sit to supine  in bed with minimal assistance/contact guard assist within 4 days. 2. Patient will perform sit to stand with minimal assistance/contact guard assist within 4 days. 3. Patient will ambulate with moderate assistance  for 25 feet with the least restrictive device within 4 days. 4. Patient will ascend/descend 5 stairs with 1 handrail(s) with moderate assistance  within 4 days. 5. Patient will verbalize and demonstrate understanding of posterior precautions per protocol within 4 days. 6. Patient will perform hip hemiarthroplasty home exercise program per protocol with minimal assistance/contact guard assist within 4 days. Outcome: Progressing Towards Goal   PHYSICAL THERAPY EVALUATION  Patient: Gavin Billings ([de-identified] y.o. female)  Date: 4/9/2020  Primary Diagnosis: Hip fracture ([facility] [address] [S72.009A]  Procedure(s) (LRB):  RIGHT HIP HEMIARTHROPLASTY/ BIOMET/ ER TO IP/ REQ [id number]  Bio-met/ ESSENTIAL (Right) 1 Day Post-Op   Precautions:   Fall, WBAT, Skin(R posterior hip precautions)      ASSESSMENT  Based on the objective data described below, the patient presents with poor sitting balance, decreased independence with all functional mobility, and impaired cognition post-op day 1 R hip hemiarthroplasty after GLF at [facility] prior to admission. She was unable to initiate any movement, comprehend education on posterior hip precautions, and ultimately required Total A x2 for brief bed mobility. Patient requires total assistance to maintain sitting balance.  She is returned to supine, unable to assist with positioning. Ice pack is placed on right hip. Recommend d/c to SNF vs TBD pending progress with acute care therapies. Current Level of Function Impacting Discharge (mobility/balance): Total assistance     Functional Outcome Measure: The patient scored 0/100 on the Barthel outcome measure which is indicative of the need for total care . Other factors to consider for discharge: medical stability, cognitive status requiring supervision 24/7, current need for total care      Patient will benefit from skilled therapy intervention to address the above noted impairments. PLAN :  Recommendations and Planned Interventions: bed mobility training, transfer training, gait training, therapeutic exercises, neuromuscular re-education, edema management/control, patient and family training/education, and therapeutic activities      Frequency/Duration: Patient will be followed by physical therapy:  daily to address goals.     Recommendation for discharge: (in order for the patient to meet his/her long term goals)  Therapy up to 5 days/week in SNF setting    This discharge recommendation:  Has not yet been discussed the attending provider and/or case management    IF patient discharges home will need the following DME: to be determined (TBD)         SUBJECTIVE:   Patient mumbles unintelligible     OBJECTIVE DATA SUMMARY:   HISTORY:    Past Medical History:   Diagnosis Date    Anemia NEC     Asthma     major asthma as a child/only with colds now    Cancer ([address]     BCCA removed    Coagulation disorder ([facility] [address]     on xarelto    Dementia ([facility] [address]     GERD (gastroesophageal reflux disease) 10/22/2010    Hiatal hernia     Hypertension     hx of blood pressure med for short time    Ill-defined condition     increased cholesterol    Other unknown and unspecified cause of morbidity or mortality     eval with Dr Joe Avilez for SOB, sept 2015:  results unkown    Stroke ([address] 2013    no deficits    Stroke [facility])     heat exhaustion    Watermelon stomach 2003     Past Surgical History:   Procedure Laterality Date    HX BLADDER SUSPENSION      HX COLONOSCOPY      HX ENDOSCOPY  6/4/15    Dr. Rodrigo Lopez GYN      vaginal del times 3    HX HERNIA REPAIR  2014    x2, failure    HX OTHER SURGICAL      EGDs    HX TONSILLECTOMY      GA EXC SKIN MALIG >4CM FACE,FACIAL      times 3 - BCCA       Personal factors and/or comorbidities impacting plan of care: please see above    Home Situation  Home Environment: Assisted living  One/Two Story Residence: Other (Comment)  Living Alone: No  Support Systems: Family member(s)  Patient Expects to be Discharged to[de-identified] Assisted living  Current DME Used/Available at Home: Other (comment)    EXAMINATION/PRESENTATION/DECISION MAKING:   Critical Behavior:  Neurologic State: Alert, Confused  Orientation Level: Disoriented X4  Cognition: Decreased attention/concentration, No command following  Safety/Judgement: Decreased awareness of environment, Lack of insight into deficits  Hearing: Auditory  Auditory Impairment: None  Skin:    Edema:   Range Of Motion:  AROM: Grossly decreased, non-functional           PROM: Grossly decreased, non-functional(BUE tone vs resistance?)           Strength:    Strength: Grossly decreased, non-functional                    Tone & Sensation:                                  Coordination:  Coordination: Grossly decreased, non-functional  Vision:   Tracking: Unable to test secondary due to decreased visual attention  Functional Mobility:  Bed Mobility:  Rolling: Total assistance  Supine to Sit: Total assistance;Assist x2  Sit to Supine: Total assistance;Assist x2  Scooting: Total assistance;Assist x2  Transfers:                             Balance:   Sitting: Impaired; With support  Sitting - Static: Poor (constant support)  Ambulation/Gait Training:                                                         Stairs:               Therapeutic Exercises: Functional Measure:  Barthel Index:    Bathin  Bladder: 0  Bowels: 0  Groomin  Dressin  Feedin  Mobility: 0  Stairs: 0  Toilet Use: 0  Transfer (Bed to Chair and Back): 0  Total: 0/100       The Barthel ADL Index: Guidelines  1. The index should be used as a record of what a patient does, not as a record of what a patient could do. 2. The main aim is to establish degree of independence from any help, physical or verbal, however minor and for whatever reason. 3. The need for supervision renders the patient not independent. 4. A patient's performance should be established using the best available evidence. Asking the patient, friends/relatives and nurses are the usual sources, but direct observation and common sense are also important. However direct testing is not needed. 5. Usually the patient's performance over the preceding 24-48 hours is important, but occasionally longer periods will be relevant. 6. Middle categories imply that the patient supplies over 50 per cent of the effort. 7. Use of aids to be independent is allowed. Ana Portillo., Barthel, D.W. (0). Functional evaluation: the Barthel Index. [address] [facility] (14)2. Jenni Dill pelon TORITO [place], Chani Woods., Severa Mouse., [place], [address] [facility] (). Measuring the change indisability after inpatient rehabilitation; comparison of the responsiveness of the Barthel Index and Functional [place] Measure. Journal of Neurology, Neurosurgery, and Psychiatry, 66(4), 920-213. Katrina Bender, N.J.A, Linwood Lemus  W.J.SANDRA, & Kiley Alberto MEduarA. (2004.) Assessment of post-stroke quality of life in cost-effectiveness studies: The usefulness of the Barthel Index and the EuroQoL-5D.  Quality of Life Research, 15, 178-65            Physical Therapy Evaluation Charge Determination   History Examination Presentation Decision-Making   MEDIUM  Complexity : 1-2 comorbidities / personal factors will impact the outcome/ POC  LOW Complexity : 1-2 Standardized tests and measures addressing body structure, function, activity limitation and / or participation in recreation  MEDIUM Complexity : Evolving with changing characteristics  Other outcome measures Barthel  HIGH       Based on the above components, the patient evaluation is determined to be of the following complexity level: MEDIUM    Pain Rating:      Activity Tolerance:   Poor  Please refer to the flowsheet for vital signs taken during this treatment. After treatment patient left in no apparent distress:   Supine in bed, Heels elevated for pressure relief, Patient positioned in right sidelying for pressure relief, Call bell within reach, and Caregiver / family present    COMMUNICATION/EDUCATION:   The patients plan of care was discussed with: Occupational therapist, Speech therapist, and Registered nurse. Fall prevention education was provided and the patient/caregiver indicated understanding., Patient/family have participated as able in goal setting and plan of care. , and Patient/family agree to work toward stated goals and plan of care.     Thank you for this referral.  Marcy Farias, PT   Time Calculation: 23 mins

## 2020-04-10 LAB
ANION GAP SERPL CALC-SCNC: 7 MMOL/L (ref 5–15)
BUN SERPL-MCNC: 19 MG/DL (ref 6–20)
BUN/CREAT SERPL: 29 (ref 12–20)
CALCIUM SERPL-MCNC: 8.9 MG/DL (ref 8.5–10.1)
CHLORIDE SERPL-SCNC: 112 MMOL/L (ref 97–108)
CO2 SERPL-SCNC: 26 MMOL/L (ref 21–32)
CREAT SERPL-MCNC: 0.66 MG/DL (ref 0.55–1.02)
GLUCOSE SERPL-MCNC: 150 MG/DL (ref 65–100)
HGB BLD-MCNC: 7.9 G/DL (ref 11.5–16)
MAGNESIUM SERPL-MCNC: 2 MG/DL (ref 1.6–2.4)
POTASSIUM SERPL-SCNC: 3.4 MMOL/L (ref 3.5–5.1)
SODIUM SERPL-SCNC: 145 MMOL/L (ref 136–145)

## 2020-04-10 PROCEDURE — 65270000032 HC RM SEMIPRIVATE

## 2020-04-10 PROCEDURE — 74011000258 HC RX REV CODE- 258: Performed by: INTERNAL MEDICINE

## 2020-04-10 PROCEDURE — 85018 HEMOGLOBIN: CPT

## 2020-04-10 PROCEDURE — 80048 BASIC METABOLIC PNL TOTAL CA: CPT

## 2020-04-10 PROCEDURE — 36415 COLL VENOUS BLD VENIPUNCTURE: CPT

## 2020-04-10 PROCEDURE — 92526 ORAL FUNCTION THERAPY: CPT

## 2020-04-10 PROCEDURE — 97530 THERAPEUTIC ACTIVITIES: CPT

## 2020-04-10 PROCEDURE — 83735 ASSAY OF MAGNESIUM: CPT

## 2020-04-10 PROCEDURE — 74011250637 HC RX REV CODE- 250/637: Performed by: ORTHOPAEDIC SURGERY

## 2020-04-10 PROCEDURE — 51798 US URINE CAPACITY MEASURE: CPT

## 2020-04-10 PROCEDURE — 74011250636 HC RX REV CODE- 250/636: Performed by: INTERNAL MEDICINE

## 2020-04-10 PROCEDURE — 97530 THERAPEUTIC ACTIVITIES: CPT | Performed by: OCCUPATIONAL THERAPIST

## 2020-04-10 PROCEDURE — 74011250636 HC RX REV CODE- 250/636: Performed by: ORTHOPAEDIC SURGERY

## 2020-04-10 RX ORDER — POTASSIUM CHLORIDE 7.45 MG/ML
10 INJECTION INTRAVENOUS
Status: COMPLETED | OUTPATIENT
Start: 2020-04-10 | End: 2020-04-10

## 2020-04-10 RX ADMIN — POTASSIUM CHLORIDE 10 MEQ: 7.46 INJECTION, SOLUTION INTRAVENOUS at 09:38

## 2020-04-10 RX ADMIN — DEXTROSE MONOHYDRATE AND SODIUM CHLORIDE 100 ML/HR: 5; .45 INJECTION, SOLUTION INTRAVENOUS at 02:05

## 2020-04-10 RX ADMIN — SODIUM CHLORIDE 10 ML: 9 INJECTION INTRAMUSCULAR; INTRAVENOUS; SUBCUTANEOUS at 21:57

## 2020-04-10 RX ADMIN — POTASSIUM CHLORIDE 10 MEQ: 7.46 INJECTION, SOLUTION INTRAVENOUS at 12:15

## 2020-04-10 RX ADMIN — POTASSIUM CHLORIDE 10 MEQ: 7.46 INJECTION, SOLUTION INTRAVENOUS at 08:46

## 2020-04-10 RX ADMIN — POTASSIUM CHLORIDE 10 MEQ: 7.46 INJECTION, SOLUTION INTRAVENOUS at 11:14

## 2020-04-10 RX ADMIN — MORPHINE SULFATE 2 MG: 2 INJECTION, SOLUTION INTRAMUSCULAR; INTRAVENOUS at 03:37

## 2020-04-10 RX ADMIN — Medication 10 ML: at 21:58

## 2020-04-10 RX ADMIN — ENOXAPARIN SODIUM 40 MG: 40 INJECTION SUBCUTANEOUS at 08:49

## 2020-04-10 NOTE — PROGRESS NOTES
Problem: Dysphagia (Adult)  Goal: *Acute Goals and Plan of Care (Insert Text)  Description: Speech pathology goals  Initiated 4/9/2020  1. Patient will participate in re-evaluation of swallow function within 7 days   Outcome: Not Progressing Towards Goal     SPEECH LANGUAGE PATHOLOGY DYSPHAGIA TREATMENT  Patient: Becki Lopes ([de-identified] y.o. female)  Date: 4/10/2020  Diagnosis: Hip fracture ([address] [S72.009A]   Hip fracture ([facility] [address]  Procedure(s) (LRB):  RIGHT HIP HEMIARTHROPLASTY/ BIOMET/ ER TO IP/ REQ [id number]  Bio-met/ ESSENTIAL (Right) 2 Days Post-Op  Precautions: swallow, Fall, WBAT, Skin(R posterior hip precautions)    ASSESSMENT:  Patient continues to be most limited by refusal and absent bolus acceptance. Patient only agreeable to 1 bite of puree and 1 sip of thin liquid, and patient demonstrated decreased acceptance, slow oral prep, suspected premature spillage, delayed posterior propulsion, delayed swallow initiation, decreased laryngeal elevation, and strong coughing. Patient then refused additional PO trials. Patient is at high risk for aspiration, dehydration, and malnutrition secondary to dysphagia, poor PO intake, and advanced dementia, and NPO is safest. However, patient's dysphagia and poor PO intake appear to be progressing as this was occurring PTA as well, ?if related to progression of dementia. As long term feeding tubes are not recommended in patients with advanced dementia, only option is for comfort feeds despite high aspiration risk. MD to discuss with . PLAN:  Recommendations and Planned Interventions:  -Comfort feeds despite high aspiration risk as feeding tubes are not indicated in patients with advanced dementia  Patient continues to benefit from skilled intervention to address the above impairments. Continue treatment per established plan of care. Discharge Recommendations: To Be Determined     SUBJECTIVE:   Patient stated Please no, no no no.  Remains confused. OBJECTIVE:   Cognitive and Communication Status:  Neurologic State: Alert, Confused  Orientation Level: Other (Comment)(did not assess)  Cognition: Decreased command following, Decreased attention/concentration  Perception: Cues to maintain midline in sitting(L gaze/cervical rotation preference)  Perseveration: Perseverates during conversation(mumbling throughout)  Safety/Judgement: Decreased awareness of environment, Lack of insight into deficits  Dysphagia Treatment:     P.O. Trials:  Patient Position: upright in bed  Vocal quality prior to P.O.: Low volume  Consistency Presented: Thin liquid;Puree  How Presented: SLP-fed/presented;Spoon;Straw     Bolus Acceptance: Impaired  Bolus Formation/Control: Impaired  Type of Impairment: Delayed;Premature spillage  Propulsion: Delayed (# of seconds)  Oral Residue: None  Initiation of Swallow: Delayed (# of seconds)  Laryngeal Elevation: Decreased  Aspiration Signs/Symptoms: Strong cough          Pain:     Pain Intensity 1: 0       After treatment:   Patient left in no apparent distress in bed, Call bell within reach and Nursing notified    COMMUNICATION/EDUCATION:   The patient's plan of care including recommendations, planned interventions, and recommended diet changes were discussed with: Registered nurse and Physician.      ANISH Olmos  Time Calculation: 15 mins

## 2020-04-10 NOTE — PROGRESS NOTES
BAKARI Plan  [facility] [employer]  -[facility] will transport, confirmed 1 PM , cancelled now on will call   -Referral sent to [facility], pending     CM spoke with liaMick salinas who advised patient has been accepted. Patient and spouse is in agreement. [facility] transport request sent via [employer] for , awaiting a response. AVS and discharge summary sent via fax [phone]. Envelope containing MARS, KARDEX, AVS & discharge summary, RX, & [facility] will be sent with patient. Nurse will call report [phone]. Discharge delayed per Covid. Liabrad Feng ([phone]) called advised that there are two (2) positive Covid patients in the area where the patient will be going. CM contacting patients spouse to get SNF options. CM spoke with patients spouse who expressed interest in [address] [place] [address]. CM sent spouse and patients daughter a list of SNF options per spouse was not certain about [facility] but mentioned it as an option. CM sent referral to [facility] to check availability and potential acceptance. Patient spouse is in agreement and advised will look for other options using the list as back up.       CM will follow     BLANCA Gonzalez/SHILOH

## 2020-04-10 NOTE — PROGRESS NOTES
Problem: Self Care Deficits Care Plan (Adult)  Goal: *Acute Goals and Plan of Care (Insert Text)  Description:   FUNCTIONAL STATUS PRIOR TO ADMISSION: Pateint is a poor historian, perseverative with mumbling, unable to provide PLOF. Per chart review, patient lives at [address] [place] [address], walks with a RW with assistance, has caregivers and assist for all ADLs. Hx of dementia. HOME SUPPORT: The patient lived at longterm, required assist from staff and caregiver for ADLs and mobility. Occupational Therapy Goals  Initiated 4/9/2020  1. Patient will perform self-feeding with maximal assistance within 7 day(s). 2.  Patient will follow 10% of commands with functional activities throughout session within 7 day(s). 3.  Patient will perform rolling in supine for toilet transfers with maximal assistance x2 within 7 day(s). 4.  Patient will participate in upper extremity therapeutic exercise/activities with maximal assistance for 3 minutes within 7 day(s). Outcome: Progressing Towards Goal     OCCUPATIONAL THERAPY TREATMENT  Patient: Elen Griffin ([de-identified] y.o. female)  Date: 4/10/2020  Diagnosis: Hip fracture ([facility] [address] [S72.009A]   Hip fracture ([facility] [address]  Procedure(s) (LRB):  RIGHT HIP HEMIARTHROPLASTY/ BIOMET/ ER TO IP/ REQ [id number]  Bio-met/ ESSENTIAL (Right) 2 Days Post-Op  Precautions: Fall, WBAT, Skin(R posterior hip precautions)  Chart, occupational therapy assessment, plan of care, and goals were reviewed. ASSESSMENT  Patient continues with skilled OT services and is progressing slowly towards goals. She remains cognitively impaired with poor command following, resistive to mobility, yelling in pain with mobility and total A with all ADLs following fall at her longterm with resulting R hip fx POD 2 hemiarthroplasy. Recommend SNF at discharge.      Current Level of Function Impacting Discharge (ADLs): total A with all ADLs and functional mobility    Other factors to consider for discharge: pt will require 24/7 cognitive and physical assist         PLAN :  Patient continues to benefit from skilled intervention to address the above impairments. Continue treatment per established plan of care. to address goals. Recommend with staff: chair position in bed for all meals. Mechanical lift for OOB mobility    Recommend next OT session: EOB sitting, command following with any ADL    Recommendation for discharge: (in order for the patient to meet his/her long term goals)  Therapy up to 5 days/week in SNF setting    This discharge recommendation:  Has been made in collaboration with the attending provider and/or case management    IF patient discharges home will need the following DME: TBD       SUBJECTIVE:   Patient stated Wait, wait.     OBJECTIVE DATA SUMMARY:   Cognitive/Behavioral Status:  Neurologic State: Alert;Confused  Orientation Level: Disoriented to place; Disoriented to situation;Disoriented to time(responds to name)  Cognition: Decreased attention/concentration;Decreased command following; Impaired decision making;Memory loss;Poor safety awareness  Perception: Cues to maintain midline in sitting;Cues to maintain midline in standing; Tactile;Verbal;Visual  Perseveration: Perseverates during conversation(mumbling throughout session)  Safety/Judgement: Decreased awareness of need for assistance;Decreased awareness of environment;Decreased awareness of need for safety;Decreased insight into deficits; Fall prevention    Functional Mobility and Transfers for ADLs: Co-tx with PT as pt required the knowledge and skill of 2 therapists for cognitive instruction, safety, successful mobility and pt's active participation. Bed Mobility:  Rolling: Total assistance  Supine to Sit: Total assistance; Additional time;Assist x2  Sit to Supine: Total assistance; Additional time;Assist x2  Scooting: Total assistance    Transfers:  Sit to Stand: Total assistance; Additional time;Assist x2          Balance:  Sitting: Impaired; Without support  Sitting - Static: Good (unsupported)  Sitting - Dynamic: Fair (occasional)  Standing: Impaired;Pull to stand; With support  Standing - Static: Poor  Standing - Dynamic : Poor    ADL Intervention:  Grooming  Grooming Assistance: Total assistance(dependent)  Brushing/Combing Hair: Total assistance (dependent)    Lower Body Dressing Assistance  Socks: Total assistance (dependent)  Position Performed: Supine  Cues: Don;Doff;Physical assistance; Tactile cues provided;Verbal cues provided;Visual cues provided    Cognitive Retraining  Safety/Judgement: Decreased awareness of need for assistance;Decreased awareness of environment;Decreased awareness of need for safety;Decreased insight into deficits; Fall prevention    Pain:  No c/o pain, but yells with all mobility    Activity Tolerance:   Poor and requires frequent rest breaks  Please refer to the flowsheet for vital signs taken during this treatment. After treatment patient left in no apparent distress:   Supine in bed, Call bell within reach, and Caregiver / family present    COMMUNICATION/COLLABORATION:   The patients plan of care was discussed with: Physical therapist, Registered nurse, and Certified nursing assistant/patient care technician.      Taylor Anders OT  Time Calculation: 25 mins

## 2020-04-10 NOTE — PROGRESS NOTES
Problem: Mobility Impaired (Adult and Pediatric)  Goal: *Acute Goals and Plan of Care (Insert Text)  Description: FUNCTIONAL STATUS PRIOR TO ADMISSION: Patient is a poor historian. Per documentation she ambulated short distance with RW and physical assistance. HOME SUPPORT PRIOR TO ADMISSION: The patient lived with spouse and has caregivers 1-2 hours daily. Physical Therapy Goals  Initiated 4/9/2020    1. Patient will move from supine to sit and sit to supine  in bed with minimal assistance/contact guard assist within 4 days. 2. Patient will perform sit to stand with minimal assistance/contact guard assist within 4 days. 3. Patient will ambulate with moderate assistance  for 25 feet with the least restrictive device within 4 days. 4. Patient will ascend/descend 5 stairs with 1 handrail(s) with moderate assistance  within 4 days. 5. Patient will verbalize and demonstrate understanding of posterior precautions per protocol within 4 days. 6. Patient will perform hip hemiarthroplasty home exercise program per protocol with minimal assistance/contact guard assist within 4 days. Outcome: Progressing Towards Goal    PHYSICAL THERAPY TREATMENT  Patient: Lexie Denny ([de-identified] y.o. female)  Date: 4/10/2020  Diagnosis: Hip fracture ([facility] [address] [S72.009A]   Hip fracture ([facility] [address]  Procedure(s) (LRB):  RIGHT HIP HEMIARTHROPLASTY/ BIOMET/ ER TO IP/ REQ [id number]  Bio-met/ ESSENTIAL (Right) 2 Days Post-Op  Precautions: Fall, WBAT, Skin(R posterior hip precautions)  Chart, physical therapy assessment, plan of care and goals were reviewed. ASSESSMENT  Patient continues with skilled PT services and is marginally progressing towards goals however remains most limited by decr command following and movement initiation, post op pain, and global weakness leading to increased dependency and falls risk compared to baseline level. Received pt supine in bed - agreeable to participation in session with encouragement.  Overall, she required total A x2 for supine<>sit transitions however once hips squared at EOB, she was able to maintain good/fair sitting balance. Completed sit<>stands x2 reps with essentially total A x2 and total A to facilitate improved upright posture once up. Unable to adjust BISI to attempt weight shifts this date. Assisted back to bed and repositioned, abduction wedge in place, NAD. Continue to recommend discharge to SNF level rehab once medically cleared. Will follow. Current Level of Function Impacting Discharge (mobility/balance): total A x2 for all mobility     Other factors to consider for discharge: high falls risk          PLAN :  Patient continues to benefit from skilled intervention to address the above impairments. Continue treatment per established plan of care. to address goals. Recommendation for discharge: (in order for the patient to meet his/her long term goals)  Therapy up to 5 days/week in SNF setting    This discharge recommendation:  A follow-up discussion with the attending provider and/or case management is planned    IF patient discharges home will need the following DME: to be determined (TBD)       SUBJECTIVE:   Patient stated I don't like your face.     OBJECTIVE DATA SUMMARY:   Critical Behavior:  Neurologic State: Alert, Confused  Orientation Level: Disoriented to place, Disoriented to situation, Disoriented to time(responds to name)  Cognition: Decreased attention/concentration, Decreased command following, Impaired decision making, Memory loss, Poor safety awareness  Safety/Judgement: Decreased awareness of need for assistance, Decreased awareness of environment, Decreased awareness of need for safety, Decreased insight into deficits, Fall prevention  Functional Mobility Training:  Bed Mobility:  Rolling:  Total assistance  Supine to Sit: Assist x2;Total assistance  Sit to Supine: Assist x2;Total assistance  Scooting: Assist x2;Total assistance     Transfers:  Sit to Stand: Assist x2;Total assistance  Stand to Sit: Assist x2;Total assistance    Balance:  Sitting: Impaired; Without support  Sitting - Static: Good (unsupported)  Sitting - Dynamic: Fair (occasional)  Standing: Impaired; With support;Pull to stand  Standing - Static: Poor;Constant support  Standing - Dynamic : Poor    Activity Tolerance:   Fair  Please refer to the flowsheet for vital signs taken during this treatment. After treatment patient left in no apparent distress:   Supine in bed, Call bell within reach, Bed / chair alarm activated, Caregiver / family present, and Side rails x 3    COMMUNICATION/COLLABORATION:   The patients plan of care was discussed with: Occupational therapist and Registered nurse.      Clyde Monday, PT, DPT   Time Calculation: 25 mins

## 2020-04-10 NOTE — DISCHARGE SUMMARY
Discharge Summary       PATIENT ID: David Burris  MRN: [id number]   YOB: 1940    DATE OF ADMISSION: 4/7/2020 12:49 PM    DATE OF DISCHARGE: 04/10/20    PRIMARY CARE PROVIDER: John Otoole MD     ATTENDING PHYSICIAN: Rikki Villegas MD    DISCHARGING PROVIDER: Rikki Villegas MD    To contact this individual call [phone] and ask the  to page. If unavailable ask to be transferred the Adult Hospitalist Department. CONSULTATIONS: IP CONSULT TO ORTHOPEDIC SURGERY    PROCEDURES/SURGERIES: Procedure(s):  RIGHT HIP HEMIARTHROPLASTY/ BIOMET/ ER TO IP/ REQ [id number]  Bio-met/ ESSENTIAL    ADMITTING DIAGNOSES & HOSPITAL COURSE:   Toni Ann is a [de-identified] y.o. female who is demented at the history cannot be taken from her. Brian Edmonds is taken from the chart.  She has history of COPD asthma peptic ulcer disease hypertension in the past. Evelia Milner resides at the nursing home  Per EMS patient had fallen on Friday and today hip x-ray was done and it confirmed that she had a fracture.  She has been complaining of pain on right side of the hip. No nausea vomiting diarrhea no shortness of breath.  Family available on phone at bedside. Hip fracture due to ground-level fall  -Acute versus subacute right femoral neck fracture   -s/p right hip hemiarthroplasty 4/8  -Appreciate Ortho  -Pain controlled         Delirium  May be related to pain medicines and or post op  monitor        hyperlipidemia  Continue statin     depression    Remeron   Continue the same  Seroquel as needed for confusion agitation     Had a very detailed discussion with ,  report from nursing home was patient's feeding and was declined for last week before coming to the hospital.  Speech therapy was consulted and was recommending by speech therapist to keep patient n.p.o. because patient is at risk of aspiration.   Details were discussed with  about aspiration risk and benefits and risk of tube feedings, personally not recommended.  knows about the risk of aspiration, does not use for tube feeding. Instructions has been given to the nursing home regarding small liquid sips of thin liquids and assistance in feeding. Also discussed about hospice with  in the next few days in rehab if the patient does not do good, he will consider hospice. PENDING TEST RESULTS:   At the time of discharge the following test results are still pending: none    FOLLOW UP APPOINTMENTS:    Follow-up Information     Follow up With Specialties Details Why Contact Info    Chery Jarrell MD Internal Medicine In 1 week  [address] [place] [address] Dr Nicolás Brenner   [phone]             ADDITIONAL CARE RECOMMENDATIONS:   · It is important that you take the medication exactly as they are prescribed. · Keep your medication in the bottles provided by the pharmacist and keep a list of the medication names, dosages, and times to be taken in your wallet. · Do not take other medications without consulting your doctor. · No drinking alcohol or driving car or operating machinery if you are on narcotic pain medications. Donot take sedating mediations if you are sleepy or confused. · Fall Precautions  · Keep Well Hydrated  · Report to your medical provider if you feel you have  developed allergies to medications  · Follow up with your PCP or Consultant for medication adjustments and refills  · Monitor for signs of fevers,chills,bleeding,chest pain and seek medical attention if you do so. ·          DIET: Thin liquids      ACTIVITY: Activity as tolerated    WOUND CARE: none    EQUIPMENT needed: none      DISCHARGE MEDICATIONS:  Current Discharge Medication List            NOTIFY YOUR PHYSICIAN FOR ANY OF THE FOLLOWING:   Fever over 101 degrees for 24 hours. Chest pain, shortness of breath, fever, chills, nausea, vomiting, diarrhea, change in mentation, falling, weakness, bleeding.  Severe pain or pain not relieved by medications. Or, any other signs or symptoms that you may have questions about. DISPOSITION:    Home With:   OT  PT  HH  RN      x Long term SNF/Inpatient Rehab    Independent/assisted living    Hospice    Other:       PATIENT CONDITION AT DISCHARGE:     Functional status    Poor     Deconditioned     Independent      Cognition     Lucid     Forgetful     Dementia      Catheters/lines (plus indication)   x Liz     PICC     PEG     None      Code status     Full code    x DNR      PHYSICAL EXAMINATION AT DISCHARGE:  General:          Alert, cooperative,   Lungs:             Clear to auscultation bilaterally. Heart:              Regular  rhythm,  No  murmur   No edema  Abdomen:        Soft, non-tender. Not distended. Bowel sounds normal  Extremities:     No cyanosis. No clubbing,                            Skin turgor normal, Capillary refill normal    Neurologic:      Alert, moves all extremities,    CHRONIC MEDICAL DIAGNOSES:  Problem List as of 4/10/2020 Date Reviewed: 4/8/2020          Codes Class Noted - Resolved    Pulmonary hypertension ([facility] ICD-10-CM: I27.20  ICD-9-CM: 416.8  10/5/2015 - Present        Shortness of breath on exertion ICD-10-CM: R06.02  ICD-9-CM: 786.05  10/5/2015 - Present        Aortic valve regurgitation ICD-10-CM: I35.1  ICD-9-CM: 424.1  10/5/2015 - Present        * (Principal) Hip fracture ([facility] ICD-10-CM: S72.009A  ICD-9-CM: 820.8  4/7/2020 - Present        Generalized weakness ICD-10-CM: R53.1  ICD-9-CM: 780.79  2/7/2020 - Present        Status post gastrostomy ([facility] ICD-10-CM: Z93.1  ICD-9-CM: V44.1  3/8/2018 - Present        Blood loss anemia ICD-10-CM: D50.0  ICD-9-CM: 280.0  10/29/2015 - Present        Advance care planning ICD-10-CM: Z71.89  ICD-9-CM: V65.49  9/24/2015 - Present    Overview Signed 9/24/2015  3:32 PM by Latrice Ackerman LPN     End of life planning discussed with patient.   Patient states that they do have an advance directive and will provide a copy for our office at next visit.                  Chronic cholecystitis ICD-10-CM: K81.1  ICD-9-CM: 575.11  2/10/2015 - Present        Paraesophageal hernia ICD-10-CM: K44.9  ICD-9-CM: 553.3  2/10/2015 - Present        Moderate protein-calorie malnutrition ([facility] ICD-10-CM: E44.0  ICD-9-CM: 263.0  2/10/2015 - Present        CVA (cerebral vascular accident) [facility]) ICD-10-CM: I63.9  ICD-9-CM: 434.91  1/13/2014 - Present        Anemia ICD-10-CM: D64.9  ICD-9-CM: 285.9  12/30/2013 - Present        Watermelon stomach ICD-10-CM: W12.968  ICD-9-CM: 537.82  10/22/2010 - Present        GERD (gastroesophageal reflux disease) ICD-10-CM: K21.9  ICD-9-CM: 530.81  10/22/2010 - Present    Overview Signed 12/30/2013  5:50 AM by Andressa Joseph MD     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/13): 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

## 2020-04-10 NOTE — PROGRESS NOTES
Bedside shift change report given to Eddie6 Citllay Gardner (oncoming nurse) by Brigitte Adrian RN (offgoing nurse). Report included the following information Kardex, MAR and Recent Results.

## 2020-04-10 NOTE — PROGRESS NOTES
ORTHO PROGRESS NOTE    Late entry, patient seen this am.    SUBJECTIVE:  Ariannetie Argyle is minimally verbal.    OBJECTIVE:  Patient Vitals for the past 24 hrs:   Temp Pulse BP   04/10/20 0758 98.5 °F (36.9 °C) 99 100/55   04/10/20 0157 98.3 °F (36.8 °C) 86 100/64   04/09/20 2023 98.5 °F (36.9 °C) 99 100/66   04/09/20 1710   112/69   04/09/20 1418 97.9 °F (36.6 °C) 78 113/56       Awake, no distress. Lying in bed. Sitter present. Respirations unlabored. Some pain with gentle PROM. Thigh soft. Dressing CDI. Doesn't follow commands to move feet. Recent Labs     04/10/20  0551  04/08/20  0425   HGB 7.9*   < > 10.8*   HCT  --   --  33.9*   PLT  --   --  190   BUN 19   < >  --    CREA 0.66   < >  --    GFRAA >60   < >  --    GFRNA >60   < >  --     < > = values in this interval not displayed. ASSESSMENT:  Procedure: Procedure(s):  RIGHT HIP HEMIARTHROPLASTY/ BIOMET/ ER TO IP/ REQ [id number]  Bio-met/ ESSENTIAL  Post Op day: 2 Days Post-Op  Dementia    PLAN:  PT/OT: WBAT RLE with walker. Analgesics: Tylenol, limited prn morphine  DVT proph: Lovenox  Disp planning. To facility today. F/U: Dr. Scott Pemberton 3-4 weeks, [facility] [address] [place] [address] office.     MIKEY See  Orthopedic Trauma Service  [address] [place] [address]

## 2020-04-11 PROCEDURE — 97530 THERAPEUTIC ACTIVITIES: CPT

## 2020-04-11 PROCEDURE — 74011000258 HC RX REV CODE- 258: Performed by: INTERNAL MEDICINE

## 2020-04-11 PROCEDURE — 51798 US URINE CAPACITY MEASURE: CPT

## 2020-04-11 PROCEDURE — 74011250636 HC RX REV CODE- 250/636: Performed by: ORTHOPAEDIC SURGERY

## 2020-04-11 PROCEDURE — 65270000032 HC RM SEMIPRIVATE

## 2020-04-11 RX ADMIN — Medication 10 ML: at 21:41

## 2020-04-11 RX ADMIN — DEXTROSE MONOHYDRATE AND SODIUM CHLORIDE 100 ML/HR: 5; .45 INJECTION, SOLUTION INTRAVENOUS at 18:45

## 2020-04-11 RX ADMIN — Medication 10 ML: at 06:29

## 2020-04-11 RX ADMIN — ENOXAPARIN SODIUM 40 MG: 40 INJECTION SUBCUTANEOUS at 09:52

## 2020-04-11 RX ADMIN — SODIUM CHLORIDE 10 ML: 9 INJECTION INTRAMUSCULAR; INTRAVENOUS; SUBCUTANEOUS at 06:28

## 2020-04-11 RX ADMIN — SODIUM CHLORIDE 10 ML: 9 INJECTION INTRAMUSCULAR; INTRAVENOUS; SUBCUTANEOUS at 21:40

## 2020-04-11 NOTE — PROGRESS NOTES
[facility] Adult  Hospitalist Group                                                                                          Hospitalist Progress Note  Osman Pozo MD  Answering service: [phone] OR 36 from in house phone        Date of Service:  2020  NAME:  Elen Griffin  :  1940  MRN:  [id number]      Admission Summary:   Elen Griffin is a [de-identified] y.o. female who is demented at the history cannot be taken from her. History is taken from the chart. She has history of COPD asthma peptic ulcer disease hypertension in the past.  She resides at the nursing home  Per EMS patient had fallen on Friday and today hip x-ray was done and it confirmed that she had a fracture. She has been complaining of pain on right side of the hip. No nausea vomiting diarrhea no shortness of breath. Family available on phone at bedside.       Interval history / Subjective: Follow up Rt Femoral fracture  Patient seen and examined by the bedside, Labs, images and notes reviewed  Pt sleeping, not responding to commands much, has sitter at bedside. No new events noted  Facilities delaying acceptance and wants to do COVID testing twice to accept  No events reported by nursinig  Discussed with nursing staff, orders reviewed.    Plan discussed with patient/Family       Assessment & Plan:     Hip fracture due to ground-level fall  -Acute versus subacute right femoral neck fracture   -s/p right hip hemiarthroplasty   -Appreciate Ortho  -Pain controlled        Delirium  May be related to pain medicines and or post op  monitor       hyperlipidemia  Continue statin     depression    Remeron   Continue the same  Seroquel as needed for confusion agitation       Dysphagia  Risk of aspiration  NPO per speech       Code status: DNR  DVT prophylaxis: Lovenox  PTA: [facility]athalie Vazquez [facility]    Plan: Follow Ortho    Care Plan discussed with: Patient/Family  Anticipated Disposition: SNF/LTC  Anticipated Discharge: Greater than 48 hours     Hospital Problems  Date Reviewed: 4/8/2020          Codes Class Noted POA    * (Principal) Hip fracture [facility]) ICD-10-CM: J69.721F  ICD-9-CM: 820.8  4/7/2020 Yes                Review of Systems:   A comprehensive review of systems was negative except for that written in the HPI. Vital Signs:    Last 24hrs VS reviewed since prior progress note. Most recent are:  Visit Vitals  BP 99/47 (BP 1 Location: Left arm, BP Patient Position: At rest)   Pulse 76   Temp 98.7 °F (37.1 °C)   Resp 18   Wt 71.4 kg (157 lb 8 oz)   SpO2 98%   BMI 27.03 kg/m²         Intake/Output Summary (Last 24 hours) at 4/11/2020 1045  Last data filed at 4/11/2020 0055  Gross per 24 hour   Intake 225.83 ml   Output 300 ml   Net -74.17 ml        Physical Examination:             Constitutional:  No acute distress   ENT:  Oral mucosa moist, oropharynx benign. Resp:  CTA bilaterally. No wheezing/rhonchi/rales. No accessory muscle use   CV:  Regular rhythm, normal rate, no murmurs, gallops, rubs    GI:  Soft, non distended, non tender. normoactive bowel sounds, no hepatosplenomegaly     Musculoskeletal:  No edema, warm, 2+ pulses throughout    Neurologic:  Moves all extremities. AAOx0, CN II-XII reviewed     Psych:  Poor insight       Data Review:    Review and/or order of clinical lab test      Labs:     Recent Labs     04/10/20  0551 04/09/20  0233   HGB 7.9* 8.4*     Recent Labs     04/10/20  0551 04/09/20  0233    146*   K 3.4* 3.8   * 114*   CO2 26 28   BUN 19 29*   CREA 0.66 0.89   * 139*   CA 8.9 9.0   MG 2.0  --      No results for input(s): SGOT, GPT, ALT, AP, TBIL, TBILI, TP, ALB, GLOB, GGT, AML, LPSE in the last 72 hours. No lab exists for component: AMYP, HLPSE  No results for input(s): INR, PTP, APTT, INREXT, INREXT in the last 72 hours. No results for input(s): FE, TIBC, PSAT, FERR in the last 72 hours.    Lab Results   Component Value Date/Time    Folate 17.3 10/08/2015 11:30 AM No results for input(s): PH, PCO2, PO2 in the last 72 hours. No results for input(s): CPK, CKNDX, TROIQ in the last 72 hours.     No lab exists for component: CPKMB  Lab Results   Component Value Date/Time    Cholesterol, total 145 04/04/2017 09:46 AM    HDL Cholesterol 61 04/04/2017 09:46 AM    LDL, calculated 67 04/04/2017 09:46 AM    Triglyceride 87 04/04/2017 09:46 AM    CHOL/HDL Ratio 3.4 06/02/2013 06:30 AM     Lab Results   Component Value Date/Time    Glucose (POC) 120 (H) 04/08/2020 11:17 AM    Glucose (POC) 115 (H) 09/28/2018 08:46 PM     Lab Results   Component Value Date/Time    Color YELLOW/STRAW 04/07/2020 02:00 PM    Appearance CLEAR 04/07/2020 02:00 PM    Specific gravity 1.022 04/07/2020 02:00 PM    pH (UA) 6.0 04/07/2020 02:00 PM    Protein 30 (A) 04/07/2020 02:00 PM    Glucose 100 (A) 04/07/2020 02:00 PM    Ketone Negative 04/07/2020 02:00 PM    Bilirubin Negative 04/07/2020 02:00 PM    Urobilinogen 0.2 04/07/2020 02:00 PM    Nitrites Negative 04/07/2020 02:00 PM    Leukocyte Esterase Negative 04/07/2020 02:00 PM    Epithelial cells FEW 04/07/2020 02:00 PM    Bacteria Negative 04/07/2020 02:00 PM    WBC 0-4 04/07/2020 02:00 PM    RBC 0-5 04/07/2020 02:00 PM         Medications Reviewed:     Current Facility-Administered Medications   Medication Dose Route Frequency    dextrose 5 % - 0.45% NaCl infusion  100 mL/hr IntraVENous CONTINUOUS    sodium chloride (NS) flush 5-40 mL  5-40 mL IntraVENous Q8H    sodium chloride (NS) flush 5-40 mL  5-40 mL IntraVENous PRN    naloxone (NARCAN) injection 0.4 mg  0.4 mg IntraVENous PRN    calcium-vitamin D (OS-SAE) 500 mg-200 unit tablet  1 Tab Oral TID WITH MEALS    senna-docusate (PERICOLACE) 8.6-50 mg per tablet 1 Tab  1 Tab Oral BID    polyethylene glycol (MIRALAX) packet 17 g  17 g Oral DAILY    bisacodyL (DULCOLAX) suppository 10 mg  10 mg Rectal DAILY PRN    enoxaparin (LOVENOX) injection 40 mg  40 mg SubCUTAneous DAILY    acetaminophen (TYLENOL) tablet 650 mg  650 mg Oral Q6H    morphine injection 2 mg  2 mg IntraVENous Q3H PRN    traMADoL (ULTRAM) tablet 50 mg  50 mg Oral Q6H PRN    ALPRAZolam (XANAX) tablet 0.5 mg  0.5 mg Oral TID PRN    famotidine (PEPCID) tablet 20 mg  20 mg Oral DAILY    midodrine (PROAMITINE) tablet 10 mg  10 mg Oral BID WITH MEALS    mirtazapine (REMERON) tablet 7.5 mg  7.5 mg Oral QHS    QUEtiapine (SEROquel) tablet 12.5 mg  12.5 mg Oral Q8H PRN    pravastatin (PRAVACHOL) tablet 20 mg  20 mg Oral QHS    sodium chloride (NS) flush 5-40 mL  5-40 mL IntraVENous Q8H    sodium chloride (NS) flush 5-40 mL  5-40 mL IntraVENous PRN    acetaminophen (TYLENOL) tablet 650 mg  650 mg Oral Q4H PRN    naloxone (NARCAN) injection 0.4 mg  0.4 mg IntraVENous PRN    morphine injection 1 mg  1 mg IntraVENous Q4H PRN    ondansetron (ZOFRAN) injection 4 mg  4 mg IntraVENous Q4H PRN     ______________________________________________________________________  EXPECTED LENGTH OF STAY: 2d 21h  ACTUAL LENGTH OF STAY:          4                 Mateus Lowery MD

## 2020-04-11 NOTE — PROGRESS NOTES
TRANSITION OF CARE  Disposition--SNF Rehab-- provider MELINDA Calderón of [employer]-- referral received. Needs 2 negative co-vid 19 tests before referral will be reviewed. [address] [address]-- referral sent with response pending. [facility]-- referral sent with response pending. RUR--  23%  Transport--BLS stretcher. CM follow up. CM received response via [employer] from [address] [place] [address]: need 2 negative co-vid 19 tests before referral will be reviewed. CM gave update to the hospitalist and charge nurse. CM spoke with  Regina Russell via phone (cell [phone]) and update was given.  selected 2 additional facilities to have referrals sent: [facility] and [facility]. CM placed Freedom of Choice form on chart. CM sent referrals via [employer] to [address] [address] with responses pending.

## 2020-04-11 NOTE — PROGRESS NOTES
Problem: Pressure Injury - Risk of  Goal: *Prevention of pressure injury  Description: Document Hawk Scale and appropriate interventions in the flowsheet. Outcome: Progressing Towards Goal  Note: Pressure Injury Interventions:  Sensory Interventions: Check visual cues for pain, Float heels, Keep linens dry and wrinkle-free, Maintain/enhance activity level, Minimize linen layers, Pad between skin to skin, Pressure redistribution bed/mattress (bed type), Turn and reposition approx. every two hours (pillows and wedges if needed)    Moisture Interventions: Absorbent underpads, Apply protective barrier, creams and emollients    Activity Interventions: Pressure redistribution bed/mattress(bed type)    Mobility Interventions: Float heels    Nutrition Interventions: Document food/fluid/supplement intake, Offer support with meals,snacks and hydration    Friction and Shear Interventions: Apply protective barrier, creams and emollients, Lift sheet                Problem: Pain  Goal: *Control of Pain  Outcome: Progressing Towards Goal     Problem: Falls - Risk of  Goal: *Absence of Falls  Description: Document Mika Fall Risk and appropriate interventions in the flowsheet.   Outcome: Progressing Towards Goal  Note: Fall Risk Interventions:  Mobility Interventions: Patient to call before getting OOB    Mentation Interventions: Bed/chair exit alarm    Medication Interventions: Bed/chair exit alarm                   Problem: Nutrition Deficit  Goal: *Optimize nutritional status  Outcome: Progressing Towards Goal     Problem: Falls - Risk of  Goal: *Absence of Falls  Outcome: Progressing Towards Goal

## 2020-04-11 NOTE — PROGRESS NOTES
Orthopedic Spine Nursing Note      Patient Name: Alia Rivas    : 1940               MRN: [id number]    Admit Diagnosis: Hip fracture ([address] Artelia Organ    Surgery: Procedure(s):  RIGHT HIP HEMIARTHROPLASTY/ BIOMET/ ER TO IP/ REQ [id number]  Bio-met/ ESSENTIAL        Patient voided but was incontinent as witnessed by the sitter, put out at least 300 ml, pad changed. 20 0055   Output (mL)   Urine Voided 300 ml   Urine Assessment   Urinary Status Voiding; Incontinent   Urine Color Yellow/straw   Urine Appearance Clear   Urine Odor None   $$ Bladder Scan (mL of urine) 172 mL         Lines:   Peripheral IV 20 Right External jugular (Active)   Site Assessment Clean, dry, & intact 4/10/2020  8:48 PM   Phlebitis Assessment 0 4/10/2020  8:48 PM   Infiltration Assessment 0 4/10/2020  8:48 PM   Dressing Status Clean, dry, & intact 4/10/2020  8:48 PM   Dressing Type Transparent 4/10/2020  8:48 PM   Hub Color/Line Status Infusing 4/10/2020  8:48 PM   Action Taken Open ports on tubing capped 4/10/2020  7:58 AM   Alcohol Cap Used Yes 4/10/2020  7:58 AM       Signed by: Merry Carmona, RN, BSN, CMSRN                                            2020 at 12:56 AM

## 2020-04-11 NOTE — PROGRESS NOTES
Problem: Mobility Impaired (Adult and Pediatric)  Goal: *Acute Goals and Plan of Care (Insert Text)  Description: FUNCTIONAL STATUS PRIOR TO ADMISSION: Patient is a poor historian. Per documentation she ambulated short distance with RW and physical assistance. HOME SUPPORT PRIOR TO ADMISSION: The patient lived with spouse and has caregivers 1-2 hours daily. Physical Therapy Goals  Initiated 4/9/2020    1. Patient will move from supine to sit and sit to supine  in bed with minimal assistance/contact guard assist within 4 days. 2. Patient will perform sit to stand with minimal assistance/contact guard assist within 4 days. 3. Patient will ambulate with moderate assistance  for 25 feet with the least restrictive device within 4 days. 4. Patient will ascend/descend 5 stairs with 1 handrail(s) with moderate assistance  within 4 days. 5. Patient will verbalize and demonstrate understanding of posterior precautions per protocol within 4 days. 6. Patient will perform hip hemiarthroplasty home exercise program per protocol with minimal assistance/contact guard assist within 4 days. Outcome: Progressing Towards Goal  PHYSICAL THERAPY TREATMENT  Patient: Pooja Christiansen ([de-identified] y.o. female)  Date: 4/11/2020  Diagnosis: Hip fracture ([facility] [S72.009A]   Hip fracture ([facility] [address]  Procedure(s) (LRB):  RIGHT HIP HEMIARTHROPLASTY/ BIOMET/ ER TO IP/ REQ [id number]  Bio-met/ ESSENTIAL (Right) 3 Days Post-Op  Precautions: Fall, WBAT, Skin(R posterior hip precautions)  Chart, physical therapy assessment, plan of care and goals were reviewed. ASSESSMENT  Patient continues with skilled PT services and is progressing slowly towards goals. She is oriented to self only at this time and has a sitter present with her. She continues to require Max-Total Ax2 for functional transfers. She was agreeable to stand w/ use of RW (pull to stand) and w/ Max Ax2.  Attempted to take steps to [facility]; required Mod-Max Ax2 to complete for manual and verbal cuing to move feet and for weight shifting as well as RW management and support to maintain standing. Flexed posture noted despite verbal cues and facilitation to sacrum and glutes for hip extension and sternum for upright trunk. She returned to bed w/ all items in reach and needs met. ABD wedge in place w/ (B) heels elevated on pillows for pressure relief; pillow underneath R hip. Current Level of Function Impacting Discharge (mobility/balance): Max Ax2 overall for functional transfers. Other factors to consider for discharge: requires assist x2 to complete mobility at this time. Oriented to self at this time, mobility below known baseline reported (per eval and chart review). PLAN :  Patient continues to benefit from skilled intervention to address the above impairments. Continue treatment per established plan of care. to address goals. Recommendation for discharge: (in order for the patient to meet his/her long term goals)  Therapy up to 5 days/week in SNF setting    This discharge recommendation:  Has been made in collaboration with the attending provider and/or case management    IF patient discharges home will need the following DME: to be determined (TBD)       SUBJECTIVE:   Patient stated Jazlyn Dimas  when asked birth date and last name. OBJECTIVE DATA SUMMARY:   Critical Behavior:  Neurologic State: Confused  Orientation Level: Disoriented to person, Disoriented to place, Disoriented to situation, Disoriented to time  Cognition: Decreased command following  Safety/Judgement: Decreased awareness of need for assistance, Decreased awareness of environment, Decreased awareness of need for safety, Decreased insight into deficits, Fall prevention  Functional Mobility Training:  Bed Mobility:     Supine to Sit: Maximum assistance;Assist x2(pt intitiating LE's to EOB; assist to complete)  Sit to Supine: Maximum assistance; Total assistance;Assist x2           Transfers:  Sit to Stand: Maximum assistance;Assist x2  Stand to Sit: Maximum assistance;Assist x2                             Balance:  Sitting: Impaired; Without support  Sitting - Static: Good (unsupported)  Sitting - Dynamic: Fair (occasional)  Standing: Impaired;Pull to stand; With support  Standing - Static: Constant support  Standing - Dynamic : Poor  Ambulation/Gait Training:  Distance (ft): 1 Feet (ft)(to [facility])  Assistive Device: Gait belt;Walker, rolling  Ambulation - Level of Assistance: Moderate assistance;Maximum assistance;Assist x2(assist w/weight shift when side stepping)        Gait Abnormalities: Antalgic;Decreased step clearance;Shuffling gait; Step to gait        Base of Support: Center of gravity altered; Widened  Stance: Right decreased  Speed/Eloise: Shuffled  Step Length: Left shortened;Right shortened         Pain Rating:  No verbal c/o pain except for a few minor  groans w/ mobility     Activity Tolerance:   Fair  Please refer to the flowsheet for vital signs taken during this treatment. After treatment patient left in no apparent distress:   Supine in bed, Heels elevated for pressure relief, Call bell within reach, Caregiver / family present, and Side rails x 3 (sitter present)    COMMUNICATION/COLLABORATION:   The patients plan of care was discussed with: Registered nurse.      Jennifer Estrella PTA   Time Calculation: 25 mins

## 2020-04-11 NOTE — PROGRESS NOTES
Orthopedic Spine Nursing Note      Patient Name: Austin Cordero    : 1940               MRN: [id number]    Admit Diagnosis: [facility] [facility]    Surgery: Procedure(s):  RIGHT HIP HEMIARTHROPLASTY/ BIOMET/ ER TO IP/ REQ [id number]  Bio-met/ ESSENTIAL        Patient urinated 300 ml previously, bladder scan done this AM had 339 ml as witnessed by the sitter.      20 0706   Urine Assessment   $$ Bladder Scan (mL of urine) 339 mL         Lines:   Peripheral IV 20 Right External jugular (Active)   Site Assessment Clean, dry, & intact 4/10/2020  8:48 PM   Phlebitis Assessment 0 4/10/2020  8:48 PM   Infiltration Assessment 0 4/10/2020  8:48 PM   Dressing Status Clean, dry, & intact 4/10/2020  8:48 PM   Dressing Type Transparent 4/10/2020  8:48 PM   Hub Color/Line Status Infusing 4/10/2020  8:48 PM   Action Taken Open ports on tubing capped 4/10/2020  7:58 AM   Alcohol Cap Used Yes 4/10/2020  7:58 AM       Signed by: Meenakshi Kaminski, RN, BSN, CMSRN                                            2020 at 7:06 AM

## 2020-04-12 PROCEDURE — 74011250637 HC RX REV CODE- 250/637: Performed by: PHYSICIAN ASSISTANT

## 2020-04-12 PROCEDURE — 74011000258 HC RX REV CODE- 258: Performed by: INTERNAL MEDICINE

## 2020-04-12 PROCEDURE — 74011250636 HC RX REV CODE- 250/636: Performed by: ORTHOPAEDIC SURGERY

## 2020-04-12 PROCEDURE — 97110 THERAPEUTIC EXERCISES: CPT

## 2020-04-12 PROCEDURE — 65270000032 HC RM SEMIPRIVATE

## 2020-04-12 RX ORDER — ACETAMINOPHEN 650 MG/1
650 SUPPOSITORY RECTAL
Status: DISCONTINUED | OUTPATIENT
Start: 2020-04-12 | End: 2020-04-20 | Stop reason: HOSPADM

## 2020-04-12 RX ADMIN — DEXTROSE MONOHYDRATE AND SODIUM CHLORIDE 100 ML/HR: 5; .45 INJECTION, SOLUTION INTRAVENOUS at 16:25

## 2020-04-12 RX ADMIN — Medication 10 ML: at 06:40

## 2020-04-12 RX ADMIN — ENOXAPARIN SODIUM 40 MG: 40 INJECTION SUBCUTANEOUS at 09:22

## 2020-04-12 RX ADMIN — SODIUM CHLORIDE 10 ML: 9 INJECTION INTRAMUSCULAR; INTRAVENOUS; SUBCUTANEOUS at 06:40

## 2020-04-12 RX ADMIN — SODIUM CHLORIDE 10 ML: 9 INJECTION INTRAMUSCULAR; INTRAVENOUS; SUBCUTANEOUS at 21:34

## 2020-04-12 RX ADMIN — MORPHINE SULFATE 2 MG: 2 INJECTION, SOLUTION INTRAMUSCULAR; INTRAVENOUS at 02:57

## 2020-04-12 RX ADMIN — Medication 10 ML: at 21:34

## 2020-04-12 RX ADMIN — ACETAMINOPHEN 650 MG: 650 SUPPOSITORY RECTAL at 14:04

## 2020-04-12 RX ADMIN — MORPHINE SULFATE 2 MG: 2 INJECTION, SOLUTION INTRAMUSCULAR; INTRAVENOUS at 20:38

## 2020-04-12 NOTE — PROGRESS NOTES
[facility] Adult  Hospitalist Group                                                                                          Hospitalist Progress Note  Candi Mondragon MD  Answering service: [phone] from in house phone        Date of Service:  2020  NAME:  Marixa Castañeda  :  1940  MRN:  [id number]      Admission Summary:   Marixa Castañeda is a [de-identified] y.o. female who is demented at the history cannot be taken from her. History is taken from the chart. She has history of COPD asthma peptic ulcer disease hypertension in the past.  She resides at the nursing home  Per EMS patient had fallen on Friday and today hip x-ray was done and it confirmed that she had a fracture. She has been complaining of pain on right side of the hip. No nausea vomiting diarrhea no shortness of breath. Family available on phone at bedside.       Interval history / Subjective: Follow up Rt Femoral fracture  Patient seen and examined by the bedside, Labs, images and notes reviewed  Pt up in bed but not following commands, at her baseline of advanced dementia  No new events noted  Facilities delaying acceptance and wants to do COVID testing twice to accept  No events reported by nursinig  Discussed with nursing staff, orders reviewed. Discussed with  dmitriy about Feeding is not possible due to Aspiration risk, discussed hospice options but he does not want go for that because that's the last resort. He says that if she can go to rehab and her caregiver can go there and help her feed because of familiar face, she would be able to do. Currently waiting for rehab facility to accept.        Assessment & Plan:     Hip fracture due to ground-level fall  -Acute versus subacute right femoral neck fracture   -s/p right hip hemiarthroplasty   -Appreciate Ortho  -Pain controlled  -DVT PX per ortho        Post op Delirium  Resolved,  Now at baseline  monitor       hyperlipidemia  Continue statin     depression    Remeron   Continue the same  Seroquel as needed for confusion agitation       Dysphagia  Risk of aspiration  NPO per speech   check back on Monday  Palliative consulted        Code status: DNR  DVT prophylaxis: Lovenox  PTA: Nathalie Vazquez [facility]    Plan: Follow Ortho    Care Plan discussed with: Patient/Family  Anticipated Disposition: SNF/LTC  Anticipated Discharge: Greater than 48 hours     Hospital Problems  Date Reviewed: 4/8/2020          Codes Class Noted POA    * (Principal) Hip fracture ([address] ICD-10-CM: J94.789T  ICD-9-CM: 820.8  4/7/2020 Yes                Review of Systems:   A comprehensive review of systems was negative except for that written in the HPI. Vital Signs:    Last 24hrs VS reviewed since prior progress note. Most recent are:  Visit Vitals  /60 (BP 1 Location: Left arm, BP Patient Position: At rest)   Pulse 71   Temp 97.8 °F (36.6 °C)   Resp 18   Ht 5' 4\" (1.626 m)   Wt 71.4 kg (157 lb 8 oz)   SpO2 96%   BMI 27.03 kg/m²         Intake/Output Summary (Last 24 hours) at 4/12/2020 1043  Last data filed at 4/11/2020 2032  Gross per 24 hour   Intake 1000 ml   Output 450 ml   Net 550 ml        Physical Examination:             Constitutional:  awake, confused, No acute distress   ENT:  Oral mucosa moist, oropharynx benign. Resp:  CTA bilaterally. No wheezing/rhonchi/rales. No accessory muscle use   CV:  Regular rhythm, normal rate, no murmurs, gallops, rubs    GI:  Soft, non distended, non tender. normoactive bowel sounds, no hepatosplenomegaly     Musculoskeletal:  No edema, warm, 2+ pulses throughout    Neurologic:  Moves all extremities.   AAOx0, CN II-XII reviewed     Psych:  Poor insight       Data Review:    Review and/or order of clinical lab test      Labs:     Recent Labs     04/10/20  0551   HGB 7.9*     Recent Labs     04/10/20  0551      K 3.4*   *   CO2 26   BUN 19   CREA 0.66   *   CA 8.9   MG 2.0     No results for input(s): SGOT, GPT, ALT, AP, TBIL, TBILI, TP, ALB, GLOB, GGT, AML, LPSE in the last 72 hours. No lab exists for component: AMYP, HLPSE  No results for input(s): INR, PTP, APTT, INREXT, INREXT in the last 72 hours. No results for input(s): FE, TIBC, PSAT, FERR in the last 72 hours. Lab Results   Component Value Date/Time    Folate 17.3 10/08/2015 11:30 AM      No results for input(s): PH, PCO2, PO2 in the last 72 hours. No results for input(s): CPK, CKNDX, TROIQ in the last 72 hours.     No lab exists for component: CPKMB  Lab Results   Component Value Date/Time    Cholesterol, total 145 04/04/2017 09:46 AM    HDL Cholesterol 61 04/04/2017 09:46 AM    LDL, calculated 67 04/04/2017 09:46 AM    Triglyceride 87 04/04/2017 09:46 AM    CHOL/HDL Ratio 3.4 06/02/2013 06:30 AM     Lab Results   Component Value Date/Time    Glucose (POC) 120 (H) 04/08/2020 11:17 AM    Glucose (POC) 115 (H) 09/28/2018 08:46 PM     Lab Results   Component Value Date/Time    Color YELLOW/STRAW 04/07/2020 02:00 PM    Appearance CLEAR 04/07/2020 02:00 PM    Specific gravity 1.022 04/07/2020 02:00 PM    pH (UA) 6.0 04/07/2020 02:00 PM    Protein 30 (A) 04/07/2020 02:00 PM    Glucose 100 (A) 04/07/2020 02:00 PM    Ketone Negative 04/07/2020 02:00 PM    Bilirubin Negative 04/07/2020 02:00 PM    Urobilinogen 0.2 04/07/2020 02:00 PM    Nitrites Negative 04/07/2020 02:00 PM    Leukocyte Esterase Negative 04/07/2020 02:00 PM    Epithelial cells FEW 04/07/2020 02:00 PM    Bacteria Negative 04/07/2020 02:00 PM    WBC 0-4 04/07/2020 02:00 PM    RBC 0-5 04/07/2020 02:00 PM         Medications Reviewed:     Current Facility-Administered Medications   Medication Dose Route Frequency    acetaminophen (TYLENOL) suppository 650 mg  650 mg Rectal Q6H PRN    dextrose 5 % - 0.45% NaCl infusion  100 mL/hr IntraVENous CONTINUOUS    sodium chloride (NS) flush 5-40 mL  5-40 mL IntraVENous Q8H    sodium chloride (NS) flush 5-40 mL  5-40 mL IntraVENous PRN    naloxone [facility]) injection 0.4 mg  0.4 mg IntraVENous PRN    calcium-vitamin D (OS-SAE) 500 mg-200 unit tablet  1 Tab Oral TID WITH MEALS    senna-docusate (PERICOLACE) 8.6-50 mg per tablet 1 Tab  1 Tab Oral BID    polyethylene glycol (MIRALAX) packet 17 g  17 g Oral DAILY    bisacodyL (DULCOLAX) suppository 10 mg  10 mg Rectal DAILY PRN    enoxaparin (LOVENOX) injection 40 mg  40 mg SubCUTAneous DAILY    morphine injection 2 mg  2 mg IntraVENous Q3H PRN    traMADoL (ULTRAM) tablet 50 mg  50 mg Oral Q6H PRN    ALPRAZolam (XANAX) tablet 0.5 mg  0.5 mg Oral TID PRN    famotidine (PEPCID) tablet 20 mg  20 mg Oral DAILY    midodrine (PROAMITINE) tablet 10 mg  10 mg Oral BID WITH MEALS    mirtazapine (REMERON) tablet 7.5 mg  7.5 mg Oral QHS    QUEtiapine (SEROquel) tablet 12.5 mg  12.5 mg Oral Q8H PRN    pravastatin (PRAVACHOL) tablet 20 mg  20 mg Oral QHS    sodium chloride (NS) flush 5-40 mL  5-40 mL IntraVENous Q8H    sodium chloride (NS) flush 5-40 mL  5-40 mL IntraVENous PRN    acetaminophen (TYLENOL) tablet 650 mg  650 mg Oral Q4H PRN    naloxone (NARCAN) injection 0.4 mg  0.4 mg IntraVENous PRN    morphine injection 1 mg  1 mg IntraVENous Q4H PRN    ondansetron (ZOFRAN) injection 4 mg  4 mg IntraVENous Q4H PRN     ______________________________________________________________________  EXPECTED LENGTH OF STAY: 2d 21h  ACTUAL LENGTH OF STAY:          5                 Ly Weber MD

## 2020-04-12 NOTE — PROGRESS NOTES
ORTHO PROGRESS NOTE      SUBJECTIVE:  Philippe Quintero is sleeping when I enter and seems comfortable at rest.  She awakens and is a little more conversant than during previous visit. Appears Friday discharge to SNF was cancelled as receiving facility dealing with COVID cases. Care Management working on new SNF options. OBJECTIVE:  Patient Vitals for the past 24 hrs:   Temp Pulse BP   04/12/20 0819 97.8 °F (36.6 °C) 71 117/60   04/12/20 0259 98.2 °F (36.8 °C) 75 110/68   04/11/20 2032 98.1 °F (36.7 °C) 85 116/48   04/11/20 1412 97.4 °F (36.3 °C) 72 130/71       Awake, no distress. Lying in bed. No longer requiring sitter. Respirations unlabored. Dressing with some contained post-operative serosanguinous oozing. Thigh soft. Wedge pillow intact. No deformity RLE. She says \"No\" when I perform PROM right hip but upon further questioning she states more fear than pain. Moves feet to command today. Feet SILT. Recent Labs     04/10/20  0551   HGB 7.9*   BUN 19   CREA 0.66   GFRAA >60   GFRNA >60       PT/OT:   Gait:  Gait  Base of Support: Center of gravity altered, Widened  Speed/Eloise: Shuffled  Step Length: Left shortened, Right shortened  Stance: Right decreased  Gait Abnormalities: Antalgic, Decreased step clearance, Shuffling gait, Step to gait  Ambulation - Level of Assistance: Moderate assistance, Maximum assistance, Assist x2(assist w/weight shift when side stepping)  Distance (ft): 1 Feet (ft)(to [facility])  Assistive Device: Gait belt, Walker, rolling                 ASSESSMENT:  Procedure: Procedure(s):  RIGHT HIP HEMIARTHROPLASTY/ BIOMET/ ER TO IP/ REQ [id number]  Bio-met/ ESSENTIAL  Post Op day: 4 Days Post-Op  stable  dementia    PLAN:  PT/OT: WBAT RLE with walker, maintain posterior approach precautions. Analgesics: Tylenol PO held as she is NPO (aspiration risk), very limited IV morphine. Could consider Tylenol supp prn to min narcotics.   DVT proph: Lovenox typically 14 days post op, then  daily for 14 additional days  Disp planning. Care Management working on different SNF options since previously accepting facility now dealing with COVID cases. F/U: Dr. Jesse Blanco 3-4 weeks. Call again if questions.     MIKEY Ghotra  Orthopedic Trauma Service  [address]

## 2020-04-12 NOTE — PROGRESS NOTES
Problem: Mobility Impaired (Adult and Pediatric)  Goal: *Acute Goals and Plan of Care (Insert Text)  Description: FUNCTIONAL STATUS PRIOR TO ADMISSION: Patient is a poor historian. Per documentation she ambulated short distance with RW and physical assistance. HOME SUPPORT PRIOR TO ADMISSION: The patient lived with spouse and has caregivers 1-2 hours daily. Physical Therapy Goals  Initiated 4/9/2020    1. Patient will move from supine to sit and sit to supine  in bed with minimal assistance/contact guard assist within 4 days. 2. Patient will perform sit to stand with minimal assistance/contact guard assist within 4 days. 3. Patient will ambulate with moderate assistance  for 25 feet with the least restrictive device within 4 days. 4. Patient will ascend/descend 5 stairs with 1 handrail(s) with moderate assistance  within 4 days. 5. Patient will verbalize and demonstrate understanding of posterior precautions per protocol within 4 days. 6. Patient will perform hip hemiarthroplasty home exercise program per protocol with minimal assistance/contact guard assist within 4 days. Outcome: Progressing Towards Goal   PHYSICAL THERAPY TREATMENT  Patient: Austin Cordero ([de-identified] y.o. female)  Date: 4/12/2020  Diagnosis: Hip fracture ([facility] [S72.009A]   Hip fracture ([facility])  Procedure(s) (LRB):  RIGHT HIP HEMIARTHROPLASTY/ BIOMET/ ER TO IP/ REQ [id number]  Bio-met/ ESSENTIAL (Right) 4 Days Post-Op  Precautions: Fall, WBAT, Skin(R posterior hip precautions)  Chart, physical therapy assessment, plan of care and goals were reviewed. ASSESSMENT  Patient continues with skilled PT services and is not progressing towards goals. She remains disoriented except to self and is not able to follow any verbal commands. She reports pain and says, \"no, no, no,\" with ROM of both LEs and UEs. Elevated the bed into chair position, but she was not able to participate in any mobility beyond extremity ROM in the bed.   Note SLP assessment from Friday and she has been NPO since then, as well. We can continue to attempt mobility progression, but at this time, she has not made any gains with therapy efforts. Recommend bed in chair position at least 3 times a day to promote pulmonary toilet and alternating pressure on skin. Current Level of Function Impacting Discharge (mobility/balance): total assist for all mobility    Other factors to consider for discharge: dementia         PLAN :  Patient continues to benefit from skilled intervention to address the above impairments. Continue treatment per established plan of care. to address goals. Recommendation for discharge: (in order for the patient to meet his/her long term goals)  Therapy up to 5 days/week in SNF setting    This discharge recommendation:  Has been made in collaboration with the attending provider and/or case management    IF patient discharges home will need the following DME: to be determined (TBD)       SUBJECTIVE:   Patient stated I'm cold.     OBJECTIVE DATA SUMMARY:   Critical Behavior:  Neurologic State: Confused  Orientation Level: Disoriented to place, Disoriented to situation  Cognition: Follows commands  Safety/Judgement: Decreased awareness of need for assistance, Decreased awareness of environment, Decreased awareness of need for safety, Decreased insight into deficits, Fall prevention  Functional Mobility Training:  Bed Mobility:                    Transfers:                                   Balance:     Ambulation/Gait Training:                                                        Stairs: Therapeutic Exercises:   PROM of extremities with attempts at [facility]  Pain Rating:  Pain reported with motion of both legs and arms    Activity Tolerance:   Poor  Please refer to the flowsheet for vital signs taken during this treatment.     After treatment patient left in no apparent distress:   Call bell within reach, Side rails x 3, and bed in chair position     COMMUNICATION/COLLABORATION:   The patients plan of care was discussed with: Registered nurse.      Val Mcclellan, PT   Time Calculation: 15 mins

## 2020-04-12 NOTE — PROGRESS NOTES
Spiritual Care Assessment/Progress Note  [facility]      NAME: Megan Burgos      MRN: [id number]  AGE: [de-identified] y.o.  SEX: female  Jehovah's witness Affiliation: Islam   Language: English     4/12/2020           Spiritual Assessment begun in [facility] 5W1 ORTHO SPINE through conversation with:         []Patient        [] Family    [] Friend(s)        Reason for Consult: Palliative Care, Initial/Spiritual Assessment     Spiritual beliefs: (Please include comment if needed)     [] Identifies with a rosaura tradition:         [] Supported by a rosaura community:            [] Claims no spiritual orientation:           [] Seeking spiritual identity:                [] Adheres to an individual form of spirituality:           [x] Not able to assess:                           Identified resources for coping:      [] Prayer                               [] Music                  [] Guided Imagery     [] Family/friends                 [] Pet visits     [] Devotional reading                         [] Unknown     [] Other:                                               Interventions offered during this visit: (See comments for more details)    Patient Interventions: Affirmation of emotions/emotional suffering, Catharsis/review of pertinent events in supportive environment, Prayer (assurance of)           Plan of Care:     [] Support spiritual and/or cultural needs    [] Support AMD and/or advance care planning process      [] Support grieving process   [] Coordinate Rites and/or Rituals    [] Coordination with community clergy   [] No spiritual needs identified at this time   [] Detailed Plan of Care below (See Comments)  [] Make referral to Music Therapy  [] Make referral to Pet Therapy     [] Make referral to Addiction services  [] Make referral to [facility]  [] Make referral to Spiritual Care Partner  [] No future visits requested        [] Follow up visits as needed     Comments: Visited Ms Micki Ramírez in room 540 for initial Palliative Care spiritual assessment. Ms Sandra Sargent was sitting up in bed; she appeared to be very concerned. Provided active listening as she shared that she didn't know where she was, what was wrong with her, or where she was going. Informed patient of her location and situation. Assured her that staff had been in conversation with her  about where she would be going and attempted to provided calming presence. Assured her of prayers on her behalf for which she expressed appreciation. : Rev. Sheron Lin.  Herbert Barrett; [facility], to contact [phone] [address] call: [phone]

## 2020-04-13 PROCEDURE — 74011250636 HC RX REV CODE- 250/636: Performed by: ORTHOPAEDIC SURGERY

## 2020-04-13 PROCEDURE — 65270000032 HC RM SEMIPRIVATE

## 2020-04-13 PROCEDURE — 74011250637 HC RX REV CODE- 250/637: Performed by: INTERNAL MEDICINE

## 2020-04-13 PROCEDURE — 74011000258 HC RX REV CODE- 258: Performed by: INTERNAL MEDICINE

## 2020-04-13 PROCEDURE — 74011250637 HC RX REV CODE- 250/637: Performed by: ORTHOPAEDIC SURGERY

## 2020-04-13 PROCEDURE — 92526 ORAL FUNCTION THERAPY: CPT

## 2020-04-13 PROCEDURE — 97110 THERAPEUTIC EXERCISES: CPT

## 2020-04-13 PROCEDURE — 97530 THERAPEUTIC ACTIVITIES: CPT

## 2020-04-13 PROCEDURE — 74011250636 HC RX REV CODE- 250/636: Performed by: INTERNAL MEDICINE

## 2020-04-13 RX ORDER — POTASSIUM CHLORIDE 7.45 MG/ML
10 INJECTION INTRAVENOUS
Status: COMPLETED | OUTPATIENT
Start: 2020-04-13 | End: 2020-04-13

## 2020-04-13 RX ORDER — FAMOTIDINE 20 MG/1
20 TABLET, FILM COATED ORAL 2 TIMES DAILY
Status: DISCONTINUED | OUTPATIENT
Start: 2020-04-13 | End: 2020-04-16 | Stop reason: ALTCHOICE

## 2020-04-13 RX ORDER — DEXTROSE MONOHYDRATE AND SODIUM CHLORIDE 5; .45 G/100ML; G/100ML
20 INJECTION, SOLUTION INTRAVENOUS CONTINUOUS
Status: DISCONTINUED | OUTPATIENT
Start: 2020-04-13 | End: 2020-04-19

## 2020-04-13 RX ADMIN — SENNOSIDES AND DOCUSATE SODIUM 1 TABLET: 8.6; 5 TABLET ORAL at 18:00

## 2020-04-13 RX ADMIN — DEXTROSE MONOHYDRATE AND SODIUM CHLORIDE 20 ML/HR: 5; .45 INJECTION, SOLUTION INTRAVENOUS at 17:34

## 2020-04-13 RX ADMIN — SODIUM CHLORIDE 10 ML: 9 INJECTION INTRAMUSCULAR; INTRAVENOUS; SUBCUTANEOUS at 16:02

## 2020-04-13 RX ADMIN — MORPHINE SULFATE 2 MG: 2 INJECTION, SOLUTION INTRAMUSCULAR; INTRAVENOUS at 22:30

## 2020-04-13 RX ADMIN — PRAVASTATIN SODIUM 20 MG: 20 TABLET ORAL at 22:31

## 2020-04-13 RX ADMIN — FAMOTIDINE 20 MG: 20 TABLET ORAL at 18:00

## 2020-04-13 RX ADMIN — SODIUM CHLORIDE 10 ML: 9 INJECTION INTRAMUSCULAR; INTRAVENOUS; SUBCUTANEOUS at 22:31

## 2020-04-13 RX ADMIN — Medication 10 ML: at 05:51

## 2020-04-13 RX ADMIN — MORPHINE SULFATE 2 MG: 2 INJECTION, SOLUTION INTRAMUSCULAR; INTRAVENOUS at 05:34

## 2020-04-13 RX ADMIN — Medication 10 ML: at 22:31

## 2020-04-13 RX ADMIN — ENOXAPARIN SODIUM 40 MG: 40 INJECTION SUBCUTANEOUS at 08:46

## 2020-04-13 RX ADMIN — Medication 10 ML: at 16:03

## 2020-04-13 RX ADMIN — POTASSIUM CHLORIDE 10 MEQ: 7.46 INJECTION, SOLUTION INTRAVENOUS at 08:28

## 2020-04-13 RX ADMIN — SODIUM CHLORIDE 10 ML: 9 INJECTION INTRAMUSCULAR; INTRAVENOUS; SUBCUTANEOUS at 05:51

## 2020-04-13 RX ADMIN — MIDODRINE HYDROCHLORIDE 10 MG: 5 TABLET ORAL at 16:40

## 2020-04-13 RX ADMIN — POTASSIUM CHLORIDE 10 MEQ: 7.46 INJECTION, SOLUTION INTRAVENOUS at 10:53

## 2020-04-13 RX ADMIN — OYSTER SHELL CALCIUM WITH VITAMIN D 1 TABLET: 500; 200 TABLET, FILM COATED ORAL at 12:52

## 2020-04-13 RX ADMIN — POTASSIUM CHLORIDE 10 MEQ: 7.46 INJECTION, SOLUTION INTRAVENOUS at 09:46

## 2020-04-13 RX ADMIN — OYSTER SHELL CALCIUM WITH VITAMIN D 1 TABLET: 500; 200 TABLET, FILM COATED ORAL at 17:00

## 2020-04-13 RX ADMIN — MIRTAZAPINE 7.5 MG: 15 TABLET, FILM COATED ORAL at 22:31

## 2020-04-13 NOTE — PROGRESS NOTES
Problem: Self Care Deficits Care Plan (Adult)  Goal: *Acute Goals and Plan of Care (Insert Text)  Description:   FUNCTIONAL STATUS PRIOR TO ADMISSION: Pateint is a poor historian, perseverative with mumbling, unable to provide PLOF. Per chart review, patient lives at [address] [place] [address], walks with a RW with assistance, has caregivers and assist for all ADLs. Hx of dementia. HOME SUPPORT: The patient lived at [facility], required assist from staff and caregiver for ADLs and mobility. Occupational Therapy Goals  Initiated 4/9/2020  1. Patient will perform self-feeding with maximal assistance within 7 day(s). 2.  Patient will follow 10% of commands with functional activities throughout session within 7 day(s). 3.  Patient will perform rolling in supine for toilet transfers with maximal assistance x2 within 7 day(s). 4.  Patient will participate in upper extremity therapeutic exercise/activities with maximal assistance for 3 minutes within 7 day(s). Outcome: Not Met    OCCUPATIONAL THERAPY TREATMENT  Patient: Ayan Coleman ([de-identified] y.o. female)  Date: 4/13/2020  Diagnosis: Hip fracture ([facility] [address] [S72.009A]   Hip fracture ([facility] [address]  Procedure(s) (LRB):  RIGHT HIP HEMIARTHROPLASTY/ BIOMET/ ER TO IP/ REQ [id number]  Bio-met/ ESSENTIAL (Right) 5 Days Post-Op  Precautions: Fall, WBAT, Skin(R posterior hip precautions)  Chart, occupational therapy assessment, plan of care, and goals were reviewed. ASSESSMENT  Patient continues with skilled OT services and is not progressing towards goals. Patient cleared by RN to be seen, received in bed mumbling and repeating \"help me, help me\". Patient calmed slightly with reassuring presence and voice. Patient required total A for bed mobility to scoot to [facility]. Noted patient guarding with all movement. Patient placed in semisupine in bed.  Patient required max A to wash face - able to wash chin and eyes, otherwise required assist. Patient completed AAROM in BE with noted resistance to movement - maximal physical cues/assist to complete. Patient left in bed with call bell in reach, RN aware and all needs met. Patient may benefit from SNF, however, likely will require LTC. Will continue to follow. Current Level of Function Impacting Discharge (ADLs): max-total A for ADLs and mobility. Other factors to consider for discharge: may require LTC placement         PLAN :  Patient continues to benefit from skilled intervention to address the above impairments. Continue treatment per established plan of care. to address goals. Recommend with staff: Recommend with nursing patient to complete as able in order to maintain strength, endurance and independence: ADLs with supervision/setup and once Egress Test completed bed to chair position 3x/day and mobilizing self in bed for toileting with 2 assist. Thank you for your assistance. Recommend next OT session: simple ADLs - attempt EOB sitting or while in bed in chair    Recommendation for discharge: (in order for the patient to meet his/her long term goals)  To be determined: SNF vs. LTC     This discharge recommendation:  Has been made in collaboration with the attending provider and/or case management    IF patient discharges home will need the following DME: bedside commode, hospital bed, mechanical lift, transfer bench, and wheelchair       SUBJECTIVE:   Patient stated Help me, Help me.     OBJECTIVE DATA SUMMARY:   Cognitive/Behavioral Status:  Neurologic State: Alert;Confused  Orientation Level: Oriented to person;Disoriented to place; Disoriented to situation;Disoriented to time  Cognition: Decreased attention/concentration;Decreased command following  Perception: Appears intact(supported semisupine)  Perseveration: Perseverates during conversation(\"help me, help me\")  Safety/Judgement: Decreased awareness of environment;Decreased awareness of need for assistance;Decreased awareness of need for safety;Decreased insight into deficits    Functional Mobility and Transfers for ADLs:  Bed Mobility:  Supine to Sit: Bed Modified(bed mechanics utilized)  Scooting: Total assistance; Additional time;Assist x2(to scoot to [facility])    Transfers:  NT this session    Balance:  Sitting: Impaired  Sitting - Static: Supported sitting    ADL Intervention:     Grooming  Position Performed: Long sitting on bed  Washing Face: Maximum assistance  Cues: Physical assistance;Verbal cues provided    Cognitive Retraining  Safety/Judgement: Decreased awareness of environment;Decreased awareness of need for assistance;Decreased awareness of need for safety;Decreased insight into deficits    Pain:  Yelling out with any movement - did not quantify    Activity Tolerance:   Poor  Please refer to the flowsheet for vital signs taken during this treatment. After treatment patient left in no apparent distress:   Supine in bed, Call bell within reach, and Side rails x 3    COMMUNICATION/COLLABORATION:   The patients plan of care was discussed with: Physical therapist, Speech therapist, and Registered nurse.      Candie Herrera OT  Time Calculation: 18 mins

## 2020-04-13 NOTE — PROGRESS NOTES
BAKARI Plan  -Need SNF Placement  -[facility], reviewing but need 2 negative Cov-19  test results. -[facility], declined, not accepting patients currently  [facility], pending awaiting a response  -Cov-19 testing needed for SNF placement. (need 2 negative)    CM will follow up with updates as they become available.      Pancho Mccauley, BROOKEA/CRM

## 2020-04-13 NOTE — PROGRESS NOTES
Problem: Pressure Injury - Risk of  Goal: *Prevention of pressure injury  Description: Document Hawk Scale and appropriate interventions in the flowsheet. Outcome: Progressing Towards Goal  Note: Pressure Injury Interventions:  Sensory Interventions: Check visual cues for pain, Float heels, Keep linens dry and wrinkle-free, Maintain/enhance activity level, Minimize linen layers, Pad between skin to skin, Pressure redistribution bed/mattress (bed type), Turn and reposition approx. every two hours (pillows and wedges if needed)    Moisture Interventions: Internal/External urinary devices, Absorbent underpads, Minimize layers    Activity Interventions: Pressure redistribution bed/mattress(bed type)    Mobility Interventions: Pressure redistribution bed/mattress (bed type)    Nutrition Interventions: Offer support with meals,snacks and hydration    Friction and Shear Interventions: Minimize layers                Problem: Pain  Goal: *Control of Pain  Outcome: Progressing Towards Goal     Problem: Falls - Risk of  Goal: *Absence of Falls  Description: Document Mika Fall Risk and appropriate interventions in the flowsheet.   Outcome: Progressing Towards Goal  Note: Fall Risk Interventions:  Mobility Interventions: Patient to call before getting OOB    Mentation Interventions: Door open when patient unattended, Adequate sleep, hydration, pain control, Room close to nurse's station    Medication Interventions: Patient to call before getting OOB    Elimination Interventions: Patient to call for help with toileting needs    History of Falls Interventions: Room close to nurse's station, Door open when patient unattended         Problem: Nutrition Deficit  Goal: *Optimize nutritional status  Outcome: Progressing Towards Goal

## 2020-04-13 NOTE — PROGRESS NOTES
Problem: Dysphagia (Adult)  Goal: *Acute Goals and Plan of Care (Insert Text)  Description: Speech pathology goals  Initiated 4/13/2020  1. Patient will tolerate pureed diet/thin liquids without adverse effects within 7 days. Initiated 4/9/2020  1. Patient will participate in re-evaluation of swallow function within 7 days    Outcome: [facility] TREATMENT  Patient: No Rosas ([de-identified] y.o. female)  Date: 4/13/2020  Diagnosis: Hip fracture ([facility] [address] [S72.009A]   Hip fracture ([facility] [address]  Procedure(s) (LRB):  RIGHT HIP HEMIARTHROPLASTY/ BIOMET/ ER TO IP/ REQ [id number]  Bio-met/ ESSENTIAL (Right) 5 Days Post-Op  Precautions: Fall, WBAT, Skin(R posterior hip precautions)    ASSESSMENT:  Pt with improvement in bolus acceptance on this date, with only delayed oral transit present. Pt with good bolus acceptance. Pt with weak cough x2 with straw sips of thin liquids, however, none present with cup sips. Recommend cautious initiation of diet as outlined below. However, given pt with advanced dementia, she will be at chronic risk for prandial aspiration, and a PEG tube would be contraindicated for her given the advanced dementia as it does not prevent aspiration, no indicator of improvement in albumin/prealbumin, and risk for wounds and needs for restraints. Therefore, strongly recommend continued goals of care conversations as pt will also be at risk to not maintain nutrition PO. PLAN:  Recommendations and Planned Interventions:  --pureed diet/thin liquids  --no straws  --upright as much as patient will tolerate  --small sips/bites  --alternate liquids/solids  --1:1 assistance  --meds crushed in applesauce  Patient continues to benefit from skilled intervention to address the above impairments. Continue treatment per established plan of care. Discharge Recommendations: To Be Determined     SUBJECTIVE:   Patient stated, \"I'm nauseous.     OBJECTIVE:   Cognitive and Communication Status:  Neurologic State: Alert, Confused  Orientation Level: Oriented to person, Disoriented to place, Disoriented to situation, Disoriented to time  Cognition: Decreased attention/concentration, Decreased command following  Perception: Appears intact(supported semisupine)  Perseveration: Perseverates during conversation(\"help me, help me\")  Safety/Judgement: Decreased awareness of environment, Decreased awareness of need for assistance, Decreased awareness of need for safety, Decreased insight into deficits  Dysphagia Treatment:  Oral Assessment:  Oral Assessment  Labial: No impairment  Dentition: Natural  Oral Hygiene: oral mucosa moist and clear of secretions  Lingual: No impairment  Velum: No impairment  Mandible: No impairment  P.O. Trials:  Patient Position: upright in bed  Vocal quality prior to P.O.: No impairment  Consistency Presented: Thin liquid;Puree  How Presented: Self-fed/presented;Cup/sip;Straw;Spoon     Bolus Acceptance: No impairment  Bolus Formation/Control: Impaired  Type of Impairment: Delayed  Propulsion: Delayed (# of seconds)  Oral Residue: None  Initiation of Swallow: Delayed (# of seconds)  Laryngeal Elevation: Decreased  Aspiration Signs/Symptoms: Weak cough(with straw sips)  Pharyngeal Phase Characteristics: Poor endurance             Oral Phase Severity: Mild-moderate  Pharyngeal Phase Severity : Mild-moderate    Pain:  Pain Scale 1: Visual  Pain Intensity 1: 2  Pain Location 1: Hip    After treatment:   Call bell within reach and Nursing notified    COMMUNICATION/EDUCATION:     The patient's plan of care including recommendations, planned interventions, and recommended diet changes were discussed with: Physical therapist and Registered nurse.      Lucia Barker SLP  Time Calculation: 10 mins

## 2020-04-13 NOTE — PROGRESS NOTES
[facility] visit. Mrs. Moreno Barer asleep and unable to receive communion because she is NPO. Prayer offered for her well-being.     [facility], SBS, RN, ACSW, LCSW   Page:  [phone]

## 2020-04-13 NOTE — CONSULTS
Palliative Medicine Consult  Kevin: [phone])    Patient Name: Maribel Hauser  YOB: 1940    Date of Initial Consult: April 13, 2020  Reason for Consult: Care Decisions  Requesting Provider:Dr. Lito Cheng  Primary Care Physician: Chery Jarrell MD     SUMMARY:   Maribel Hauser is a [de-identified] y.o. female admitted on 4/7/2020 from [address] [place] [address] with a diagnosis of rt femoral neck fracture due to ground level fall (acute vs subacute), post op delirium, Dysphagia with high risk of aspiration. Plan is for SNF upon dc. Pt npo due to aspiration risk. Spouse declined hospice services. . Pt known to our team from a previous admission. PMH: dementia, anemia, asthma, basal cell carcinoma s/p removal, hiatal hernia repair, stroke, COPD, HTN,  Cholesterolemia, watermelon stomach, falls, HLD, depression,  Generalized weakness, UTI, rt hydronephrosis, cholelithiasis, hyponatremia.     Current medical issues leading to Palliative Medicine involvement include: support with care decisions given medical condition and risk of decline.     Social: ,was living at home with spouse but now in [facility] with hired care, 3 children (one lives locally), native of [place], retired teacher, graduated from [address], loves to read, ambulates with walker prior to admission, spouse reports overall steady decline in health the past 4 years     PALLIATIVE DIAGNOSES:   1. Delirium  2. Dementia  3. Dysphagia  4. Feeding difficulties  5. Hypoalbuminemia   6. High risk of aspiration   7. Femoral neck fracture     PLAN:   1. Pt unable to provide any meaningful information due to dementia  2. Spoke with spouse who recalled meeting with me during a previous admission  3. We discussed the patient's condition, risk of compromise, rehab vs hospice, dysphagia, questions and concerns  4. Decision: spouse is not receptive to hospice and would like the pt to transition to skilled rehab  5.  Plan:  has been sending referrals to preferred facilities. Continue current level of care  6. Symptoms: controlled  7. Spouse asking for attending to call him today. I discussed with Dr. Yonatan Duran  8. Will follow peripherally. Pt at high risk for readmission  9. Initial consult note routed to primary continuity provider and/or primary health care team members  10. Communicated plan of care with: Palliative [facility] Team     GOALS OF CARE / TREATMENT PREFERENCES:     GOALS OF CARE:  Patient/Health Care Proxy Stated Goals: Rehabilitation    TREATMENT PREFERENCES:   Code Status: DNR    Advance Care Planning:  [x] The [facility] Interdisciplinary Team has updated the ACP Navigator with Health Care Decision Maker and Patient Capacity      Primary Decision Maker: Debby Cruz Spouse - [phone]  Advance Care Planning 4/7/2020   Confirm Advance Directive Yes, on file   Does the patient have other document types Do Not Resuscitate       Medical Interventions: Limited additional interventions     Other Instructions:   Artificially Administered Nutrition: No feeding tube     Other:    As far as possible, the palliative care team has discussed with patient / health care proxy about goals of care / treatment preferences for patient.      HISTORY:     History obtained from: chart, team, family    CHIEF COMPLAINT: pt admitted with aforementioned history and issues    HPI/SUBJECTIVE:    The patient is:   [] Verbal and participatory  [x] Non-participatory due to:   Medical condition      Clinical Pain Assessment (nonverbal scale for severity on nonverbal patients):   Clinical Pain Assessment  Severity: 0     Activity (Movement): Lying quietly, normal position    Duration: for how long has pt been experiencing pain (e.g., 2 days, 1 month, years)  Frequency: how often pain is an issue (e.g., several times per day, once every few days, constant)     FUNCTIONAL ASSESSMENT:     Palliative Performance Scale (PPS): 30 PSYCHOSOCIAL/SPIRITUAL SCREENING:     Palliative IDT has assessed this patient for cultural preferences / practices and a referral made as appropriate to needs (Cultural Services, Patient Advocacy, Ethics, etc.)    Any spiritual / Orthodox concerns:  [] Yes /  [x] No    Caregiver Burnout:  [] Yes /  [x] No /  [] No Caregiver Present      Anticipatory grief assessment:   [x] Normal  / [] Maladaptive       ESAS Anxiety: Anxiety: 0    ESAS Depression:          REVIEW OF SYSTEMS:     Positive and pertinent negative findings in ROS are noted above in HPI. The following systems were [x] reviewed objectively / [] unable to be reviewed as noted in HPI  Other findings are noted below. Systems: constitutional, ears/nose/mouth/throat, respiratory, gastrointestinal, genitourinary, musculoskeletal, integumentary, neurologic, psychiatric, endocrine. Positive findings noted below. Modified ESAS Completed by: provider   Fatigue: 0 Drowsiness: 0     Pain: 0   Anxiety: 0 Nausea: 0   Anorexia: 10 Dyspnea: 0                    PHYSICAL EXAM:     From RN flowsheet:  Wt Readings from Last 3 Encounters:   04/09/20 157 lb 8 oz (71.4 kg)   02/12/20 157 lb 8 oz (71.4 kg)   01/10/20 147 lb 4.3 oz (66.8 kg)     Blood pressure 126/76, pulse 72, temperature 98.2 °F (36.8 °C), resp. rate 16, height 5' 4\" (1.626 m), weight 157 lb 8 oz (71.4 kg), SpO2 93 %.     Pain Scale 1: Visual  Pain Intensity 1: 2     Pain Location 1: Hip  Pain Orientation 1: Right  Pain Description 1: Aching  Pain Intervention(s) 1: Position, Rest  Last bowel movement, if known:     Constitutional: confused, nad  Eyes: pupils equal, anicteric  ENMT: no nasal discharge, moist mucous membranes  Cardiovascular:  Respiratory: breathing not labored, symmetric  Gastrointestinal: soft non-tender, +bowel sounds  Musculoskeletal: femoral neck fx  Skin: warm, dry  Neurologic: dementia, delirium  Psychiatric:   Other:       HISTORY:     Principal Problem:    Hip fracture ([address] (4/7/2020)      Past Medical History:   Diagnosis Date    Anemia NEC     Asthma     major asthma as a child/only with colds now    Cancer ([facility] [address]     BCCA removed    Coagulation disorder ([facility] [address]     on xarelto    Dementia ([facility] [address]     GERD (gastroesophageal reflux disease) 10/22/2010    Hiatal hernia     Hypertension     hx of blood pressure med for short time    Ill-defined condition     increased cholesterol    Other unknown and unspecified cause of morbidity or mortality     eval with Dr Marco Joy for SOB, sept 2015:  results unkown    Stroke ([facility] [address] 2013    no deficits    Stroke ([facility] [address]     heat exhaustion    Watermelon stomach 2003      Past Surgical History:   Procedure Laterality Date    HX BLADDER SUSPENSION      HX COLONOSCOPY      HX ENDOSCOPY  6/4/15    Dr. Yadav Latin HX GYN      vaginal del times 3    HX HERNIA REPAIR  2014    x2, failure    HX OTHER SURGICAL      EGDs    HX TONSILLECTOMY      WY EXC SKIN MALIG >4CM FACE,FACIAL      times 3 - BCCA      Family History   Problem Relation Age of Onset    COPD Mother     Asthma Mother     Heart Attack Father     Other Father         peptic ulcer disease    Anesth Problems Neg Hx       History reviewed, no pertinent family history.   Social History     Tobacco Use    Smoking status: Never Smoker    Smokeless tobacco: Never Used   Substance Use Topics    Alcohol use: No     Allergies   Allergen Reactions    Latex Hives     Latex tape    Codeine Nausea and Vomiting    Darvon [Propoxyphene] Nausea and Vomiting    Other Medication Unknown (comments)     Flu shot (per )    Pcn [Penicillins] Hives      Current Facility-Administered Medications   Medication Dose Route Frequency    acetaminophen (TYLENOL) suppository 650 mg  650 mg Rectal Q6H PRN    dextrose 5 % - 0.45% NaCl infusion  100 mL/hr IntraVENous CONTINUOUS    sodium chloride (NS) flush 5-40 mL  5-40 mL IntraVENous Q8H    sodium chloride (NS) flush 5-40 mL  5-40 mL IntraVENous PRN    naloxone (NARCAN) injection 0.4 mg  0.4 mg IntraVENous PRN    calcium-vitamin D (OS-SAE) 500 mg-200 unit tablet  1 Tab Oral TID WITH MEALS    senna-docusate (PERICOLACE) 8.6-50 mg per tablet 1 Tab  1 Tab Oral BID    polyethylene glycol (MIRALAX) packet 17 g  17 g Oral DAILY    bisacodyL (DULCOLAX) suppository 10 mg  10 mg Rectal DAILY PRN    enoxaparin (LOVENOX) injection 40 mg  40 mg SubCUTAneous DAILY    morphine injection 2 mg  2 mg IntraVENous Q3H PRN    traMADoL (ULTRAM) tablet 50 mg  50 mg Oral Q6H PRN    ALPRAZolam (XANAX) tablet 0.5 mg  0.5 mg Oral TID PRN    famotidine (PEPCID) tablet 20 mg  20 mg Oral DAILY    midodrine (PROAMITINE) tablet 10 mg  10 mg Oral BID WITH MEALS    mirtazapine (REMERON) tablet 7.5 mg  7.5 mg Oral QHS    QUEtiapine (SEROquel) tablet 12.5 mg  12.5 mg Oral Q8H PRN    pravastatin (PRAVACHOL) tablet 20 mg  20 mg Oral QHS    sodium chloride (NS) flush 5-40 mL  5-40 mL IntraVENous Q8H    sodium chloride (NS) flush 5-40 mL  5-40 mL IntraVENous PRN    acetaminophen (TYLENOL) tablet 650 mg  650 mg Oral Q4H PRN    naloxone (NARCAN) injection 0.4 mg  0.4 mg IntraVENous PRN    morphine injection 1 mg  1 mg IntraVENous Q4H PRN    ondansetron (ZOFRAN) injection 4 mg  4 mg IntraVENous Q4H PRN          LAB AND IMAGING FINDINGS:     Lab Results   Component Value Date/Time    WBC 12.2 (H) 04/08/2020 04:25 AM    HGB 7.9 (L) 04/10/2020 05:51 AM    PLATELET 458 92/10/9656 04:25 AM     Lab Results   Component Value Date/Time    Sodium 145 04/10/2020 05:51 AM    Potassium 3.4 (L) 04/10/2020 05:51 AM    Chloride 112 (H) 04/10/2020 05:51 AM    CO2 26 04/10/2020 05:51 AM    BUN 19 04/10/2020 05:51 AM    Creatinine 0.66 04/10/2020 05:51 AM    Calcium 8.9 04/10/2020 05:51 AM    Magnesium 2.0 04/10/2020 05:51 AM    Phosphorus 2.8 02/08/2020 03:09 AM      Lab Results   Component Value Date/Time    AST (SGOT) 36 04/07/2020 01:08 PM    Alk.  phosphatase 52 04/07/2020 01:08 PM    Protein, total 7.0 04/07/2020 01:08 PM    Albumin 2.7 (L) 04/07/2020 01:08 PM    Globulin 4.3 (H) 04/07/2020 01:08 PM     Lab Results   Component Value Date/Time    INR 1.1 07/14/2014 05:08 PM    Prothrombin time 11.2 07/14/2014 05:08 PM    aPTT 28.9 12/21/2013 11:50 AM      Lab Results   Component Value Date/Time    Iron 63 02/15/2016 02:38 PM    TIBC 321 02/15/2016 02:38 PM    Iron % saturation 20 02/15/2016 02:38 PM    Ferritin 34 11/18/2015 03:18 PM      No results found for: PH, PCO2, PO2  No components found for: [place]   Lab Results   Component Value Date/Time    CK 38 08/13/2015 02:50 PM    CK - MB <0.5 (L) 08/13/2015 02:50 PM                Total time: 70 min  Counseling / coordination time, spent as noted above:  55 min  > 50% counseling / coordination?:  y    Prolonged service was provided for  []30 min   []75 min in face to face time in the presence of the patient, spent as noted above. Time Start:   Time End:   Note: this can only be billed with 80604 (initial) or 30162 (follow up). If multiple start / stop times, list each separately.

## 2020-04-13 NOTE — PROGRESS NOTES
[address] [facility] Adult  Hospitalist Group                                                                                          Hospitalist Progress Note  [address] [facility], DO  Answering service: [phone] from in house phone        Date of Service:  2020  NAME:  Claire Martinez  :  1940  MRN:  [id number]      Admission Summary:   Claire Martinez is a [de-identified] y.o. female who is demented at the history cannot be taken from her. History is taken from the chart. She has history of COPD asthma peptic ulcer disease hypertension in the past.  She resides at the nursing home  Per EMS patient had fallen on Friday and today hip x-ray was done and it confirmed that she had a fracture. She has been complaining of pain on right side of the hip. No nausea vomiting diarrhea no shortness of breath. Family available on phone at bedside. Interval history / Subjective:   Patient seen and examined. Appears globally weak. Speech evaluated patient and pureed diet initiated. Ongoing concern for aspiration. Appreciate palliative care. Discussed with , Cam Hatchet, via phone.  voiced concerns that patient is confused without family support.       Assessment & Plan:     Hip fracture due to ground-level fall  -Acute versus subacute right femoral neck fracture   -s/p right hip hemiarthroplasty   -Appreciate Ortho  -Pain controlled  -DVT prophylaxis with lovenox  -bowel regimen   -Calcium-Vitamin D      Post op Delirium: resolved  -baseline delirium    Dysphagia:   -initiated on pureed diet    Hypernatremia:   -monitor off D5, concern that patient unable to maintain oral intake      Hyperlipidemia: Continue statin     Depression: Continue remeron   -Seroquel as needed for confusion agitation     Hypotension: continue home midodrine     Dementia without behavorial disturbances:   -supportive care      Code status: DNR  DVT prophylaxis: Lovenox  PTA: Nathalie Vazquez [place]    Plan: Follow Ortho    Care Plan discussed with: Patient/Family  Anticipated Disposition: SNF/LTC  Anticipated Discharge: Greater than 48 hours     Hospital Problems  Date Reviewed: 4/8/2020          Codes Class Noted POA    * (Principal) Hip fracture ([facility] [address] ICD-10-CM: I04.915Z  ICD-9-CM: 820.8  4/7/2020 Yes                Review of Systems:   A comprehensive review of systems was negative except for that written in the HPI. Vital Signs:    Last 24hrs VS reviewed since prior progress note. Most recent are:  Visit Vitals  BP 92/51   Pulse 78   Temp 98.6 °F (37 °C)   Resp 16   Ht 5' 4\" (1.626 m)   Wt 71.4 kg (157 lb 8 oz)   SpO2 100%   BMI 27.03 kg/m²         Intake/Output Summary (Last 24 hours) at 4/13/2020 1459  Last data filed at 4/13/2020 0536  Gross per 24 hour   Intake 1000 ml   Output 800 ml   Net 200 ml        Physical Examination:             Constitutional:  awake, confused, No acute distress   ENT:  Oral mucosa moist, oropharynx benign. Resp:  CTA bilaterally. No wheezing/rhonchi/rales. No accessory muscle use   CV:  Regular rhythm, normal rate, no murmurs, gallops, rubs    GI:  Soft, non distended, non tender. normoactive bowel sounds, no hepatosplenomegaly     Musculoskeletal:  No edema, warm, 2+ pulses throughout    Neurologic:  Moves all extremities     Psych:  Poor insight       Data Review:    Review and/or order of clinical lab test      Labs:     No results for input(s): WBC, HGB, HCT, PLT, HGBEXT, HCTEXT, PLTEXT, HGBEXT, HCTEXT, PLTEXT in the last 72 hours. No results for input(s): NA, K, CL, CO2, BUN, CREA, GLU, CA, MG, PHOS, URICA in the last 72 hours. No results for input(s): SGOT, GPT, ALT, AP, TBIL, TBILI, TP, ALB, GLOB, GGT, AML, LPSE in the last 72 hours. No lab exists for component: AMYP, HLPSE  No results for input(s): INR, PTP, APTT, INREXT, INREXT in the last 72 hours. No results for input(s): FE, TIBC, PSAT, FERR in the last 72 hours.    Lab Results   Component Value Date/Time Folate 17.3 10/08/2015 11:30 AM      No results for input(s): PH, PCO2, PO2 in the last 72 hours. No results for input(s): CPK, CKNDX, TROIQ in the last 72 hours.     No lab exists for component: CPKMB  Lab Results   Component Value Date/Time    Cholesterol, total 145 04/04/2017 09:46 AM    HDL Cholesterol 61 04/04/2017 09:46 AM    LDL, calculated 67 04/04/2017 09:46 AM    Triglyceride 87 04/04/2017 09:46 AM    CHOL/HDL Ratio 3.4 06/02/2013 06:30 AM     Lab Results   Component Value Date/Time    Glucose (POC) 120 (H) 04/08/2020 11:17 AM    Glucose (POC) 115 (H) 09/28/2018 08:46 PM     Lab Results   Component Value Date/Time    Color YELLOW/STRAW 04/07/2020 02:00 PM    Appearance CLEAR 04/07/2020 02:00 PM    Specific gravity 1.022 04/07/2020 02:00 PM    pH (UA) 6.0 04/07/2020 02:00 PM    Protein 30 (A) 04/07/2020 02:00 PM    Glucose 100 (A) 04/07/2020 02:00 PM    Ketone Negative 04/07/2020 02:00 PM    Bilirubin Negative 04/07/2020 02:00 PM    Urobilinogen 0.2 04/07/2020 02:00 PM    Nitrites Negative 04/07/2020 02:00 PM    Leukocyte Esterase Negative 04/07/2020 02:00 PM    Epithelial cells FEW 04/07/2020 02:00 PM    Bacteria Negative 04/07/2020 02:00 PM    WBC 0-4 04/07/2020 02:00 PM    RBC 0-5 04/07/2020 02:00 PM         Medications Reviewed:     Current Facility-Administered Medications   Medication Dose Route Frequency    acetaminophen (TYLENOL) suppository 650 mg  650 mg Rectal Q6H PRN    sodium chloride (NS) flush 5-40 mL  5-40 mL IntraVENous Q8H    sodium chloride (NS) flush 5-40 mL  5-40 mL IntraVENous PRN    naloxone (NARCAN) injection 0.4 mg  0.4 mg IntraVENous PRN    calcium-vitamin D (OS-SAE) 500 mg-200 unit tablet  1 Tab Oral TID WITH MEALS    senna-docusate (PERICOLACE) 8.6-50 mg per tablet 1 Tab  1 Tab Oral BID    polyethylene glycol (MIRALAX) packet 17 g  17 g Oral DAILY    bisacodyL (DULCOLAX) suppository 10 mg  10 mg Rectal DAILY PRN    enoxaparin (LOVENOX) injection 40 mg  40 mg SubCUTAneous DAILY    morphine injection 2 mg  2 mg IntraVENous Q3H PRN    traMADoL (ULTRAM) tablet 50 mg  50 mg Oral Q6H PRN    ALPRAZolam (XANAX) tablet 0.5 mg  0.5 mg Oral TID PRN    famotidine (PEPCID) tablet 20 mg  20 mg Oral DAILY    midodrine (PROAMITINE) tablet 10 mg  10 mg Oral BID WITH MEALS    mirtazapine (REMERON) tablet 7.5 mg  7.5 mg Oral QHS    QUEtiapine (SEROquel) tablet 12.5 mg  12.5 mg Oral Q8H PRN    pravastatin (PRAVACHOL) tablet 20 mg  20 mg Oral QHS    sodium chloride (NS) flush 5-40 mL  5-40 mL IntraVENous Q8H    sodium chloride (NS) flush 5-40 mL  5-40 mL IntraVENous PRN    acetaminophen (TYLENOL) tablet 650 mg  650 mg Oral Q4H PRN    naloxone (NARCAN) injection 0.4 mg  0.4 mg IntraVENous PRN    morphine injection 1 mg  1 mg IntraVENous Q4H PRN    ondansetron (ZOFRAN) injection 4 mg  4 mg IntraVENous Q4H PRN     ______________________________________________________________________  EXPECTED LENGTH OF STAY: 2d 4h  ACTUAL LENGTH OF STAY:          [facility]

## 2020-04-13 NOTE — PROGRESS NOTES
Problem: Mobility Impaired (Adult and Pediatric)  Goal: *Acute Goals and Plan of Care (Insert Text)  Description: FUNCTIONAL STATUS PRIOR TO ADMISSION: Patient is a poor historian. Per documentation she ambulated short distance with RW and physical assistance. HOME SUPPORT PRIOR TO ADMISSION: The patient lived with spouse and has caregivers 1-2 hours daily. Physical Therapy Goals  Initiated 4/9/2020    1. Patient will move from supine to sit and sit to supine  in bed with minimal assistance/contact guard assist within 4 days. 2. Patient will perform sit to stand with minimal assistance/contact guard assist within 4 days. 3. Patient will ambulate with moderate assistance  for 25 feet with the least restrictive device within 4 days. 4. Patient will ascend/descend 5 stairs with 1 handrail(s) with moderate assistance  within 4 days. 5. Patient will verbalize and demonstrate understanding of posterior precautions per protocol within 4 days. 6. Patient will perform hip hemiarthroplasty home exercise program per protocol with minimal assistance/contact guard assist within 4 days. Outcome: Not Progressing Towards Goal   PHYSICAL THERAPY TREATMENT  Patient: No Rosas ([de-identified] y.o. female)  Date: 4/13/2020  Diagnosis: Hip fracture ([facility] [address] [S72.009A]   Hip fracture ([facility] [address]  Procedure(s) (LRB):  RIGHT HIP HEMIARTHROPLASTY/ BIOMET/ ER TO IP/ REQ [id number]  Bio-met/ ESSENTIAL (Right) 5 Days Post-Op  Precautions: Fall, WBAT, Skin(R posterior hip precautions)  Chart, physical therapy assessment, plan of care and goals were reviewed. ASSESSMENT  Patient continues with skilled PT services and is not progressing towards goals. Patient is perseverating with speech and has difficulty follow commands. Total assist of 2 and bed pad in order to scoot patient up into bed. Patient cried out, extreme fear of falling. Unable to get her to edge of bed this morning. Performed AAROM LLE and PROM RLE.     Current Level of Function Impacting Discharge (mobility/balance): Total assistance of 2 required today. Other factors to consider for discharge: Dementia/Poor mobility/high pain levels/fear of falling. PLAN :  Patient continues to benefit from skilled intervention to address the above impairments. Continue treatment per established plan of care. to address goals. Recommendation for discharge: (in order for the patient to meet his/her long term goals)  Therapy up to 5 days/week in SNF setting    This discharge recommendation:  A follow-up discussion with the attending provider and/or case management is planned    IF patient discharges home will need the following DME: to be determined (TBD)       SUBJECTIVE:   Patient stated I'm sorry I'm sorry. I am so sick. Please don't let me fall. \"    OBJECTIVE DATA SUMMARY:   Critical Behavior:  Neurologic State: Alert, Confused  Orientation Level: Oriented to person, Disoriented to place, Disoriented to situation, Disoriented to time  Cognition: Decreased attention/concentration, Decreased command following  Safety/Judgement: Decreased awareness of environment, Decreased awareness of need for assistance, Decreased awareness of need for safety, Decreased insight into deficits  Functional Mobility Training:  Bed Mobility:  Rolling: Total assistance;Assist x2;Bed Modified  Supine to Sit: Bed Modified(bed mechanics utilized)     Scooting: Total assistance; Additional time;Assist x2(to scoot to [facility])        Transfers:                                   Balance:  Sitting: Impaired  Sitting - Static: Supported sitting  Ambulation/Gait Training:                                      Therapeutic Exercises:   Performed AAROM LLE and PROM RLE. Pain Rating:  Constantly complaining of pain but unable to specify where. Cries out when RLE is moved.     Activity Tolerance:   Poor      After treatment patient left in no apparent distress:   Supine in bed, Heels elevated for pressure relief, Call bell within reach, Side rails x 3, and nurse notified. COMMUNICATION/COLLABORATION:   The patients plan of care was discussed with: Occupational therapist and Registered nurse.      Flavia Capps   Time Calculation: 22 mins

## 2020-04-13 NOTE — CDMP QUERY
Query 1 of 1 Pt admitted with femoral neck fracture and has 'delirium' documented in a patient with known dementia with no supporting abnormal behaviors documented to support delirium. If possible, please document in the progress notes and discharge summary if you are evaluating and / or treating any of the following: ? Dementia without behavioral disturbance ? Dementia with behavioral disturbance ? Other, please specify ? Clinically unable to determine The medical record reflects the following: 
   Risk Factors: dementia;  
 
   Clinical Indicators:  
ED Floridalma Jenkins PN 4/7- \"at baseline is reportedly oriented to name only. not answering simple questions\"; 
 
OT Yun Rojas PN 4/9- \"perseverative, unable to answer orientation questions, some yes/no but inconsistent\"; Samir Guillen PN 4/9- \" Delirium- May be related to pain medicines and or post op, Monitor\" Treatment: monitor; sitter at bedside; Seroquel 12.5mg Q 8hrs PO PRN PTA; Thank you, Maribel Bolivar RN, BSN, CCM Clinical  
[facility] 
[phone]

## 2020-04-13 NOTE — PROGRESS NOTES
Renal Dosing/Monitoring  Medication: famotidine   Current regimen:  20 every 24 hr  No results for input(s): CREA, BUN in the last 72 hours.   Estimated CrCl:  62 ml/min  Plan: Change to famotidine 20 mg BID

## 2020-04-14 ENCOUNTER — HOSPICE ADMISSION (OUTPATIENT)
Dept: HOSPICE | Facility: HOSPICE | Age: 80
End: 2020-04-14

## 2020-04-14 LAB
ANION GAP SERPL CALC-SCNC: 5 MMOL/L (ref 5–15)
BUN SERPL-MCNC: 9 MG/DL (ref 6–20)
BUN/CREAT SERPL: 14 (ref 12–20)
CALCIUM SERPL-MCNC: 8.9 MG/DL (ref 8.5–10.1)
CHLORIDE SERPL-SCNC: 108 MMOL/L (ref 97–108)
CO2 SERPL-SCNC: 26 MMOL/L (ref 21–32)
CREAT SERPL-MCNC: 0.63 MG/DL (ref 0.55–1.02)
ERYTHROCYTE [DISTWIDTH] IN BLOOD BY AUTOMATED COUNT: 14.1 % (ref 11.5–14.5)
GLUCOSE SERPL-MCNC: 107 MG/DL (ref 65–100)
HCT VFR BLD AUTO: 23.7 % (ref 35–47)
HGB BLD-MCNC: 7.4 G/DL (ref 11.5–16)
MAGNESIUM SERPL-MCNC: 1.7 MG/DL (ref 1.6–2.4)
MCH RBC QN AUTO: 32.6 PG (ref 26–34)
MCHC RBC AUTO-ENTMCNC: 31.2 G/DL (ref 30–36.5)
MCV RBC AUTO: 104.4 FL (ref 80–99)
NRBC # BLD: 0 K/UL (ref 0–0.01)
NRBC BLD-RTO: 0 PER 100 WBC
PHOSPHATE SERPL-MCNC: 2.8 MG/DL (ref 2.6–4.7)
PLATELET # BLD AUTO: 196 K/UL (ref 150–400)
PMV BLD AUTO: 9.4 FL (ref 8.9–12.9)
POTASSIUM SERPL-SCNC: 4 MMOL/L (ref 3.5–5.1)
RBC # BLD AUTO: 2.27 M/UL (ref 3.8–5.2)
SODIUM SERPL-SCNC: 139 MMOL/L (ref 136–145)
WBC # BLD AUTO: 5.7 K/UL (ref 3.6–11)

## 2020-04-14 PROCEDURE — 92526 ORAL FUNCTION THERAPY: CPT

## 2020-04-14 PROCEDURE — 87635 SARS-COV-2 COVID-19 AMP PRB: CPT

## 2020-04-14 PROCEDURE — 83735 ASSAY OF MAGNESIUM: CPT

## 2020-04-14 PROCEDURE — 97530 THERAPEUTIC ACTIVITIES: CPT

## 2020-04-14 PROCEDURE — 74011250637 HC RX REV CODE- 250/637: Performed by: ORTHOPAEDIC SURGERY

## 2020-04-14 PROCEDURE — 74011250636 HC RX REV CODE- 250/636: Performed by: ORTHOPAEDIC SURGERY

## 2020-04-14 PROCEDURE — 80048 BASIC METABOLIC PNL TOTAL CA: CPT

## 2020-04-14 PROCEDURE — 84100 ASSAY OF PHOSPHORUS: CPT

## 2020-04-14 PROCEDURE — 74011250637 HC RX REV CODE- 250/637: Performed by: INTERNAL MEDICINE

## 2020-04-14 PROCEDURE — 97535 SELF CARE MNGMENT TRAINING: CPT

## 2020-04-14 PROCEDURE — 85027 COMPLETE CBC AUTOMATED: CPT

## 2020-04-14 PROCEDURE — 65270000032 HC RM SEMIPRIVATE

## 2020-04-14 PROCEDURE — 36415 COLL VENOUS BLD VENIPUNCTURE: CPT

## 2020-04-14 RX ADMIN — OYSTER SHELL CALCIUM WITH VITAMIN D 1 TABLET: 500; 200 TABLET, FILM COATED ORAL at 16:23

## 2020-04-14 RX ADMIN — FAMOTIDINE 20 MG: 20 TABLET ORAL at 08:40

## 2020-04-14 RX ADMIN — SENNOSIDES AND DOCUSATE SODIUM 1 TABLET: 8.6; 5 TABLET ORAL at 17:30

## 2020-04-14 RX ADMIN — OYSTER SHELL CALCIUM WITH VITAMIN D 1 TABLET: 500; 200 TABLET, FILM COATED ORAL at 11:23

## 2020-04-14 RX ADMIN — FAMOTIDINE 20 MG: 20 TABLET ORAL at 17:30

## 2020-04-14 RX ADMIN — SODIUM CHLORIDE 10 ML: 9 INJECTION INTRAMUSCULAR; INTRAVENOUS; SUBCUTANEOUS at 14:00

## 2020-04-14 RX ADMIN — OYSTER SHELL CALCIUM WITH VITAMIN D 1 TABLET: 500; 200 TABLET, FILM COATED ORAL at 08:40

## 2020-04-14 RX ADMIN — SENNOSIDES AND DOCUSATE SODIUM 1 TABLET: 8.6; 5 TABLET ORAL at 08:40

## 2020-04-14 RX ADMIN — PRAVASTATIN SODIUM 20 MG: 20 TABLET ORAL at 21:16

## 2020-04-14 RX ADMIN — MIDODRINE HYDROCHLORIDE 10 MG: 5 TABLET ORAL at 16:23

## 2020-04-14 RX ADMIN — ENOXAPARIN SODIUM 40 MG: 40 INJECTION SUBCUTANEOUS at 08:40

## 2020-04-14 RX ADMIN — Medication 10 ML: at 13:59

## 2020-04-14 RX ADMIN — MIDODRINE HYDROCHLORIDE 10 MG: 5 TABLET ORAL at 08:39

## 2020-04-14 RX ADMIN — MIRTAZAPINE 7.5 MG: 15 TABLET, FILM COATED ORAL at 21:15

## 2020-04-14 RX ADMIN — ACETAMINOPHEN 650 MG: 325 TABLET, FILM COATED ORAL at 16:23

## 2020-04-14 RX ADMIN — POLYETHYLENE GLYCOL 3350 17 G: 17 POWDER, FOR SOLUTION ORAL at 08:39

## 2020-04-14 NOTE — PROGRESS NOTES
Problem: Dysphagia (Adult)  Goal: *Acute Goals and Plan of Care (Insert Text)  Description: Speech pathology goals  Initiated 4/13/2020  1. Patient will tolerate pureed diet/thin liquids without adverse effects within 7 days. Initiated 4/9/2020  1. Patient will participate in re-evaluation of swallow function within 7 days    Outcome: [facility] TREATMENT  Patient: Dacia Farrar ([de-identified] y.o. female)  Date: 4/14/2020  Diagnosis: Hip fracture ([facility] [address] [S72.009A]   Hip fracture ([facility] [address]  Procedure(s) (LRB):  RIGHT HIP HEMIARTHROPLASTY/ BIOMET/ ER TO IP/ REQ [id number]  Bio-met/ ESSENTIAL (Right) 6 Days Post-Op  Precautions:  Fall, WBAT, Skin(R posterior hip precautions)    ASSESSMENT:  Pt tolerating thin liquids with no straws and puree without significant difficulty. Noted that pt with straws and honey-thick liquids at bedside. Discussed with team that straws should not be given and honey-thick liquids should not be administered without discussion from SLP, as pt can still aspirate honey-thick liquids (and it can be more unsafe than thin liquids given thickening agents). However, given pt with advanced dementia, she will be at chronic risk for prandial aspiration, and a PEG tube would be contraindicated for her given the advanced dementia as it does not prevent aspiration, no indicator of improvement in albumin/prealbumin, and risk for wounds and needs for restraints. Therefore, strongly recommend continued goals of care conversations as pt will also be at risk to not maintain nutrition PO. PLAN:  Recommendations and Planned Interventions:  --pureed diet/thin liquids  --no straws  --upright as much as patient will tolerate  --small sips/bites  --alternate liquids/solids  --1:1 assistance  Patient continues to benefit from skilled intervention to address the above impairments. Continue treatment per established plan of care. Discharge Recommendations:   To Be Determined     SUBJECTIVE:   Patient stated, Raheel Gut you from here? . OBJECTIVE:   Cognitive and Communication Status:  Neurologic State: Alert, Confused  Orientation Level: Disoriented X4  Cognition: Decreased attention/concentration, Decreased command following  Perception: Cues to maintain midline in standing, Verbal, Tactile  Perseveration: No perseveration noted  Safety/Judgement: Decreased awareness of environment, Decreased awareness of need for assistance, Decreased awareness of need for safety, Decreased insight into deficits  Dysphagia Treatment:    P.O. Trials:  Patient Position: upright in bed  Vocal quality prior to P.O.: Hoarse  Consistency Presented: Thin liquid;Puree  How Presented: SLP-fed/presented;Cup/sip;Spoon     Bolus Acceptance: No impairment  Bolus Formation/Control: Impaired  Type of Impairment: Delayed  Propulsion: Delayed (# of seconds); Discoordination  Oral Residue: None  Initiation of Swallow: Delayed (# of seconds)  Laryngeal Elevation: Decreased  Aspiration Signs/Symptoms: None  Pharyngeal Phase Characteristics: Poor endurance    Pain:  Pain Scale 1: Numeric (0 - 10)  Pain Intensity 1: 3  Pain Location 1: Hip    After treatment:   Nursing notified    COMMUNICATION/EDUCATION:     The patient's plan of care including recommendations, planned interventions, and recommended diet changes were discussed with: Registered nurse.      Joe Nascimento SLP  Time Calculation: 10 mins

## 2020-04-14 NOTE — PROGRESS NOTES
Problem: Mobility Impaired (Adult and Pediatric)  Goal: *Acute Goals and Plan of Care (Insert Text)  Description: FUNCTIONAL STATUS PRIOR TO ADMISSION: Patient is a poor historian. Per documentation she ambulated short distance with RW and physical assistance. HOME SUPPORT PRIOR TO ADMISSION: The patient lived with spouse and has caregivers 1-2 hours daily. Physical Therapy Goals  Initiated 4/9/2020    1. Patient will move from supine to sit and sit to supine  in bed with minimal assistance/contact guard assist within 4 days. 2. Patient will perform sit to stand with minimal assistance/contact guard assist within 4 days. 3. Patient will ambulate with moderate assistance  for 25 feet with the least restrictive device within 4 days. 4. Patient will ascend/descend 5 stairs with 1 handrail(s) with moderate assistance  within 4 days. 5. Patient will verbalize and demonstrate understanding of posterior precautions per protocol within 4 days. 6. Patient will perform hip hemiarthroplasty home exercise program per protocol with minimal assistance/contact guard assist within 4 days. Outcome: Progressing Towards Goal       PHYSICAL THERAPY TREATMENT  Patient: Bindu Gilliland ([de-identified] y.o. female)  Date: 4/14/2020  Diagnosis: Hip fracture ([facility] [address] [S72.009A]   Hip fracture ([facility] [address]  Procedure(s) (LRB):  RIGHT HIP HEMIARTHROPLASTY/ BIOMET/ ER TO IP/ REQ [id number]  Bio-met/ ESSENTIAL (Right) 6 Days Post-Op  Precautions: Fall, WBAT, Skin(R posterior hip precautions)  Chart, physical therapy assessment, plan of care and goals were reviewed. ASSESSMENT  Patient continues with skilled PT services and is slowly progressing towards goals. Participation impacted by impaired cognition (attention, command following, insight, safety judgement) as well as impaired reach to LEs, impaired standing balance and tolerance, report of and demonstration of fear of falling.  Patient sat on EOB for 10 min with physical assist off and on to prevent moving back to supine during the 10 min. Pt participates in sub-successful and spontaneous scooting attempts with fair clearance of ITs however no to slight lateral movement. Total assist for standing attempts, unable to stand pivot up in bed. Pt returned to supine, heels elevated    Current Level of Function Impacting Discharge (mobility/balance): total assist-max A x2 for movement, resistance to cues, cognition impaired    Other factors to consider for discharge: lives at          PLAN :  Patient continues to benefit from skilled intervention to address the above impairments. Continue treatment per established plan of care. to address goals. Recommendation for discharge: (in order for the patient to meet his/her long term goals)  Therapy up to 5 days/week in SNF setting    This discharge recommendation:  Has been made in collaboration with the attending provider and/or case management    IF patient discharges home will need the following DME: to be determined (TBD)       SUBJECTIVE:   Patient stated no, wait, I dont know. ....     OBJECTIVE DATA SUMMARY:   Critical Behavior:  Neurologic State: Alert, Confused  Orientation Level: Disoriented X4  Cognition: Decreased attention/concentration, Decreased command following  Safety/Judgement: Decreased awareness of environment, Decreased awareness of need for assistance, Decreased awareness of need for safety, Decreased insight into deficits  Functional Mobility Training:  Bed Mobility:     Supine to Sit: Maximum assistance;Assist x2  Sit to Supine: Maximum assistance;Assist x2           Transfers:  Sit to Stand: Maximum assistance  Stand to Sit: Maximum assistance; Total assistance                             Balance:  Sitting: Impaired; Without support  Sitting - Static: Fair (occasional); Good (unsupported)(does not require corrections, UE support occasionally)  Sitting - Dynamic: Poor (constant support)  Standing: Impaired  Standing - Static: Poor  Standing - Dynamic : Poor  Ambulation/Gait Training:     Therapeutic Exercises:   Noncompliance with AAROM  Pain Rating:  Unable to rate    Activity Tolerance:   Fair  Please refer to the flowsheet for vital signs taken during this treatment. After treatment patient left in no apparent distress:   Supine in bed, Heels elevated for pressure relief, Call bell within reach, and Bed / chair alarm activated    COMMUNICATION/COLLABORATION:   The patients plan of care was discussed with: Registered nurse and Case management.      Amanuel Palma, PT   Time Calculation: 23 mins

## 2020-04-14 NOTE — PROGRESS NOTES
Problem: Pressure Injury - Risk of  Goal: *Prevention of pressure injury  Description: Document Hawk Scale and appropriate interventions in the flowsheet. Outcome: Progressing Towards Goal  Note: Pressure Injury Interventions:  Sensory Interventions: Check visual cues for pain, Float heels, Keep linens dry and wrinkle-free, Maintain/enhance activity level, Minimize linen layers, Pad between skin to skin, Pressure redistribution bed/mattress (bed type), Turn and reposition approx. every two hours (pillows and wedges if needed)    Moisture Interventions: Internal/External urinary devices    Activity Interventions: Pressure redistribution bed/mattress(bed type)    Mobility Interventions: Pressure redistribution bed/mattress (bed type)    Nutrition Interventions: Offer support with meals,snacks and hydration    Friction and Shear Interventions: Minimize layers                Problem: Pain  Goal: *Control of Pain  Outcome: Progressing Towards Goal     Problem: Falls - Risk of  Goal: *Absence of Falls  Description: Document Mika Fall Risk and appropriate interventions in the flowsheet.   Outcome: Progressing Towards Goal  Note: Fall Risk Interventions:  Mobility Interventions: Patient to call before getting OOB    Mentation Interventions: Door open when patient unattended, Adequate sleep, hydration, pain control, Room close to nurse's station    Medication Interventions: Patient to call before getting OOB    Elimination Interventions: Call light in reach, Patient to call for help with toileting needs    History of Falls Interventions: Room close to nurse's station, Door open when patient unattended

## 2020-04-14 NOTE — PROGRESS NOTES
Spoke with the Director on [employer]. The patient cannot be tested for the Covid-19 because she was never admitted to a Covid-19 Unit and was not showing symptoms for Covid-19.  Mignon spoke with the [place] and said not to administer the test.

## 2020-04-14 NOTE — PROGRESS NOTES
Patients  called and was wanting the patient to go to hospice instead of rehab. MD and case management were notified.

## 2020-04-14 NOTE — PROGRESS NOTES
[facility] FRACTURE PROGRESS NOTE    2020  Admit Date:   2020    Post Op day: 6 Days Post-Op    Subjective: Ariana Mullins is alert and conversant today but speech is non intelligible. Very slow to progress with PT but has severe dementia at baseline. Note CM issues related to disposition and need for Covid negative status. Tolerating diet    PT/OT:   Gait:  Gait  Base of Support: Center of gravity altered, Widened  Speed/Eloise: Shuffled  Step Length: Left shortened, Right shortened  Stance: Right decreased  Gait Abnormalities: Antalgic, Decreased step clearance, Shuffling gait, Step to gait  Ambulation - Level of Assistance:  Moderate assistance, Maximum assistance, Assist x2(assist w/weight shift when side stepping)  Distance (ft): 1 Feet (ft)(to hospitals)  Assistive Device: Gait belt, Walker, rolling                 Vital Signs:    Patient Vitals for the past 8 hrs:   BP Temp Pulse Resp SpO2   20 0836 121/57 97.7 °F (36.5 °C) 78 16 99 %   20 0228 107/63 98.6 °F (37 °C) 73 16 98 %     Temp (24hrs), Av.2 °F (36.8 °C), Min:97.6 °F (36.4 °C), Max:98.6 °F (37 °C)      Pain Control:   Pain Assessment  Pain Scale 1: Numeric (0 - 10)  Pain Intensity 1: 3  Pain Onset 1: post opt  Pain Location 1: Hip  Pain Orientation 1: Right  Pain Description 1: Aching  Pain Intervention(s) 1: Medication (see MAR)    Meds:    Current Facility-Administered Medications   Medication Dose Route Frequency    famotidine (PEPCID) tablet 20 mg  20 mg Oral BID    dextrose 5 % - 0.45% NaCl infusion  20 mL/hr IntraVENous CONTINUOUS    acetaminophen (TYLENOL) suppository 650 mg  650 mg Rectal Q6H PRN    sodium chloride (NS) flush 5-40 mL  5-40 mL IntraVENous Q8H    sodium chloride (NS) flush 5-40 mL  5-40 mL IntraVENous PRN    naloxone (NARCAN) injection 0.4 mg  0.4 mg IntraVENous PRN    calcium-vitamin D (OS-SAE) 500 mg-200 unit tablet  1 Tab Oral TID WITH MEALS    senna-docusate (PERICOLACE) 8.6-50 mg per tablet 1 Tab  1 Tab Oral BID    polyethylene glycol (MIRALAX) packet 17 g  17 g Oral DAILY    bisacodyL (DULCOLAX) suppository 10 mg  10 mg Rectal DAILY PRN    enoxaparin (LOVENOX) injection 40 mg  40 mg SubCUTAneous DAILY    morphine injection 2 mg  2 mg IntraVENous Q3H PRN    traMADoL (ULTRAM) tablet 50 mg  50 mg Oral Q6H PRN    ALPRAZolam (XANAX) tablet 0.5 mg  0.5 mg Oral TID PRN    midodrine (PROAMITINE) tablet 10 mg  10 mg Oral BID WITH MEALS    mirtazapine (REMERON) tablet 7.5 mg  7.5 mg Oral QHS    QUEtiapine (SEROquel) tablet 12.5 mg  12.5 mg Oral Q8H PRN    pravastatin (PRAVACHOL) tablet 20 mg  20 mg Oral QHS    sodium chloride (NS) flush 5-40 mL  5-40 mL IntraVENous Q8H    sodium chloride (NS) flush 5-40 mL  5-40 mL IntraVENous PRN    acetaminophen (TYLENOL) tablet 650 mg  650 mg Oral Q4H PRN    naloxone (NARCAN) injection 0.4 mg  0.4 mg IntraVENous PRN    morphine injection 1 mg  1 mg IntraVENous Q4H PRN    ondansetron (ZOFRAN) injection 4 mg  4 mg IntraVENous Q4H PRN       LAB:    Recent Labs     04/14/20  0420   HCT 23.7*   HGB 7.4*       Transfuse PRBC's:      Assessment & Physician's Comment:  Right thigh soft and compressible with mild soreness  Gentle log roll of leg is pain free  Moves toes to command  Calves soft and non-tender  Dressing is with mild central bloody drainage not advancing  Neurovascular checks within normal limits  Orientation:  Disoriented (baseline)  Acute postoperative blood loss - asymptomatic at this time    Principal Problem:    Hip fracture ([address] (4/7/2020)        Plan:    Cont PT/OT as able - WBAT  Tylenol for pain  Lovenox for DVT prophylaxis  Medical management per primary team  Decision regarding Covid testing needs to be made but it seems excessive in a patient who exhibits no symptoms  Case Management for disposition planning    Kyle Flores PA-C   Orthopedic Trauma Service  [facility] [place][note 2]3 [facility]

## 2020-04-14 NOTE — PROGRESS NOTES
BAKARI plan:  -Covid19 test, pending   -[facility] unable to accept. -Patient will be tested once for Covid. They require 2 test.   -[facility] reviewed again, unable to accept per family would like patients care giver to be in house with her.   -Spouse is interested in Hopsice at [facility]  -Referral sent to [facility], pending   -Patient has private LTC Insurance   -BLS once placed    CM asked [facility] if they can accept 1 negative test advised no must be 2 negative Covid test. CM [facility] asked if she can review patient again to accept. CM received call from Pat at [facility] advising that the family wants the patients caregiver to be onsite with her. [facility] is not allowing any visitors at this time. CM spoke with patients spouse who advised that he is interested in Hospice. Spouse advised he has private [employer] and prefers the patient get hospice at a facility. Spouse advised he is interested in [facility] CM informed patient that [facility] is an agency. CM called [address] [address] [address] Phone: [phone]  Fax: [phone]). CM informed that they are contracted with [facility]. CM placed call to [facility] to see if they are accepting LTC/Hospice patients in the case Hospice orders are received, CM will fax referral to fax contact provided    Patients spouse also expressed interest in [facility] and is open to discussing hospice with [facility] team. CM sent referral to [facility], pending updates. Per unit nurse hospitalist has been informed. Updates pending. CM noted consult for hospice. Referral sent to [facility].      CM will follow     BLANCA Castillo/SHILOH

## 2020-04-14 NOTE — PROGRESS NOTES
Problem: Pressure Injury - Risk of  Goal: *Prevention of pressure injury  Description: Document Hawk Scale and appropriate interventions in the flowsheet. Outcome: Progressing Towards Goal  Note: Pressure Injury Interventions:  Sensory Interventions: Check visual cues for pain, Float heels, Keep linens dry and wrinkle-free, Maintain/enhance activity level, Minimize linen layers, Pad between skin to skin, Pressure redistribution bed/mattress (bed type), Turn and reposition approx. every two hours (pillows and wedges if needed)    Moisture Interventions: Moisture barrier, Minimize layers, Offer toileting Q_hr    Activity Interventions: PT/OT evaluation, Increase time out of bed    Mobility Interventions: PT/OT evaluation, HOB 30 degrees or less    Nutrition Interventions: Document food/fluid/supplement intake    Friction and Shear Interventions: Minimize layers, Lift sheet, Sit at 90-degree angle                Problem: Patient Education: Go to Patient Education Activity  Goal: Patient/Family Education  Outcome: Progressing Towards Goal     Problem: Falls - Risk of  Goal: *Absence of Falls  Outcome: Progressing Towards Goal  Goal: *KNOWLEDGE OF FALL PREVENTION  Outcome: Progressing Towards Goal  Goal: Will remain free from falls  Description: Will remain free from falls     Outcome: Progressing Towards Goal     Problem: Pain  Goal: *Control of Pain  Outcome: Progressing Towards Goal  Goal: *PALLIATIVE CARE:  Alleviation of Pain  Outcome: Progressing Towards Goal     Problem: Patient Education: Go to Patient Education Activity  Goal: Patient/Family Education  Outcome: Progressing Towards Goal     Problem: Falls - Risk of  Goal: *Absence of Falls  Description: Document Mika Fall Risk and appropriate interventions in the flowsheet.   Outcome: Progressing Towards Goal  Note: Fall Risk Interventions:  Mobility Interventions: OT consult for ADLs, Patient to call before getting OOB, PT Consult for mobility concerns, PT Consult for assist device competence, Utilize gait belt for transfers/ambulation, Utilize walker, cane, or other assistive device    Mentation Interventions: Door open when patient unattended, Reorient patient, Room close to nurse's station    Medication Interventions: Teach patient to arise slowly, Patient to call before getting OOB    Elimination Interventions: Call light in reach, Stay With Me (per policy), Patient to call for help with toileting needs, Toileting schedule/hourly rounds    History of Falls Interventions: Room close to nurse's station, Door open when patient unattended         Problem: Patient Education: Go to Patient Education Activity  Goal: Patient/Family Education  Outcome: Progressing Towards Goal     Problem: Nutrition Deficit  Goal: *Optimize nutritional status  Outcome: Progressing Towards Goal     Problem: Patient Education: Go to Patient Education Activity  Goal: Patient/Family Education  Outcome: Progressing Towards Goal     Problem: Patient Education: Go to Patient Education Activity  Goal: Patient/Family Education  Outcome: Progressing Towards Goal     Problem: Patient Education: Go to Patient Education Activity  Goal: Patient/Family Education  Outcome: Progressing Towards Goal

## 2020-04-14 NOTE — PROGRESS NOTES
[address] [facility] Adult  Hospitalist Group                                                                                          Hospitalist Progress Note  DO Cesia Vega service: [phone] from in house phone        Date of Service:  2020  NAME:  Elda Yeh  :  1940  MRN:  [id number]      Admission Summary:   Elda Yeh is a [de-identified] y.o. female who is demented at the history cannot be taken from her. History is taken from the chart. She has history of COPD asthma peptic ulcer disease hypertension in the past.  She resides at the nursing home  Per EMS patient had fallen on Friday and today hip x-ray was done and it confirmed that she had a fracture. She has been complaining of pain on right side of the hip. No nausea vomiting diarrhea no shortness of breath. Family available on phone at bedside. Interval history / Subjective:   Patient seen and examined. Globally weak. Advanced dementia. Discussed with  Claudia Ruiz, via phone. He is interested in hospice but also requesting feeding tube. Informed that patient not appropriate for feeding tube. Will ask hospice to provide informational session to help better understand hospice philosophy and service.       Assessment & Plan:     Hip fracture due to ground-level fall  -Acute versus subacute right femoral neck fracture   -s/p right hip hemiarthroplasty   -Appreciate Ortho  -Pain controlled  -DVT prophylaxis with lovenox  -bowel regimen   -Calcium-Vitamin D      Post op Delirium: resolved  -baseline delirium    Dysphagia:   -initiated on pureed diet    Hypernatremia:   -stable off D5      Hyperlipidemia: Continue statin     Depression: Continue remeron   -Seroquel as needed for confusion agitation     Hypotension: continue home midodrine     Dementia without behavorial disturbances:   -supportive care    Anemia, post operative decrease:   -H/H stable, no need for transfusion       Code status: DNR  DVT prophylaxis: Lovenox    Care Plan discussed with: Patient/Family  Anticipated Disposition: SNF/LTC  Anticipated Discharge: Greater than 48 hours     Hospital Problems  Date Reviewed: 4/8/2020          Codes Class Noted POA    * (Principal) Hip fracture ([facility] [facility] ICD-10-CM: Q23.634B  ICD-9-CM: 820.8  4/7/2020 Yes                Review of Systems:   Unable to obtain       Vital Signs:    Last 24hrs VS reviewed since prior progress note. Most recent are:  Visit Vitals  /71   Pulse 89   Temp 98.9 °F (37.2 °C)   Resp 16   Ht 5' 4\" (1.626 m)   Wt 72.5 kg (159 lb 14.4 oz)   SpO2 98%   BMI 27.45 kg/m²         Intake/Output Summary (Last 24 hours) at 4/14/2020 1719  Last data filed at 4/13/2020 2009  Gross per 24 hour   Intake 251.67 ml   Output    Net 251.67 ml        Physical Examination:             Constitutional:  awake, confused, No acute distress   ENT:  Oral mucosa moist, oropharynx benign. Resp:  CTA bilaterally. No wheezing/rhonchi/rales   CV:  Regular rhythm, normal rate, no murmurs, gallops, rubs    GI:  Soft, non distended, non tender    Musculoskeletal:  No edema, warm, 2+ pulses throughout    Neurologic:  Moves all extremities     Psych:  Poor insight       Data Review:    Review and/or order of clinical lab test      Labs:     Recent Labs     04/14/20  0420   WBC 5.7   HGB 7.4*   HCT 23.7*        Recent Labs     04/14/20  0420      K 4.0      CO2 26   BUN 9   CREA 0.63   *   CA 8.9   MG 1.7   PHOS 2.8     No results for input(s): SGOT, GPT, ALT, AP, TBIL, TBILI, TP, ALB, GLOB, GGT, AML, LPSE in the last 72 hours. No lab exists for component: AMYP, HLPSE  No results for input(s): INR, PTP, APTT, INREXT, INREXT in the last 72 hours. No results for input(s): FE, TIBC, PSAT, FERR in the last 72 hours. Lab Results   Component Value Date/Time    Folate 17.3 10/08/2015 11:30 AM      No results for input(s): PH, PCO2, PO2 in the last 72 hours.   No results for input(s): CPK, CKNDX, TROIQ in the last 72 hours.     No lab exists for component: CPKMB  Lab Results   Component Value Date/Time    Cholesterol, total 145 04/04/2017 09:46 AM    HDL Cholesterol 61 04/04/2017 09:46 AM    LDL, calculated 67 04/04/2017 09:46 AM    Triglyceride 87 04/04/2017 09:46 AM    CHOL/HDL Ratio 3.4 06/02/2013 06:30 AM     Lab Results   Component Value Date/Time    Glucose (POC) 120 (H) 04/08/2020 11:17 AM    Glucose (POC) 115 (H) 09/28/2018 08:46 PM     Lab Results   Component Value Date/Time    Color YELLOW/STRAW 04/07/2020 02:00 PM    Appearance CLEAR 04/07/2020 02:00 PM    Specific gravity 1.022 04/07/2020 02:00 PM    pH (UA) 6.0 04/07/2020 02:00 PM    Protein 30 (A) 04/07/2020 02:00 PM    Glucose 100 (A) 04/07/2020 02:00 PM    Ketone Negative 04/07/2020 02:00 PM    Bilirubin Negative 04/07/2020 02:00 PM    Urobilinogen 0.2 04/07/2020 02:00 PM    Nitrites Negative 04/07/2020 02:00 PM    Leukocyte Esterase Negative 04/07/2020 02:00 PM    Epithelial cells FEW 04/07/2020 02:00 PM    Bacteria Negative 04/07/2020 02:00 PM    WBC 0-4 04/07/2020 02:00 PM    RBC 0-5 04/07/2020 02:00 PM         Medications Reviewed:     Current Facility-Administered Medications   Medication Dose Route Frequency    famotidine (PEPCID) tablet 20 mg  20 mg Oral BID    dextrose 5 % - 0.45% NaCl infusion  20 mL/hr IntraVENous CONTINUOUS    acetaminophen (TYLENOL) suppository 650 mg  650 mg Rectal Q6H PRN    sodium chloride (NS) flush 5-40 mL  5-40 mL IntraVENous Q8H    sodium chloride (NS) flush 5-40 mL  5-40 mL IntraVENous PRN    naloxone (NARCAN) injection 0.4 mg  0.4 mg IntraVENous PRN    calcium-vitamin D (OS-SAE) 500 mg-200 unit tablet  1 Tab Oral TID WITH MEALS    senna-docusate (PERICOLACE) 8.6-50 mg per tablet 1 Tab  1 Tab Oral BID    polyethylene glycol (MIRALAX) packet 17 g  17 g Oral DAILY    bisacodyL (DULCOLAX) suppository 10 mg  10 mg Rectal DAILY PRN    enoxaparin (LOVENOX) injection 40 mg  40 mg SubCUTAneous DAILY    morphine injection 2 mg  2 mg IntraVENous Q3H PRN    traMADoL (ULTRAM) tablet 50 mg  50 mg Oral Q6H PRN    ALPRAZolam (XANAX) tablet 0.5 mg  0.5 mg Oral TID PRN    midodrine (PROAMITINE) tablet 10 mg  10 mg Oral BID WITH MEALS    mirtazapine (REMERON) tablet 7.5 mg  7.5 mg Oral QHS    QUEtiapine (SEROquel) tablet 12.5 mg  12.5 mg Oral Q8H PRN    pravastatin (PRAVACHOL) tablet 20 mg  20 mg Oral QHS    sodium chloride (NS) flush 5-40 mL  5-40 mL IntraVENous Q8H    sodium chloride (NS) flush 5-40 mL  5-40 mL IntraVENous PRN    acetaminophen (TYLENOL) tablet 650 mg  650 mg Oral Q4H PRN    naloxone (NARCAN) injection 0.4 mg  0.4 mg IntraVENous PRN    morphine injection 1 mg  1 mg IntraVENous Q4H PRN    ondansetron (ZOFRAN) injection 4 mg  4 mg IntraVENous Q4H PRN     ______________________________________________________________________  EXPECTED LENGTH OF STAY: 2d 4h  ACTUAL LENGTH OF STAY:          [address] [facility]

## 2020-04-14 NOTE — HOSPICE
[facility]  [phone]     Patient Name: Vanesa Ward  YOB: 1940  Age: [de-identified] y.o. [facility] RN Note:  Hospice consult received, reviewing chart. Will follow up with Unit Nurse and Care Manager to discuss plan of care, patient status and discharge disposition within the hour. Thank you for the opportunity to be of service to this patient.

## 2020-04-14 NOTE — PROGRESS NOTES
Problem: Self Care Deficits Care Plan (Adult)  Goal: *Acute Goals and Plan of Care (Insert Text)  Description:   FUNCTIONAL STATUS PRIOR TO ADMISSION: Pateint is a poor historian, perseverative with mumbling, unable to provide PLOF. Per chart review, patient lives at [address] [place] [address], walks with a RW with assistance, has caregivers and assist for all ADLs. Hx of dementia. HOME SUPPORT: The patient lived at [facility], required assist from staff and caregiver for ADLs and mobility. Occupational Therapy Goals  Initiated 4/9/2020  1. Patient will perform self-feeding with maximal assistance within 7 day(s). 2.  Patient will follow 10% of commands with functional activities throughout session within 7 day(s). 3.  Patient will perform rolling in supine for toilet transfers with maximal assistance x2 within 7 day(s). 4.  Patient will participate in upper extremity therapeutic exercise/activities with maximal assistance for 3 minutes within 7 day(s). Outcome: Progressing Towards Goal     Problem: Patient Education: Go to Patient Education Activity  Goal: Patient/Family Education  Outcome: Progressing Towards Goal   OCCUPATIONAL THERAPY TREATMENT  Patient: Claire Martinez ([de-identified] y.o. female)  Date: 4/14/2020  Diagnosis: Hip fracture ([facility] [S72.009A]   Hip fracture ([facility] [address] [facility]  Procedure(s) (LRB):  RIGHT HIP HEMIARTHROPLASTY/ BIOMET/ ER TO IP/ REQ [phone]  Bio-met/ ESSENTIAL (Right) 6 Days Post-Op  Precautions: Fall, WBAT, Skin(R posterior hip precautions)  Chart, occupational therapy assessment, plan of care, and goals were reviewed. ASSESSMENT  Patient continues with skilled OT services and is progressing towards goals. Participation impacted by impaired cognition (attention, command following, insight, safety judgement) as well as impaired reach to LEs, impaired standing balance and tolerance, report of and demonstration of fear of falling.  Patient sat on EOB for 10 min with physical assist off and on to prevent moving back to supine during the 10 min. Total assist for grooming activities. Resistant to bilateral UE exercises despite assist.    Current Level of Function Impacting Discharge (ADLs): Total assist for mobility and self care    Other factors to consider for discharge: confusion          PLAN :  Patient continues to benefit from skilled intervention to address the above impairments. Continue treatment per established plan of care. to address goals. Recommend with staff: Encourage patient to participate in self care activities, modified chair position for eating and self care, reorient    Recommend next OT session: UE bathing versus grooming    Recommendation for discharge: (in order for the patient to meet his/her long term goals)  Therapy up to 5 days/week in SNF setting    This discharge recommendation:  Has been made in collaboration with the attending provider and/or case management    IF patient discharges home will need the following DME: bedside commode, hospital bed, mechanical lift, walker: rolling, and wheelchair       SUBJECTIVE:   Patient stated NO, no. with all mobility    OBJECTIVE DATA SUMMARY:   Cognitive/Behavioral Status:  Neurologic State: Alert;Confused  Orientation Level: (with fluctuating ability to respond correctly to name)  Cognition: Decreased attention/concentration;Decreased command following;Memory loss; Impulsive;Poor safety awareness  Perception: Cues to maintain midline in standing;Verbal;Tactile  Perseveration: No perseveration noted  Safety/Judgement: Decreased awareness of environment;Decreased insight into deficits; Decreased awareness of need for assistance;Decreased awareness of need for safety    Functional Mobility and Transfers for ADLs:  Bed Mobility:  Supine to Sit: Maximum assistance;Assist x2;Bed Modified; Adaptive equipment; Additional time  Sit to Supine: Maximum assistance;Assist x2;Bed Modified; Adaptive equipment; Additional time    Transfers: Balance:  Sitting: Impaired; Without support  Sitting - Static: Fair (occasional)  Sitting - Dynamic: Poor (constant support)  Standing: Impaired; With support  Standing - Static: Poor  Standing - Dynamic : Poor    ADL Intervention:       Grooming  Grooming Assistance: Total assistance(dependent)  Position Performed: Seated edge of bed  Washing Face: Total assistance (dependent)  Brushing/Combing Hair: Total assistance (dependent)            Lower Body Dressing Assistance  Dressing Assistance: Total assistance(dependent)(inferred from mobility)  Leg Crossed Method Used: No  Position Performed: Long sitting on bed;Seated edge of bed    Toileting  Toileting Assistance: Total assistance(dependent)  Bladder Hygiene: Total assistance (dependent)(pure wick)    Cognitive Retraining  Orientation Retraining: Reorienting;Place;Situation;Person  Organizing/Sequencing: Breaking task down  Attention to Task: Distractibility  Following Commands: (poor command following)  Safety/Judgement: Decreased awareness of environment;Decreased insight into deficits; Decreased awareness of need for assistance;Decreased awareness of need for safety  Cues: Tactile cues provided;Verbal cues provided;Visual cues provided      Activity Tolerance:   Fair  Please refer to the flowsheet for vital signs taken during this treatment. After treatment patient left in no apparent distress:   Supine in bed, Heels elevated for pressure relief, Call bell within reach, and Bed / chair alarm activated    COMMUNICATION/COLLABORATION:   The patients plan of care was discussed with: Physical therapist and Registered nurse.      SAMY Cooper  Time Calculation: 23 mins

## 2020-04-15 LAB
ANION GAP SERPL CALC-SCNC: 2 MMOL/L (ref 5–15)
BUN SERPL-MCNC: 9 MG/DL (ref 6–20)
BUN/CREAT SERPL: 13 (ref 12–20)
CALCIUM SERPL-MCNC: 9.3 MG/DL (ref 8.5–10.1)
CHLORIDE SERPL-SCNC: 108 MMOL/L (ref 97–108)
CO2 SERPL-SCNC: 29 MMOL/L (ref 21–32)
CREAT SERPL-MCNC: 0.67 MG/DL (ref 0.55–1.02)
GLUCOSE SERPL-MCNC: 113 MG/DL (ref 65–100)
POTASSIUM SERPL-SCNC: 4.4 MMOL/L (ref 3.5–5.1)
SARS-COV-2, COV2: NOT DETECTED
SODIUM SERPL-SCNC: 139 MMOL/L (ref 136–145)
SPECIMEN SOURCE, FCOV2M: NORMAL

## 2020-04-15 PROCEDURE — 80048 BASIC METABOLIC PNL TOTAL CA: CPT

## 2020-04-15 PROCEDURE — 36415 COLL VENOUS BLD VENIPUNCTURE: CPT

## 2020-04-15 PROCEDURE — 74011250637 HC RX REV CODE- 250/637: Performed by: INTERNAL MEDICINE

## 2020-04-15 PROCEDURE — 74011250637 HC RX REV CODE- 250/637: Performed by: ORTHOPAEDIC SURGERY

## 2020-04-15 PROCEDURE — 74011250636 HC RX REV CODE- 250/636: Performed by: ORTHOPAEDIC SURGERY

## 2020-04-15 PROCEDURE — 65270000032 HC RM SEMIPRIVATE

## 2020-04-15 RX ADMIN — OYSTER SHELL CALCIUM WITH VITAMIN D 1 TABLET: 500; 200 TABLET, FILM COATED ORAL at 07:09

## 2020-04-15 RX ADMIN — SODIUM CHLORIDE 10 ML: 9 INJECTION INTRAMUSCULAR; INTRAVENOUS; SUBCUTANEOUS at 14:22

## 2020-04-15 RX ADMIN — FAMOTIDINE 20 MG: 20 TABLET ORAL at 17:13

## 2020-04-15 RX ADMIN — SENNOSIDES AND DOCUSATE SODIUM 1 TABLET: 8.6; 5 TABLET ORAL at 09:22

## 2020-04-15 RX ADMIN — OYSTER SHELL CALCIUM WITH VITAMIN D 1 TABLET: 500; 200 TABLET, FILM COATED ORAL at 17:13

## 2020-04-15 RX ADMIN — OYSTER SHELL CALCIUM WITH VITAMIN D 1 TABLET: 500; 200 TABLET, FILM COATED ORAL at 12:02

## 2020-04-15 RX ADMIN — PRAVASTATIN SODIUM 20 MG: 20 TABLET ORAL at 22:08

## 2020-04-15 RX ADMIN — ENOXAPARIN SODIUM 40 MG: 40 INJECTION SUBCUTANEOUS at 09:24

## 2020-04-15 RX ADMIN — MIRTAZAPINE 7.5 MG: 15 TABLET, FILM COATED ORAL at 22:08

## 2020-04-15 RX ADMIN — POLYETHYLENE GLYCOL 3350 17 G: 17 POWDER, FOR SOLUTION ORAL at 09:22

## 2020-04-15 RX ADMIN — SENNOSIDES AND DOCUSATE SODIUM 1 TABLET: 8.6; 5 TABLET ORAL at 17:13

## 2020-04-15 RX ADMIN — ACETAMINOPHEN 650 MG: 325 TABLET, FILM COATED ORAL at 09:22

## 2020-04-15 RX ADMIN — QUETIAPINE FUMARATE 12.5 MG: 25 TABLET ORAL at 00:03

## 2020-04-15 RX ADMIN — FAMOTIDINE 20 MG: 20 TABLET ORAL at 09:22

## 2020-04-15 RX ADMIN — MIDODRINE HYDROCHLORIDE 10 MG: 5 TABLET ORAL at 07:09

## 2020-04-15 RX ADMIN — Medication 10 ML: at 14:22

## 2020-04-15 RX ADMIN — QUETIAPINE FUMARATE 12.5 MG: 25 TABLET ORAL at 19:13

## 2020-04-15 RX ADMIN — MIDODRINE HYDROCHLORIDE 10 MG: 5 TABLET ORAL at 17:13

## 2020-04-15 NOTE — PROGRESS NOTES
Palliative Medicine Consult  Brown: [phone] (2022)    Patient Name: Bandar Lepe  YOB: 1940    Date of Initial Consult: April 13, 2020  Reason for Consult: Care Decisions  Requesting Provider:Dr. Chayito Estrella  Primary Care Physician: Buck Farris MD     SUMMARY:   Bandar Lepe is a [de-identified] y.o. female admitted on 4/7/2020 from [address] [place] [address] with a diagnosis of rt femoral neck fracture due to ground level fall (acute vs subacute), post op delirium, Dysphagia with high risk of aspiration. Plan is for SNF upon dc. Pt npo due to aspiration risk. Spouse declined hospice services. . Pt known to our team from a previous admission. PMH: dementia, anemia, asthma, basal cell carcinoma s/p removal, hiatal hernia repair, stroke, COPD, HTN,  Cholesterolemia, watermelon stomach, falls, HLD, depression,  Generalized weakness, UTI, rt hydronephrosis, cholelithiasis, hyponatremia.     Current medical issues leading to Palliative Medicine involvement include: support with care decisions given medical condition and risk of decline.     Social: ,was living at home with spouse but now in [facility] with hired care, 3 children (one lives locally), native of [place], retired teacher, graduated from [address], loves to read, ambulates with walker prior to admission, spouse reports overall steady decline in health the past 4 years     PALLIATIVE DIAGNOSES:   1. Delirium  2. Dementia  3. Dysphagia  4. Feeding difficulties  5. Hypoalbuminemia   6. High risk of aspiration   7. Femoral neck fracture     PLAN:   1. Pt unable to provide any meaningful information due to dementia  2. Conversations held with spouse, attending, CM regarding plan of care  3. Spouse has had the opportunity to discuss hospice with a hospice liason. He has wavered back and forth between hospice and rehab  4.  We discussed the benefits of rehab given her frailty, dementia, and high risk of compromise, high risk of re-admission. He agreed that rehab would be of limited benefit  5. Initially he was hoping for the pt to go to rehab and stay at the same facility as she transitioned to hospice afterwards but this has been a challenge trying to secure a facility. He does not like [facility]   6. Decision made to transition back to [facility] with hospice and 24 hour care in her apartment. CM has been in touch with [facility] and they are reviewing the chart and will render a decision in 24 hours  7. Will follow up    8. Initial consult note routed to primary continuity provider and/or primary health care team members  9. Communicated plan of care with: Palliative IDT, [facility] Team     GOALS OF CARE / TREATMENT PREFERENCES:     GOALS OF CARE:  Patient/Health Care Proxy Stated Goals: Comfort    TREATMENT PREFERENCES:   Code Status: DNR    Advance Care Planning:  [x] [facility] Interdisciplinary Team has updated the ACP Navigator with Health Care Decision Maker and Patient Capacity      Primary Decision Maker: Gosia Al [facility] - [phone]  Advance Care Planning 4/7/2020   Confirm Advance Directive Yes, on file   Does the patient have other document types Do Not Resuscitate       Medical Interventions: Comfort measures     Other Instructions:   Artificially Administered Nutrition: No feeding tube     Other:    As far as possible, the palliative care team has discussed with patient / health care proxy about goals of care / treatment preferences for patient.      HISTORY:     History obtained from: chart, team, family    CHIEF COMPLAINT: pt admitted with aforementioned history and issues    HPI/SUBJECTIVE:    The patient is:   [] Verbal and participatory  [x] Non-participatory due to:   Medical condition      Clinical Pain Assessment (nonverbal scale for severity on nonverbal patients):   Clinical Pain Assessment  Severity: 0     Activity (Movement): Lying quietly, normal position    Duration: for how long has pt been experiencing pain (e.g., 2 days, 1 month, years)  Frequency: how often pain is an issue (e.g., several times per day, once every few days, constant)     FUNCTIONAL ASSESSMENT:     Palliative Performance Scale (PPS): 30  PPS: 30       PSYCHOSOCIAL/SPIRITUAL SCREENING:     Palliative IDT has assessed this patient for cultural preferences / practices and a referral made as appropriate to needs (Cultural Services, Patient Advocacy, Ethics, etc.)    Any spiritual / Mormon concerns:  [] Yes /  [x] No    Caregiver Burnout:  [] Yes /  [x] No /  [] No Caregiver Present      Anticipatory grief assessment:   [x] Normal  / [] Maladaptive       ESAS Anxiety: Anxiety: 0    ESAS Depression:          REVIEW OF SYSTEMS:     Positive and pertinent negative findings in ROS are noted above in HPI. The following systems were [x] reviewed objectively / [] unable to be reviewed as noted in HPI  Other findings are noted below. Systems: constitutional, ears/nose/mouth/throat, respiratory, gastrointestinal, genitourinary, musculoskeletal, integumentary, neurologic, psychiatric, endocrine. Positive findings noted below. Modified ESAS Completed by: provider   Fatigue: 10 Drowsiness: 10     Pain: 0   Anxiety: 0 Nausea: 0   Anorexia: 10 Dyspnea: 0                    PHYSICAL EXAM:     From RN flowsheet:  Wt Readings from Last 3 Encounters:   04/13/20 159 lb 14.4 oz (72.5 kg)   02/12/20 157 lb 8 oz (71.4 kg)   01/10/20 147 lb 4.3 oz (66.8 kg)     Blood pressure 121/70, pulse 80, temperature 97.8 °F (36.6 °C), resp. rate 18, height 5' 4\" (1.626 m), weight 159 lb 14.4 oz (72.5 kg), SpO2 97 %.     Pain Scale 1: Adult Nonverbal Pain Scale  Pain Intensity 1: 4  Pain Onset 1: post opt  Pain Location 1: Hip  Pain Orientation 1: Right  Pain Description 1: Aching  Pain Intervention(s) 1: Medication (see MAR)  Last bowel movement, if known:     Constitutional: confused, nad  Eyes: pupils equal, anicteric  ENMT: no nasal discharge, moist mucous membranes  Cardiovascular:  Respiratory: breathing not labored, symmetric  Gastrointestinal: soft non-tender, +bowel sounds  Musculoskeletal: femoral neck fx  Skin: warm, dry  Neurologic: dementia, delirium  Psychiatric:   Other:       HISTORY:     Principal Problem:    Hip fracture ([facility] (4/7/2020)      Past Medical History:   Diagnosis Date    Anemia NEC     Asthma     major asthma as a child/only with colds now    Cancer ([facility]     BCCA removed    Coagulation disorder ([facility]     on xarelto    Dementia ([facility]     GERD (gastroesophageal reflux disease) 10/22/2010    Hiatal hernia     Hypertension     hx of blood pressure med for short time    Ill-defined condition     increased cholesterol    Other unknown and unspecified cause of morbidity or mortality     eval with Dr Sandra Burroughs for SOB, sept 2015:  results unkown    Stroke ([facility] 2013    no deficits    Stroke ([facility]     heat exhaustion    Watermelon stomach 2003      Past Surgical History:   Procedure Laterality Date    HX BLADDER SUSPENSION      HX COLONOSCOPY      HX ENDOSCOPY  6/4/15    Dr. Anh Baxter HX GYN      vaginal del times 3    HX HERNIA REPAIR  2014    x2, failure    HX OTHER SURGICAL      EGDs    HX TONSILLECTOMY      DC EXC SKIN MALIG >4CM FACE,FACIAL      times 3 - BCCA      Family History   Problem Relation Age of Onset    COPD Mother     Asthma Mother     Heart Attack Father     Other Father         peptic ulcer disease    Anesth Problems Neg Hx       History reviewed, no pertinent family history.   Social History     Tobacco Use    Smoking status: Never Smoker    Smokeless tobacco: Never Used   Substance Use Topics    Alcohol use: No     Allergies   Allergen Reactions    Latex Hives     Latex tape    Codeine Nausea and Vomiting    Darvon [Propoxyphene] Nausea and Vomiting    Other Medication Unknown (comments)     Flu shot (per )    Pcn [Penicillins] Hives      Current Facility-Administered Medications Medication Dose Route Frequency    famotidine (PEPCID) tablet 20 mg  20 mg Oral BID    dextrose 5 % - 0.45% NaCl infusion  20 mL/hr IntraVENous CONTINUOUS    acetaminophen (TYLENOL) suppository 650 mg  650 mg Rectal Q6H PRN    sodium chloride (NS) flush 5-40 mL  5-40 mL IntraVENous Q8H    sodium chloride (NS) flush 5-40 mL  5-40 mL IntraVENous PRN    naloxone (NARCAN) injection 0.4 mg  0.4 mg IntraVENous PRN    calcium-vitamin D (OS-SAE) 500 mg-200 unit tablet  1 Tab Oral TID WITH MEALS    senna-docusate (PERICOLACE) 8.6-50 mg per tablet 1 Tab  1 Tab Oral BID    polyethylene glycol (MIRALAX) packet 17 g  17 g Oral DAILY    bisacodyL (DULCOLAX) suppository 10 mg  10 mg Rectal DAILY PRN    enoxaparin (LOVENOX) injection 40 mg  40 mg SubCUTAneous DAILY    morphine injection 2 mg  2 mg IntraVENous Q3H PRN    traMADoL (ULTRAM) tablet 50 mg  50 mg Oral Q6H PRN    ALPRAZolam (XANAX) tablet 0.5 mg  0.5 mg Oral TID PRN    midodrine (PROAMITINE) tablet 10 mg  10 mg Oral BID WITH MEALS    mirtazapine (REMERON) tablet 7.5 mg  7.5 mg Oral QHS    QUEtiapine (SEROquel) tablet 12.5 mg  12.5 mg Oral Q8H PRN    pravastatin (PRAVACHOL) tablet 20 mg  20 mg Oral QHS    sodium chloride (NS) flush 5-40 mL  5-40 mL IntraVENous Q8H    sodium chloride (NS) flush 5-40 mL  5-40 mL IntraVENous PRN    acetaminophen (TYLENOL) tablet 650 mg  650 mg Oral Q4H PRN    naloxone (NARCAN) injection 0.4 mg  0.4 mg IntraVENous PRN    morphine injection 1 mg  1 mg IntraVENous Q4H PRN    ondansetron (ZOFRAN) injection 4 mg  4 mg IntraVENous Q4H PRN          LAB AND IMAGING FINDINGS:     Lab Results   Component Value Date/Time    WBC 5.7 04/14/2020 04:20 AM    HGB 7.4 (L) 04/14/2020 04:20 AM    PLATELET 464 18/23/4432 04:20 AM     Lab Results   Component Value Date/Time    Sodium 139 04/15/2020 03:43 AM    Potassium 4.4 04/15/2020 03:43 AM    Chloride 108 04/15/2020 03:43 AM    CO2 29 04/15/2020 03:43 AM    BUN 9 04/15/2020 03:43 AM Creatinine 0.67 04/15/2020 03:43 AM    Calcium 9.3 04/15/2020 03:43 AM    Magnesium 1.7 04/14/2020 04:20 AM    Phosphorus 2.8 04/14/2020 04:20 AM      Lab Results   Component Value Date/Time    AST (SGOT) 36 04/07/2020 01:08 PM    Alk. phosphatase 52 04/07/2020 01:08 PM    Protein, total 7.0 04/07/2020 01:08 PM    Albumin 2.7 (L) 04/07/2020 01:08 PM    Globulin 4.3 (H) 04/07/2020 01:08 PM     Lab Results   Component Value Date/Time    INR 1.1 07/14/2014 05:08 PM    Prothrombin time 11.2 07/14/2014 05:08 PM    aPTT 28.9 12/21/2013 11:50 AM      Lab Results   Component Value Date/Time    Iron 63 02/15/2016 02:38 PM    TIBC 321 02/15/2016 02:38 PM    Iron % saturation 20 02/15/2016 02:38 PM    Ferritin 34 11/18/2015 03:18 PM      No results found for: PH, PCO2, PO2  No components found for: [place]   Lab Results   Component Value Date/Time    CK 38 08/13/2015 02:50 PM    CK - MB <0.5 (L) 08/13/2015 02:50 PM                Total time: 65 min  Counseling / coordination time, spent as noted above:  55 min  > 50% counseling / coordination?:  y    Prolonged service was provided for  [x]30 min   []75 min in face to face time in the presence of the patient, spent as noted above. Time Start: 9153~3729  Time End:   Note: this can only be billed with 59405 (initial) or 80222 (follow up). If multiple start / stop times, list each separately.

## 2020-04-15 NOTE — PROGRESS NOTES
Problem: Pressure Injury - Risk of  Goal: *Prevention of pressure injury  Description: Document Hawk Scale and appropriate interventions in the flowsheet. Outcome: Progressing Towards Goal  Note: Pressure Injury Interventions:  Sensory Interventions: Check visual cues for pain, Float heels, Keep linens dry and wrinkle-free, Maintain/enhance activity level, Minimize linen layers, Pad between skin to skin, Pressure redistribution bed/mattress (bed type), Turn and reposition approx. every two hours (pillows and wedges if needed)    Moisture Interventions: Moisture barrier, Minimize layers, Offer toileting Q_hr    Activity Interventions: PT/OT evaluation, Increase time out of bed    Mobility Interventions: PT/OT evaluation, HOB 30 degrees or less    Nutrition Interventions: Document food/fluid/supplement intake    Friction and Shear Interventions: Minimize layers, Lift sheet, HOB 30 degrees or less                Problem: Patient Education: Go to Patient Education Activity  Goal: Patient/Family Education  Outcome: Progressing Towards Goal     Problem: Falls - Risk of  Goal: *Absence of Falls  Outcome: Progressing Towards Goal  Goal: *KNOWLEDGE OF FALL PREVENTION  Outcome: Progressing Towards Goal  Goal: Will remain free from falls  Description: Will remain free from falls     Outcome: Progressing Towards Goal     Problem: Pain  Goal: *Control of Pain  Outcome: Progressing Towards Goal  Goal: *PALLIATIVE CARE:  Alleviation of Pain  Outcome: Progressing Towards Goal     Problem: Patient Education: Go to Patient Education Activity  Goal: Patient/Family Education  Outcome: Progressing Towards Goal     Problem: Falls - Risk of  Goal: *Absence of Falls  Description: Document Mika Fall Risk and appropriate interventions in the flowsheet.   Outcome: Progressing Towards Goal  Note: Fall Risk Interventions:  Mobility Interventions: PT Consult for assist device competence, PT Consult for mobility concerns, Patient to call before getting OOB, OT consult for ADLs    Mentation Interventions: Door open when patient unattended, Reorient patient, More frequent rounding    Medication Interventions: Teach patient to arise slowly, Patient to call before getting OOB    Elimination Interventions: Call light in reach    History of Falls Interventions: Room close to nurse's station, Door open when patient unattended         Problem: Patient Education: Go to Patient Education Activity  Goal: Patient/Family Education  Outcome: Progressing Towards Goal     Problem: Nutrition Deficit  Goal: *Optimize nutritional status  Outcome: Progressing Towards Goal     Problem: Patient Education: Go to Patient Education Activity  Goal: Patient/Family Education  Outcome: Progressing Towards Goal     Problem: Patient Education: Go to Patient Education Activity  Goal: Patient/Family Education  Outcome: Progressing Towards Goal     Problem: Patient Education: Go to Patient Education Activity  Goal: Patient/Family Education  Outcome: Progressing Towards Goal     Problem: Risk for Spread of Infection  Goal: Prevent transmission of infectious organism to others  Description: Prevent the transmission of infectious organisms to other patients, staff members, and visitors.   Outcome: Progressing Towards Goal     Problem: Patient Education:  Go to Education Activity  Goal: Patient/Family Education  Outcome: Progressing Towards Goal

## 2020-04-15 NOTE — PROGRESS NOTES
ORTHO PROGRESS NOTE      SUBJECTIVE:  Sallyanne Cockayne is sleeping when I enter but easily awakens. She denies hip pain. I spoke with lab who state they haven't received COVID testing as ordered yesterday. OBJECTIVE:  Patient Vitals for the past 24 hrs:   Temp Pulse BP   04/15/20 0907 (P) 97.8 °F (36.6 °C) (P) 80 (P) 121/70   04/15/20 0709  79 134/74   04/15/20 0341   130/61   04/15/20 0244 98.4 °F (36.9 °C) 81 92/50   04/14/20 2100 98.3 °F (36.8 °C) 70 106/66   04/14/20 1622 98.9 °F (37.2 °C) 89 108/71       Alert, no distress. Lying in bed. Much more conversant than during previous evaluations. She speaks in full sentences for me today. Respirations unlabored. No pain with PROM right hip. Thigh soft. Dressing CDI. No pain PROM LLE also. Ecchymosis left knee present upon presentation to ER. SILT bilat foot. Calves non-tender. Moves bilat foot upon command. Recent Labs     04/15/20  0343 04/14/20  0420   HGB  --  7.4*   HCT  --  23.7*   PLT  --  196   BUN 9 9   CREA 0.67 0.63   GFRAA >60 >60   GFRNA >60 >60       PT/OT:   Gait:  Gait  Base of Support: Center of gravity altered, Widened  Speed/Eloise: Shuffled  Step Length: Left shortened, Right shortened  Stance: Right decreased  Gait Abnormalities: Antalgic, Decreased step clearance, Shuffling gait, Step to gait  Ambulation - Level of Assistance: Moderate assistance, Maximum assistance, Assist x2(assist w/weight shift when side stepping)  Distance (ft): 1 Feet (ft)(to HOB)  Assistive Device: Gait belt, Walker, rolling                 ASSESSMENT:  Procedure: Procedure(s):  RIGHT HIP HEMIARTHROPLASTY/ BIOMET/ ER TO IP/ REQ [id number]  Bio-met/ ESSENTIAL  Post Op day: 7 Days Post-Op  Baseline dementia    PLAN:  I spoke with inpatient lab here and they haven't received COVID testing which appears to be required for placement. RN states swab was obtained yesterday. She will speak with lab. PT/OT: WBAT RLE. Posterior precautions.   Analgesics: She's requiring limited Tylenol. DVT proph: Lovenox typically 14 days post-op, then  PO daily 14 additional days if she aure PO still. Disp planning. Per hospitalist and care management. F/U: Dr. Alvaro Villar [facility], 3-4 weeks post-op. Please call again if questions regarding her hip.     MIKEY Howe  Orthopedic Trauma Service  [address]

## 2020-04-15 NOTE — PROGRESS NOTES
[facility]  Hospitalist Group                                                                                          Hospitalist Progress Note  [address] [facility], DO  Answering service: [phone] from in house phone        Date of Service:  4/15/2020  NAME:  Gavin Billings  :  1940  MRN:  [id number]      Admission Summary:   Gavin Billings is a [de-identified] y.o. female who is demented at the history cannot be taken from her. History is taken from the chart. She has history of COPD asthma peptic ulcer disease hypertension in the past.  She resides at the nursing home  Per EMS patient had fallen on Friday and today hip x-ray was done and it confirmed that she had a fracture. She has been complaining of pain on right side of the hip. No nausea vomiting diarrhea no shortness of breath. Family available on phone at bedside. Interval history / Subjective:   Patient seen and examined. Globally weak. No significant change from day prior. Unable to provide meaningful review of systems.  Planned meeting with hospice this AM.      Assessment & Plan:     Hip fracture due to ground-level fall  -Acute versus subacute right femoral neck fracture   -s/p right hip hemiarthroplasty   -Appreciate Ortho  -Pain controlled  -DVT prophylaxis with lovenox  -bowel regimen   -Calcium-Vitamin D      Post op Delirium: resolved  -baseline dementia     Dysphagia:   -initiated on pureed diet    Hypernatremia:   -stable off D5      Hyperlipidemia: Continue statin     Depression: Continue remeron   -Seroquel as needed for confusion agitation     Hypotension: continue home midodrine     Dementia without behavorial disturbances:   -supportive care    Anemia, post operative decrease:   -H/H stable, no need for transfusion       Code status: DNR  DVT prophylaxis: Lovenox    Care Plan discussed with: Patient/Family  Anticipated Disposition: SNF/LTC  Anticipated Discharge: case management for [facility] Problems  Date Reviewed: 4/8/2020          Codes Class Noted POA    * (Principal) Hip fracture [facility]) ICD-10-CM: K73.072Y  ICD-9-CM: 820.8  4/7/2020 Yes                Review of Systems:   Unable to obtain       Vital Signs:    Last 24hrs VS reviewed since prior progress note. Most recent are:  Visit Vitals  BP (P) 121/70 (BP 1 Location: Left arm, BP Patient Position: At rest)   Pulse (P) 80   Temp (P) 97.8 °F (36.6 °C)   Resp (P) 18   Ht 5' 4\" (1.626 m)   Wt 72.5 kg (159 lb 14.4 oz)   SpO2 (P) 97%   BMI 27.45 kg/m²         Intake/Output Summary (Last 24 hours) at 4/15/2020 1015  Last data filed at 4/15/2020 0504  Gross per 24 hour   Intake    Output 300 ml   Net -300 ml        Physical Examination:             Constitutional:  awake, confused, No acute distress   ENT:  Oral mucosa moist, oropharynx benign. Resp:  CTA bilaterally. No wheezing/rhonchi/rales   CV:  Regular rhythm, normal rate, no murmurs, gallops, rubs    GI:  Soft, non distended, non tender    Musculoskeletal:  No edema, warm, 2+ pulses throughout    Neurologic:  Moves all extremities, globally weak      Psych:  Poor insight       Data Review:    Review and/or order of clinical lab test      Labs:     Recent Labs     04/14/20  0420   WBC 5.7   HGB 7.4*   HCT 23.7*        Recent Labs     04/15/20  0343 04/14/20  0420    139   K 4.4 4.0    108   CO2 29 26   BUN 9 9   CREA 0.67 0.63   * 107*   CA 9.3 8.9   MG  --  1.7   PHOS  --  2.8     No results for input(s): SGOT, GPT, ALT, AP, TBIL, TBILI, TP, ALB, GLOB, GGT, AML, LPSE in the last 72 hours. No lab exists for component: AMYP, HLPSE  No results for input(s): INR, PTP, APTT, INREXT, INREXT in the last 72 hours. No results for input(s): FE, TIBC, PSAT, FERR in the last 72 hours. Lab Results   Component Value Date/Time    Folate 17.3 10/08/2015 11:30 AM      No results for input(s): PH, PCO2, PO2 in the last 72 hours.   No results for input(s): CPK, CKNDX, TROIQ in the last 72 hours.     No lab exists for component: CPKMB  Lab Results   Component Value Date/Time    Cholesterol, total 145 04/04/2017 09:46 AM    HDL Cholesterol 61 04/04/2017 09:46 AM    LDL, calculated 67 04/04/2017 09:46 AM    Triglyceride 87 04/04/2017 09:46 AM    CHOL/HDL Ratio 3.4 06/02/2013 06:30 AM     Lab Results   Component Value Date/Time    Glucose (POC) 120 (H) 04/08/2020 11:17 AM    Glucose (POC) 115 (H) 09/28/2018 08:46 PM     Lab Results   Component Value Date/Time    Color YELLOW/STRAW 04/07/2020 02:00 PM    Appearance CLEAR 04/07/2020 02:00 PM    Specific gravity 1.022 04/07/2020 02:00 PM    pH (UA) 6.0 04/07/2020 02:00 PM    Protein 30 (A) 04/07/2020 02:00 PM    Glucose 100 (A) 04/07/2020 02:00 PM    Ketone Negative 04/07/2020 02:00 PM    Bilirubin Negative 04/07/2020 02:00 PM    Urobilinogen 0.2 04/07/2020 02:00 PM    Nitrites Negative 04/07/2020 02:00 PM    Leukocyte Esterase Negative 04/07/2020 02:00 PM    Epithelial cells FEW 04/07/2020 02:00 PM    Bacteria Negative 04/07/2020 02:00 PM    WBC 0-4 04/07/2020 02:00 PM    RBC 0-5 04/07/2020 02:00 PM         Medications Reviewed:     Current Facility-Administered Medications   Medication Dose Route Frequency    famotidine (PEPCID) tablet 20 mg  20 mg Oral BID    dextrose 5 % - 0.45% NaCl infusion  20 mL/hr IntraVENous CONTINUOUS    acetaminophen (TYLENOL) suppository 650 mg  650 mg Rectal Q6H PRN    sodium chloride (NS) flush 5-40 mL  5-40 mL IntraVENous Q8H    sodium chloride (NS) flush 5-40 mL  5-40 mL IntraVENous PRN    naloxone (NARCAN) injection 0.4 mg  0.4 mg IntraVENous PRN    calcium-vitamin D (OS-SAE) 500 mg-200 unit tablet  1 Tab Oral TID WITH MEALS    senna-docusate (PERICOLACE) 8.6-50 mg per tablet 1 Tab  1 Tab Oral BID    polyethylene glycol (MIRALAX) packet 17 g  17 g Oral DAILY    bisacodyL (DULCOLAX) suppository 10 mg  10 mg Rectal DAILY PRN    enoxaparin (LOVENOX) injection 40 mg  40 mg SubCUTAneous DAILY    morphine injection 2 mg  2 mg IntraVENous Q3H PRN    traMADoL (ULTRAM) tablet 50 mg  50 mg Oral Q6H PRN    ALPRAZolam (XANAX) tablet 0.5 mg  0.5 mg Oral TID PRN    midodrine (PROAMITINE) tablet 10 mg  10 mg Oral BID WITH MEALS    mirtazapine (REMERON) tablet 7.5 mg  7.5 mg Oral QHS    QUEtiapine (SEROquel) tablet 12.5 mg  12.5 mg Oral Q8H PRN    pravastatin (PRAVACHOL) tablet 20 mg  20 mg Oral QHS    sodium chloride (NS) flush 5-40 mL  5-40 mL IntraVENous Q8H    sodium chloride (NS) flush 5-40 mL  5-40 mL IntraVENous PRN    acetaminophen (TYLENOL) tablet 650 mg  650 mg Oral Q4H PRN    naloxone (NARCAN) injection 0.4 mg  0.4 mg IntraVENous PRN    morphine injection 1 mg  1 mg IntraVENous Q4H PRN    ondansetron (ZOFRAN) injection 4 mg  4 mg IntraVENous Q4H PRN     ______________________________________________________________________  EXPECTED LENGTH OF STAY: 2d 4h  ACTUAL LENGTH OF STAY:          [address] DO

## 2020-04-15 NOTE — PROGRESS NOTES
[facility] meeting with BSR @ 10am, Spouse got questions answered    -Family is not sure if ready for hospice.     -Tentative plan  Hospice at [address] [place] [address] with 24/7 Caregiver.     -Clinicals faxed to [address] [place] [address] for review, pending     -[facility] called advised can accept for rehab with 1 negative Covid19 test. Can also accept for hospice or rehab then transition to hospice.     -Covid Results negative    -Spouse and daughter is making a decision; prefers to speak with Hospitalist before proceeds with the plan       CM spoke with patients spouse and daughter on a conference call. Patient spouse advised that he is not sure about hospice and feels that he wants to try rehab then transition to hospice. Patients spouse asked CM to follow up with [address] [place] [address] to confirm that patient can return to [facility] at [address] [place] [address] with Hospice and 24/7 caregiver after rehab at [facility]. CM spoke with [facility]  of [address] [place] [address] who advised that the patient will be assessed at that time and if appropriate she can return with Hospice and 24/7 caregiver. Stann Schilder advised they are contracted with [facility] and will allow a caregiver to come in house to assist with Hospice. CM received call from palliative NP who had recently spoke with the patients spouse and the plan is to move forward with hopsice if [address] [place] [address] will accept the patient back in [place] with hospice and 24/7 caregivers. CM spoke with Stann Schilder [facility]  at [address] [place] [address] ([phone]) to inquire if they can assist. CM advised to fax clinicals. CM faxed clinicals [phone] for review. Stann Schilder advised she will need to review it with clinical staff and will respond in 24 hours, by morning at earliest.     CM awaiting the plan from family. CM will follow.      BLANCA Castillo/SHILOH

## 2020-04-15 NOTE — HOSPICE
[facility]  [phone]    Nursing Note   Patient Name: Kelsie Gould  YOB: 1940  Age: [de-identified] y.o. [employer] RN Note:     Completed hospice information session via phone conference with Mireya Bush Marissa/ and Kay Cortez/dtr. Discussed Hospice philosophy, general plan of care, levels of care, services and on call procedures. Per , patient has been accepted by [employer] [facility]. Mireya Bush and Kay Baldwin wish to speak with attending to ask questions as to why the MD feels that she is hospice appropriate. Told them that this is understandable and that [employer] is here to help however we can and provided contact number. Shared with family that [employer] does not have a contract with [facility] and that  can provide information on hospice agencies that do. If there are any questions, please call [employer] at [phone]. Thank you for the opportunity to be of service to this patient and her family.

## 2020-04-15 NOTE — PROGRESS NOTES
NUTRITION  Pt seen for:     [x]           Supplements  [x]             PO intake check   []           Food Allergies  []             Food Preferences/tolerances    [x]           Rescreen   []             Education    []           Diet order clarification []             Other            RECOMMENDATIONS:     1. Continue with pureed diet, thin liquids--NO STRAWS    2. Agree that pt is not appropriate candidate for enteral tube feedings    3. RD will order supplements for all meals    SUBJECTIVE/OBJECTIVE:   Information obtained from:   Chart reviewed remotely. Consult received for nutrition management, thank you for the consult. Pt has h/o dementia at baseline, GERD, watermelon stomach, past CVA. She was admitted on 4/7 with a right hip fracture (apparently fell at home several days ago, fracture was just recently confirmed). Pt underwent hip repair on 4/8. Currently on clear liquids but should be able to advance to regular today. 4/15: Chart reviewed, per notes family to have discussion with Hospice today regarding goals of care. Agree that pt is not an appropriate pt for PEG feedings d/t her advanced dementia. No new nutritional issues, pt is taking small amounts of pureed diet. RD continues to follow.     Diet:  Pureed + thin liquids  Supplements: added ensure compact TID  Intake: []           Good     [x]           Fair      []           Poor   Patient Vitals for the past 100 hrs:   % Diet Eaten   04/14/20 1815 75 %   04/13/20 1734 75 %       Weight Changes:   []            Loss  []            Gain  [x]            Stable  Wt Readings from Last 10 Encounters:   04/13/20 72.5 kg (159 lb 14.4 oz)   02/12/20 71.4 kg (157 lb 8 oz)   01/10/20 66.8 kg (147 lb 4.3 oz)   10/24/19 65.3 kg (144 lb)   07/03/19 65.2 kg (143 lb 12.8 oz)   01/18/19 62.6 kg (138 lb)   01/17/19 62.6 kg (138 lb)   01/11/19 67 kg (147 lb 11.3 oz)   01/02/19 63.5 kg (140 lb)   12/14/18 62.8 kg (138 lb 6.4 oz)       Nutrition Problems Identified:  [x]       None: not at nutrition risk  []           Specified food preferences   []           Dislikes supplements              []           Allergies  [x]           Difficulty chewing or swallowing   []           Poor dentition   []           Nausea/Vomiting  []           Constipation  []           Diarrhea    PLAN:   []           Obtained/adjusted food preferences/tolerances and/or snacks options   []           Dislikes supplements will try a substitution   []           Modify diet for food allergies  []           Adjust texture due to difficulty chewing   [x]    Continue current diet  []           Educated patient  [x]           Add Supplements  [x]          Ene Mcmanus, [address] [place] [address]  C: [phone]

## 2020-04-16 LAB
ABO + RH BLD: NORMAL
ANION GAP SERPL CALC-SCNC: 4 MMOL/L (ref 5–15)
BASOPHILS # BLD: 0 K/UL (ref 0–0.1)
BASOPHILS NFR BLD: 0 % (ref 0–1)
BLOOD GROUP ANTIBODIES SERPL: NORMAL
BUN SERPL-MCNC: 9 MG/DL (ref 6–20)
BUN/CREAT SERPL: 11 (ref 12–20)
CALCIUM SERPL-MCNC: 9.8 MG/DL (ref 8.5–10.1)
CHLORIDE SERPL-SCNC: 100 MMOL/L (ref 97–108)
CO2 SERPL-SCNC: 34 MMOL/L (ref 21–32)
CREAT SERPL-MCNC: 0.8 MG/DL (ref 0.55–1.02)
DIFFERENTIAL METHOD BLD: ABNORMAL
EOSINOPHIL # BLD: 0 K/UL (ref 0–0.4)
EOSINOPHIL NFR BLD: 0 % (ref 0–7)
ERYTHROCYTE [DISTWIDTH] IN BLOOD BY AUTOMATED COUNT: 14.5 % (ref 11.5–14.5)
GLUCOSE SERPL-MCNC: 174 MG/DL (ref 65–100)
HCT VFR BLD AUTO: 29.2 % (ref 35–47)
HEMOCCULT STL QL: POSITIVE
HGB BLD-MCNC: 9.3 G/DL (ref 11.5–16)
IMM GRANULOCYTES # BLD AUTO: 0.1 K/UL (ref 0–0.04)
IMM GRANULOCYTES NFR BLD AUTO: 1 % (ref 0–0.5)
LYMPHOCYTES # BLD: 0.4 K/UL (ref 0.8–3.5)
LYMPHOCYTES NFR BLD: 4 % (ref 12–49)
MCH RBC QN AUTO: 32.6 PG (ref 26–34)
MCHC RBC AUTO-ENTMCNC: 31.8 G/DL (ref 30–36.5)
MCV RBC AUTO: 102.5 FL (ref 80–99)
MONOCYTES # BLD: 0.6 K/UL (ref 0–1)
MONOCYTES NFR BLD: 5 % (ref 5–13)
NEUTS SEG # BLD: 10 K/UL (ref 1.8–8)
NEUTS SEG NFR BLD: 90 % (ref 32–75)
NRBC # BLD: 0 K/UL (ref 0–0.01)
NRBC BLD-RTO: 0 PER 100 WBC
PLATELET # BLD AUTO: 428 K/UL (ref 150–400)
PMV BLD AUTO: 8.2 FL (ref 8.9–12.9)
POTASSIUM SERPL-SCNC: 4 MMOL/L (ref 3.5–5.1)
RBC # BLD AUTO: 2.85 M/UL (ref 3.8–5.2)
RBC MORPH BLD: ABNORMAL
SODIUM SERPL-SCNC: 138 MMOL/L (ref 136–145)
SPECIMEN EXP DATE BLD: NORMAL
WBC # BLD AUTO: 11.1 K/UL (ref 3.6–11)

## 2020-04-16 PROCEDURE — 86900 BLOOD TYPING SEROLOGIC ABO: CPT

## 2020-04-16 PROCEDURE — 85025 COMPLETE CBC W/AUTO DIFF WBC: CPT

## 2020-04-16 PROCEDURE — 74011000258 HC RX REV CODE- 258: Performed by: INTERNAL MEDICINE

## 2020-04-16 PROCEDURE — 74011000250 HC RX REV CODE- 250: Performed by: INTERNAL MEDICINE

## 2020-04-16 PROCEDURE — C9113 INJ PANTOPRAZOLE SODIUM, VIA: HCPCS | Performed by: INTERNAL MEDICINE

## 2020-04-16 PROCEDURE — 74011250636 HC RX REV CODE- 250/636: Performed by: INTERNAL MEDICINE

## 2020-04-16 PROCEDURE — 82272 OCCULT BLD FECES 1-3 TESTS: CPT

## 2020-04-16 PROCEDURE — 80048 BASIC METABOLIC PNL TOTAL CA: CPT

## 2020-04-16 PROCEDURE — 65270000032 HC RM SEMIPRIVATE

## 2020-04-16 PROCEDURE — 36415 COLL VENOUS BLD VENIPUNCTURE: CPT

## 2020-04-16 RX ADMIN — SODIUM CHLORIDE 10 ML: 9 INJECTION INTRAMUSCULAR; INTRAVENOUS; SUBCUTANEOUS at 21:14

## 2020-04-16 RX ADMIN — SODIUM CHLORIDE 40 MG: 9 INJECTION INTRAMUSCULAR; INTRAVENOUS; SUBCUTANEOUS at 21:13

## 2020-04-16 RX ADMIN — SODIUM CHLORIDE 40 MG: 9 INJECTION INTRAMUSCULAR; INTRAVENOUS; SUBCUTANEOUS at 11:17

## 2020-04-16 RX ADMIN — DEXTROSE MONOHYDRATE AND SODIUM CHLORIDE 20 ML/HR: 5; .45 INJECTION, SOLUTION INTRAVENOUS at 20:14

## 2020-04-16 NOTE — PROGRESS NOTES
Palliative Medicine Consult  Kevin: [phone] ([phone])    Patient Name: Bindu Gilliland  YOB: 1940    Date of Initial Consult: April 13, 2020  Reason for Consult: Care Decisions  Requesting Provider:Dr. Gael Schirmer  Primary Care Physician: Shimon Carias MD     SUMMARY:   Bindu Gilliland is a [de-identified] y.o. female admitted on 4/7/2020 from [address] [place] [address] with a diagnosis of rt femoral neck fracture due to ground level fall (acute vs subacute), post op delirium, Dysphagia with high risk of aspiration. Plan is for SNF upon dc. Pt npo due to aspiration risk. Spouse declined hospice services. . Pt known to our team from a previous admission. PMH: dementia, anemia, asthma, basal cell carcinoma s/p removal, hiatal hernia repair, stroke, COPD, HTN,  Cholesterolemia, watermelon stomach, falls, HLD, depression,  Generalized weakness, UTI, rt hydronephrosis, cholelithiasis, hyponatremia.     Current medical issues leading to Palliative Medicine involvement include: support with care decisions given medical condition and risk of decline.     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 Delirium  2. Dementia  3. Dysphagia  4. Feeding difficulties  5. Hypoalbuminemia   6. High risk of aspiration   7. Femoral neck fracture     PLAN:   1. Pt unable to provide any meaningful information due to dementia  2. Conversations held with attending, ALVARO regarding plan of care. Still waiting to hear from the facility. Of note there are a high number of COVID at the facility at this time  3. Pt with new gib symptoms which are being treated conservatively   4. Primary team communicating with spouse  5. Will continue to follow. Spouse has my contact information  4/15/20:  6. Spouse has had the opportunity to discuss hospice with a hospice liason. He has wavered back and forth between hospice and rehab  7. We discussed the benefits of rehab given her frailty, dementia, and high risk of compromise, high risk of re-admission. He agreed that rehab would be of limited benefit  8. Initially he was hoping for the pt to go to rehab and stay at the same facility as she transitioned to hospice afterwards but this has been a challenge trying to secure a facility. He does not like [facility]   9. Decision made to transition back to [address] [place] [address] with hospice and 24 hour care in her apartment. CM has been in touch with [address] [place] [address] and they are reviewing the chart and will render a decision in 24 hours  10. Will follow up    11. Initial consult note routed to primary continuity provider and/or primary health care team members  12. Communicated plan of care with: Palliative [facility] Team     GOALS OF CARE / TREATMENT PREFERENCES:     GOALS OF CARE:  Patient/Health Care Proxy Stated Goals: Comfort    TREATMENT PREFERENCES:   Code Status: DNR    Advance Care Planning:  [x] [facility] Interdisciplinary Team has updated the ACP Navigator with Health Care Decision Maker and Patient Capacity      Primary Decision Maker: Marissa Rivas Spouse - [phone]  Advance Care Planning 4/7/2020   Confirm Advance Directive Yes, on file   Does the patient have other document types Do Not Resuscitate       Medical Interventions: Comfort measures     Other Instructions:   Artificially Administered Nutrition: No feeding tube     Other:    As far as possible, the palliative care team has discussed with patient / health care proxy about goals of care / treatment preferences for patient.      HISTORY:     History obtained from: chart, team, family    CHIEF COMPLAINT: pt admitted with aforementioned history and issues    HPI/SUBJECTIVE:    The patient is:   [] Verbal and participatory  [x] Non-participatory due to:   Medical condition      Clinical Pain Assessment (nonverbal scale for severity on nonverbal patients):   Clinical Pain Assessment  Severity: 0     Activity (Movement): Lying quietly, normal position    Duration: for how long has pt been experiencing pain (e.g., 2 days, 1 month, years)  Frequency: how often pain is an issue (e.g., several times per day, once every few days, constant)     FUNCTIONAL ASSESSMENT:     Palliative Performance Scale (PPS): 30  PPS: 30       PSYCHOSOCIAL/SPIRITUAL SCREENING:     Palliative IDT has assessed this patient for cultural preferences / practices and a referral made as appropriate to needs (Cultural Services, Patient Advocacy, Ethics, etc.)    Any spiritual / Baptism concerns:  [] Yes /  [x] No    Caregiver Burnout:  [] Yes /  [x] No /  [] No Caregiver Present      Anticipatory grief assessment:   [x] Normal  / [] Maladaptive       ESAS Anxiety: Anxiety: 0    ESAS Depression:          REVIEW OF SYSTEMS:     Positive and pertinent negative findings in ROS are noted above in HPI. The following systems were [x] reviewed objectively / [] unable to be reviewed as noted in HPI  Other findings are noted below. Systems: constitutional, ears/nose/mouth/throat, respiratory, gastrointestinal, genitourinary, musculoskeletal, integumentary, neurologic, psychiatric, endocrine. Positive findings noted below. Modified ESAS Completed by: provider   Fatigue: 9 Drowsiness: 4     Pain: 0   Anxiety: 0 Nausea: 0   Anorexia: 10 Dyspnea: 0           Stool Occurrence(s): 1        PHYSICAL EXAM:     From RN flowsheet:  Wt Readings from Last 3 Encounters:   04/16/20 152 lb 3.2 oz (69 kg)   02/12/20 157 lb 8 oz (71.4 kg)   01/10/20 147 lb 4.3 oz (66.8 kg)     Blood pressure 133/68, pulse 100, temperature 98 °F (36.7 °C), resp. rate 17, height 5' 4\" (1.626 m), weight 152 lb 3.2 oz (69 kg), SpO2 99 %.     Pain Scale 1: Adult Nonverbal Pain Scale  Pain Intensity 1: 0  Pain Onset 1: post opt  Pain Location 1: Hip  Pain Orientation 1: Right  Pain Description 1: Aching  Pain Intervention(s) 1: Medication (see MAR)  Last bowel movement, if known:     Constitutional: confused, nad  Eyes: pupils equal, anicteric  ENMT: no nasal discharge, moist mucous membranes  Cardiovascular:  Respiratory: breathing not labored, symmetric  Gastrointestinal: soft non-tender, +bowel sounds  Musculoskeletal: femoral neck fx  Skin: warm, dry  Neurologic: dementia, delirium  Psychiatric:   Other:       HISTORY:     Principal Problem:    Hip fracture ([facility] (4/7/2020)      Past Medical History:   Diagnosis Date    Anemia NEC     Asthma     major asthma as a child/only with colds now    Cancer ([facility]     BCCA removed    Coagulation disorder ([facility]     on xarelto    Dementia ([facility]     GERD (gastroesophageal reflux disease) 10/22/2010    Hiatal hernia     Hypertension     hx of blood pressure med for short time    Ill-defined condition     increased cholesterol    Other unknown and unspecified cause of morbidity or mortality     eval with Dr Lainey Machado for SOB, sept 2015:  results unkown    Stroke ([facility] 2013    no deficits    Stroke ([facility]     heat exhaustion    Watermelon stomach 2003      Past Surgical History:   Procedure Laterality Date    HX BLADDER SUSPENSION      HX COLONOSCOPY      HX ENDOSCOPY  6/4/15    Dr. Homero Aviles HX GYN      vaginal del times 3    HX HERNIA REPAIR  2014    x2, failure    HX OTHER SURGICAL      EGDs    HX TONSILLECTOMY      GA EXC SKIN MALIG >4CM FACE,FACIAL      times 3 - BCCA      Family History   Problem Relation Age of Onset    COPD Mother     Asthma Mother     Heart Attack Father     Other Father         peptic ulcer disease    Anesth Problems Neg Hx       History reviewed, no pertinent family history.   Social History     Tobacco Use    Smoking status: Never Smoker    Smokeless tobacco: Never Used   Substance Use Topics  Alcohol use: No     Allergies   Allergen Reactions    Latex Hives     Latex tape    Codeine Nausea and Vomiting    Darvon [Propoxyphene] Nausea and Vomiting    Other Medication Unknown (comments)     Flu shot (per )    Pcn [Penicillins] Hives      Current Facility-Administered Medications   Medication Dose Route Frequency    pantoprazole (PROTONIX) 40 mg in 0.9% sodium chloride 10 mL injection  40 mg IntraVENous Q12H    dextrose 5 % - 0.45% NaCl infusion  20 mL/hr IntraVENous CONTINUOUS    acetaminophen (TYLENOL) suppository 650 mg  650 mg Rectal Q6H PRN    sodium chloride (NS) flush 5-40 mL  5-40 mL IntraVENous Q8H    sodium chloride (NS) flush 5-40 mL  5-40 mL IntraVENous PRN    naloxone (NARCAN) injection 0.4 mg  0.4 mg IntraVENous PRN    calcium-vitamin D (OS-SAE) 500 mg-200 unit tablet  1 Tab Oral TID WITH MEALS    senna-docusate (PERICOLACE) 8.6-50 mg per tablet 1 Tab  1 Tab Oral BID    polyethylene glycol (MIRALAX) packet 17 g  17 g Oral DAILY    bisacodyL (DULCOLAX) suppository 10 mg  10 mg Rectal DAILY PRN    morphine injection 2 mg  2 mg IntraVENous Q3H PRN    traMADoL (ULTRAM) tablet 50 mg  50 mg Oral Q6H PRN    ALPRAZolam (XANAX) tablet 0.5 mg  0.5 mg Oral TID PRN    midodrine (PROAMITINE) tablet 10 mg  10 mg Oral BID WITH MEALS    mirtazapine (REMERON) tablet 7.5 mg  7.5 mg Oral QHS    QUEtiapine (SEROquel) tablet 12.5 mg  12.5 mg Oral Q8H PRN    pravastatin (PRAVACHOL) tablet 20 mg  20 mg Oral QHS    sodium chloride (NS) flush 5-40 mL  5-40 mL IntraVENous Q8H    sodium chloride (NS) flush 5-40 mL  5-40 mL IntraVENous PRN    acetaminophen (TYLENOL) tablet 650 mg  650 mg Oral Q4H PRN    naloxone (NARCAN) injection 0.4 mg  0.4 mg IntraVENous PRN    morphine injection 1 mg  1 mg IntraVENous Q4H PRN    ondansetron (ZOFRAN) injection 4 mg  4 mg IntraVENous Q4H PRN          LAB AND IMAGING FINDINGS:     Lab Results   Component Value Date/Time    WBC 11.1 (H) 04/16/2020 09:14 AM    HGB 9.3 (L) 04/16/2020 09:14 AM    PLATELET 635 (H) 33/67/6265 09:14 AM     Lab Results   Component Value Date/Time    Sodium 138 04/16/2020 09:14 AM    Potassium 4.0 04/16/2020 09:14 AM    Chloride 100 04/16/2020 09:14 AM    CO2 34 (H) 04/16/2020 09:14 AM    BUN 9 04/16/2020 09:14 AM    Creatinine 0.80 04/16/2020 09:14 AM    Calcium 9.8 04/16/2020 09:14 AM    Magnesium 1.7 04/14/2020 04:20 AM    Phosphorus 2.8 04/14/2020 04:20 AM      Lab Results   Component Value Date/Time    AST (SGOT) 36 04/07/2020 01:08 PM    Alk. phosphatase 52 04/07/2020 01:08 PM    Protein, total 7.0 04/07/2020 01:08 PM    Albumin 2.7 (L) 04/07/2020 01:08 PM    Globulin 4.3 (H) 04/07/2020 01:08 PM     Lab Results   Component Value Date/Time    INR 1.1 07/14/2014 05:08 PM    Prothrombin time 11.2 07/14/2014 05:08 PM    aPTT 28.9 12/21/2013 11:50 AM      Lab Results   Component Value Date/Time    Iron 63 02/15/2016 02:38 PM    TIBC 321 02/15/2016 02:38 PM    Iron % saturation 20 02/15/2016 02:38 PM    Ferritin 34 11/18/2015 03:18 PM      No results found for: PH, PCO2, PO2  No components found for: [place]   Lab Results   Component Value Date/Time    CK 38 08/13/2015 02:50 PM    CK - MB <0.5 (L) 08/13/2015 02:50 PM                Total time: 25 min  Counseling / coordination time, spent as noted above:  20 min  > 50% counseling / coordination?:  y    Prolonged service was provided for  []30 min   []75 min in face to face time in the presence of the patient, spent as noted above. Time Start:   Time End:   Note: this can only be billed with 67907 (initial) or 45934 (follow up). If multiple start / stop times, list each separately.

## 2020-04-16 NOTE — PROGRESS NOTES
Occupational Therapy  04/16/20    Chart reviewed, discussed with CM. Patient and family are moving forward with home hospice services, therefore will sign off at this time.      Thank you,   Jolene Martinez, OTD, OTR/L

## 2020-04-16 NOTE — CONSULTS
[address] [facility].   [address] [facility] [address]        GASTROENTEROLOGY CONSULTATION NOTE  Will Madhuri Padilla  [phone] office  [phone] NP/PA in-hospital cell phone M-F until 4:30PM  After 5PM or on weekends, please call  for physician on call        NAME:  Lexie Denny   :   1940   MRN:   [id number]       Referring Physician: Dr. Paty Hubbard Date: 2020 9:34 AM    Chief Complaint: unable to provide     History of Present Illness:  Patient is a [de-identified] y.o. who is seen in consultation at the request of Dr. Ara Smith for GI bleed. Patient has a past medical history significant for hypertension, COPD, and dementia. She was admitted to the hospital on 20 for a right hip fracture due to a ground-level fall. Patient underwent right hip hemiarthroplasty by Dr. Bouchra Sanon on 20. Patient is unable to provide any history due to dementia. History was obtained from patient's RN and through chart review. Patient reportedly had an episode of melena overnight with 2 episodes of coffee ground emesis this morning. Patient has been on Lovenox for DVT prophylaxis. History of GAVE. EGD (9/16/15) by Dr. Blanka Sidhu for [place] [id number] showed a large hiatal hernia, erythema within the hernia, but no obvious ulcers or erosions, no residual GAVE; normal duodenum to second portion. At that time, it was recommended that future endoscopic procedures should be done with general anesthesia. I have reviewed the emergency room note, hospital admission note, notes by all other clinicians who have seen the patient during this hospitalization to date. I have reviewed the problem list and the reason for this hospitalization. I have reviewed the allergies and the medications the patient was taking at home prior to this hospitalization.       PMH:  Past Medical History:   Diagnosis Date    Anemia NEC     Asthma     major asthma as a child/only with colds now    [facility]     B[facility] removed    Coagulation disorder ([facility] [address] [facility]     on xarelto    Dementia ([facility] [address] [facility]     GERD (gastroesophageal reflux disease) 10/22/2010    Hiatal hernia     Hypertension     hx of blood pressure med for short time    Ill-defined condition     increased cholesterol    Other unknown and unspecified cause of morbidity or mortality     eval with Dr Joe Avilez for SOB, sept 2015:  results unkown    Stroke ([facility] [address] 2013    no deficits    Stroke ([facility] [address]     heat exhaustion    Watermelon stomach 2003       PSH:  Past Surgical History:   Procedure Laterality Date    HX BLADDER SUSPENSION      HX COLONOSCOPY      HX ENDOSCOPY  6/4/15    Dr. West Dus      vaginal del times 3    HX HERNIA REPAIR  2014    x2, failure    HX OTHER SURGICAL      EGDs    HX TONSILLECTOMY      NE EXC SKIN MALIG >4CM FACE,FACIAL      times 3 - BCCA       Allergies: Allergies   Allergen Reactions    Latex Hives     Latex tape    Codeine Nausea and Vomiting    Darvon [Propoxyphene] Nausea and Vomiting    Other Medication Unknown (comments)     Flu shot (per )    Pcn [Penicillins] Hives       Home Medications:  Prior to Admission Medications   Prescriptions Last Dose Informant Patient Reported? Taking? ALPRAZolam (XANAX) 0.5 mg tablet Unknown at Unknown time  No No   Sig: Take 1 Tab by mouth three (3) times daily as needed for Anxiety. Max Daily Amount: 1.5 mg. MULTIVITAMINS (MULTIVITAMIN PO) Unknown at Unknown time  Yes No   Sig: Take 1 Tab by mouth daily. QUEtiapine (SEROQUEL) 25 mg tablet Unknown at Unknown time  No No   Sig: Take 0.5 Tabs by mouth every eight (8) hours as needed for Other (agitation). acetaminophen (TYLENOL) 325 mg tablet Unknown at Unknown time  Yes No   Sig: Take  by mouth every four (4) hours as needed for Pain. aspirin delayed-release 81 mg tablet Unknown at Unknown time  Yes No   Sig: Take  by mouth daily.    cholecalciferol, vitamin d3, (VITAMIN D) 1,000 unit tablet Unknown at Unknown time  Yes No   Sig: Take 2,000 Units by mouth daily. famotidine (PEPCID PO) Unknown at Unknown time  Yes No   Sig: Take  by mouth. ferrous sulfate 325 mg (65 mg iron) tablet Unknown at Unknown time  Yes No   Sig: Take 1 Tab by mouth Daily (before breakfast). melatonin tab tablet Unknown at Unknown time  No No   Sig: Take 1 Tab by mouth nightly. midodrine (PROAMITINE) 10 mg tablet Unknown at Unknown time  Yes No   Sig: Take  by mouth two (2) times a day. mirtazapine (REMERON) 15 mg tablet Unknown at Unknown time  No No   Sig: TAKE 1/2 TABLET BY MOUTH EVERY DAY AT BEDTIME   omeprazole (PRILOSEC) 20 mg capsule Unknown at Unknown time  No No   Sig: TAKE 1 CAPSULE BY MOUTH EVERY DAY   ondansetron hcl (ZOFRAN) 4 mg tablet Unknown at Unknown time  No No   Sig: Take 1 Tab by mouth every eight (8) hours as needed for Nausea. polyethylene glycol (MIRALAX) 17 gram/dose powder Unknown at Unknown time  Yes No   Sig: Take 17 g by mouth as needed. pravastatin (PRAVACHOL) 20 mg tablet Unknown at Unknown time  No No   Sig: TAKE 1 TABLET BY MOUTH AT BEDTIME   saliva substitute combo no.9 (BIOTENE DRY MOUTH ORAL RINSE MM) Unknown at Unknown time  Yes No   Sig: by Mucous Membrane route.       Facility-Administered Medications: None       Hospital Medications:  Current Facility-Administered Medications   Medication Dose Route Frequency    pantoprazole (PROTONIX) 40 mg in 0.9% sodium chloride 10 mL injection  40 mg IntraVENous Q12H    dextrose 5 % - 0.45% NaCl infusion  20 mL/hr IntraVENous CONTINUOUS    acetaminophen (TYLENOL) suppository 650 mg  650 mg Rectal Q6H PRN    sodium chloride (NS) flush 5-40 mL  5-40 mL IntraVENous Q8H    sodium chloride (NS) flush 5-40 mL  5-40 mL IntraVENous PRN    naloxone (NARCAN) injection 0.4 mg  0.4 mg IntraVENous PRN    calcium-vitamin D (OS-SAE) 500 mg-200 unit tablet  1 Tab Oral TID WITH MEALS    senna-docusate (PERICOLACE) 8.6-50 mg per tablet 1 Tab  1 Tab Oral BID    polyethylene glycol (MIRALAX) packet 17 g  17 g Oral DAILY    bisacodyL (DULCOLAX) suppository 10 mg  10 mg Rectal DAILY PRN    morphine injection 2 mg  2 mg IntraVENous Q3H PRN    traMADoL (ULTRAM) tablet 50 mg  50 mg Oral Q6H PRN    ALPRAZolam (XANAX) tablet 0.5 mg  0.5 mg Oral TID PRN    midodrine (PROAMITINE) tablet 10 mg  10 mg Oral BID WITH MEALS    mirtazapine (REMERON) tablet 7.5 mg  7.5 mg Oral QHS    QUEtiapine (SEROquel) tablet 12.5 mg  12.5 mg Oral Q8H PRN    pravastatin (PRAVACHOL) tablet 20 mg  20 mg Oral QHS    sodium chloride (NS) flush 5-40 mL  5-40 mL IntraVENous Q8H    sodium chloride (NS) flush 5-40 mL  5-40 mL IntraVENous PRN    acetaminophen (TYLENOL) tablet 650 mg  650 mg Oral Q4H PRN    naloxone (NARCAN) injection 0.4 mg  0.4 mg IntraVENous PRN    morphine injection 1 mg  1 mg IntraVENous Q4H PRN    ondansetron (ZOFRAN) injection 4 mg  4 mg IntraVENous Q4H PRN       Social History:  Social History     Tobacco Use    Smoking status: Never Smoker    Smokeless tobacco: Never Used   Substance Use Topics    Alcohol use: No       Family History:  Family History   Problem Relation Age of Onset    COPD Mother     Asthma Mother     Heart Attack Father     Other Father         peptic ulcer disease    Anesth Problems Neg Hx        Review of Systems:  Unable to obtain due to dementia    Objective:     Patient Vitals for the past 8 hrs:   BP Temp Pulse Resp SpO2 Weight   04/16/20 0921 133/68 98 °F (36.7 °C) 100 17 99 %    04/16/20 0418      69 kg (152 lb 3.2 oz)   04/16/20 0234 131/77 98.6 °F (37 °C) 100 18 96 %      No intake/output data recorded.   04/14 1901 - 04/16 0700  In: -   Out: 300 [Urine:300]    EXAM:     CONST:  Demented, no acute distress, appears nauseated   NEURO:  Patient does not converse   HEENT: No scleral icterus   LUNGS: No respiratory distress   CARD:  S1 S2   ABD:  Soft, non distended, no apparent tenderness, no rebound, no guarding. + Bowel sounds. EXT:  Warm   PSYCH: Unable to assess     Data Review     Recent Labs     04/14/20  0420   WBC 5.7   HGB 7.4*   HCT 23.7*        Recent Labs     04/15/20  0343 04/14/20  0420    139   K 4.4 4.0    108   CO2 29 26   BUN 9 9   CREA 0.67 0.63   * 107*   PHOS  --  2.8   CA 9.3 8.9     No results for input(s): SGOT, GPT, AP, TBIL, TP, ALB, GLOB, GGT, AML, LPSE in the last 72 hours. No lab exists for component: AMYP, HLPSE  No results for input(s): INR, PTP, APTT, INREXT in the last 72 hours. EGD (9/16/15) by Dr. Briseyda Patel for [place] [address] showed a large hiatal hernia, erythema within the hernia, but no obvious ulcers or erosions, no residual GAVE; normal duodenum to second portion. At that time, it was recommended that future endoscopic procedures should be done with general anesthesia. Assessment:   · Anemia: Hgb 9.3 (7.4 on 4/14, 7.9 on 4/10, 8.4 on 4/9), platelets 524. EGD in 2015 with above findings. · History of GAVE  · Hip fracture status post right hip hemiarthroplasty on 4/8  · Dementia     Patient Active Problem List   Diagnosis Code    Watermelon stomach K31.819    GERD (gastroesophageal reflux disease) K21.9    Anemia D64.9    CVA (cerebral vascular accident) ([address] I63.9    Chronic cholecystitis K81.1    Paraesophageal hernia K44.9    Moderate protein-calorie malnutrition ([address] E44.0    Advance care planning Z71.89    Pulmonary hypertension (HCC) I27.20    Shortness of breath on exertion R06.02    Aortic valve regurgitation I35.1    Blood loss anemia D50.0    Status post gastrostomy ([address] Z93.1    Generalized weakness R53.1    Hip fracture ([address] S72.009A     Plan:   · NPO  · PPI BID  · Trend CBC and transfuse as necessary  · I had a lengthy discussion with the patient's , Faith Glass ([phone]), on the phone. We discussed EGD versus conservative management.  Risks to include (but not limited to) general anesthesia, bleeding, infection, perforation, and death were discussed. Patient's  would like conservative management at this time, however would consider EGD if Hgb drops. · Palliative care following - per chart review, plan for hospice on discharge  · Patient was discussed with and will be seen by Dr. Halley Zayas  · Thank you for allowing me to participate in care of Elen Griffin     Signed By: Rogelio Spence [place]     4/16/2020  9:34 AM       This patient was seen and examined by me in a face-to-face visit today. I reviewed the medical record including lab work, imaging and other provider notes. I confirmed the interval history as described above. I spoke to the patient, however the patient is unable to give a meaningful history. I discussed this case in detail with Viktoriya JENSEN. I formulated an updated  assessment of this patient and guided our treatment plan. I agree with the above progress note. I agree with the history, exam and assessment and plan as outlined in the note. I would like to add the following:     Abd: normoactive BS, nt, nd, no rebound/guarding. Hematemesis and melena. Differential includes ulcers, erosions, AVMs, an GAVE. Hemodynamically stable. Conservative management per  without EGD at this time.     Dr. Halley Zayas

## 2020-04-16 NOTE — PROGRESS NOTES
BAKARI Plan     -Need Hospice order to evaluate and treat    -[facility] willing to accept on Monday 4/20    -Care givers provided by [facility] ([phone])-  Nurse Ga Coreas confirmed care will begin Monday 4/20 @ 3 PM    -Accepted with [facility] hiram Sotelo [phone]). Hospice admission Monday 4/20 @ 2:30     -Covid Results negative    -[facility] transport requested 1 pm 4/20, confirmed    CM spoke with [facility]  who advised they are willing to accept as long as patient has 24/7 care givers. CM called patients spouse who advised that 24/7 care givers will be provided by [facility]. Patients spouse also advised ok to send referral to [facility] hospice. CM requested hospice order from hospitalist. Referral sent to [facility] hospice via allscripts. [facility] confirmed ok to return on Monday with hospice and 24/7 care givers. Alison with [facility] confirmed they will provide 24/7 care starting Monday @3 pm. [facility] will begin hospice admission on Monday between 2pm and 2:30. CM plans to arrange transport for 1 pm. CM sent request to [facility] for 1 PM, awaiting a response. CM received confirmation via allscripts.  [facility] will transort to [facility] Monday 4/20 @ 1PM.      CM will continue to follow     BLANCA Clemons/SHILOH

## 2020-04-16 NOTE — PROGRESS NOTES
We received the consult request for this patient, but on review of the chart realized that she is a patient of [facility]. I have forwarded the request to that group.    Claire Forbes PA-C with [facility]

## 2020-04-16 NOTE — PROGRESS NOTES
SLP Contact Note    Noted that pt pursuing hospice care. Therefore, recommend puree/thins as tolerated for comfort. No further SLP needs. Will sign-off.       Thank you,  Carlyle Duverney, M.Ed, [address] [facility]  Speech-Language Pathologist

## 2020-04-16 NOTE — PROGRESS NOTES
[facility] Adult  Hospitalist Group                                                                                          Hospitalist Progress Note  Tiera Dorsey MD  Answering service: [phone] from in house phone        Date of Service:  2020  NAME:  Kelsie Gould  :  1940  MRN:  [id number]      Admission Summary:   Kelsie Gould is a [de-identified] y.o. female who is demented at the history cannot be taken from her. History is taken from the chart. She has history of COPD asthma peptic ulcer disease hypertension in the past.  She resides at the nursing home  Per EMS patient had fallen on Friday and today hip x-ray was done and it confirmed that she had a fracture. She has been complaining of pain on right side of the hip. No nausea vomiting diarrhea no shortness of breath. Family available on phone at bedside. Interval history / Subjective:   Patient seen and examined. Continues to be confused, globally weak, unable to provide meaningful review of systems. Reported to have black tarry stools and coffee-ground emesis this morning, kept n.p.o., GI consulted although patient is hospice appropriate and will benefit from conservative measures. Call the  and updated in detail. We will keep patient n.p.o. for now, continue PPI and see if she requires any transfusion although repeat H&H has shown improvement. Discussed with case management and active care team.     Assessment & Plan:     Hip fracture due to ground-level fall  -Acute versus subacute right femoral neck fracture   -s/p right hip hemiarthroplasty   -Appreciate Ortho  -Pain controlled  -DVT prophylaxis with lovenox was stopped due to black tarry stools and coffee-ground emesis.   -bowel regimen   -Calcium-Vitamin D    Black tarry stools, coffee-ground emesis: Risk for GI bleed  History of G AVE, status post EGD 2015 by Dr. Juan Luis Rodriguez showing large hiatal hernia but no residual gave  N.p.o., IV Protonix, GI consult, repeat H in the morning and transfuse as needed.     Post op Delirium: resolved  -baseline dementia     Dysphagia:   -initiated on pureed diet, on hold for now    Hypernatremia:   -stable off D5      Hyperlipidemia: Continue statin     Depression: Continue remeron   -Seroquel as needed for confusion agitation     Hypotension: continue home midodrine     Dementia without behavorial disturbances:   -supportive care    Anemia, post operative decrease:   -H/H stable, no need for transfusion       Code status: DNR  DVT prophylaxis: Lovenox stopped due to bleeding. Care Plan discussed with: Patient/Family  Anticipated Disposition: SNF/LTC  Anticipated Discharge: case management for [address] Problems  Date Reviewed: 4/8/2020          Codes Class Noted POA    * (Principal) Hip fracture [facility]) ICD-10-CM: I23.981H  ICD-9-CM: 820.8  4/7/2020 Yes                Review of Systems:   Unable to obtain Due to patient factorsconfusion      Vital Signs:    Last 24hrs VS reviewed since prior progress note. Most recent are:  Visit Vitals  /68 (BP 1 Location: Left arm, BP Patient Position: At rest)   Pulse 100   Temp 98 °F (36.7 °C)   Resp 17   Ht 5' 4\" (1.626 m)   Wt 69 kg (152 lb 3.2 oz)   SpO2 99%   BMI 26.13 kg/m²       No intake or output data in the 24 hours ending 04/16/20 1319     Physical Examination:             Constitutional:  awake, confused, No acute distress   ENT:  Oral mucosa moist, oropharynx benign. Resp:  CTA bilaterally.  No wheezing/rhonchi/rales   CV:  Regular rhythm, normal rate, no murmurs, gallops, rubs    GI:  Soft, non distended, non tender        Neurologic:  Moves all extremities, globally weak      Psych:  Poor insight       Data Review:    Review and/or order of clinical lab test      Labs:     Recent Labs     04/16/20  0914 04/14/20  0420   WBC 11.1* 5.7   HGB 9.3* 7.4*   HCT 29.2* 23.7*   * 196     Recent Labs     04/16/20  0914 04/15/20  5053 04/14/20  0420    139 139   K 4.0 4.4 4.0    108 108   CO2 34* 29 26   BUN 9 9 9   CREA 0.80 0.67 0.63   * 113* 107*   CA 9.8 9.3 8.9   MG  --   --  1.7   PHOS  --   --  2.8     No results for input(s): SGOT, GPT, ALT, AP, TBIL, TBILI, TP, ALB, GLOB, GGT, AML, LPSE in the last 72 hours. No lab exists for component: AMYP, HLPSE  No results for input(s): INR, PTP, APTT, INREXT, INREXT in the last 72 hours. No results for input(s): FE, TIBC, PSAT, FERR in the last 72 hours. Lab Results   Component Value Date/Time    Folate 17.3 10/08/2015 11:30 AM      No results for input(s): PH, PCO2, PO2 in the last 72 hours. No results for input(s): CPK, CKNDX, TROIQ in the last 72 hours.     No lab exists for component: CPKMB  Lab Results   Component Value Date/Time    Cholesterol, total 145 04/04/2017 09:46 AM    HDL Cholesterol 61 04/04/2017 09:46 AM    LDL, calculated 67 04/04/2017 09:46 AM    Triglyceride 87 04/04/2017 09:46 AM    CHOL/HDL Ratio 3.4 06/02/2013 06:30 AM     Lab Results   Component Value Date/Time    Glucose (POC) 120 (H) 04/08/2020 11:17 AM    Glucose (POC) 115 (H) 09/28/2018 08:46 PM     Lab Results   Component Value Date/Time    Color YELLOW/STRAW 04/07/2020 02:00 PM    Appearance CLEAR 04/07/2020 02:00 PM    Specific gravity 1.022 04/07/2020 02:00 PM    pH (UA) 6.0 04/07/2020 02:00 PM    Protein 30 (A) 04/07/2020 02:00 PM    Glucose 100 (A) 04/07/2020 02:00 PM    Ketone Negative 04/07/2020 02:00 PM    Bilirubin Negative 04/07/2020 02:00 PM    Urobilinogen 0.2 04/07/2020 02:00 PM    Nitrites Negative 04/07/2020 02:00 PM    Leukocyte Esterase Negative 04/07/2020 02:00 PM    Epithelial cells FEW 04/07/2020 02:00 PM    Bacteria Negative 04/07/2020 02:00 PM    WBC 0-4 04/07/2020 02:00 PM    RBC 0-5 04/07/2020 02:00 PM         Medications Reviewed:     Current Facility-Administered Medications   Medication Dose Route Frequency    pantoprazole (PROTONIX) 40 mg in 0.9% sodium chloride 10 mL injection  40 mg IntraVENous Q12H    dextrose 5 % - 0.45% NaCl infusion  20 mL/hr IntraVENous CONTINUOUS    acetaminophen (TYLENOL) suppository 650 mg  650 mg Rectal Q6H PRN    sodium chloride (NS) flush 5-40 mL  5-40 mL IntraVENous Q8H    sodium chloride (NS) flush 5-40 mL  5-40 mL IntraVENous PRN    naloxone (NARCAN) injection 0.4 mg  0.4 mg IntraVENous PRN    calcium-vitamin D (OS-SAE) 500 mg-200 unit tablet  1 Tab Oral TID WITH MEALS    senna-docusate (PERICOLACE) 8.6-50 mg per tablet 1 Tab  1 Tab Oral BID    polyethylene glycol (MIRALAX) packet 17 g  17 g Oral DAILY    bisacodyL (DULCOLAX) suppository 10 mg  10 mg Rectal DAILY PRN    morphine injection 2 mg  2 mg IntraVENous Q3H PRN    traMADoL (ULTRAM) tablet 50 mg  50 mg Oral Q6H PRN    ALPRAZolam (XANAX) tablet 0.5 mg  0.5 mg Oral TID PRN    midodrine (PROAMITINE) tablet 10 mg  10 mg Oral BID WITH MEALS    mirtazapine (REMERON) tablet 7.5 mg  7.5 mg Oral QHS    QUEtiapine (SEROquel) tablet 12.5 mg  12.5 mg Oral Q8H PRN    pravastatin (PRAVACHOL) tablet 20 mg  20 mg Oral QHS    sodium chloride (NS) flush 5-40 mL  5-40 mL IntraVENous Q8H    sodium chloride (NS) flush 5-40 mL  5-40 mL IntraVENous PRN    acetaminophen (TYLENOL) tablet 650 mg  650 mg Oral Q4H PRN    naloxone (NARCAN) injection 0.4 mg  0.4 mg IntraVENous PRN    morphine injection 1 mg  1 mg IntraVENous Q4H PRN    ondansetron (ZOFRAN) injection 4 mg  4 mg IntraVENous Q4H PRN     ______________________________________________________________________  EXPECTED LENGTH OF STAY: 2d 4h  ACTUAL LENGTH OF STAY:          9                 Milagros Collazo MD

## 2020-04-17 LAB
ANION GAP SERPL CALC-SCNC: 7 MMOL/L (ref 5–15)
BUN SERPL-MCNC: 16 MG/DL (ref 6–20)
BUN/CREAT SERPL: 23 (ref 12–20)
CALCIUM SERPL-MCNC: 9.4 MG/DL (ref 8.5–10.1)
CHLORIDE SERPL-SCNC: 103 MMOL/L (ref 97–108)
CO2 SERPL-SCNC: 29 MMOL/L (ref 21–32)
CREAT SERPL-MCNC: 0.69 MG/DL (ref 0.55–1.02)
ERYTHROCYTE [DISTWIDTH] IN BLOOD BY AUTOMATED COUNT: 14.6 % (ref 11.5–14.5)
GLUCOSE SERPL-MCNC: 127 MG/DL (ref 65–100)
HCT VFR BLD AUTO: 25.9 % (ref 35–47)
HGB BLD-MCNC: 8 G/DL (ref 11.5–16)
MCH RBC QN AUTO: 32.1 PG (ref 26–34)
MCHC RBC AUTO-ENTMCNC: 30.9 G/DL (ref 30–36.5)
MCV RBC AUTO: 104 FL (ref 80–99)
NRBC # BLD: 0 K/UL (ref 0–0.01)
NRBC BLD-RTO: 0 PER 100 WBC
PLATELET # BLD AUTO: 365 K/UL (ref 150–400)
PMV BLD AUTO: 8.3 FL (ref 8.9–12.9)
POTASSIUM SERPL-SCNC: 4.1 MMOL/L (ref 3.5–5.1)
RBC # BLD AUTO: 2.49 M/UL (ref 3.8–5.2)
SODIUM SERPL-SCNC: 139 MMOL/L (ref 136–145)
WBC # BLD AUTO: 9.6 K/UL (ref 3.6–11)

## 2020-04-17 PROCEDURE — 74011000250 HC RX REV CODE- 250: Performed by: ORTHOPAEDIC SURGERY

## 2020-04-17 PROCEDURE — 74011250636 HC RX REV CODE- 250/636: Performed by: ORTHOPAEDIC SURGERY

## 2020-04-17 PROCEDURE — 85027 COMPLETE CBC AUTOMATED: CPT

## 2020-04-17 PROCEDURE — 74011000250 HC RX REV CODE- 250: Performed by: INTERNAL MEDICINE

## 2020-04-17 PROCEDURE — 65270000032 HC RM SEMIPRIVATE

## 2020-04-17 PROCEDURE — 74011250637 HC RX REV CODE- 250/637: Performed by: ORTHOPAEDIC SURGERY

## 2020-04-17 PROCEDURE — 36415 COLL VENOUS BLD VENIPUNCTURE: CPT

## 2020-04-17 PROCEDURE — C9113 INJ PANTOPRAZOLE SODIUM, VIA: HCPCS | Performed by: INTERNAL MEDICINE

## 2020-04-17 PROCEDURE — 74011250636 HC RX REV CODE- 250/636: Performed by: INTERNAL MEDICINE

## 2020-04-17 PROCEDURE — 80048 BASIC METABOLIC PNL TOTAL CA: CPT

## 2020-04-17 RX ADMIN — MIRTAZAPINE 7.5 MG: 15 TABLET, FILM COATED ORAL at 22:03

## 2020-04-17 RX ADMIN — SENNOSIDES AND DOCUSATE SODIUM 1 TABLET: 8.6; 5 TABLET ORAL at 18:11

## 2020-04-17 RX ADMIN — SODIUM CHLORIDE 40 MG: 9 INJECTION INTRAMUSCULAR; INTRAVENOUS; SUBCUTANEOUS at 22:03

## 2020-04-17 RX ADMIN — ONDANSETRON 4 MG: 2 INJECTION, SOLUTION INTRAMUSCULAR; INTRAVENOUS at 18:10

## 2020-04-17 RX ADMIN — OYSTER SHELL CALCIUM WITH VITAMIN D 1 TABLET: 500; 200 TABLET, FILM COATED ORAL at 17:00

## 2020-04-17 RX ADMIN — MIDODRINE HYDROCHLORIDE 10 MG: 5 TABLET ORAL at 18:10

## 2020-04-17 RX ADMIN — PRAVASTATIN SODIUM 20 MG: 20 TABLET ORAL at 22:02

## 2020-04-17 RX ADMIN — SODIUM CHLORIDE 40 MG: 9 INJECTION INTRAMUSCULAR; INTRAVENOUS; SUBCUTANEOUS at 08:38

## 2020-04-17 RX ADMIN — SODIUM CHLORIDE 10 ML: 9 INJECTION INTRAMUSCULAR; INTRAVENOUS; SUBCUTANEOUS at 22:03

## 2020-04-17 NOTE — PROGRESS NOTES
[address] [facility] Adult  Hospitalist Group                                                                                          Hospitalist Progress Note  Sathya Roque MD  Answering service: [phone] from in house phone        Date of Service:  2020  NAME:  Matilda Todd  :  1940  MRN:  [id number]      Admission Summary:   Matilda Todd is a [de-identified] y.o. female who is demented at the history cannot be taken from her. History is taken from the chart. She has history of COPD asthma peptic ulcer disease hypertension in the past.  She resides at the nursing home  Per EMS patient had fallen on Friday and today hip x-ray was done and it confirmed that she had a fracture. She has been complaining of pain on right side of the hip. No nausea vomiting diarrhea no shortness of breath. Family available on phone at bedside. Interval history / Subjective:   Patient seen and examined. Patient continues to be globally weak, confused, limited oral intake, limited interactions, mostly bedbound. No further black tarry stools or coffee-ground emesis. Discussed nursing staff, case management notes reviewed, patient is planning to go to home with 24/7 caregivers and hospice on Monday, 2020     Assessment & Plan:     Hip fracture due to ground-level fall  -Acute versus subacute right femoral neck fracture   -s/p right hip hemiarthroplasty   -Appreciate Ortho  -Pain controlled  -DVT prophylaxis with lovenox was stopped due to black tarry stools and coffee-ground emesis.   -bowel regimen   -Calcium-Vitamin D    Black tarry stools, coffee-ground emesis: Risk for GI bleed  History of [address], status post EGD 2015 by Dr. Maggy Majano showing large hiatal hernia but no residual gave  No further bleeding, HH stable, GI consult noted, continue conservative management, continue Protonix, resume puréed diet.     Post op Delirium: resolved  -baseline dementia     Dysphagia:   -initiated on pureed diet    Hypernatremia:   -stable off D5      Hyperlipidemia: Continue statin     Depression: Continue remeron   -Seroquel as needed for confusion agitation     Hypotension: continue home midodrine     Dementia without behavorial disturbances:   -supportive care    Anemia, post operative decrease:   -H/H stable, no need for transfusion       Code status: DNR  DVT prophylaxis: Lovenox stopped due to bleeding. Care Plan discussed with: Patient/Family  Anticipated Disposition: SNF/LTC  Anticipated Discharge: case management for [address] [facility]  Date Reviewed: 4/8/2020          Codes Class Noted POA    * (Principal) Hip fracture [facility]) ICD-10-CM: Z71.280S  ICD-9-CM: 820.8  4/7/2020 Yes                Review of Systems:   Unable to obtain Due to patient factorsconfusion      Vital Signs:    Last 24hrs VS reviewed since prior progress note. Most recent are:  Visit Vitals  /76 (BP 1 Location: Left arm, BP Patient Position: At rest)   Pulse 88   Temp 98.5 °F (36.9 °C)   Resp 16   Ht 5' 4\" (1.626 m)   Wt 68.8 kg (151 lb 10.8 oz)   SpO2 94%   BMI 26.04 kg/m²         Intake/Output Summary (Last 24 hours) at 4/17/2020 1307  Last data filed at 4/17/2020 1131  Gross per 24 hour   Intake    Output 200 ml   Net -200 ml        Physical Examination:             Constitutional:  awake, confused, No acute distress   ENT:  Oral mucosa moist, oropharynx benign. Resp:  Decreased air entry at the bases, unlabored breathing.    CV:  Regular rhythm, normal rate, no murmurs, gallops, rubs    GI:  Soft, non distended, non tender        Neurologic:  Moves all extremities, globally weak      Psych:  Poor insight       Data Review:    Review and/or order of clinical lab test      Labs:     Recent Labs     04/17/20  0430 04/16/20  0914   WBC 9.6 11.1*   HGB 8.0* 9.3*   HCT 25.9* 29.2*    428*     Recent Labs     04/17/20  0430 04/16/20  0914 04/15/20  0343    138 139   K 4.1 4.0 4.4    100 108 CO2 29 34* 29   BUN 16 9 9   CREA 0.69 0.80 0.67   * 174* 113*   CA 9.4 9.8 9.3     No results for input(s): SGOT, GPT, ALT, AP, TBIL, TBILI, TP, ALB, GLOB, GGT, AML, LPSE in the last 72 hours. No lab exists for component: AMYP, HLPSE  No results for input(s): INR, PTP, APTT, INREXT, INREXT in the last 72 hours. No results for input(s): FE, TIBC, PSAT, FERR in the last 72 hours. Lab Results   Component Value Date/Time    Folate 17.3 10/08/2015 11:30 AM      No results for input(s): PH, PCO2, PO2 in the last 72 hours. No results for input(s): CPK, CKNDX, TROIQ in the last 72 hours.     No lab exists for component: CPKMB  Lab Results   Component Value Date/Time    Cholesterol, total 145 04/04/2017 09:46 AM    HDL Cholesterol 61 04/04/2017 09:46 AM    LDL, calculated 67 04/04/2017 09:46 AM    Triglyceride 87 04/04/2017 09:46 AM    CHOL/HDL Ratio 3.4 06/02/2013 06:30 AM     Lab Results   Component Value Date/Time    Glucose (POC) 120 (H) 04/08/2020 11:17 AM    Glucose (POC) 115 (H) 09/28/2018 08:46 PM     Lab Results   Component Value Date/Time    Color YELLOW/STRAW 04/07/2020 02:00 PM    Appearance CLEAR 04/07/2020 02:00 PM    Specific gravity 1.022 04/07/2020 02:00 PM    pH (UA) 6.0 04/07/2020 02:00 PM    Protein 30 (A) 04/07/2020 02:00 PM    Glucose 100 (A) 04/07/2020 02:00 PM    Ketone Negative 04/07/2020 02:00 PM    Bilirubin Negative 04/07/2020 02:00 PM    Urobilinogen 0.2 04/07/2020 02:00 PM    Nitrites Negative 04/07/2020 02:00 PM    Leukocyte Esterase Negative 04/07/2020 02:00 PM    Epithelial cells FEW 04/07/2020 02:00 PM    Bacteria Negative 04/07/2020 02:00 PM    WBC 0-4 04/07/2020 02:00 PM    RBC 0-5 04/07/2020 02:00 PM         Medications Reviewed:     Current Facility-Administered Medications   Medication Dose Route Frequency    pantoprazole (PROTONIX) 40 mg in 0.9% sodium chloride 10 mL injection  40 mg IntraVENous Q12H    dextrose 5 % - 0.45% NaCl infusion  20 mL/hr IntraVENous CONTINUOUS    acetaminophen (TYLENOL) suppository 650 mg  650 mg Rectal Q6H PRN    sodium chloride (NS) flush 5-40 mL  5-40 mL IntraVENous Q8H    sodium chloride (NS) flush 5-40 mL  5-40 mL IntraVENous PRN    naloxone (NARCAN) injection 0.4 mg  0.4 mg IntraVENous PRN    calcium-vitamin D (OS-SAE) 500 mg-200 unit tablet  1 Tab Oral TID WITH MEALS    senna-docusate (PERICOLACE) 8.6-50 mg per tablet 1 Tab  1 Tab Oral BID    polyethylene glycol (MIRALAX) packet 17 g  17 g Oral DAILY    bisacodyL (DULCOLAX) suppository 10 mg  10 mg Rectal DAILY PRN    morphine injection 2 mg  2 mg IntraVENous Q3H PRN    traMADoL (ULTRAM) tablet 50 mg  50 mg Oral Q6H PRN    ALPRAZolam (XANAX) tablet 0.5 mg  0.5 mg Oral TID PRN    midodrine (PROAMITINE) tablet 10 mg  10 mg Oral BID WITH MEALS    mirtazapine (REMERON) tablet 7.5 mg  7.5 mg Oral QHS    QUEtiapine (SEROquel) tablet 12.5 mg  12.5 mg Oral Q8H PRN    pravastatin (PRAVACHOL) tablet 20 mg  20 mg Oral QHS    sodium chloride (NS) flush 5-40 mL  5-40 mL IntraVENous Q8H    sodium chloride (NS) flush 5-40 mL  5-40 mL IntraVENous PRN    acetaminophen (TYLENOL) tablet 650 mg  650 mg Oral Q4H PRN    naloxone (NARCAN) injection 0.4 mg  0.4 mg IntraVENous PRN    morphine injection 1 mg  1 mg IntraVENous Q4H PRN    ondansetron (ZOFRAN) injection 4 mg  4 mg IntraVENous Q4H PRN     ______________________________________________________________________  EXPECTED LENGTH OF STAY: 2d 4h  ACTUAL LENGTH OF STAY:          10                 Caroline Medeiros MD

## 2020-04-17 NOTE — PROGRESS NOTES
Problem: Pressure Injury - Risk of  Goal: *Prevention of pressure injury  Description: Document Hawk Scale and appropriate interventions in the flowsheet.   Outcome: Progressing Towards Goal  Note: Pressure Injury Interventions:  Sensory Interventions: Float heels, Keep linens dry and wrinkle-free, Minimize linen layers    Moisture Interventions: Internal/External urinary devices, Minimize layers, Apply protective barrier, creams and emollients, Check for incontinence Q2 hours and as needed    Activity Interventions: Increase time out of bed, PT/OT evaluation    Mobility Interventions: Float heels    Nutrition Interventions: Offer support with meals,snacks and hydration    Friction and Shear Interventions: Lift sheet, Lift team/patient mobility team, Minimize layers

## 2020-04-17 NOTE — PROGRESS NOTES
Palliative Medicine Consult  Kevin: [phone] ([phone])    Patient Name: Tarun Burns  YOB: 1940    Date of Initial Consult: April 13, 2020  Reason for Consult: Care Decisions  Requesting Provider:Dr. Patricia aEson  Primary Care Physician: Amy Alegria MD     SUMMARY:   Tarun Burns is a [de-identified] y.o. female admitted on 4/7/2020 from [address] [place] [address] with a diagnosis of rt femoral neck fracture due to ground level fall (acute vs subacute), post op delirium, Dysphagia with high risk of aspiration. Plan is for SNF upon dc. Pt npo due to aspiration risk. Spouse declined hospice services. . Pt known to our team from a previous admission. PMH: dementia, anemia, asthma, basal cell carcinoma s/p removal, hiatal hernia repair, stroke, COPD, HTN,  Cholesterolemia, watermelon stomach, falls, HLD, depression,  Generalized weakness, UTI, rt hydronephrosis, cholelithiasis, hyponatremia.     Current medical issues leading to Palliative Medicine involvement include: support with care decisions given medical condition and risk of decline.     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 Delirium  2. Dementia  3. Dysphagia  4. Feeding difficulties  5. Hypoalbuminemia   6. High risk of aspiration   7. Femoral neck fracture     PLAN:   1. Spoke with spouse today to offer support  2. He is pleased with the plans for dc on Monday back to [facility] with Hospice and a 24 hour caregiver  3. Questions and concerns addressed  4. Encouraged him to call with any issues that we can help with  5. Will sign off  4/15/20:  6. Spouse has had the opportunity to discuss hospice with a hospice liason.   He has wavered back and forth between hospice and rehab  7. We discussed the benefits of rehab given her frailty, dementia, and high risk of compromise, high risk of re-admission. He agreed that rehab would be of limited benefit  8. Initially he was hoping for the pt to go to rehab and stay at the same facility as she transitioned to hospice afterwards but this has been a challenge trying to secure a facility. He does not like [facility]   9. Decision made to transition back to [facility] with hospice and 24 hour care in her apartment. CM has been in touch with [facility] and they are reviewing the chart and will render a decision in 24 hours  10. Will follow up    11. Initial consult note routed to primary continuity provider and/or primary health care team members  12. Communicated plan of care with: Palliative [facility] Team     GOALS OF CARE / TREATMENT PREFERENCES:     GOALS OF CARE:  Patient/Health Care Proxy Stated Goals: Comfort    TREATMENT PREFERENCES:   Code Status: DNR    Advance Care Planning:  [x] [facility] Interdisciplinary Team has updated the ACP Navigator with Health Care Decision Maker and Patient Capacity      Primary Decision Maker: Devang Vaca spouse - [phone]  Advance Care Planning 4/7/2020   Confirm Advance Directive Yes, on file   Does the patient have other document types Do Not Resuscitate       Medical Interventions: Comfort measures     Other Instructions:   Artificially Administered Nutrition: No feeding tube     Other:    As far as possible, the palliative care team has discussed with patient / health care proxy about goals of care / treatment preferences for patient.      HISTORY:     History obtained from: chart, team, family    CHIEF COMPLAINT: pt admitted with aforementioned history and issues    HPI/SUBJECTIVE:    The patient is:   [] Verbal and participatory  [x] Non-participatory due to:   Medical condition      Clinical Pain Assessment (nonverbal scale for severity on nonverbal patients):   Clinical Pain Assessment  Severity: 0     Activity (Movement): Lying quietly, normal position    Duration: for how long has pt been experiencing pain (e.g., 2 days, 1 month, years)  Frequency: how often pain is an issue (e.g., several times per day, once every few days, constant)     FUNCTIONAL ASSESSMENT:     Palliative Performance Scale (PPS): 30  PPS: 30       PSYCHOSOCIAL/SPIRITUAL SCREENING:     Palliative IDT has assessed this patient for cultural preferences / practices and a referral made as appropriate to needs (Cultural Services, Patient Advocacy, Ethics, etc.)    Any spiritual / Sabianist concerns:  [] Yes /  [x] No    Caregiver Burnout:  [] Yes /  [x] No /  [] No Caregiver Present      Anticipatory grief assessment:   [x] Normal  / [] Maladaptive       ESAS Anxiety: Anxiety: 0    ESAS Depression:          REVIEW OF SYSTEMS:     Positive and pertinent negative findings in ROS are noted above in HPI. The following systems were [x] reviewed objectively / [] unable to be reviewed as noted in HPI  Other findings are noted below. Systems: constitutional, ears/nose/mouth/throat, respiratory, gastrointestinal, genitourinary, musculoskeletal, integumentary, neurologic, psychiatric, endocrine. Positive findings noted below. Modified ESAS Completed by: provider   Fatigue: 9 Drowsiness: 4     Pain: 0   Anxiety: 0 Nausea: 0   Anorexia: 10 Dyspnea: 0           Stool Occurrence(s): 1        PHYSICAL EXAM:     From RN flowsheet:  Wt Readings from Last 3 Encounters:   04/17/20 151 lb 10.8 oz (68.8 kg)   02/12/20 157 lb 8 oz (71.4 kg)   01/10/20 147 lb 4.3 oz (66.8 kg)     Blood pressure 120/76, pulse 88, temperature 98.5 °F (36.9 °C), resp. rate 16, height 5' 4\" (1.626 m), weight 151 lb 10.8 oz (68.8 kg), SpO2 94 %.     Pain Scale 1: Adult Nonverbal Pain Scale  Pain Intensity 1: 0  Pain Onset 1: post opt  Pain Location 1: Hip  Pain Orientation 1: Right  Pain Description 1: Aching  Pain Intervention(s) 1: Medication (see MAR)  Last bowel movement, if known:     Constitutional: confused, nad  Eyes: pupils equal, anicteric  ENMT: no nasal discharge, moist mucous membranes  Cardiovascular:  Respiratory: breathing not labored, symmetric  Gastrointestinal: soft non-tender, +bowel sounds  Musculoskeletal: femoral neck fx  Skin: warm, dry  Neurologic: dementia, delirium  Psychiatric:   Other:       HISTORY:     Principal Problem:    Hip fracture ([address] (4/7/2020)      Past Medical History:   Diagnosis Date    Anemia NEC     Asthma     major asthma as a child/only with colds now    Cancer ([facility] [address] [facility]     BCCA removed    Coagulation disorder ([address]     on xarelto    Dementia ([address] [address]     GERD (gastroesophageal reflux disease) 10/22/2010    Hiatal hernia     Hypertension     hx of blood pressure med for short time    Ill-defined condition     increased cholesterol    Other unknown and unspecified cause of morbidity or mortality     eval with Dr Lainey Machado for SOB, sept 2015:  results unkown    Stroke ([facility] [address] [facility] 2013    no deficits    Stroke ([facility] [address] [facility]     heat exhaustion    Watermelon stomach 2003      Past Surgical History:   Procedure Laterality Date    HX BLADDER SUSPENSION      HX COLONOSCOPY      HX ENDOSCOPY  6/4/15    Dr. Homero Aviles HX GYN      vaginal del times 3    HX HERNIA REPAIR  2014    x2, failure    HX OTHER SURGICAL      EGDs    HX TONSILLECTOMY      MN EXC SKIN MALIG >4CM FACE,FACIAL      times 3 - BCCA      Family History   Problem Relation Age of Onset    COPD Mother     Asthma Mother     Heart Attack Father     Other Father         peptic ulcer disease    Anesth Problems Neg Hx       History reviewed, no pertinent family history.   Social History     Tobacco Use    Smoking status: Never Smoker    Smokeless tobacco: Never Used   Substance Use Topics    Alcohol use: No     Allergies   Allergen Reactions    Latex Hives     Latex tape    Codeine Nausea and Vomiting    Darvon [Propoxyphene] Nausea and Vomiting    Other Medication Unknown (comments)     Flu shot (per )    Pcn [Penicillins] Hives      Current Facility-Administered Medications   Medication Dose Route Frequency    pantoprazole (PROTONIX) 40 mg in 0.9% sodium chloride 10 mL injection  40 mg IntraVENous Q12H    dextrose 5 % - 0.45% NaCl infusion  20 mL/hr IntraVENous CONTINUOUS    acetaminophen (TYLENOL) suppository 650 mg  650 mg Rectal Q6H PRN    sodium chloride (NS) flush 5-40 mL  5-40 mL IntraVENous Q8H    sodium chloride (NS) flush 5-40 mL  5-40 mL IntraVENous PRN    naloxone (NARCAN) injection 0.4 mg  0.4 mg IntraVENous PRN    calcium-vitamin D (OS-SAE) 500 mg-200 unit tablet  1 Tab Oral TID WITH MEALS    senna-docusate (PERICOLACE) 8.6-50 mg per tablet 1 Tab  1 Tab Oral BID    polyethylene glycol (MIRALAX) packet 17 g  17 g Oral DAILY    bisacodyL (DULCOLAX) suppository 10 mg  10 mg Rectal DAILY PRN    morphine injection 2 mg  2 mg IntraVENous Q3H PRN    traMADoL (ULTRAM) tablet 50 mg  50 mg Oral Q6H PRN    ALPRAZolam (XANAX) tablet 0.5 mg  0.5 mg Oral TID PRN    midodrine (PROAMITINE) tablet 10 mg  10 mg Oral BID WITH MEALS    mirtazapine (REMERON) tablet 7.5 mg  7.5 mg Oral QHS    QUEtiapine (SEROquel) tablet 12.5 mg  12.5 mg Oral Q8H PRN    pravastatin (PRAVACHOL) tablet 20 mg  20 mg Oral QHS    sodium chloride (NS) flush 5-40 mL  5-40 mL IntraVENous Q8H    sodium chloride (NS) flush 5-40 mL  5-40 mL IntraVENous PRN    acetaminophen (TYLENOL) tablet 650 mg  650 mg Oral Q4H PRN    naloxone (NARCAN) injection 0.4 mg  0.4 mg IntraVENous PRN    morphine injection 1 mg  1 mg IntraVENous Q4H PRN    ondansetron (ZOFRAN) injection 4 mg  4 mg IntraVENous Q4H PRN          LAB AND IMAGING FINDINGS:     Lab Results   Component Value Date/Time    WBC 9.6 04/17/2020 04:30 AM    HGB 8.0 (L) 04/17/2020 04:30 AM    PLATELET 877 19/53/6845 04:30 AM     Lab Results   Component Value Date/Time    Sodium 139 04/17/2020 04:30 AM    Potassium 4.1 04/17/2020 04:30 AM    Chloride 103 04/17/2020 04:30 AM    CO2 29 04/17/2020 04:30 AM    BUN 16 04/17/2020 04:30 AM    Creatinine 0.69 04/17/2020 04:30 AM    Calcium 9.4 04/17/2020 04:30 AM    Magnesium 1.7 04/14/2020 04:20 AM    Phosphorus 2.8 04/14/2020 04:20 AM      Lab Results   Component Value Date/Time    AST (SGOT) 36 04/07/2020 01:08 PM    Alk. phosphatase 52 04/07/2020 01:08 PM    Protein, total 7.0 04/07/2020 01:08 PM    Albumin 2.7 (L) 04/07/2020 01:08 PM    Globulin 4.3 (H) 04/07/2020 01:08 PM     Lab Results   Component Value Date/Time    INR 1.1 07/14/2014 05:08 PM    Prothrombin time 11.2 07/14/2014 05:08 PM    aPTT 28.9 12/21/2013 11:50 AM      Lab Results   Component Value Date/Time    Iron 63 02/15/2016 02:38 PM    TIBC 321 02/15/2016 02:38 PM    Iron % saturation 20 02/15/2016 02:38 PM    Ferritin 34 11/18/2015 03:18 PM      No results found for: PH, PCO2, PO2  No components found for: [place]   Lab Results   Component Value Date/Time    CK 38 08/13/2015 02:50 PM    CK - MB <0.5 (L) 08/13/2015 02:50 PM                Total time: 35 min  Counseling / coordination time, spent as noted above:  30 min  > 50% counseling / coordination?:  y    Prolonged service was provided for  []30 min   []75 min in face to face time in the presence of the patient, spent as noted above. Time Start:   Time End:   Note: this can only be billed with 42803 (initial) or 53099 (follow up). If multiple start / stop times, list each separately.

## 2020-04-17 NOTE — PROGRESS NOTES
[address]  [address] [facility], [address]       GI PROGRESS NOTE  [facility]  [phone] NP in-hospital cell phone M-F until 4:30  After 5pm or on weekends, please call  for physician on call      NAME: Sallyanne Cockayne   :  1940   MRN:  [id number]       Subjective:   No acute events. Discussed with RN, no further signs of GI blood loss. Objective:     VITALS:   Last 24hrs VS reviewed since prior progress note. Most recent are:  Visit Vitals  /76 (BP 1 Location: Left arm, BP Patient Position: At rest)   Pulse 88   Temp 98.5 °F (36.9 °C)   Resp 16   Ht 5' 4\" (1.626 m)   Wt 68.8 kg (151 lb 10.8 oz)   SpO2 94%   BMI 26.04 kg/m²       PHYSICAL EXAM:  General: Mild acute distress    Neurologic:  Alert    HEENT: EOMI, no scleral icterus   Lungs:  No increased WOB  Heart:  S1 S2   Abdomen: Soft, non-distended, no tenderness.  +Bowel sounds  Extremities: warm  Psych:   Poor insight.      Lab Data Reviewed:     Recent Results (from the past 24 hour(s))   OCCULT BLOOD, STOOL    Collection Time: 20 10:30 AM   Result Value Ref Range    Occult blood, stool Positive (A) NEG     CBC W/O DIFF    Collection Time: 20  4:30 AM   Result Value Ref Range    WBC 9.6 3.6 - 11.0 K/uL    RBC 2.49 (L) 3.80 - 5.20 M/uL    HGB 8.0 (L) 11.5 - 16.0 g/dL    HCT 25.9 (L) 35.0 - 47.0 %    .0 (H) 80.0 - 99.0 FL    MCH 32.1 26.0 - 34.0 PG    MCHC 30.9 30.0 - 36.5 g/dL    RDW 14.6 (H) 11.5 - 14.5 %    PLATELET 123 246 - 201 K/uL    MPV 8.3 (L) 8.9 - 12.9 FL    NRBC 0.0 0  WBC    ABSOLUTE NRBC 0.00 0.00 - 9.77 K/uL   METABOLIC PANEL, BASIC    Collection Time: 20  4:30 AM   Result Value Ref Range    Sodium 139 136 - 145 mmol/L    Potassium 4.1 3.5 - 5.1 mmol/L    Chloride 103 97 - 108 mmol/L    CO2 29 21 - 32 mmol/L    Anion gap 7 5 - 15 mmol/L    Glucose 127 (H) 65 - 100 mg/dL    BUN 16 6 - 20 MG/DL    Creatinine 0.69 0.55 - 1.02 MG/DL BUN/Creatinine ratio 23 (H) 12 - 20      GFR est AA >60 >60 ml/min/1.73m2    GFR est non-AA >60 >60 ml/min/1.73m2    Calcium 9.4 8.5 - 10.1 MG/DL            Assessment:   · Anemia: Hgb 8.0 (7.4 on 4/14, 7.9 on 4/10, 8.4 on 4/9), platelets 468. EGD in 2015   · History of GAVE  · Hip fracture status post right hip hemiarthroplasty on 4/8  · Dementia     Patient Active Problem List   Diagnosis Code    Watermelon stomach K27.26    GERD (gastroesophageal reflux disease) K21.9    Anemia D64.9    CVA (cerebral vascular accident) ([facility] I63.9    Chronic cholecystitis K81.1    Paraesophageal hernia K44.9    Moderate protein-calorie malnutrition (HCC) E44.0    Advance care planning Z71.89    Pulmonary hypertension (HCC) I27.20    Shortness of breath on exertion R06.02    Aortic valve regurgitation I35.1    Blood loss anemia D50.0    Status post gastrostomy (HCC) Z93.1    Generalized weakness R53.1    Hip fracture ([facility] S72.009A     Plan:   · BID PPI  · Monitor CBC, transfuse as necessary  · Ongoing goals of care discussions - I discussed with  Debi Lehman on phone who does not want EGD and continues to agree with conservative management  · Will be available to see again on request      Signed By: Darrell Buitrago NP     4/17/2020  9:47 AM       This patient was seen and examined by me in a face-to-face visit today. I reviewed the medical record including lab work, imaging and other provider notes. I confirmed the interval history as described above. I spoke to the patient, however the patient is unable to give a meaningful history. I discussed this case in detail with Mitch FOX. I formulated an updated  assessment of this patient and guided our treatment plan. I agree with the above progress note. I agree with the history, exam and assessment and plan as outlined in the note. I would like to add the following:     Abd: normoactive BS, nd, mild generalized tenderness, no rebound/guarding.  No signs of active GI bleeding at this time. PPI. As above. Will sign off. Please call with any questions.     Dr. Maureen Amaral

## 2020-04-18 PROCEDURE — 74011000250 HC RX REV CODE- 250: Performed by: INTERNAL MEDICINE

## 2020-04-18 PROCEDURE — 74011250636 HC RX REV CODE- 250/636: Performed by: ORTHOPAEDIC SURGERY

## 2020-04-18 PROCEDURE — 74011250637 HC RX REV CODE- 250/637: Performed by: ORTHOPAEDIC SURGERY

## 2020-04-18 PROCEDURE — 74011250636 HC RX REV CODE- 250/636: Performed by: INTERNAL MEDICINE

## 2020-04-18 PROCEDURE — C9113 INJ PANTOPRAZOLE SODIUM, VIA: HCPCS | Performed by: INTERNAL MEDICINE

## 2020-04-18 PROCEDURE — 65270000032 HC RM SEMIPRIVATE

## 2020-04-18 PROCEDURE — 51798 US URINE CAPACITY MEASURE: CPT

## 2020-04-18 RX ADMIN — Medication 10 ML: at 21:41

## 2020-04-18 RX ADMIN — OYSTER SHELL CALCIUM WITH VITAMIN D 1 TABLET: 500; 200 TABLET, FILM COATED ORAL at 16:08

## 2020-04-18 RX ADMIN — Medication 10 ML: at 13:04

## 2020-04-18 RX ADMIN — OYSTER SHELL CALCIUM WITH VITAMIN D 1 TABLET: 500; 200 TABLET, FILM COATED ORAL at 08:00

## 2020-04-18 RX ADMIN — SODIUM CHLORIDE 10 ML: 9 INJECTION INTRAMUSCULAR; INTRAVENOUS; SUBCUTANEOUS at 13:04

## 2020-04-18 RX ADMIN — PRAVASTATIN SODIUM 20 MG: 20 TABLET ORAL at 21:41

## 2020-04-18 RX ADMIN — MIDODRINE HYDROCHLORIDE 10 MG: 5 TABLET ORAL at 08:00

## 2020-04-18 RX ADMIN — SODIUM CHLORIDE 40 MG: 9 INJECTION INTRAMUSCULAR; INTRAVENOUS; SUBCUTANEOUS at 21:40

## 2020-04-18 RX ADMIN — OYSTER SHELL CALCIUM WITH VITAMIN D 1 TABLET: 500; 200 TABLET, FILM COATED ORAL at 11:51

## 2020-04-18 RX ADMIN — SENNOSIDES AND DOCUSATE SODIUM 1 TABLET: 8.6; 5 TABLET ORAL at 17:17

## 2020-04-18 RX ADMIN — ONDANSETRON 4 MG: 2 INJECTION, SOLUTION INTRAMUSCULAR; INTRAVENOUS at 06:45

## 2020-04-18 RX ADMIN — POLYETHYLENE GLYCOL 3350 17 G: 17 POWDER, FOR SOLUTION ORAL at 08:22

## 2020-04-18 RX ADMIN — MIDODRINE HYDROCHLORIDE 10 MG: 5 TABLET ORAL at 16:05

## 2020-04-18 RX ADMIN — SODIUM CHLORIDE 10 ML: 9 INJECTION INTRAMUSCULAR; INTRAVENOUS; SUBCUTANEOUS at 05:42

## 2020-04-18 RX ADMIN — SODIUM CHLORIDE 40 MG: 9 INJECTION INTRAMUSCULAR; INTRAVENOUS; SUBCUTANEOUS at 08:22

## 2020-04-18 RX ADMIN — MIRTAZAPINE 7.5 MG: 15 TABLET, FILM COATED ORAL at 21:41

## 2020-04-18 RX ADMIN — SODIUM CHLORIDE 10 ML: 9 INJECTION INTRAMUSCULAR; INTRAVENOUS; SUBCUTANEOUS at 21:41

## 2020-04-18 RX ADMIN — SENNOSIDES AND DOCUSATE SODIUM 1 TABLET: 8.6; 5 TABLET ORAL at 08:24

## 2020-04-18 RX ADMIN — ACETAMINOPHEN 650 MG: 325 TABLET, FILM COATED ORAL at 16:05

## 2020-04-18 NOTE — PROGRESS NOTES
Problem: Pressure Injury - Risk of  Goal: *Prevention of pressure injury  Description: Document Hawk Scale and appropriate interventions in the flowsheet. Outcome: Progressing Towards Goal  Note: Pressure Injury Interventions:  Sensory Interventions: Keep linens dry and wrinkle-free    Moisture Interventions: Absorbent underpads    Activity Interventions: Pressure redistribution bed/mattress(bed type), PT/OT evaluation    Mobility Interventions: PT/OT evaluation    Nutrition Interventions: Document food/fluid/supplement intake    Friction and Shear Interventions: Apply protective barrier, creams and emollients    Plan of care reviewed.

## 2020-04-18 NOTE — PROGRESS NOTES
Problem: Pressure Injury - Risk of  Goal: *Prevention of pressure injury  Description: Document Hawk Scale and appropriate interventions in the flowsheet. Outcome: Progressing Towards Goal  Note: Pressure Injury Interventions:  Sensory Interventions: Float heels, Keep linens dry and wrinkle-free, Minimize linen layers, Monitor skin under medical devices, Turn and reposition approx. every two hours (pillows and wedges if needed)    Moisture Interventions: Minimize layers, Moisture barrier, Offer toileting Q_hr    Activity Interventions: PT/OT evaluation, Increase time out of bed    Mobility Interventions: PT/OT evaluation, Turn and reposition approx. every two hours(pillow and wedges), HOB 30 degrees or less, Float heels    Nutrition Interventions: Document food/fluid/supplement intake    Friction and Shear Interventions: Minimize layers, Lift team/patient mobility team, Lift sheet                Problem: Patient Education: Go to Patient Education Activity  Goal: Patient/Family Education  Outcome: Progressing Towards Goal     Problem: Falls - Risk of  Goal: *Absence of Falls  Outcome: Progressing Towards Goal  Goal: *KNOWLEDGE OF FALL PREVENTION  Outcome: Progressing Towards Goal  Goal: Will remain free from falls  Description: Will remain free from falls     Outcome: Progressing Towards Goal     Problem: Pain  Goal: *Control of Pain  Outcome: Progressing Towards Goal  Goal: *PALLIATIVE CARE:  Alleviation of Pain  Outcome: Progressing Towards Goal     Problem: Patient Education: Go to Patient Education Activity  Goal: Patient/Family Education  Outcome: Progressing Towards Goal     Problem: Falls - Risk of  Goal: *Absence of Falls  Description: Document Mika Fall Risk and appropriate interventions in the flowsheet.   Outcome: Progressing Towards Goal  Note: Fall Risk Interventions:  Mobility Interventions: PT Consult for mobility concerns, PT Consult for assist device competence, Patient to call before getting OOB, OT consult for ADLs, Utilize walker, cane, or other assistive device    Mentation Interventions: Room close to nurse's station, Reorient patient, More frequent rounding, Door open when patient unattended    Medication Interventions: Teach patient to arise slowly, Patient to call before getting OOB, Evaluate medications/consider consulting pharmacy    Elimination Interventions: Call light in reach, Toileting schedule/hourly rounds, Patient to call for help with toileting needs    History of Falls Interventions: Consult care management for discharge planning, Door open when patient unattended, Investigate reason for fall, Room close to nurse's station         Problem: Patient Education: Go to Patient Education Activity  Goal: Patient/Family Education  Outcome: Progressing Towards Goal     Problem: Nutrition Deficit  Goal: *Optimize nutritional status  Outcome: Progressing Towards Goal     Problem: Patient Education: Go to Patient Education Activity  Goal: Patient/Family Education  Outcome: Progressing Towards Goal     Problem: Patient Education: Go to Patient Education Activity  Goal: Patient/Family Education  Outcome: Progressing Towards Goal     Problem: Patient Education: Go to Patient Education Activity  Goal: Patient/Family Education  Outcome: Progressing Towards Goal     Problem: Risk for Spread of Infection  Goal: Prevent transmission of infectious organism to others  Description: Prevent the transmission of infectious organisms to other patients, staff members, and visitors.   Outcome: Progressing Towards Goal     Problem: Patient Education:  Go to Education Activity  Goal: Patient/Family Education  Outcome: Progressing Towards Goal

## 2020-04-18 NOTE — PROGRESS NOTES
[address] [facility]                                                                                          Hospitalist Progress Note  Leigha Guo NP  Answering service: [phone] from in house phone        Date of Service:  2020  NAME:  Anibal Sheth  :  1940  MRN:  [id number]      Admission Summary:   Corrie Ann is a [de-identified] y.o. female who is demented at the history cannot be taken from her. Lior Asher is taken from the chart.  She has history of COPD asthma peptic ulcer disease hypertension in the past. Kamlesh Christiansen resides at the nursing home  Per EMS patient had fallen on Friday and today hip x-ray was done and it confirmed that she had a fracture.  She has been complaining of pain on right side of the hip. No nausea vomiting diarrhea no shortness of breath.  Family available on phone at bedside.       Interval history / Subjective:    Patient seen and examined. Patient opens eyes to verbal stimuli, only mumbles responses. No overnight events. No signs of bleeding. Assessment & Plan:     Hip fracture due to ground-level fall  -Acute versus subacute right femoral neck fracture   -s/p right hip hemiarthroplasty   -Pain controlled  -DVT prophylaxis with lovenox was stopped due to black tarry stools and coffee-ground emesis.   -bowel regimen   -Calcium-Vitamin D  - Ortho following      Black tarry stools, coffee-ground emesis: Risk for GI bleed  History of [address], status post EGD 2015 by Dr. Lemuel Lowery showing large hiatal hernia but no residual gave  No further bleeding, HH stable, GI consult noted, continue conservative management, continue Protonix, resume puréed diet.     Post op Delirium: resolved  -baseline dementia      Dysphagia:   -initiated on pureed diet     Hypernatremia:   -stable off D5      Hyperlipidemia: Continue statin     Depression: Continue remeron   -Seroquel as needed for confusion agitation     Hypotension: continue home midodrine      Dementia without behavorial disturbances:   -supportive care     Anemia, post operative decrease:   -H/H stable, no need for transfusion      Code status: DNR  DVT prophylaxis: 310Luli Andres discussed with: Patient/Family and Nurse  Anticipated Disposition: Hospice on Monday 04/20/20  Anticipated Discharge: 24 hours to 48 hours        Hospital Problems  Date Reviewed: 4/8/2020          Codes Class Noted POA    * (Principal) Hip fracture ([facility] [address] ICD-10-CM: L76.115V  ICD-9-CM: 820.8  4/7/2020 Yes                Review of Systems:   A comprehensive review of systems was negative except for that written in the HPI. Vital Signs:    Last 24hrs VS reviewed since prior progress note. Most recent are:  Visit Vitals  /59 (BP 1 Location: Right arm, BP Patient Position: At rest)   Pulse 90   Temp 98.9 °F (37.2 °C)   Resp 16   Ht 5' 4\" (1.626 m)   Wt 68.5 kg (151 lb 0.2 oz)   SpO2 93%   BMI 25.92 kg/m²         Intake/Output Summary (Last 24 hours) at 4/18/2020 1009  Last data filed at 4/18/2020 0251  Gross per 24 hour   Intake    Output 700 ml   Net -700 ml        Physical Examination:             Constitutional:  No acute distress   ENT:  Oral mucosa moist, oropharynx benign. Resp:  Diminished throughout. No wheezing/rhonchi/rales. No accessory muscle use   CV:  Regular rhythm, normal rate, no murmurs, gallops, rubs    GI:  Soft, non distended, non tender. normoactive bowel sounds, no hepatosplenomegaly     Musculoskeletal:  No edema, warm, 2+ pulses throughout    Neurologic:  Weak, disoriented.        Skin:  Good turgor, no rashes or ulcers       Data Review:    Review and/or order of clinical lab test      Labs:     Recent Labs     04/17/20  0430 04/16/20  0914   WBC 9.6 11.1*   HGB 8.0* 9.3*   HCT 25.9* 29.2*    428*     Recent Labs     04/17/20  0430 04/16/20  0914    138   K 4.1 4.0    100   CO2 29 34*   BUN 16 9   CREA 0.69 0.80   * 174*   CA 9.4 9.8     No results for input(s): SGOT, GPT, ALT, AP, TBIL, TBILI, TP, ALB, GLOB, GGT, AML, LPSE in the last 72 hours. No lab exists for component: AMYP, HLPSE  No results for input(s): INR, PTP, APTT, INREXT in the last 72 hours. No results for input(s): FE, TIBC, PSAT, FERR in the last 72 hours. Lab Results   Component Value Date/Time    Folate 17.3 10/08/2015 11:30 AM      No results for input(s): PH, PCO2, PO2 in the last 72 hours. No results for input(s): CPK, CKNDX, TROIQ in the last 72 hours.     No lab exists for component: CPKMB  Lab Results   Component Value Date/Time    Cholesterol, total 145 04/04/2017 09:46 AM    HDL Cholesterol 61 04/04/2017 09:46 AM    LDL, calculated 67 04/04/2017 09:46 AM    Triglyceride 87 04/04/2017 09:46 AM    CHOL/HDL Ratio 3.4 06/02/2013 06:30 AM     Lab Results   Component Value Date/Time    Glucose (POC) 120 (H) 04/08/2020 11:17 AM    Glucose (POC) 115 (H) 09/28/2018 08:46 PM     Lab Results   Component Value Date/Time    Color YELLOW/STRAW 04/07/2020 02:00 PM    Appearance CLEAR 04/07/2020 02:00 PM    Specific gravity 1.022 04/07/2020 02:00 PM    pH (UA) 6.0 04/07/2020 02:00 PM    Protein 30 (A) 04/07/2020 02:00 PM    Glucose 100 (A) 04/07/2020 02:00 PM    Ketone Negative 04/07/2020 02:00 PM    Bilirubin Negative 04/07/2020 02:00 PM    Urobilinogen 0.2 04/07/2020 02:00 PM    Nitrites Negative 04/07/2020 02:00 PM    Leukocyte Esterase Negative 04/07/2020 02:00 PM    Epithelial cells FEW 04/07/2020 02:00 PM    Bacteria Negative 04/07/2020 02:00 PM    WBC 0-4 04/07/2020 02:00 PM    RBC 0-5 04/07/2020 02:00 PM         Medications Reviewed:     Current Facility-Administered Medications   Medication Dose Route Frequency    pantoprazole (PROTONIX) 40 mg in 0.9% sodium chloride 10 mL injection  40 mg IntraVENous Q12H    dextrose 5 % - 0.45% NaCl infusion  20 mL/hr IntraVENous CONTINUOUS    acetaminophen (TYLENOL) suppository 650 mg  650 mg Rectal Q6H PRN    sodium chloride (NS) flush 5-40 mL  5-40 mL IntraVENous Q8H    sodium chloride (NS) flush 5-40 mL  5-40 mL IntraVENous PRN    naloxone (NARCAN) injection 0.4 mg  0.4 mg IntraVENous PRN    calcium-vitamin D (OS-SAE) 500 mg-200 unit tablet  1 Tab Oral TID WITH MEALS    senna-docusate (PERICOLACE) 8.6-50 mg per tablet 1 Tab  1 Tab Oral BID    polyethylene glycol (MIRALAX) packet 17 g  17 g Oral DAILY    bisacodyL (DULCOLAX) suppository 10 mg  10 mg Rectal DAILY PRN    morphine injection 2 mg  2 mg IntraVENous Q3H PRN    traMADoL (ULTRAM) tablet 50 mg  50 mg Oral Q6H PRN    ALPRAZolam (XANAX) tablet 0.5 mg  0.5 mg Oral TID PRN    midodrine (PROAMITINE) tablet 10 mg  10 mg Oral BID WITH MEALS    mirtazapine (REMERON) tablet 7.5 mg  7.5 mg Oral QHS    QUEtiapine (SEROquel) tablet 12.5 mg  12.5 mg Oral Q8H PRN    pravastatin (PRAVACHOL) tablet 20 mg  20 mg Oral QHS    sodium chloride (NS) flush 5-40 mL  5-40 mL IntraVENous Q8H    sodium chloride (NS) flush 5-40 mL  5-40 mL IntraVENous PRN    acetaminophen (TYLENOL) tablet 650 mg  650 mg Oral Q4H PRN    naloxone (NARCAN) injection 0.4 mg  0.4 mg IntraVENous PRN    morphine injection 1 mg  1 mg IntraVENous Q4H PRN    ondansetron (ZOFRAN) injection 4 mg  4 mg IntraVENous Q4H PRN     ______________________________________________________________________  EXPECTED LENGTH OF STAY: 2d 4h  ACTUAL LENGTH OF STAY:          11                 Chanel Hobbs NP

## 2020-04-18 NOTE — PROGRESS NOTES
[facility] [address] FRACTURE PROGRESS NOTE    2020  Admit Date:   2020    Post Op day: 10 Days Post-Op    Subjective: Marixa Castañeda remains confused. States she has some pain and has reported some nausea. Making minimal gains with PT. Note plans to discharge on Hospice. PT/OT:   Gait:  Gait  Base of Support: Center of gravity altered, Widened  Speed/Eloise: Shuffled  Step Length: Left shortened, Right shortened  Stance: Right decreased  Gait Abnormalities: Antalgic, Decreased step clearance, Shuffling gait, Step to gait  Ambulation - Level of Assistance:  Moderate assistance, Maximum assistance, Assist x2(assist w/weight shift when side stepping)  Distance (ft): 1 Feet (ft)(to [facility])  Assistive Device: Gait belt, Walker, rolling                 Vital Signs:    Patient Vitals for the past 8 hrs:   BP Temp Pulse Resp SpO2 Weight   20 0816 121/59 98.9 °F (37.2 °C) 90 16 93 %    20 0601      68.5 kg (151 lb 0.2 oz)   20 0301 113/59 98.2 °F (36.8 °C) 66 18 97 %      Temp (24hrs), Av.4 °F (36.9 °C), Min:97.5 °F (36.4 °C), Max:99 °F (37.2 °C)      Pain Control:   Pain Assessment  Pain Scale 1: Adult Nonverbal Pain Scale  Pain Intensity 1: 2  Pain Onset 1: post opt  Pain Location 1: Hip  Pain Orientation 1: Right  Pain Description 1: Aching  Pain Intervention(s) 1: Medication (see MAR)    Meds:    Current Facility-Administered Medications   Medication Dose Route Frequency    pantoprazole (PROTONIX) 40 mg in 0.9% sodium chloride 10 mL injection  40 mg IntraVENous Q12H    dextrose 5 % - 0.45% NaCl infusion  20 mL/hr IntraVENous CONTINUOUS    acetaminophen (TYLENOL) suppository 650 mg  650 mg Rectal Q6H PRN    sodium chloride (NS) flush 5-40 mL  5-40 mL IntraVENous Q8H    sodium chloride (NS) flush 5-40 mL  5-40 mL IntraVENous PRN    naloxone (NARCAN) injection 0.4 mg  0.4 mg IntraVENous PRN    calcium-vitamin D (OS-SAE) 500 mg-200 unit tablet  1 Tab Oral TID WITH MEALS    senna-docusate (PERICOLACE) 8.6-50 mg per tablet 1 Tab  1 Tab Oral BID    polyethylene glycol (MIRALAX) packet 17 g  17 g Oral DAILY    bisacodyL (DULCOLAX) suppository 10 mg  10 mg Rectal DAILY PRN    morphine injection 2 mg  2 mg IntraVENous Q3H PRN    traMADoL (ULTRAM) tablet 50 mg  50 mg Oral Q6H PRN    ALPRAZolam (XANAX) tablet 0.5 mg  0.5 mg Oral TID PRN    midodrine (PROAMITINE) tablet 10 mg  10 mg Oral BID WITH MEALS    mirtazapine (REMERON) tablet 7.5 mg  7.5 mg Oral QHS    QUEtiapine (SEROquel) tablet 12.5 mg  12.5 mg Oral Q8H PRN    pravastatin (PRAVACHOL) tablet 20 mg  20 mg Oral QHS    sodium chloride (NS) flush 5-40 mL  5-40 mL IntraVENous Q8H    sodium chloride (NS) flush 5-40 mL  5-40 mL IntraVENous PRN    acetaminophen (TYLENOL) tablet 650 mg  650 mg Oral Q4H PRN    naloxone (NARCAN) injection 0.4 mg  0.4 mg IntraVENous PRN    morphine injection 1 mg  1 mg IntraVENous Q4H PRN    ondansetron (ZOFRAN) injection 4 mg  4 mg IntraVENous Q4H PRN       LAB:    Recent Labs     04/17/20  0430   HCT 25.9*   HGB 8.0*       Transfuse PRBC's:      Assessment & Physician's Comment:  Right thigh soft and compressible  Gentle log roll pain free  Does not respond to sensory or motor function requests but is seen to move right leg spontaneously  Distal pulses palpable  Dressing is clean, dry, and intact  Neurovascular checks within normal limits  Orientation:  Disoriented (baseline)    Principal Problem:    Hip fracture ([address] (4/7/2020)        Plan:    Cont PT/OT as able  Cont Tylenol for pain  Cont SCDs for DVT prophylaxis  Ice packs to hip PRN  Medical management per primary team  Case Management for disposition planning - to [facility] on Hospice 4/20  OK to discharge from Ortho standpoint - will sign off   Call with any questions    Dr Leonor Murcia aware of patient and in agreement with current plan    Debi Elliott PA-C   Orthopedic Trauma Service  [facility]

## 2020-04-19 PROCEDURE — 74011250637 HC RX REV CODE- 250/637: Performed by: ORTHOPAEDIC SURGERY

## 2020-04-19 PROCEDURE — 74011250636 HC RX REV CODE- 250/636: Performed by: INTERNAL MEDICINE

## 2020-04-19 PROCEDURE — 65270000032 HC RM SEMIPRIVATE

## 2020-04-19 PROCEDURE — C9113 INJ PANTOPRAZOLE SODIUM, VIA: HCPCS | Performed by: INTERNAL MEDICINE

## 2020-04-19 PROCEDURE — 74011250636 HC RX REV CODE- 250/636: Performed by: ORTHOPAEDIC SURGERY

## 2020-04-19 PROCEDURE — 74011000250 HC RX REV CODE- 250: Performed by: INTERNAL MEDICINE

## 2020-04-19 RX ADMIN — MIRTAZAPINE 7.5 MG: 15 TABLET, FILM COATED ORAL at 21:21

## 2020-04-19 RX ADMIN — MIDODRINE HYDROCHLORIDE 10 MG: 5 TABLET ORAL at 07:03

## 2020-04-19 RX ADMIN — SODIUM CHLORIDE 40 MG: 9 INJECTION INTRAMUSCULAR; INTRAVENOUS; SUBCUTANEOUS at 21:21

## 2020-04-19 RX ADMIN — ACETAMINOPHEN 650 MG: 325 TABLET, FILM COATED ORAL at 00:39

## 2020-04-19 RX ADMIN — PRAVASTATIN SODIUM 20 MG: 20 TABLET ORAL at 21:21

## 2020-04-19 RX ADMIN — Medication 10 ML: at 07:02

## 2020-04-19 RX ADMIN — SODIUM CHLORIDE 40 MG: 9 INJECTION INTRAMUSCULAR; INTRAVENOUS; SUBCUTANEOUS at 08:37

## 2020-04-19 RX ADMIN — SODIUM CHLORIDE 10 ML: 9 INJECTION INTRAMUSCULAR; INTRAVENOUS; SUBCUTANEOUS at 07:02

## 2020-04-19 RX ADMIN — MORPHINE SULFATE 1 MG: 2 INJECTION, SOLUTION INTRAMUSCULAR; INTRAVENOUS at 14:25

## 2020-04-19 RX ADMIN — SENNOSIDES AND DOCUSATE SODIUM 1 TABLET: 8.6; 5 TABLET ORAL at 17:45

## 2020-04-19 RX ADMIN — OYSTER SHELL CALCIUM WITH VITAMIN D 1 TABLET: 500; 200 TABLET, FILM COATED ORAL at 08:00

## 2020-04-19 RX ADMIN — MIDODRINE HYDROCHLORIDE 10 MG: 5 TABLET ORAL at 17:45

## 2020-04-19 RX ADMIN — SENNOSIDES AND DOCUSATE SODIUM 1 TABLET: 8.6; 5 TABLET ORAL at 09:00

## 2020-04-19 RX ADMIN — OYSTER SHELL CALCIUM WITH VITAMIN D 1 TABLET: 500; 200 TABLET, FILM COATED ORAL at 17:00

## 2020-04-19 NOTE — PROGRESS NOTES
Problem: Pressure Injury - Risk of  Goal: *Prevention of pressure injury  Description: Document Hawk Scale and appropriate interventions in the flowsheet. Outcome: Progressing Towards Goal  Note: Pressure Injury Interventions:  Sensory Interventions: Keep linens dry and wrinkle-free, Minimize linen layers, Turn and reposition approx. every two hours (pillows and wedges if needed)    Moisture Interventions: Absorbent underpads, Apply protective barrier, creams and emollients, Internal/External urinary devices    Activity Interventions: Pressure redistribution bed/mattress(bed type)    Mobility Interventions: Float heels, Turn and reposition approx.  every two hours(pillow and wedges), Pressure redistribution bed/mattress (bed type)    Nutrition Interventions: Offer support with meals,snacks and hydration    Friction and Shear Interventions: Apply protective barrier, creams and emollients, Lift team/patient mobility team, Lift sheet

## 2020-04-19 NOTE — PROGRESS NOTES
Problem: Pressure Injury - Risk of  Goal: *Prevention of pressure injury  Description: Document Hawk Scale and appropriate interventions in the flowsheet. Outcome: Progressing Towards Goal  Note: Pressure Injury Interventions:  Sensory Interventions: Assess changes in LOC    Moisture Interventions: Absorbent underpads    Activity Interventions: Pressure redistribution bed/mattress(bed type)    Mobility Interventions: Float heels    Nutrition Interventions: Offer support with meals,snacks and hydration    Friction and Shear Interventions: Apply protective barrier, creams and emollients    Plan of care reviewed.

## 2020-04-20 VITALS
DIASTOLIC BLOOD PRESSURE: 63 MMHG | BODY MASS INDEX: 25.71 KG/M2 | SYSTOLIC BLOOD PRESSURE: 107 MMHG | RESPIRATION RATE: 16 BRPM | TEMPERATURE: 98.3 F | WEIGHT: 150.6 LBS | OXYGEN SATURATION: 95 % | HEART RATE: 82 BPM | HEIGHT: 64 IN

## 2020-04-20 PROCEDURE — 74011250637 HC RX REV CODE- 250/637: Performed by: ORTHOPAEDIC SURGERY

## 2020-04-20 PROCEDURE — 74011000250 HC RX REV CODE- 250: Performed by: INTERNAL MEDICINE

## 2020-04-20 PROCEDURE — 74011250636 HC RX REV CODE- 250/636: Performed by: ORTHOPAEDIC SURGERY

## 2020-04-20 PROCEDURE — C9113 INJ PANTOPRAZOLE SODIUM, VIA: HCPCS | Performed by: INTERNAL MEDICINE

## 2020-04-20 PROCEDURE — 74011250636 HC RX REV CODE- 250/636: Performed by: INTERNAL MEDICINE

## 2020-04-20 RX ADMIN — SODIUM CHLORIDE 40 MG: 9 INJECTION INTRAMUSCULAR; INTRAVENOUS; SUBCUTANEOUS at 09:03

## 2020-04-20 RX ADMIN — Medication 10 ML: at 07:08

## 2020-04-20 RX ADMIN — SENNOSIDES AND DOCUSATE SODIUM 1 TABLET: 8.6; 5 TABLET ORAL at 09:03

## 2020-04-20 RX ADMIN — Medication 10 ML: at 01:46

## 2020-04-20 RX ADMIN — SODIUM CHLORIDE 10 ML: 9 INJECTION INTRAMUSCULAR; INTRAVENOUS; SUBCUTANEOUS at 07:08

## 2020-04-20 RX ADMIN — OYSTER SHELL CALCIUM WITH VITAMIN D 1 TABLET: 500; 200 TABLET, FILM COATED ORAL at 07:08

## 2020-04-20 RX ADMIN — SODIUM CHLORIDE 10 ML: 9 INJECTION INTRAMUSCULAR; INTRAVENOUS; SUBCUTANEOUS at 01:46

## 2020-04-20 RX ADMIN — OYSTER SHELL CALCIUM WITH VITAMIN D 1 TABLET: 500; 200 TABLET, FILM COATED ORAL at 11:20

## 2020-04-20 RX ADMIN — MORPHINE SULFATE 1 MG: 2 INJECTION, SOLUTION INTRAMUSCULAR; INTRAVENOUS at 01:45

## 2020-04-20 RX ADMIN — MIDODRINE HYDROCHLORIDE 10 MG: 5 TABLET ORAL at 07:08

## 2020-04-20 RX ADMIN — POLYETHYLENE GLYCOL 3350 17 G: 17 POWDER, FOR SOLUTION ORAL at 09:03

## 2020-04-20 NOTE — PROGRESS NOTES
[facility] visit. Mrs. Maya Alberto awake. Offered to pray for her since she was having difficulty talking. Used the Our Father since this prayer is so familiar. Mrs. Maya Alberto did try to say the prayer with the . She did not receive communion today due to chaplains question about her ability to swallow. Prayer for he to receive spiritual communion.     ABRAHAM Reece, RN, ACSW, LCSW   Page:  [phone]

## 2020-04-20 NOTE — PROGRESS NOTES
Problem: Pressure Injury - Risk of  Goal: *Prevention of pressure injury  Description: Document Hawk Scale and appropriate interventions in the flowsheet. Outcome: Progressing Towards Goal  Note: Pressure Injury Interventions:  Sensory Interventions: Assess changes in LOC, Minimize linen layers, Maintain/enhance activity level, Keep linens dry and wrinkle-free, Float heels    Moisture Interventions: Offer toileting Q_hr, Moisture barrier, Minimize layers    Activity Interventions: Pressure redistribution bed/mattress(bed type), Increase time out of bed    Mobility Interventions: Float heels, Turn and reposition approx. every two hours(pillow and wedges), PT/OT evaluation    Nutrition Interventions: Offer support with meals,snacks and hydration, Document food/fluid/supplement intake    Friction and Shear Interventions: Minimize layers, Lift team/patient mobility team, Sit at 90-degree angle, Trapeze to reposition                Problem: Patient Education: Go to Patient Education Activity  Goal: Patient/Family Education  Outcome: Progressing Towards Goal     Problem: Falls - Risk of  Goal: *Absence of Falls  Outcome: Progressing Towards Goal  Goal: *KNOWLEDGE OF FALL PREVENTION  Outcome: Progressing Towards Goal  Goal: Will remain free from falls  Description: Will remain free from falls     Outcome: Progressing Towards Goal     Problem: Pain  Goal: *Control of Pain  Outcome: Progressing Towards Goal  Goal: *PALLIATIVE CARE:  Alleviation of Pain  Outcome: Progressing Towards Goal     Problem: Patient Education: Go to Patient Education Activity  Goal: Patient/Family Education  Outcome: Progressing Towards Goal     Problem: Falls - Risk of  Goal: *Absence of Falls  Description: Document Mika Fall Risk and appropriate interventions in the flowsheet.   Outcome: Progressing Towards Goal  Note: Fall Risk Interventions:  Mobility Interventions: OT consult for ADLs, Patient to call before getting OOB, PT Consult for mobility concerns, PT Consult for assist device competence, Utilize walker, cane, or other assistive device, Communicate number of staff needed for ambulation/transfer    Mentation Interventions: Door open when patient unattended, Evaluate medications/consider consulting pharmacy, Room close to nurse's station, Reorient patient, More frequent rounding    Medication Interventions: Patient to call before getting OOB, Teach patient to arise slowly    Elimination Interventions: Toileting schedule/hourly rounds, Patient to call for help with toileting needs, Call light in reach    History of Falls Interventions: Consult care management for discharge planning, Door open when patient unattended, Room close to nurse's station         Problem: Patient Education: Go to Patient Education Activity  Goal: Patient/Family Education  Outcome: Progressing Towards Goal     Problem: Nutrition Deficit  Goal: *Optimize nutritional status  Outcome: Progressing Towards Goal     Problem: Patient Education: Go to Patient Education Activity  Goal: Patient/Family Education  Outcome: Progressing Towards Goal     Problem: Patient Education: Go to Patient Education Activity  Goal: Patient/Family Education  Outcome: Progressing Towards Goal     Problem: Patient Education: Go to Patient Education Activity  Goal: Patient/Family Education  Outcome: Progressing Towards Goal     Problem: Risk for Spread of Infection  Goal: Prevent transmission of infectious organism to others  Description: Prevent the transmission of infectious organisms to other patients, staff members, and visitors.   Outcome: Progressing Towards Goal     Problem: Patient Education:  Go to Education Activity  Goal: Patient/Family Education  Outcome: Progressing Towards Goal

## 2020-04-20 NOTE — DISCHARGE SUMMARY
Discharge Summary       PATIENT ID: Elen Griffin  MRN: [id number]   YOB: 1940    DATE OF ADMISSION: 4/7/2020 12:49 PM    DATE OF DISCHARGE: 04/20/2020  PRIMARY CARE PROVIDER: Tucker Landa MD     ATT[facility] [place]   DISCHARGING PROVIDER: Kym Smallwood NP    To contact this individual call [phone] and ask the  to page. If unavailable ask to be transferred the Adult Hospitalist Department. CONSULTATIONS: IP CONSULT TO ORTHOPEDIC SURGERY  IP CONSULT TO PALLIATIVE CARE - PROVIDER  IP CONSULT TO GASTROENTEROLOGY    PROCEDURES/SURGERIES: Procedure(s):  RIGHT HIP HEMIARTHROPLASTY/ BIOMET/ ER TO IP/ REQ [id number]  Bio-met/ ESSENTIAL    ADMITTING DIAGNOSES & HOSPITAL COURSE:   Elen Griffin is a [de-identified] y.o. female who is demented at the history cannot be taken from her. History is taken from the chart. She has history of COPD asthma peptic ulcer disease hypertension in the past.  She resides at the nursing home  Per EMS patient had fallen on Friday and today hip x-ray was done and it confirmed that she had a fracture. She has been complaining of pain on right side of the hip. No nausea vomiting diarrhea no shortness of breath. Family available on phone at bedside. DISCHARGE DIAGNOSES / PLAN:      Patient to be admitted to Hospice services     Hip fracture due to ground-level fall  -Acute versus subacute right femoral neck fracture   -s/p right hip hemiarthroplasty 4/8       Black tarry stools, coffee-ground emesis: Risk for GI bleed  History of [address], status post EGD 9/16/2015 by Dr. Kun Alcala showing large hiatal hernia     Post op Delirium: resolved  -baseline dementia      Dysphagia:   -initiated on pureed diet     Hypernatremia:     Hyperlipidemia:     Depression:     Hypotension:   Dementia without behavorial disturbances:   -supportive care     Anemia, post operative decrease:        Please discharge patient with Liz Catheter.       ADDITIONAL CARE RECOMMENDATIONS:   Will be admitted to [facility] PENDING TEST RESULTS:   At the time of discharge the following test results are still pending: None    FOLLOW UP APPOINTMENTS:    Follow-up Information     Follow up With Specialties Details Why Contact Info    Rick Vital, Hospice   [facility] [address] [address] [phone]             DIET: Pureed      ACTIVITY: Activity as tolerated    WOUND CARE: None    EQUIPMENT needed: None      DISCHARGE MEDICATIONS:  Current Discharge Medication List      CONTINUE these medications which have NOT CHANGED    Details   mirtazapine (REMERON) 15 mg tablet TAKE 1/2 TABLET BY MOUTH EVERY DAY AT BEDTIME  Qty: 45 Tab, Refills: 0      ALPRAZolam (XANAX) 0.5 mg tablet Take 1 Tab by mouth three (3) times daily as needed for Anxiety. Max Daily Amount: 1.5 mg.  Qty: 10 Tab, Refills: 0    Comments: Not to exceed 4 additional fills before 09/15/2018. Associated Diagnoses: Anxiety      QUEtiapine (SEROQUEL) 25 mg tablet Take 0.5 Tabs by mouth every eight (8) hours as needed for Other (agitation). Qty: 10 Tab, Refills: 0      polyethylene glycol (MIRALAX) 17 gram/dose powder Take 17 g by mouth as needed. midodrine (PROAMITINE) 10 mg tablet Take  by mouth two (2) times a day.       pravastatin (PRAVACHOL) 20 mg tablet TAKE 1 TABLET BY MOUTH AT BEDTIME  Qty: 90 Tab, Refills: 1         STOP taking these medications       omeprazole (PRILOSEC) 20 mg capsule Comments:   Reason for Stopping:         ferrous sulfate 325 mg (65 mg iron) tablet Comments:   Reason for Stopping:         ondansetron hcl (ZOFRAN) 4 mg tablet Comments:   Reason for Stopping:         famotidine (PEPCID PO) Comments:   Reason for Stopping:         aspirin delayed-release 81 mg tablet Comments:   Reason for Stopping:         acetaminophen (TYLENOL) 325 mg tablet Comments:   Reason for Stopping:         saliva substitute combo no.9 (BIOTENE DRY MOUTH ORAL RINSE MM) Comments:   Reason for Stopping:         melatonin tab tablet Comments:   Reason for Stopping:         cholecalciferol, vitamin d3, (VITAMIN D) 1,000 unit tablet Comments:   Reason for Stopping:         MULTIVITAMINS (MULTIVITAMIN PO) Comments:   Reason for Stopping:                 NOTIFY YOUR PHYSICIAN FOR ANY OF THE FOLLOWING:   Fever over 101 degrees for 24 hours. Chest pain, shortness of breath, fever, chills, nausea, vomiting, diarrhea, change in mentation, falling, weakness, bleeding. Severe pain or pain not relieved by medications. Or, any other signs or symptoms that you may have questions about. DISPOSITION:    Home With:   OT  PT  HH  RN      X Long term SNF/Inpatient Rehab: [facility]    Independent/assisted living   X Hospice:     Other:       PATIENT CONDITION AT DISCHARGE:     Functional status   X Poor     Deconditioned     Independent      Cognition     Lucid     Forgetful    X Dementia      Catheters/lines (plus indication)   X Liz     PICC     PEG     None      Code status     Full code    X DNR      PHYSICAL EXAMINATION AT DISCHARGE:  General:          Opens eyes to verbal stimuli, confused conversation     HEENT:           Atraumatic, anicteric sclerae, pink conjunctivae                          No oral ulcers, mucosa moist, throat clear, dentition fair  Neck:               Supple, symmetrical  Lungs:             Diminished throughout   Chest wall:      No tenderness  No Accessory muscle use. Heart:              Regular  rhythm,  No  murmur   No edema  Abdomen:        Soft, non-tender. Not distended. Bowel sounds normal  Extremities:     No cyanosis. No clubbing,                            Skin turgor normal, Capillary refill normal  Skin:                Pale. Psych:             Not anxious or agitated.   Neurologic:     Opens eyes to verbal stimuli, Confused conversation       CHRONIC MEDICAL DIAGNOSES:  Problem List as of 4/20/2020 Date Reviewed: 4/8/2020          Codes Class Noted - Resolved    Pulmonary hypertension ([facility] ICD-10-CM: I27.20  ICD-9-CM: 416.8  10/5/2015 - Present        Shortness of breath on exertion ICD-10-CM: R06.02  ICD-9-CM: 786.05  10/5/2015 - Present        Aortic valve regurgitation ICD-10-CM: I35.1  ICD-9-CM: 424.1  10/5/2015 - Present        * (Principal) Hip fracture ([facility] ICD-10-CM: S72.009A  ICD-9-CM: 820.8  4/7/2020 - Present        Generalized weakness ICD-10-CM: R53.1  ICD-9-CM: 780.79  2/7/2020 - Present        Status post gastrostomy ([facility] ICD-10-CM: Z93.1  ICD-9-CM: V44.1  3/8/2018 - Present        Blood loss anemia ICD-10-CM: D50.0  ICD-9-CM: 280.0  10/29/2015 - Present        Advance care planning ICD-10-CM: Z71.89  ICD-9-CM: V65.49  9/24/2015 - Present    Overview Signed 9/24/2015  3:32 PM by Charanjit Navarro LPN     End of life planning discussed with patient. Patient states that they do have an advance directive and will provide a copy for our office at next visit.                  Chronic cholecystitis ICD-10-CM: K81.1  ICD-9-CM: 575.11  2/10/2015 - Present        Paraesophageal hernia ICD-10-CM: K44.9  ICD-9-CM: 553.3  2/10/2015 - Present        Moderate protein-calorie malnutrition ([facility] ICD-10-CM: E44.0  ICD-9-CM: 263.0  2/10/2015 - Present        CVA (cerebral vascular accident) 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

## 2020-04-20 NOTE — PROGRESS NOTES
Problem: Pressure Injury - Risk of  Goal: *Prevention of pressure injury  Description: Document Hawk Scale and appropriate interventions in the flowsheet. 4/20/2020 1100 by Sonal Sol RN  Outcome: Resolved/Met  Note: Pressure Injury Interventions:  Sensory Interventions: Assess changes in LOC, Minimize linen layers, Maintain/enhance activity level, Keep linens dry and wrinkle-free, Float heels    Moisture Interventions: Offer toileting Q_hr, Moisture barrier, Minimize layers    Activity Interventions: Pressure redistribution bed/mattress(bed type), Increase time out of bed    Mobility Interventions: Float heels, Turn and reposition approx. every two hours(pillow and wedges), PT/OT evaluation    Nutrition Interventions: Offer support with meals,snacks and hydration, Document food/fluid/supplement intake    Friction and Shear Interventions: Minimize layers, Lift team/patient mobility team, Sit at 90-degree angle, Trapeze to reposition             4/20/2020 0938 by Sonal Sol RN  Outcome: Progressing Towards Goal  Note: Pressure Injury Interventions:  Sensory Interventions: Assess changes in LOC, Minimize linen layers, Maintain/enhance activity level, Keep linens dry and wrinkle-free, Float heels    Moisture Interventions: Offer toileting Q_hr, Moisture barrier, Minimize layers    Activity Interventions: Pressure redistribution bed/mattress(bed type), Increase time out of bed    Mobility Interventions: Float heels, Turn and reposition approx.  every two hours(pillow and wedges), PT/OT evaluation    Nutrition Interventions: Offer support with meals,snacks and hydration, Document food/fluid/supplement intake    Friction and Shear Interventions: Minimize layers, Lift team/patient mobility team, Sit at 90-degree angle, Trapeze to reposition                Problem: Patient Education: Go to Patient Education Activity  Goal: Patient/Family Education  4/20/2020 1100 by Sonal Sol RN  Outcome: Resolved/Met  4/20/2020 9460 by Sonal Sol RN  Outcome: Progressing Towards Goal     Problem: Falls - Risk of  Goal: *Absence of Falls  4/20/2020 1100 by Sonal Sol RN  Outcome: Resolved/Met  4/20/2020 0938 by Sonal Sol RN  Outcome: Progressing Towards Goal  Goal: *KNOWLEDGE OF FALL PREVENTION  4/20/2020 1100 by Sonal Sol RN  Outcome: Resolved/Met  4/20/2020 0938 by Sonal Sol RN  Outcome: Progressing Towards Goal  Goal: Will remain free from falls  Description: Will remain free from falls     4/20/2020 1100 by Sonal Sol RN  Outcome: Resolved/Met  4/20/2020 0938 by Sonal Sol RN  Outcome: Progressing Towards Goal     Problem: Pain  Goal: *Control of Pain  4/20/2020 1100 by Sonal Sol RN  Outcome: Resolved/Met  4/20/2020 0938 by Sonal Sol RN  Outcome: Progressing Towards Goal  Goal: *PALLIATIVE CARE:  Alleviation of Pain  4/20/2020 1100 by Sonal Sol RN  Outcome: Resolved/Met  4/20/2020 0938 by Sonal Sol RN  Outcome: Progressing Towards Goal     Problem: Patient Education: Go to Patient Education Activity  Goal: Patient/Family Education  4/20/2020 1100 by Sonal Sol RN  Outcome: Resolved/Met  4/20/2020 0938 by Sonal Sol RN  Outcome: Progressing Towards Goal     Problem: Falls - Risk of  Goal: *Absence of Falls  Description: Document Richard Rodgers Fall Risk and appropriate interventions in the flowsheet.   4/20/2020 1100 by Sonal Sol RN  Outcome: Resolved/Met  Note: Fall Risk Interventions:  Mobility Interventions: OT consult for ADLs, Patient to call before getting OOB, PT Consult for mobility concerns, PT Consult for assist device competence, Utilize walker, cane, or other assistive device, Communicate number of staff needed for ambulation/transfer    Mentation Interventions: Door open when patient unattended, Evaluate medications/consider consulting pharmacy, Room close to nurse's station, Reorient patient, More frequent rounding    Medication Interventions: Patient to call before getting OOB, Teach patient to arise slowly    Elimination Interventions: Toileting schedule/hourly rounds, Patient to call for help with toileting needs, Call light in reach    History of Falls Interventions: Consult care management for discharge planning, Door open when patient unattended, Room close to nurse's station      4/20/2020 0938 by Lala Maxwell RN  Outcome: Progressing Towards Goal  Note: Fall Risk Interventions:  Mobility Interventions: OT consult for ADLs, Patient to call before getting OOB, PT Consult for mobility concerns, PT Consult for assist device competence, Utilize walker, cane, or other assistive device, Communicate number of staff needed for ambulation/transfer    Mentation Interventions: Door open when patient unattended, Evaluate medications/consider consulting pharmacy, Room close to nurse's station, Reorient patient, More frequent rounding    Medication Interventions: Patient to call before getting OOB, Teach patient to arise slowly    Elimination Interventions:  Toileting schedule/hourly rounds, Patient to call for help with toileting needs, Call light in reach    History of Falls Interventions: Consult care management for discharge planning, Door open when patient unattended, Room close to nurse's station         Problem: Patient Education: Go to Patient Education Activity  Goal: Patient/Family Education  4/20/2020 1100 by Lala Maxwell RN  Outcome: Resolved/Met  4/20/2020 0938 by Lala Maxwell RN  Outcome: Progressing Towards Goal     Problem: Nutrition Deficit  Goal: *Optimize nutritional status  4/20/2020 1100 by Lala Maxwell RN  Outcome: Resolved/Met  4/20/2020 0938 by Lala Maxwell RN  Outcome: Progressing Towards Goal     Problem: Patient Education: Go to Patient Education Activity  Goal: Patient/Family Education  4/20/2020 1100 by Lala Maxwell RN  Outcome: Resolved/Met  4/20/2020 0908 by Darcie Arias RN  Outcome: Progressing Towards Goal     Problem: Patient Education: Go to Patient Education Activity  Goal: Patient/Family Education  4/20/2020 1100 by Darcie Arias RN  Outcome: Resolved/Met  4/20/2020 0938 by Darcie Arias RN  Outcome: Progressing Towards Goal     Problem: Patient Education: Go to Patient Education Activity  Goal: Patient/Family Education  4/20/2020 1100 by Darcie Arias RN  Outcome: Resolved/Met  4/20/2020 0938 by Darcie Arias RN  Outcome: Progressing Towards Goal     Problem: Risk for Spread of Infection  Goal: Prevent transmission of infectious organism to others  Description: Prevent the transmission of infectious organisms to other patients, staff members, and visitors.   4/20/2020 1100 by Darcie Arias RN  Outcome: Resolved/Met  4/20/2020 0938 by Darcie Arias RN  Outcome: Progressing Towards Goal     Problem: Patient Education:  Go to Education Activity  Goal: Patient/Family Education  4/20/2020 1100 by Darcie Arias RN  Outcome: Resolved/Met  4/20/2020 0938 by Darcie Arias RN  Outcome: Progressing Towards Goal

## 2020-04-20 NOTE — PROGRESS NOTES
BAKARI: Discharge today to [facility] MELANIE with [facility] [place] [facility] and [facility] personal care. [facility] transport set for 1 PM.     CM called [facility] #[phone] and spoke with Rod Moreira. Confirmed they are aware of discharge and transport time of 1 PM. They are requesting CM fax COVID testing results. CM faxed COVID results to [facility] at [phone].      CM spoke with [facility] with [facility] #[phone]. Confirmed they have everything they need and that hospice will open the patient this afternoon once she returns to [facility]. Cm spoke with Kadi Sen with [facility] #[phone] and confirmed they are scheduled to start services today. The caregiver will arrive at [facility] at 1:30 PM.    Cm spoke with  Mr. Micki Ramírez [phone] and confirmed he is aware of discharge plans. IM letter discussed via phone. Discharge folder located on hard chart to include ambulance PCS. RN to follow with discharge papers, MAR, Kardex, and call report to #[phone].     TRESA Nelson/SHILOH

## 2020-04-20 NOTE — DISCHARGE INSTRUCTIONS
Discharge Instructions       PATIENT ID: Krista Hobbs  MRN: [id number]   YOB: 1940    DATE OF ADMISSION: 4/7/2020 12:49 PM    DATE OF DISCHARGE: 04/20/20    PRIMARY CARE PROVIDER: Edilia Goyal MD     ATTENDING PHYSICIAN: Jackie Navarro MD   DISCHARGING PROVIDER: Kimberlee Augustin NP    To contact this individual call [phone] and ask the  to page. If unavailable ask to be transferred the Adult Hospitalist Department. DISCHARGE DIAGNOSES   Rt Hip Hemiarthroplasty    Admitting to hospice services at [address] [place] [address] - PROVIDER  IP CONSULT TO GASTROENTEROLOGY    PROCEDURES/SURGERIES: Procedure(s):  RIGHT HIP HEMIARTHROPLASTY/ BIOMET/ ER TO IP/ REQ [id number]  Bio-met/ ESSENTIAL    PENDING TEST RESULTS:   At the time of discharge the following test results are still pending: none    FOLLOW UP APPOINTMENTS:   Follow-up Information     Follow up With Specialties Details Why [address], Hospice   [address]  [phone]           ADDITIONAL CARE RECOMMENDATIONS:   · It is important that you take the medication exactly as they are prescribed. · Keep your medication in the bottles provided by the pharmacist and keep a list of the medication names, dosages, and times to be taken in your wallet. · Do not take other medications without consulting your doctor. · No drinking alcohol or driving car or operating machinery if you are on narcotic pain medications. Donot take sedating mediations if you are sleepy or confused.    · Fall Precautions  · Keep Well Hydrated  · Report to your medical provider if you feel you have  developed allergies to medications  · Follow up with your PCP or Consultant for medication adjustments and refills  · Monitor for signs of fevers,chills,bleeding,chest pain and seek medical attention if you do so. ·          DIET: As tolerated       ACTIVITY: Activity as tolerated    WOUND CARE: none    EQUIPMENT needed: none      DISCHARGE MEDICATIONS:   See Medication Reconciliation Form    · It is important that you take the medication exactly as they are prescribed. · Keep your medication in the bottles provided by the pharmacist and keep a list of the medication names, dosages, and times to be taken in your wallet. · Do not take other medications without consulting your doctor. NOTIFY YOUR PHYSICIAN FOR ANY OF THE FOLLOWING:   Fever over 101 degrees for 24 hours. Chest pain, shortness of breath, fever, chills, nausea, vomiting, diarrhea, change in mentation, falling, weakness, bleeding. Severe pain or pain not relieved by medications. Or, any other signs or symptoms that you may have questions about. DISPOSITION:    Home With:   OT  PT  HH  RN      X SNF/Inpatient Rehab/LTAC    Independent/assisted living   X Hospice Will be admitted to [facility] CAM services     Other:       Signed:   Jewel Duarte NP  4/20/2020  10:56 AM    Post op Discharge Instructions Partial Hip Replacement   Gonzalo Montes MD  [facility]  [phone]    Patient Name: Austin Cordero  Date of admission:  4/7/2020  Date of procedure: 4/8/2020   Procedure: Procedure(s):  RIGHT HIP HEMIARTHROPLASTY/ BIOMET/ ER TO IP/ REQ [id number]  Bio-met/ ESSENTIAL  PCP: Angel Luis Samuel MD  Date of discharge: No discharge date for patient encounter. Follow up office visit    See Dr. Monserrat Lewis approximately 3-4 weeks from date of surgery. Call [phone] to make an appointment. Activity   Walk with your walker with weight bearing restrictions as instructed by your physical therapist (weight bearing as tolerated on your surgical leg). Continue using your walker until seen for follow-up visit.     Get up frequently and walk (with assistance as needed)   Perform your exercise routine 3 times a day as instructed by the physical therapist.   Ryley Sandra may not drive. Routine Hip Precautions - Maintain for 6 weeks post-surgery date - always get clarification from the physician before discontinuing  o No straight leg raise  o No internal rotation  o No adduction past midline  o No flexion beyond 90 degrees    Incision Care   Keep a dressing on your incision and change daily. Once you incision is not draining, you may leave it open to air  [facility] hands thoroughly before changing the dressing.  You may take a shower when your incision is dry. Do not take a tub bath or go swimming.  Staff at facility may remove hip staples 4/22/20 if the incision appears healed and there is no active drainage. Preventing blood clots   Please follow instructions from hospitalist.    Pain management   Take pain medication as prescribed; decrease the amount you take as your pain lessens   Avoid alcoholic beverages while taking pain medications   Place an ice bag on the hip for 15-20 minutes after exercising and as needed throughout the day and night    Diet   Resume usual diet; encourage fluids; eat foods high in fiber, calcium and vitamin D   You may want to take a stool softener (such as Senokot-S or Colace) to prevent constipation while you are taking pain medication.  If constipation occurs, take a laxative (such as Dulcolax tablets, Miralax, or a suppository)      When to call your Orthopaedic Surgeon.  If you call after 5pm or on a weekend, the on call physician will be contacted   Pain that is not relieved by pain medication, ice, activity   Signs of infection  o Incision is reddened  o Incision continues to drain; drainage has an odor  o Persistent fever over 101 degrees   Signs of a blood clot in your leg  o Calf pain, tenderness, redness and/or swelling of lower leg    When to call your Primary Care Physician   Concerns about medical conditions such as diabetes, high blood pressure, asthma, congestive heart failure   Call if blood sugars are elevated, persistent headache or dizziness, coughing or congestion, constipation or diarrhea, burning with urination, abnormal heart rate (slow or fast)    When to call 911 and go to the nearest emergency room   Acute onset of chest pain, shortness of breath, difficulty breathing

## 2020-04-20 NOTE — ROUTINE PROCESS
Report was called to Kojo at [facility]. Time for questions and clarifications was given. Patient is awaiting transport. No scripts were sent with the patient.

## 2020-04-26 ENCOUNTER — HOSPITAL ENCOUNTER (INPATIENT)
Age: 80
LOS: 3 days | Discharge: NURSING, REHAB OR LONG-TERM CARE | DRG: 812 | End: 2020-04-29
Attending: EMERGENCY MEDICINE | Admitting: INTERNAL MEDICINE
Payer: MEDICARE

## 2020-04-26 DIAGNOSIS — F03.90 DEMENTIA WITHOUT BEHAVIORAL DISTURBANCE, UNSPECIFIED DEMENTIA TYPE: ICD-10-CM

## 2020-04-26 DIAGNOSIS — N17.9 AKI (ACUTE KIDNEY INJURY) (HCC): ICD-10-CM

## 2020-04-26 DIAGNOSIS — K92.0 COFFEE GROUND EMESIS: ICD-10-CM

## 2020-04-26 DIAGNOSIS — E88.09 HYPOALBUMINEMIA: ICD-10-CM

## 2020-04-26 DIAGNOSIS — K92.2 GASTROINTESTINAL HEMORRHAGE, UNSPECIFIED GASTROINTESTINAL HEMORRHAGE TYPE: Primary | ICD-10-CM

## 2020-04-26 DIAGNOSIS — R53.81 PHYSICAL DEBILITY: ICD-10-CM

## 2020-04-26 DIAGNOSIS — R54 FRAILTY: ICD-10-CM

## 2020-04-26 DIAGNOSIS — D62 ACUTE BLOOD LOSS ANEMIA: ICD-10-CM

## 2020-04-26 LAB
ALBUMIN SERPL-MCNC: 2.1 G/DL (ref 3.5–5)
ALBUMIN/GLOB SERPL: 0.6 {RATIO} (ref 1.1–2.2)
ALP SERPL-CCNC: 60 U/L (ref 45–117)
ALT SERPL-CCNC: 13 U/L (ref 12–78)
ANION GAP SERPL CALC-SCNC: 7 MMOL/L (ref 5–15)
APTT PPP: 25 SEC (ref 22.1–32)
AST SERPL-CCNC: 14 U/L (ref 15–37)
ATRIAL RATE: 116 BPM
BASOPHILS # BLD: 0 K/UL (ref 0–0.1)
BASOPHILS NFR BLD: 0 % (ref 0–1)
BILIRUB SERPL-MCNC: 0.4 MG/DL (ref 0.2–1)
BUN SERPL-MCNC: 48 MG/DL (ref 6–20)
BUN/CREAT SERPL: 38 (ref 12–20)
CALCIUM SERPL-MCNC: 8.2 MG/DL (ref 8.5–10.1)
CALCULATED P AXIS, ECG09: 14 DEGREES
CALCULATED R AXIS, ECG10: 1 DEGREES
CALCULATED T AXIS, ECG11: 74 DEGREES
CHLORIDE SERPL-SCNC: 101 MMOL/L (ref 97–108)
CO2 SERPL-SCNC: 25 MMOL/L (ref 21–32)
COMMENT, HOLDF: NORMAL
CREAT SERPL-MCNC: 1.28 MG/DL (ref 0.55–1.02)
DIAGNOSIS, 93000: NORMAL
DIFFERENTIAL METHOD BLD: ABNORMAL
EOSINOPHIL # BLD: 0 K/UL (ref 0–0.4)
EOSINOPHIL NFR BLD: 0 % (ref 0–7)
ERYTHROCYTE [DISTWIDTH] IN BLOOD BY AUTOMATED COUNT: 15.1 % (ref 11.5–14.5)
GLOBULIN SER CALC-MCNC: 3.4 G/DL (ref 2–4)
GLUCOSE SERPL-MCNC: 175 MG/DL (ref 65–100)
HCT VFR BLD AUTO: 19.8 % (ref 35–47)
HGB BLD-MCNC: 6.3 G/DL (ref 11.5–16)
IMM GRANULOCYTES # BLD AUTO: 0.1 K/UL (ref 0–0.04)
IMM GRANULOCYTES NFR BLD AUTO: 1 % (ref 0–0.5)
INR PPP: 1.3 (ref 0.9–1.1)
LYMPHOCYTES # BLD: 0.7 K/UL (ref 0.8–3.5)
LYMPHOCYTES NFR BLD: 6 % (ref 12–49)
MCH RBC QN AUTO: 32.6 PG (ref 26–34)
MCHC RBC AUTO-ENTMCNC: 31.8 G/DL (ref 30–36.5)
MCV RBC AUTO: 102.6 FL (ref 80–99)
MONOCYTES # BLD: 0.5 K/UL (ref 0–1)
MONOCYTES NFR BLD: 4 % (ref 5–13)
NEUTS SEG # BLD: 10.7 K/UL (ref 1.8–8)
NEUTS SEG NFR BLD: 89 % (ref 32–75)
NRBC # BLD: 0 K/UL (ref 0–0.01)
NRBC BLD-RTO: 0 PER 100 WBC
P-R INTERVAL, ECG05: 114 MS
PLATELET # BLD AUTO: 310 K/UL (ref 150–400)
PMV BLD AUTO: 8.7 FL (ref 8.9–12.9)
POTASSIUM SERPL-SCNC: 4.4 MMOL/L (ref 3.5–5.1)
PROT SERPL-MCNC: 5.5 G/DL (ref 6.4–8.2)
PROTHROMBIN TIME: 12.9 SEC (ref 9–11.1)
Q-T INTERVAL, ECG07: 328 MS
QRS DURATION, ECG06: 74 MS
QTC CALCULATION (BEZET), ECG08: 455 MS
RBC # BLD AUTO: 1.93 M/UL (ref 3.8–5.2)
RBC MORPH BLD: ABNORMAL
SAMPLES BEING HELD,HOLD: NORMAL
SODIUM SERPL-SCNC: 133 MMOL/L (ref 136–145)
THERAPEUTIC RANGE,PTTT: NORMAL SECS (ref 58–77)
VENTRICULAR RATE, ECG03: 116 BPM
WBC # BLD AUTO: 12 K/UL (ref 3.6–11)

## 2020-04-26 PROCEDURE — 86923 COMPATIBILITY TEST ELECTRIC: CPT

## 2020-04-26 PROCEDURE — 74011250636 HC RX REV CODE- 250/636: Performed by: EMERGENCY MEDICINE

## 2020-04-26 PROCEDURE — 74011000250 HC RX REV CODE- 250: Performed by: EMERGENCY MEDICINE

## 2020-04-26 PROCEDURE — 99285 EMERGENCY DEPT VISIT HI MDM: CPT

## 2020-04-26 PROCEDURE — P9016 RBC LEUKOCYTES REDUCED: HCPCS

## 2020-04-26 PROCEDURE — C9113 INJ PANTOPRAZOLE SODIUM, VIA: HCPCS | Performed by: EMERGENCY MEDICINE

## 2020-04-26 PROCEDURE — 85025 COMPLETE CBC W/AUTO DIFF WBC: CPT

## 2020-04-26 PROCEDURE — 96375 TX/PRO/DX INJ NEW DRUG ADDON: CPT

## 2020-04-26 PROCEDURE — P9040 RBC LEUKOREDUCED IRRADIATED: HCPCS

## 2020-04-26 PROCEDURE — 65270000029 HC RM PRIVATE

## 2020-04-26 PROCEDURE — 30233N1 TRANSFUSION OF NONAUTOLOGOUS RED BLOOD CELLS INTO PERIPHERAL VEIN, PERCUTANEOUS APPROACH: ICD-10-PCS | Performed by: INTERNAL MEDICINE

## 2020-04-26 PROCEDURE — 85730 THROMBOPLASTIN TIME PARTIAL: CPT

## 2020-04-26 PROCEDURE — 86900 BLOOD TYPING SEROLOGIC ABO: CPT

## 2020-04-26 PROCEDURE — 80053 COMPREHEN METABOLIC PANEL: CPT

## 2020-04-26 PROCEDURE — 74011250636 HC RX REV CODE- 250/636: Performed by: INTERNAL MEDICINE

## 2020-04-26 PROCEDURE — 85610 PROTHROMBIN TIME: CPT

## 2020-04-26 PROCEDURE — 93005 ELECTROCARDIOGRAM TRACING: CPT

## 2020-04-26 PROCEDURE — 36415 COLL VENOUS BLD VENIPUNCTURE: CPT

## 2020-04-26 PROCEDURE — 36430 TRANSFUSION BLD/BLD COMPNT: CPT

## 2020-04-26 PROCEDURE — 96374 THER/PROPH/DIAG INJ IV PUSH: CPT

## 2020-04-26 RX ORDER — HALOPERIDOL 5 MG/ML
2 INJECTION INTRAMUSCULAR
Status: DISCONTINUED | OUTPATIENT
Start: 2020-04-26 | End: 2020-04-29 | Stop reason: HOSPADM

## 2020-04-26 RX ORDER — SODIUM CHLORIDE 0.9 % (FLUSH) 0.9 %
5-40 SYRINGE (ML) INJECTION AS NEEDED
Status: DISCONTINUED | OUTPATIENT
Start: 2020-04-26 | End: 2020-04-29 | Stop reason: HOSPADM

## 2020-04-26 RX ORDER — ONDANSETRON 2 MG/ML
4 INJECTION INTRAMUSCULAR; INTRAVENOUS
Status: COMPLETED | OUTPATIENT
Start: 2020-04-26 | End: 2020-04-26

## 2020-04-26 RX ORDER — FAMOTIDINE 20 MG/1
20 TABLET, FILM COATED ORAL DAILY
COMMUNITY

## 2020-04-26 RX ORDER — GLYCOPYRROLATE 0.2 MG/ML
0.2 INJECTION INTRAMUSCULAR; INTRAVENOUS
Status: DISCONTINUED | OUTPATIENT
Start: 2020-04-26 | End: 2020-04-29 | Stop reason: HOSPADM

## 2020-04-26 RX ORDER — ACETAMINOPHEN 325 MG/1
650 TABLET ORAL
COMMUNITY

## 2020-04-26 RX ORDER — SODIUM CHLORIDE 9 MG/ML
75 INJECTION, SOLUTION INTRAVENOUS CONTINUOUS
Status: DISCONTINUED | OUTPATIENT
Start: 2020-04-26 | End: 2020-04-26

## 2020-04-26 RX ORDER — HALOPERIDOL 2 MG/ML
2 SOLUTION ORAL
Status: DISCONTINUED | OUTPATIENT
Start: 2020-04-26 | End: 2020-04-29 | Stop reason: HOSPADM

## 2020-04-26 RX ORDER — SODIUM CHLORIDE 0.9 % (FLUSH) 0.9 %
5-40 SYRINGE (ML) INJECTION EVERY 8 HOURS
Status: DISCONTINUED | OUTPATIENT
Start: 2020-04-26 | End: 2020-04-29 | Stop reason: HOSPADM

## 2020-04-26 RX ORDER — MORPHINE SULFATE 2 MG/ML
2 INJECTION, SOLUTION INTRAMUSCULAR; INTRAVENOUS
Status: DISCONTINUED | OUTPATIENT
Start: 2020-04-26 | End: 2020-04-29 | Stop reason: HOSPADM

## 2020-04-26 RX ORDER — MELATONIN
2000 DAILY
COMMUNITY

## 2020-04-26 RX ORDER — LANOLIN ALCOHOL/MO/W.PET/CERES
325 CREAM (GRAM) TOPICAL
COMMUNITY

## 2020-04-26 RX ORDER — DICLOFENAC SODIUM 10 MG/G
GEL TOPICAL DAILY
COMMUNITY

## 2020-04-26 RX ORDER — SERTRALINE HYDROCHLORIDE 25 MG/1
25 TABLET, FILM COATED ORAL DAILY
COMMUNITY

## 2020-04-26 RX ORDER — PHENOL/SODIUM PHENOLATE
20 AEROSOL, SPRAY (ML) MUCOUS MEMBRANE DAILY
COMMUNITY

## 2020-04-26 RX ORDER — SODIUM CHLORIDE 9 MG/ML
250 INJECTION, SOLUTION INTRAVENOUS AS NEEDED
Status: DISCONTINUED | OUTPATIENT
Start: 2020-04-26 | End: 2020-04-29 | Stop reason: HOSPADM

## 2020-04-26 RX ORDER — ONDANSETRON 2 MG/ML
4 INJECTION INTRAMUSCULAR; INTRAVENOUS
Status: DISCONTINUED | OUTPATIENT
Start: 2020-04-26 | End: 2020-04-29 | Stop reason: HOSPADM

## 2020-04-26 RX ORDER — ASPIRIN 81 MG/1
81 TABLET ORAL DAILY
COMMUNITY

## 2020-04-26 RX ADMIN — SODIUM CHLORIDE 40 MG: 9 INJECTION INTRAMUSCULAR; INTRAVENOUS; SUBCUTANEOUS at 13:57

## 2020-04-26 RX ADMIN — SODIUM CHLORIDE 40 MG: 9 INJECTION INTRAMUSCULAR; INTRAVENOUS; SUBCUTANEOUS at 12:14

## 2020-04-26 RX ADMIN — SODIUM CHLORIDE 1000 ML: 900 INJECTION, SOLUTION INTRAVENOUS at 12:14

## 2020-04-26 RX ADMIN — SODIUM CHLORIDE 75 ML/HR: 900 INJECTION, SOLUTION INTRAVENOUS at 16:19

## 2020-04-26 RX ADMIN — Medication 10 ML: at 23:11

## 2020-04-26 RX ADMIN — Medication 10 ML: at 14:13

## 2020-04-26 RX ADMIN — ONDANSETRON 4 MG: 2 INJECTION, SOLUTION INTRAMUSCULAR; INTRAVENOUS at 12:14

## 2020-04-26 RX ADMIN — Medication 10 ML: at 14:14

## 2020-04-26 RX ADMIN — ONDANSETRON 4 MG: 2 INJECTION, SOLUTION INTRAMUSCULAR; INTRAVENOUS at 15:35

## 2020-04-26 NOTE — ED PROVIDER NOTES
The history is provided by the patient. Vomiting    This is a new problem. The current episode started 2 days ago. The problem has not changed since onset. The emesis has an appearance of stomach contents and coffee grounds. There has been no fever.         Past Medical History:   Diagnosis Date    Anemia NEC     Asthma     major asthma as a child/only with colds now    Cancer ([facility]     BCCA removed    Coagulation disorder ([facility]     on xarelto    Dementia ([facility]     GERD (gastroesophageal reflux disease) 10/22/2010    Hiatal hernia     Hypertension     hx of blood pressure med for short time    Ill-defined condition     increased cholesterol    Other unknown and unspecified cause of morbidity or mortality     eval with Dr Tabby Griggs for SOB, sept 2015:  results unkown    Stroke ([facility] 2013    no deficits    Stroke ([facility]     heat exhaustion    Watermelon stomach 2003       Past Surgical History:   Procedure Laterality Date    HX BLADDER SUSPENSION      HX COLONOSCOPY      HX ENDOSCOPY  6/4/15    Dr. Ariel Tillman HX GYN      vaginal del times 3    HX HERNIA REPAIR  2014    x2, failure    HX OTHER SURGICAL      EGDs    HX TONSILLECTOMY      SD EXC SKIN MALIG >4CM FACE,FACIAL      times 3 - BCCA         Family History:   Problem Relation Age of Onset    COPD Mother     Asthma Mother     Heart Attack Father     Other Father         peptic ulcer disease    Anesth Problems Neg Hx        Social History     Socioeconomic History    Marital status:      Spouse name: Not on file    Number of children: Not on file    Years of education: Not on file    Highest education level: Not on file   Occupational History     Employer: RETIRED   Social Needs    Financial resource strain: Not on file    Food insecurity     Worry: Not on file     Inability: Not on file    Transportation needs     Medical: Not on file     Non-medical: Not on file   Tobacco Use    Smoking status: Never Smoker    Smokeless tobacco: Never Used   Substance and Sexual Activity    Alcohol use: No    Drug use: No    Sexual activity: Never   Lifestyle    Physical activity     Days per week: Not on file     Minutes per session: Not on file    Stress: Not on file   Relationships    Social connections     Talks on phone: Not on file     Gets together: Not on file     Attends Buddhist service: Not on file     Active member of club or organization: Not on file     Attends meetings of clubs or organizations: Not on file     Relationship status: Not on file    Intimate partner violence     Fear of current or ex partner: Not on file     Emotionally abused: Not on file     Physically abused: Not on file     Forced sexual activity: Not on file   Other Topics Concern    Not on file   Social History Narrative    Not on file         ALLERGIES: Latex; Codeine; Darvon [propoxyphene]; Other medication; and Pcn [penicillins]    Review of Systems   Unable to perform ROS: Dementia   Gastrointestinal: Positive for vomiting. Vitals:    04/26/20 1201   BP: (!) 117/97   Pulse: (!) 114   Resp: 28   Temp: 97.6 °F (36.4 °C)   SpO2: 98%   Weight: 65.5 kg (144 lb 6.4 oz)   Height: 5' 4\" (1.626 m)            Physical Exam  Vitals signs and nursing note reviewed. Constitutional:       General: She is not in acute distress. Appearance: She is well-developed. She is not diaphoretic. HENT:      Head: Normocephalic and atraumatic. Eyes:      Pupils: Pupils are equal, round, and reactive to light. Neck:      Musculoskeletal: Normal range of motion and neck supple. Cardiovascular:      Rate and Rhythm: Regular rhythm. Tachycardia present. Heart sounds: Normal heart sounds. No murmur. No friction rub. No gallop. Pulmonary:      Effort: Pulmonary effort is normal. No respiratory distress. Breath sounds: Normal breath sounds. No wheezing. Abdominal:      General: Bowel sounds are normal. There is no distension.       Palpations: Abdomen is soft. Tenderness: There is no abdominal tenderness. There is no guarding or rebound. Musculoskeletal: Normal range of motion. Skin:     General: Skin is warm. Findings: No rash. Neurological:      Mental Status: She is alert. MDM  Number of Diagnoses or Management Options  Acute blood loss anemia:   JAY (acute kidney injury) [facility]):   Gastrointestinal hemorrhage, unspecified gastrointestinal hemorrhage type:   Diagnosis management comments: Total critical care time spent exclusive of procedures:  45min       This is an 79-year-old female with past medical history, review of systems, physical exam as above, presenting with complaints of vomiting coffee-ground emesis. Per report from her nursing facility, patient with 3 episodes over the last 2 days. Chart review indicates history of GERD. Per report patient in hospice, cannot contribute to history or review of systems secondary to advanced dementia. On upon arrival patient awake alert, agitated, tachycardic, normotensive, afebrile, satting well on room air. She has rapid regular heart rate, clear breath sounds, soft abdomen. Consider coffee-ground emesis possible secondary to upper GI bleed. Plan to offer IV pantoprazole, Zofran, obtain CMP, CBC, coags. Will provide IV fluids, reassess, and make a disposition. Procedures    12:52 PM  Patient is anemic, discussed with  by phone, states hx of frequent PUD bleeding, multiple tx, denies current [facility], states GI is Dr. Nazanin Romero. Hospitalist Perfect Serve for Admission  1:18 PM    ED Room Number: ER13/13  Patient Name and age: Ayan Coleman [de-identified[age] y.o.  female  Working Diagnosis:   1. Gastrointestinal hemorrhage, unspecified gastrointestinal hemorrhage type    2. Acute blood loss anemia    3.  JAY (acute kidney injury) ([facility]        COVID-19 Suspicion:  no    Readmission: yes  Isolation Requirements:  no  Recommended Level of Care:  med/surg  Department:[facility] Adult ED - [phone]  Other:  D/w Dr. Karen Lee    1:31 PM  Patient d/w Dr. Shelly Hardin (GI) who will make arrangements to have the patient seen, scoped, agrees with plan.

## 2020-04-26 NOTE — PROGRESS NOTES
TRANSFER - IN REPORT:    Verbal report received from jarrett(name) on [employer]  being received from ED(unit) for routine progression of care      Report consisted of patients Situation, Background, Assessment and   Recommendations(SBAR). Information from the following report(s) SBAR, Kardex and Intake/Output was reviewed with the receiving nurse. Opportunity for questions and clarification was provided. Assessment completed upon patients arrival to unit and care assumed.

## 2020-04-26 NOTE — PROGRESS NOTES
Admission Medication Reconciliation:    Information obtained from:  [employer] med list dated 4/7/20  RxQuery data available¹:  YES    Comments/Recommendations: Updated PTA meds/reviewed patient's allergies. 1)  Attempted to call Nathalie phillips to confirm accuracy of med list scanned on 4/7- no return call received. Completed med rec utilizing med list on file    2)  Medication changes (since last review): Added  - aspirin, diclofenac gel, famotidine, ferrous sulfate, omeprazole, mvi, acetaminophen, vitamin d, zoloft    Adjusted  - none    Removed  - remeron, seroquel       ¹RxQuery pharmacy benefit data reflects medications filled and processed through the patient's insurance, however   this data does NOT capture whether the medication was picked up or is currently being taken by the patient. Allergies:  Latex; Codeine; Darvon [propoxyphene]; Other medication; and Pcn [penicillins]    Significant PMH/Disease States:   Past Medical History:   Diagnosis Date    Anemia NEC     Asthma     major asthma as a child/only with colds now    Cancer [facility])     BCCA removed    Coagulation disorder ([facility] [facility]     on xarelto    Dementia ([facility] [address] [facility]     GERD (gastroesophageal reflux disease) 10/22/2010    Hiatal hernia     Hypertension     hx of blood pressure med for short time    Ill-defined condition     increased cholesterol    Other unknown and unspecified cause of morbidity or mortality     eval with Dr Aftab Cavazos for SOB, sept 2015:  results unkown    Stroke ([facility] [address] [facility] 2013    no deficits    Stroke [facility])     heat exhaustion    Watermelon stomach 2003     Chief Complaint for this Admission:    Chief Complaint   Patient presents with    Vomiting     Prior to Admission Medications:   Prior to Admission Medications   Prescriptions Last Dose Informant Taking? ALPRAZolam (XANAX) 0.5 mg tablet Unknown at Unknown time  No   Sig: Take 1 Tab by mouth three (3) times daily as needed for Anxiety. Max Daily Amount: 1.5 mg.    Omeprazole delayed release (PRILOSEC D/R) 20 mg tablet   Yes   Sig: Take 20 mg by mouth daily. acetaminophen (TYLENOL) 325 mg tablet   Yes   Sig: Take 650 mg by mouth every six (6) hours as needed for Pain. aspirin delayed-release 81 mg tablet   Yes   Sig: Take 81 mg by mouth daily. cholecalciferol (Vitamin D3) (1000 Units /25 mcg) tablet   Yes   Sig: Take 2,000 Units by mouth daily. diclofenac (VOLTAREN) 1 % gel   Yes   Sig: Apply  to affected area daily. Left knee   famotidine (PEPCID) 20 mg tablet   Yes   Sig: Take 20 mg by mouth daily. ferrous sulfate 325 mg (65 mg iron) tablet   Yes   Sig: Take 325 mg by mouth Daily (before breakfast). midodrine (PROAMITINE) 10 mg tablet Unknown at Unknown time  No   Sig: Take 10 mg by mouth two (2) times a day. multivitamin, tx-iron-ca-min (THERA-M w/ IRON) 9 mg iron-400 mcg tab tablet   Yes   Sig: Take 1 Tab by mouth daily. polyethylene glycol (MIRALAX) 17 gram/dose powder Unknown at Unknown time  No   Sig: Take 17 g by mouth as needed. pravastatin (PRAVACHOL) 20 mg tablet Unknown at Unknown time  No   Sig: TAKE 1 TABLET BY MOUTH AT BEDTIME   sertraline (Zoloft) 25 mg tablet   Yes   Sig: Take 25 mg by mouth daily. Facility-Administered Medications: None       Please contact the main inpatient pharmacy with any questions or concerns at [phone] and we will direct you to the clinical pharmacist covering this patient's care while in-house.    Akil Worthy

## 2020-04-26 NOTE — ED TRIAGE NOTES
Patient arrives from [facility] for 2 days of coffee ground emesis.   Per EMS patient is on Hospice, but family wanted transport to ED

## 2020-04-26 NOTE — PROGRESS NOTES
Palliative care consult received. Assessed pt and called to discuss plan of care with patient's . [facility] for inpatient admission but when we spoke pt's  understands that she will not be able to tolerate invasive hospital interventions. I recommended a focus on comfort and symptom management and he is in agreement. He will visit her later this evening or tomorrow. I explained comfort focused care and the use of opioids for pain as she currently appears uncomfortable and in mild-mod distress. He expressed understanding that she may become lethargic with these medicines.       Plan  - comfort focused care w/o further diagnostics or artificial nutrition and hydration  - start prn opioids for pain management  -  to visit later tonight or tomorrow    Full note to follow

## 2020-04-26 NOTE — H&P
Hospitalist History and Physical  Gala Gonzalez MD  Answering service: [phone] from in house phone        Date of Service:  2020  NAME:  Vanesa Ward  :  1940  MRN:  [id number]  Primary Care Provider: Maame Sanderson MD    Chief Complaint:   Chief Complaint   Patient presents with    Vomiting       History of Present Illness: Vanesa Ward is a [de-identified] y.o. female with past medical history of asthma, dementia, GERD, stroke, G [address] who comes in with coffee-ground emesis. Patient is an 71-year-old lady who is unable to provide any HPI, data was mostly obtained from the ED staff, family over the phone and the extensive chart review. As per collective reports, patient is an 71-year-old lady who was just discharged for her facility to [address] [place] [address] with hospice due to progressive dementia, able to care for self and overall decline. Patient has been having poor oral intake for last few days at the nursing home as reported by nursing staff, developed urinary retention and a Liz was placed. She developed coffee-ground emesis 2 days ago and has been progressively getting worse so they called the  to see if he would prefer patient to be placed on comfort care but the  preferred the patient to be transported to hospital for further management as he would like the bleeding to be stopped. Patient was brought in via ambulance, blood work was done which was read showing acute blood loss anemia on top of chronic anemia, blood consent was obtained from patient's  who is an POA and patient was given a unit of blood. Along with starting the patient on IV Protonix and GI was consulted. Dr. Abhinav Griffin knows the patient well from the past and will evaluate the patient soon.    Patient is overall confused, unable to participate in history and process due to advanced dementia but she was able to state that she is not in pain, has no abdominal pain or back pain, has no trouble breathing. Chart reviewed at length. Review of Systems:  Pertinent positives noted in HPI. Unable to complete a detailed/thorough review of systems due to patient's factors which is confusion. Past Medical and surgical history:   Past Medical History:   Diagnosis Date    Anemia NEC     Asthma     major asthma as a child/only with colds now    Cancer ([facility]     B[facility] removed    Coagulation disorder ([facility]     on xarelto    Dementia ([facility]     GERD (gastroesophageal reflux disease) 10/22/2010    Hiatal hernia     Hypertension     hx of blood pressure med for short time    Ill-defined condition     increased cholesterol    Other unknown and unspecified cause of morbidity or mortality     eval with Dr Racquel Delarosa for SOB, sept 2015:  results unkown    Stroke ([facility] 2013    no deficits    [facility]     heat exhaustion    Watermelon stomach 2003      Past Surgical History:   Procedure Laterality Date    HX BLADDER SUSPENSION      HX COLONOSCOPY      HX ENDOSCOPY  6/4/15    Dr. Selina Lee      vaginal del times 3    HX HERNIA REPAIR  2014    x2, failure    HX OTHER SURGICAL      EGDs    HX TONSILLECTOMY      VA EXC SKIN MALIG >4CM FACE,FACIAL      times 3 - BCCA       Home medications:  Prior to Admission medications    Medication Sig Start Date End Date Taking? Authorizing Provider   mirtazapine (REMERON) 15 mg tablet TAKE 1/2 TABLET BY MOUTH EVERY DAY AT BEDTIME 3/25/20   Andria Coburn III, MD   ALPRAZolam Anya Yeh) 0.5 mg tablet Take 1 Tab by mouth three (3) times daily as needed for Anxiety. Max Daily Amount: 1.5 mg. 2/12/20   Burley Osler, MD   QUEtiapine (SEROQUEL) 25 mg tablet Take 0.5 Tabs by mouth every eight (8) hours as needed for Other (agitation). 2/12/20   Burley Osler, MD   polyethylene glycol (MIRALAX) 17 gram/dose powder Take 17 g by mouth as needed.     Provider, Historical   midodrine (PROAMITINE) 10 mg tablet Take  by mouth two (2) times a day.    Provider, Historical   pravastatin (PRAVACHOL) 20 mg tablet TAKE 1 TABLET BY MOUTH AT BEDTIME 10/17/18   Shimon Carias MD       Allergies: Allergies   Allergen Reactions    Latex Hives     Latex tape    Codeine Nausea and Vomiting    Darvon [Propoxyphene] Nausea and Vomiting    Other Medication Unknown (comments)     Flu shot (per )    Pcn [Penicillins] Hives       Family history:   Family History   Problem Relation Age of Onset    COPD Mother     Asthma Mother     Heart Attack Father     Other Father         peptic ulcer disease    Anesth Problems Neg Hx         SOCIAL HISTORY:  Patient resides at [facility] and hospice. Patient ambulates: DOES NOT WALK. Smoking history: family reports that she has never smoked. She has never used smokeless tobacco.  Drug History:family  reports no history of drug use. Alcohol history:family  reports no history of alcohol use. Objective:       Physical Exam:   Visit Vitals  /54 (BP 1 Location: Left arm, BP Patient Position: At rest)   Pulse (!) 104   Temp 97.9 °F (36.6 °C)   Resp 25   Ht 5' 4\" (1.626 m)   Wt 65.5 kg (144 lb 6.4 oz)   SpO2 100%   BMI 24.79 kg/m²     General appearance: fatigued, cooperative, mild distress, appears older than stated age, confused  Head: Normocephalic, without obvious abnormality, atraumatic  Throat: oral mucosa is dry with dried blood in the oral cavity. Lungs: CTA BL, no W/R/R  Heart: sinus tachycardia, no obvious murmurs  Abdomen: soft, NT, NG nR  Extremities: extremities normal, atraumatic, no cyanosis or edema  Skin: Skin color, texture, turgor normal. No rashes or lesions  Neurologic: confused, moves all extremities, does not follow commands     Laboratory and other diagnostic Data Review: All diagnostic labs and studies have been reviewed. Xr Hip Rt W Or Wo Pelv 2-3 Vws    Result Date: 4/7/2020  EXAM: XR HIP RT W OR WO PELV 2-3 VWS INDICATION: fall. COMPARISON: CT abdomen pelvis 2/7/2020.  FINDINGS: An AP view of the pelvis and a frogleg lateral view of the right hip demonstrate an acute displaced subcapital right femoral neck fracture with foreshortening and varus angulation. The femoral head remains within the right acetabulum. No other acute fractures are identified. Osteopenia. Vascular calcifications. Catheter in the pelvis. Degenerative changes of the lower lumbar spine. IMPRESSION: 1. Acute displaced subcapital right femoral neck fracture with foreshortening and varus angulation. Xr Knee Lt Max 2 Vws    Result Date: 4/7/2020  history: Fall with left knee pain COMPARISON: 1/10/2020 FINDINGS: 2 views of the left knee submitted for review. No evidence for acute fracture or dislocation. No knee joint effusion. Moderate osteoarthritis     IMPRESSION: No evidence for acute abnormality      Xr Chest Port    Result Date: 4/7/2020  EXAM:  XR CHEST PORT INDICATION:   hip fx COMPARISON: Chest radiograph 9/28/2018. FINDINGS: AP radiograph of the chest was obtained. No evidence of focal consolidation. No pleural effusion or pneumothorax. Heart size is normal. Calcifications of the thoracic aorta. No acute osseous abnormalities. A small hiatal hernia is again noted. IMPRESSION: No acute cardiopulmonary process. Unchanged hiatal hernia. Xr Hip Rt W Or Wo Pelv  1 Vw    Result Date: 4/8/2020  INDICATION: PACU, status post right hip surgery. IMPRESSION: Single AP view of the right hip demonstrates a hip prosthesis in gross anatomic alignment and without evidence of orthopedic hardware complication.         Patient Vitals for the past 12 hrs:   Temp Pulse Resp BP SpO2   04/26/20 1400 98 °F (36.7 °C) (!) 111 27 101/51 100 %   04/26/20 1351 97.9 °F (36.6 °C) (!) 119 25 104/51 100 %   04/26/20 1315  (!) 108 25 111/46 99 %   04/26/20 1300  (!) 105 21 108/85    04/26/20 1245  (!) 110 27 145/47    04/26/20 1230  (!) 107 27 120/44    04/26/20 1215  (!) 103 29 99/43 93 %   04/26/20 1201 97.6 °F (36.4 °C) (!) 114 28 (!) 117/97 98 %       Recent Labs     04/26/20  1207   WBC 12.0*   HGB 6.3*   HCT 19.8*        Recent Labs     04/26/20  1207   *   K 4.4      CO2 25   BUN 48*   CREA 1.28*   *   CA 8.2*     Recent Labs     04/26/20  1207   SGOT 14*   ALT 13   AP 60   TBILI 0.4   TP 5.5*   ALB 2.1*   GLOB 3.4     Recent Labs     04/26/20  1207   INR 1.3*   PTP 12.9*   APTT 25.0      No results for input(s): FE, TIBC, PSAT, FERR in the last 72 hours. Lab Results   Component Value Date/Time    Folate 17.3 10/08/2015 11:30 AM      No results for input(s): PH, PCO2, PO2 in the last 72 hours. No results for input(s): CPK, CKNDX, TROIQ in the last 72 hours. No lab exists for component: CPKMB  Lab Results   Component Value Date/Time    Cholesterol, total 145 04/04/2017 09:46 AM    HDL Cholesterol 61 04/04/2017 09:46 AM    LDL, calculated 67 04/04/2017 09:46 AM    Triglyceride 87 04/04/2017 09:46 AM    CHOL/HDL Ratio 3.4 06/02/2013 06:30 AM     Lab Results   Component Value Date/Time    Glucose (POC) 120 (H) 04/08/2020 11:17 AM    Glucose (POC) 115 (H) 09/28/2018 08:46 PM     Lab Results   Component Value Date/Time    Color YELLOW/STRAW 04/07/2020 02:00 PM    Appearance CLEAR 04/07/2020 02:00 PM    Specific gravity 1.022 04/07/2020 02:00 PM    pH (UA) 6.0 04/07/2020 02:00 PM    Protein 30 (A) 04/07/2020 02:00 PM    Glucose 100 (A) 04/07/2020 02:00 PM    Ketone Negative 04/07/2020 02:00 PM    Bilirubin Negative 04/07/2020 02:00 PM    Urobilinogen 0.2 04/07/2020 02:00 PM    Nitrites Negative 04/07/2020 02:00 PM    Leukocyte Esterase Negative 04/07/2020 02:00 PM    Epithelial cells FEW 04/07/2020 02:00 PM    Bacteria Negative 04/07/2020 02:00 PM    WBC 0-4 04/07/2020 02:00 PM    RBC 0-5 04/07/2020 02:00 PM       Assessment:   Given the patient's current clinical presentation, I have a high level of concern for decompensation if discharged from the emergency department.   Complex decision making was performed, which includes reviewing the patient's available past medical records, laboratory results, and x-ray films. My assessment of this patient's clinical condition and my plan of care is as follows. Active Problems:    Coffee ground emesis (4/26/2020)        Plan:     Acute blood loss anemia on chronic anemia   Coffee-ground emesis, possibly upper GI bleed  History of GAVE  H&H low, patient is being transfused 1 unit of PRBC in the ED, continue IV Protonix, GI consulted and they will evaluate the patient, Dr. Lyndon Oates aware. Repeat H&H at 8 PM today, transfuse as needed  N.p.o., IV fluids, antiemetics  Admit to monitored bed. Advanced dementia, progressive with inability to care for self and behavioral disturbances. Patient is hospice appropriate and was actually on hospice. The  rescinded the hospice status at the moment and wants the patient to be treated. Supportive care  Seroquel as needed for confusion/agitation  Palliative care will be consulted. Recent hip fracture due to ground-level fall status post right hip hemiarthroplasty 4/8/20   supportive care, pain control     Dysphagia due to progressive dementiacurrently n.p.o. but patient was on puréed diet     Acute kidney injury with hyponatremiadue to poor oral intake and emesis  IV fluids, repeat lab in the morning. Depressioncontinue home dose of Remeron as able    Acute urinary retention status post Liz placementcontinue Liz for now. Diet: N.p.o. Activity: Out of bed to chair with assist, fall precautions  DVT prophylaxis: SCDs  Isolation precautions: None  Consultations: Gastroenterology  Anticipated disposition: Hospice  Code status: DNR. I talked to the patient's  on the phone in detail, he understands the patient has been having hard time with her dementia, poor oral intake and has had multiple endoscopic surgeries for GI bleeds.   Patient is DNR/DNI as confirmed by the  who is the medical power of . He hopes that if she stabilizes, she could be made comfort care and family could see her at the bedside. Admit to inpatient status  Patient was explained about the risk of admission including and not a complete list including risk of falls,fractures,blood clots,allergic reactions,infections. Patient/family also understands and agrees to the treatment plan including medications and side effect profiles and also understand the risk with radiation while undergoing imaging studies. The patient and the family/friends (after permission given by the patient to discuss) understand this and agree with the admission plan.        Signed By: Ana Villasenor MD     04/26/20  2:06 PM        Patient's emergency contacts:  Extended Emergency Contact Information  Primary Emergency Contact: Munir Ann  Address: [address] [address]           [address] Phone: [phone]  Mobile Phone: [phone]  Relation: Spouse  Secondary Emergency Contact: [address] [place] [address] Phone: [phone]  Mobile Phone: [phone]  Relation: Daughter

## 2020-04-26 NOTE — ROUTINE PROCESS
TRANSFER - OUT REPORT: 
 
Verbal report given to [facility], RN(name) on Katerina Sol  being transferred to [address] [place] [address]([facility]) for routine progression of care Report consisted of patients Situation, Background, Assessment and  
Recommendations(SBAR). Information from the following report(s) SBAR, Kardex, Intake/Output, MAR and Recent Results was reviewed with the receiving nurse. Lines:  
Peripheral IV 04/26/20 Left Antecubital (Active) Site Assessment Clean, dry, & intact 4/26/2020 12:03 PM  
Phlebitis Assessment 0 4/26/2020 12:03 PM  
Infiltration Assessment 0 4/26/2020 12:03 PM  
Dressing Status Clean, dry, & intact 4/26/2020 12:03 PM  
Hub Color/Line Status Pink;Capped;Flushed;Patent 4/26/2020 12:03 PM  
Action Taken Blood drawn 4/26/2020 12:03 PM  
   
Peripheral IV 04/26/20 Right Forearm (Active) Site Assessment Clean, dry, & intact 4/26/2020 12:04 PM  
Phlebitis Assessment 0 4/26/2020 12:04 PM  
Infiltration Assessment 0 4/26/2020 12:04 PM  
Dressing Status Clean, dry, & intact 4/26/2020 12:04 PM  
Hub Color/Line Status Pink;Capped;Flushed;Patent 4/26/2020 12:04 PM  
Action Taken Blood drawn 4/26/2020 12:04 PM  
  
 
Opportunity for questions and clarification was provided. Patient transported with: 
 Registered Nurse

## 2020-04-26 NOTE — ED NOTES
Telephone consent obtained from Antwan Nguyen (patient ) and verified by 2nd RN. Paper consent on chart.

## 2020-04-27 LAB
ABO + RH BLD: NORMAL
BLD PROD TYP BPU: NORMAL
BLOOD GROUP ANTIBODIES SERPL: NORMAL
BPU ID: NORMAL
CROSSMATCH RESULT,%XM: NORMAL
SPECIMEN EXP DATE BLD: NORMAL
STATUS OF UNIT,%ST: NORMAL
UNIT DIVISION, %UDIV: 0

## 2020-04-27 PROCEDURE — 74011250636 HC RX REV CODE- 250/636: Performed by: INTERNAL MEDICINE

## 2020-04-27 PROCEDURE — 65270000029 HC RM PRIVATE

## 2020-04-27 PROCEDURE — C9113 INJ PANTOPRAZOLE SODIUM, VIA: HCPCS | Performed by: INTERNAL MEDICINE

## 2020-04-27 PROCEDURE — 74011000250 HC RX REV CODE- 250: Performed by: INTERNAL MEDICINE

## 2020-04-27 RX ADMIN — Medication 10 ML: at 06:39

## 2020-04-27 RX ADMIN — Medication 10 ML: at 22:41

## 2020-04-27 RX ADMIN — Medication 10 ML: at 14:47

## 2020-04-27 RX ADMIN — ONDANSETRON 4 MG: 2 INJECTION, SOLUTION INTRAMUSCULAR; INTRAVENOUS at 12:44

## 2020-04-27 RX ADMIN — HALOPERIDOL LACTATE 2 MG: 5 INJECTION INTRAMUSCULAR at 20:51

## 2020-04-27 RX ADMIN — SODIUM CHLORIDE 40 MG: 9 INJECTION INTRAMUSCULAR; INTRAVENOUS; SUBCUTANEOUS at 14:47

## 2020-04-27 RX ADMIN — SODIUM CHLORIDE 40 MG: 9 INJECTION INTRAMUSCULAR; INTRAVENOUS; SUBCUTANEOUS at 02:10

## 2020-04-27 NOTE — WOUND CARE
WOCN Note:  
 
New consult for Right heel wound, POA. Chart shows: 
Admitted for vomiting with coffee ground emesis History of CVA, right femoral neck fracture with surgery 4/8, dementia Admitted from [facility] and on hospice care there. Family member present. Assessment: 
Communicative and reports no pain. Requires full assists in repositioning from right side to left side. Wearing briefs. Bed: Warren with gel mattress Left heel, buttocks, and sacral skin intact and without erythema. No edema or erythema to lower legs and feet. Heels offloaded with pillows. 1. POA, right posterior heel deep tissue injury = 6 x 6 x 0.1 cm  50% moist red & non-blanching 50% non-blanching purple/burgundy. no exudate. Periwound intact & without erythema. There was a layer of dead skin bunched to one side that I removed. Cleaned with saline, applied Xeroform gauze and secured with roll gauze. Recommendations:   
Right heel: clean with saline, apply Xeroform gauze and dry cover. Change every 2-3 days. Turn/reposition approximately every 2 hours and offload heels with pillows at all times in bed. Hydraguard cream to buttocks and sacrum daily and as needed with incontinence care Discussed above plan with RN. Transition of Care: Plan to follow weekly and as needed while admitted to hospital.   
 
TERE RuizN, RN, [facility] [place] Certified Wound, Ostomy, Continence Nurse 
office [phone] 
pager [phone] or call  to page

## 2020-04-27 NOTE — CONSULTS
[address] [facility] NOTE  [facility]  [phone] NP in-hospital cell phone M-F until 4:30  After 5pm or on weekends, please call  for physician on call        NAME:  Matilda Todd   :   1940   MRN:   [id number]       Referring Physician: Braydon Darnell Date: 2020 9:13 AM    Chief Complaint: GI bleed     History of Present Illness:  Patient is a [de-identified] y.o. who is seen in consultation at the request of Dr. Maritza Dominguez for GI bleed. Medical history as listed below includes dementia and GAVE. She presented from living facility for 3 episodes of coffee-ground emesis. She was evaluated earlier this month for anemia after hip fracture/repair and discharged on hospice. No further signs of GI Blood loss since admission, was given 1 unit pRBC's. She does not provide much history due to dementia but denies abdominal pain or nausea. Patient has been on Lovenox for DVT prophylaxis. History of GAVE. EGD (9/16/15) by Dr. Gabe Verdin for [place] [address] showed a large hiatal hernia, erythema within the hernia, but no obvious ulcers or erosions, no residual GAVE; normal duodenum to second portion. At that time, it was recommended that future endoscopic procedures should be done with general anesthesia. I have reviewed the emergency room note, hospital admission note, notes by all other clinicians who have seen the patient during this hospitalization to date. I have reviewed the problem list and the reason for this hospitalization. I have reviewed the allergies and the medications the patient was taking at home prior to this hospitalization.     PMH:  Past Medical History:   Diagnosis Date    Anemia NEC     Asthma     major asthma as a child/only with colds now    Cancer [facility])     BCCA removed    Coagulation disorder ([facility] [address]     on xarelto    Dementia ([facility] [address]     GERD (gastroesophageal reflux disease) 10/22/2010    Hiatal hernia     Hypertension     hx of blood pressure med for short time    Ill-defined condition     increased cholesterol    Other unknown and unspecified cause of morbidity or mortality     eval with Dr Maia Blandon for SOB, sept 2015:  results unkown    Stroke ([facility] [address] 2013    no deficits    Stroke ([facility] [address]     heat exhaustion    Watermelon stomach 2003       PSH:  Past Surgical History:   Procedure Laterality Date    HX BLADDER SUSPENSION      HX COLONOSCOPY      HX ENDOSCOPY  6/4/15    Dr. Porras Post      vaginal del times 3    HX HERNIA REPAIR  2014    x2, failure    HX OTHER SURGICAL      EGDs    HX TONSILLECTOMY      IA EXC SKIN MALIG >4CM FACE,FACIAL      times 3 - BCCA       Allergies: Allergies   Allergen Reactions    Latex Hives     Latex tape    Codeine Nausea and Vomiting    Darvon [Propoxyphene] Nausea and Vomiting    Other Medication Unknown (comments)     Flu shot (per )    Pcn [Penicillins] Hives       Home Medications:  Prior to Admission Medications   Prescriptions Last Dose Informant Patient Reported? Taking? ALPRAZolam (XANAX) 0.5 mg tablet Unknown at Unknown time  No No   Sig: Take 1 Tab by mouth three (3) times daily as needed for Anxiety. Max Daily Amount: 1.5 mg. Omeprazole delayed release (PRILOSEC D/R) 20 mg tablet   Yes Yes   Sig: Take 20 mg by mouth daily. acetaminophen (TYLENOL) 325 mg tablet   Yes Yes   Sig: Take 650 mg by mouth every six (6) hours as needed for Pain. aspirin delayed-release 81 mg tablet   Yes Yes   Sig: Take 81 mg by mouth daily. cholecalciferol (Vitamin D3) (1000 Units /25 mcg) tablet   Yes Yes   Sig: Take 2,000 Units by mouth daily. diclofenac (VOLTAREN) 1 % gel   Yes Yes   Sig: Apply  to affected area daily. Left knee   famotidine (PEPCID) 20 mg tablet   Yes Yes   Sig: Take 20 mg by mouth daily. ferrous sulfate 325 mg (65 mg iron) tablet   Yes Yes   Sig: Take 325 mg by mouth Daily (before breakfast). midodrine (PROAMITINE) 10 mg tablet Unknown at Unknown time  Yes No   Sig: Take 10 mg by mouth two (2) times a day. multivitamin, tx-iron-ca-min (THERA-M w/ IRON) 9 mg iron-400 mcg tab tablet   Yes Yes   Sig: Take 1 Tab by mouth daily. polyethylene glycol (MIRALAX) 17 gram/dose powder Unknown at Unknown time  Yes No   Sig: Take 17 g by mouth as needed. pravastatin (PRAVACHOL) 20 mg tablet Unknown at Unknown time  No No   Sig: TAKE 1 TABLET BY MOUTH AT BEDTIME   sertraline (Zoloft) 25 mg tablet   Yes Yes   Sig: Take 25 mg by mouth daily.       Facility-Administered Medications: None       Hospital Medications:  Current Facility-Administered Medications   Medication Dose Route Frequency    0.9% sodium chloride infusion 250 mL  250 mL IntraVENous PRN    sodium chloride (NS) flush 5-40 mL  5-40 mL IntraVENous Q8H    sodium chloride (NS) flush 5-40 mL  5-40 mL IntraVENous PRN    sodium chloride (NS) flush 5-40 mL  5-40 mL IntraVENous Q8H    sodium chloride (NS) flush 5-40 mL  5-40 mL IntraVENous PRN    ondansetron (ZOFRAN) injection 4 mg  4 mg IntraVENous Q4H PRN    pantoprazole (PROTONIX) 40 mg in 0.9% sodium chloride 10 mL injection  40 mg IntraVENous Q12H    haloperidol (HALDOL) 2 mg/mL oral solution 2 mg  2 mg SubLINGual Q6H PRN    Or    haloperidol lactate (HALDOL) injection 2 mg  2 mg IntraVENous Q6H PRN    glycopyrrolate (ROBINUL) injection 0.2 mg  0.2 mg IntraVENous Q4H PRN    morphine injection 2 mg  2 mg IntraVENous Q15MIN PRN       Social History:  Social History     Tobacco Use    Smoking status: Never Smoker    Smokeless tobacco: Never Used   Substance Use Topics    Alcohol use: No       Family History:  Family History   Problem Relation Age of Onset    COPD Mother     Asthma Mother     Heart Attack Father     Other Father         peptic ulcer disease    Anesth Problems Neg Hx        Review of Systems:  Unable to obtain due to patient condition    Objective:     Patient Vitals for the past 8 hrs:   BP Temp Pulse Resp SpO2 Weight   04/27/20 0817 102/63 98.3 °F (36.8 °C) (!) 103 16 96 %    04/27/20 0249 109/53 98.4 °F (36.9 °C) 100 16  64 kg (141 lb)     No intake/output data recorded. 04/25 1901 - 04/27 0700  In: 387.5   Out: 400 [Urine:400]    EXAM:     CONST:  No acute distress   NEURO:  alert and confused     HEENT: EOMI, no scleral icterus   LUNGS: clear to ausculation, (-) wheeze   CARD:  S1 S2   ABD:  soft, no tenderness, no rebound, bowel sounds (+) all 4 quadrants, no masses, non distended   EXT:  no edema, warm   PSYCH: +anxious     Data Review     Recent Labs     04/26/20  1207   WBC 12.0*   HGB 6.3*   HCT 19.8*        Recent Labs     04/26/20  1207   *   K 4.4      CO2 25   BUN 48*   CREA 1.28*   *   CA 8.2*     Recent Labs     04/26/20  1207   SGOT 14*   AP 60   TP 5.5*   ALB 2.1*   GLOB 3.4     Recent Labs     04/26/20  1207   INR 1.3*   PTP 12.9*   APTT 25.0          Assessment:     · GI bleed: anemic with hemoccult positive stool. History of GAVE. hgb 6.3, INR 1.3  · Dementia     Patient Active Problem List   Diagnosis Code    Watermelon stomach K31.819    GERD (gastroesophageal reflux disease) K21.9    Anemia D64.9    CVA (cerebral vascular accident) ([facility] [address] I63.9    Chronic cholecystitis K81.1    Paraesophageal hernia K44.9    Moderate protein-calorie malnutrition (HCC) E44.0    Advance care planning Z71.89    Pulmonary hypertension (HCC) I27.20    Shortness of breath on exertion R06.02    Aortic valve regurgitation I35.1    Blood loss anemia D50.0    Status post gastrostomy (HCC) Z93.1    Generalized weakness R53.1    Hip fracture (HCC) S72.009A    Coffee ground emesis K92.0     Plan:     · Agree with BID PPI  · Appreciate palliative care - focus on comfort and symptom management.  I tried to call  Asa Lot but no answer, left VM   · Patient discussed with and will be seen by Dr. Abhinav Griffin  · Thank you for allowing me to participate in care of Tim Nieves     Signed By: Corbin Solis NP     4/27/2020  9:13 AM       This patient was seen and examined by me in a face-to-face visit today. I reviewed the medical record including lab work, imaging and other provider notes. I confirmed the history as described above. I spoke to the patient. I reviewed the medical record including lab work, imaging and other provider notes. I confirmed the history as described above. The patient was incapable of giving a meaningful history. I discussed this case in detail with Juliana Alvarez NP. I formulated an  assessment of this patient and developed a treatment plan. I agree with the above consultation note. I agree with the history, exam and assessment and plan as outlined in the note. I would like to add the following: It appears that the focus of her care is now comfort measures only. We will sign off for now. Please call back with any questions. Kris Grijalva MD

## 2020-04-27 NOTE — PROGRESS NOTES
BAKARI:  1. Return home or [facility]. 2. [facility] following. CM met with patient  at bedside, he is going to talk to his children on which the best option is for the patient. CM to follow-up in the morning.     Wellington Babcock, [facility]

## 2020-04-27 NOTE — CDMP QUERY
Patient admitted with acute blood loss anemia on chronic anemia. Per wound care note, noted to also have \"right posterior heel deep tissue injury = 6 x 6 x 0.1 cm  50% moist red & non-blanching 50% non-blanching purple/burgundy. no exudate. Periwound intact & without erythema\". If possible, please document in progress notes and discharge summary the following regarding the ulcer: 
 
? TYPE of Ulcer (Decubitus, Diabetic, Venous stasis, other) ? SITE of Ulcer (Including laterality, if applicable) ? STAGE/DEPTH of Ulcer (If applicable) ? POA Status--Present on Admission (POA) or Hospital Acquired  
? Other, please specify ? Clinically unable to determine The medical record reflects the following: 
  Risk Factors: [de-identified] yo with dementia and recent hip fracture due to ground-level fall Clinical Indicators:  
Wound care note \"right posterior heel deep tissue injury = 6 x 6 x 0.1 cm  50% moist red & non-blanching 50% non-blanching purple/burgundy. no exudate. Periwound intact & without erythema\" Decreased oral intake Per Palliative note: Raymond Pablo has been in bed for weeks, fractured femoral neck, and at end of life\" Per nursing assessment patient's BUE and BLE are weak, Epidermis thin w/ loss of subcut tissue, and skin condition is dry and fragile Hawk scale: 12 Treatment: wound care consult, order for wound care dressing change Thank you, Yusra Li [phone]

## 2020-04-27 NOTE — PROGRESS NOTES
Spiritual Care Assessment/Progress Note  [facility]      NAME: Tarun Burns      MRN: [id number]  AGE: [de-identified] y.o. SEX: female  Hoahaoism Affiliation: Uatsdin   Language: English     4/27/2020     Total Time (in minutes): 5     Spiritual Assessment begun in [facility] through conversation with:         []Patient        [] Family    [] Friend(s)        Reason for Consult: Palliative Care, Initial/Spiritual Assessment     Spiritual beliefs: (Please include comment if needed)     [] Identifies with a rosaura tradition:         [] Supported by a rosaura community:            [] Claims no spiritual orientation:           [] Seeking spiritual identity:                [] Adheres to an individual form of spirituality:           [x] Not able to assess:    Patient sleeping                       Identified resources for coping:      [] Prayer                               [] Music                  [] Guided Imagery     [] Family/friends                 [] Pet visits     [] Devotional reading                         [] Unknown     [] Other:                                          Interventions offered during this visit: (See comments for more details)    Patient Interventions: Initial visit           Plan of Care:     [x] Support spiritual and/or cultural needs    [] Support AMD and/or advance care planning process      [x] Support grieving process   [] Coordinate Rites and/or Rituals    [] Coordination with community clergy   [] No spiritual needs identified at this time   [] Detailed Plan of Care below (See Comments)  [] Make referral to Music Therapy  [] Make referral to Pet Therapy     [] Make referral to Addiction services  [] Make referral to [facility]  [] Make referral to Spiritual Care Partner  [] No future visits requested        [] Follow up visits as needed     Comments: Initial visit attempted with Ms. Mickie Arrieta. She was sleeping and not responsive to voice.   I left a pastoral care card informing her and her family of spiritual care presence and support. Spiritual care is available to follow as needed/desired. Please page pastoral care at [phone] as needed. Visit by: Aldo Ivy.  Myah Edward D.Min, MA, [facility]    Lead  Profession Development & Advancement

## 2020-04-27 NOTE — PROGRESS NOTES
Bedside shift change report given to Erika Pereyra (oncoming nurse) by Tavo Puri RN (offgoing nurse). Report included the following information SBAR, Kardex and MAR.

## 2020-04-27 NOTE — PROGRESS NOTES
[address] [facility] Adult  Hospitalist Group                                                                                          Hospitalist Progress Note  Whit Quezada DO Pat  Answering service: [phone] OR [phone] from in house phone        Date of Service:  2020  NAME:  Marixa Castañeda  :  1940  MRN:  [id number]      Admission Summary:   [de-identified]year old female with past medical history of asthma, dementia, GERD, stroke, GAVE who comes in with coffee-ground emesis. Interval history / Subjective:   Patient seen and examined. Does not appear in distress. No further coffee ground emesis. Globally weak.  Has underlying dementia and not able to provide ROS      Assessment & Plan:     Acute blood loss anemia on chronic anemia  Coffee-ground emesis, possibly upper GI bleed  History of GAVE  -S/p 1 unit PRBC  -appreciate GI  -PPI BID  -goals are comfort      Advanced dementia, progressive with inability to care for self and behavioral disturbances.  -Palliative care consulted, family agreeable to hospice.    -Disposition unknown: Return to [address] [place] [address] versus home     Recent hip fracture due to ground-level fall status post right hip hemiarthroplasty 20  -supportive care, pain control     Dysphagia due to progressive dementia  -Restarted previous diet    Acute renal insufficiency with hyponatremiadue to poor oral intake and emesis  -Suspected with advanced dementia and limited oral intake     Depressioncontinue home dose of Remeron as able     Acute urinary retention status post Liz placementcontinue Liz for now.     Code status: DNR      Care Plan discussed with: Patient/Family  Anticipated Disposition: Home w/Family  Anticipated Discharge: 24 hours to 48 hours     Hospital Problems  Date Reviewed: 2020          Codes Class Noted POA    Coffee ground emesis ICD-10-CM: K92.0  ICD-9-CM: 578.0  2020 Unknown                Review of Systems:   Unable to obtain      Vital Signs: Last 24hrs VS reviewed since prior progress note. Most recent are:  Visit Vitals  /63 (BP 1 Location: Left arm, BP Patient Position: At rest)   Pulse (!) 103   Temp 98.3 °F (36.8 °C)   Resp 16   Ht 5' 4\" (1.626 m)   Wt 64 kg (141 lb)   SpO2 96%   BMI 24.20 kg/m²         Intake/Output Summary (Last 24 hours) at 4/27/2020 1404  Last data filed at 4/27/2020 0700  Gross per 24 hour   Intake 377.5 ml   Output 400 ml   Net -22.5 ml        Physical Examination:             Constitutional:  No acute distress,  pleasant, elderly, demented   ENT:  Oral mucosa moist, oropharynx benign. Resp:  CTA bilaterally. No wheezing/rhonchi/rales. CV:  Regular rhythm, normal rate, no murmurs, gallops, rubs    GI:  Soft, non distended, non tender    Musculoskeletal:  No edema, warm, 2+ pulses throughout    Neurologic:  Moves all extremities. Demented, not alert           Data Review:    Review and/or order of clinical lab test  Review and/or order of tests in the radiology section of CPT  Review and/or order of tests in the medicine section of CPT      Labs:     Recent Labs     04/26/20  1207   WBC 12.0*   HGB 6.3*   HCT 19.8*        Recent Labs     04/26/20  1207   *   K 4.4      CO2 25   BUN 48*   CREA 1.28*   *   CA 8.2*     Recent Labs     04/26/20  1207   SGOT 14*   ALT 13   AP 60   TBILI 0.4   TP 5.5*   ALB 2.1*   GLOB 3.4     Recent Labs     04/26/20  1207   INR 1.3*   PTP 12.9*   APTT 25.0      No results for input(s): FE, TIBC, PSAT, FERR in the last 72 hours. Lab Results   Component Value Date/Time    Folate 17.3 10/08/2015 11:30 AM      No results for input(s): PH, PCO2, PO2 in the last 72 hours. No results for input(s): CPK, CKNDX, TROIQ in the last 72 hours.     No lab exists for component: CPKMB  Lab Results   Component Value Date/Time    Cholesterol, total 145 04/04/2017 09:46 AM    HDL Cholesterol 61 04/04/2017 09:46 AM    LDL, calculated 67 04/04/2017 09:46 AM    Triglyceride 87 04/04/2017 09:46 AM    CHOL/HDL Ratio 3.4 06/02/2013 06:30 AM     Lab Results   Component Value Date/Time    Glucose (POC) 120 (H) 04/08/2020 11:17 AM    Glucose (POC) 115 (H) 09/28/2018 08:46 PM     Lab Results   Component Value Date/Time    Color YELLOW/STRAW 04/07/2020 02:00 PM    Appearance CLEAR 04/07/2020 02:00 PM    Specific gravity 1.022 04/07/2020 02:00 PM    pH (UA) 6.0 04/07/2020 02:00 PM    Protein 30 (A) 04/07/2020 02:00 PM    Glucose 100 (A) 04/07/2020 02:00 PM    Ketone Negative 04/07/2020 02:00 PM    Bilirubin Negative 04/07/2020 02:00 PM    Urobilinogen 0.2 04/07/2020 02:00 PM    Nitrites Negative 04/07/2020 02:00 PM    Leukocyte Esterase Negative 04/07/2020 02:00 PM    Epithelial cells FEW 04/07/2020 02:00 PM    Bacteria Negative 04/07/2020 02:00 PM    WBC 0-4 04/07/2020 02:00 PM    RBC 0-5 04/07/2020 02:00 PM         Medications Reviewed:     Current Facility-Administered Medications   Medication Dose Route Frequency    0.9% sodium chloride infusion 250 mL  250 mL IntraVENous PRN    sodium chloride (NS) flush 5-40 mL  5-40 mL IntraVENous Q8H    sodium chloride (NS) flush 5-40 mL  5-40 mL IntraVENous PRN    sodium chloride (NS) flush 5-40 mL  5-40 mL IntraVENous Q8H    sodium chloride (NS) flush 5-40 mL  5-40 mL IntraVENous PRN    ondansetron (ZOFRAN) injection 4 mg  4 mg IntraVENous Q4H PRN    pantoprazole (PROTONIX) 40 mg in 0.9% sodium chloride 10 mL injection  40 mg IntraVENous Q12H    haloperidol (HALDOL) 2 mg/mL oral solution 2 mg  2 mg SubLINGual Q6H PRN    Or    haloperidol lactate (HALDOL) injection 2 mg  2 mg IntraVENous Q6H PRN    glycopyrrolate (ROBINUL) injection 0.2 mg  0.2 mg IntraVENous Q4H PRN    morphine injection 2 mg  2 mg IntraVENous Q15MIN PRN     ______________________________________________________________________  EXPECTED LENGTH OF STAY: - - -  ACTUAL LENGTH OF STAY:          [address] [facility],

## 2020-04-27 NOTE — CDMP QUERY
Patient admitted with Acute blood loss anemia on chronic anemia. Noted documentation of \"Acute Kidney Injury\" in H&P and \"Acute renal insufficiency\" in 4/27 progress note. If possible, please document in progress notes and discharge summary if you are evaluating and /or treating any of the following: ? Acute Kidney Injury confirmed and Acute Renal Insufficiency ruled out ? Acute Renal Insufficiency confirmed and Acute Kidney Injury ruled out 
? Other, please specify ? Clinically unable to determine The medical record reflects the following: 
  Risk Factors: 78yo admitted with poor oral intake and emesis Clinical Indicators:  
BUN:48 (baseline 16 from 4/17) Creatinine: 1.28 (baseline 0.69 from 4/17) Coffee ground emesis Dysphagia Treatment: IV fluids, repeat labs ordered, pureed diet Thank you, Phuong Jolley [phone]

## 2020-04-27 NOTE — PROGRESS NOTES
[facility] visit. Mrs. Octaviano Schumachers asleep and unable to receive communion. Prayer offered for her well being and that she receive spiritually.     ABRAHAM Poole, RN, ACSW, LCSW   Page:  [phone]

## 2020-04-27 NOTE — PROGRESS NOTES
04/27/20 1413   Vitals   Temp 99 °F (37.2 °C)   Temp Source Axillary   Pulse (Heart Rate) (!) 110   Resp Rate 18   O2 Sat (%) 94 %   Level of Consciousness Alert   BP 97/62   MAP (Calculated) 74   BP 1 Location Left arm   BP 1 Method Automatic   BP Patient Position At rest   MEWS Score 3   Oxygen Therapy   O2 Device Room air   comfort care, will notify Dr. Pearl Cabrera

## 2020-04-27 NOTE — PROGRESS NOTES
Palliative Medicine Consult  Brown: [phone] ([phone]    Patient Name: Claire Martinez  YOB: 1940    Date of Initial Consult: 4/26/20  Reason for Consult: End stage disease  Requesting Provider: Dr. Aram Cuba   Primary Care Physician: Tara Cazares MD     SUMMARY:   Claire Martinez is a [de-identified] y.o. woman who has been residing at [address] [place] [address] with the support of [facility] since discharge from [facility] on 4/20 after admission for right femoral neck fx s/p arthroplasty on 4/8. She suffered post-op delirium and decline resulting in recommendation for Hospice support at discharge. 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  She returned to the ED on 4/26/2020 from [facility] with recurrent coffee ground emesis for 1-2 days and congruent 1.8 gram drop in Hg on admission labs. It isn't clear who/what prompted her presentation. Palliative medicine was consulted to assist with care goals discussions given her end stage illness. Social Hx-  to [place], three children, Spondo, retired teacher. Daughter Alvarado De Luna lives locally and works as SLP. Two sons out of state- one is an interventional radiologist.       Pt was living at home prior to past two hospitalizations. Now at [facility] with 24/7 caregivers provided by [employer]. She has a DDNR on file. PALLIATIVE DIAGNOSES:   1. Goals of care counseling  2. Abdominal pain  3. Coffee ground emesis   4. Hypoalbuminemia   5. Frailty  6. GAVE syndrome w/ recurrent GI bleeding  7. Severe dementia  8. Hip fracture s/p arthroplasty 4/8       PLAN:   1. Seen with spouse at the bedside. Pt alert at times but lacks capacity for decisions. She denies pain. 2. discussion held with spouse regarding care moving forward  3.  Spouse agrees with comfort but will say things like \"she's gonna walk after her heel is healed\", \"she's gonna need to start eating more\". I explained to the spouse that goals will need to be realistic and based off of medical limitations. Explained that the pt will not walk again. Explained that she has been in bed for weeks, fractured femoral neck, and at end of life. Explained that she will not eat much and that is normal.  We will continue to feed for comfort but if she does not want to eat, we will not force feed her. 4. We compared care at the facility vs home including the risks. I recommended taking her home if feasible so he can be with her and control the environment  5. explained that the longer the pt is admitted here, the greater the risk of lina an infection. Spouse shared that if she is home then family can be with her and he could be with her when she passes  6. Spouse wants to discuss with children before making a decision. He will call them today  7. Will follow up tomorrow morning. Offered to discuss with children if helpful    4/26/20  8. Assessed pt on 2N- she was alert but confused and unable to provide history. She appeared quite uncomfortable but w/ inconsistent complaints. 9. Pt is known to palliative team from past two admissions. 10. After extensive chart review I called and spoke with pt's  Addie Suarez. He has not seen his wife in person since the lockdown- early to mid March. He did see her from a window two weeks ago at [facility]. 11. When he last saw her early March, she was ambulatory with a walker, communicative, recognized him and was eating well. 12. We talked about the fact that a hip fracture can cause significant decline, effects of anesthesia can precipitously worsen dementia as can the ensuing immobility. He understood well, has seen it happen in other family members. 13. He understands she was on Hospice with [facility], but wasn't quite sold that she needed it just yet- \"It's just for people who are right at the end. \"  At the same time when I shared I think that she is nearing the end of life given the decline that has occurred, especially with her ongoing GI bleeding, he seemed to think so too. I think it is just hard for him to process given that he hasn't been able to be with her. 14. I recommended focusing on her comfort as I do not think she would tolerate any procedures, GI or otherwise, to further diagnose or stabilize her. He readily agreed as the same decision was made with GI on a previous admission. 15. I reviewed specifically what comfort focused care looks like- managing symptoms, forgoing diagnostics, IVF and artificial nutrition. He understood very well, though it doesn't make it easier for him. 16. Most importantly he would like to visit and I think this will help him process everything as well to see her in person. I spoke with charge RN on 2N and added him to the list of patient advocates, he will visit tonight or tomorrow. 17. Will start comfort measures only, stop IVF and diagnostics. She did receive 1u of PRBCs in the ED. 18. Start morphine 2 mg IV q15m in prn for pain as based on assessment she presents with abd pain. I explained to  that this may make her lethargic and less interactive but I recommend over NSAIDs and tylenol at this time. He agrees to use opioids as needed. 19. If she is stable in the morning- would transfer back to [facility] with [facility], or perhaps even at his home if he is open to it with caregivers (this would allow him to be with her). 21. Consult placed to case management to follow up on above. 21. Thank you for the opportunity to participate in the care of Matilda Todd  22.  Communicated plan of care with: Palliative IDT, [place] [facility] Team including Dr. Stuart Razo and bedside RN Mehul Hernandez.      GOALS OF CARE / TREATMENT PREFERENCES:     GOALS OF CARE:  Patient/Health Care Proxy Stated Goals: Comfort    TREATMENT PREFERENCES:   Code Status: DNR    Advance Care Planning:  [x] [facility] Interdisciplinary Team has updated the ACP Navigator with Xavier and Patient Capacity      Primary Decision Maker (Active): Goyo Schultz Spouse - [phone]  Advance Care Planning 4/26/2020   Patient's Healthcare Decision Maker is: Legal Next of Kin   Confirm Advance Directive None   Patient Would Like to Complete Advance Directive Unable   Does the patient have other document types Do Not Resuscitate       Medical Interventions: Comfort measures     Artificially Administered Nutrition: No feeding tube     Other:    As far as possible, the palliative care team has discussed with patient / health care proxy about goals of care / treatment preferences for patient.      HISTORY:     History obtained from:  EMR,     CHIEF COMPLAINT: coffee ground emesis x several days    HPI/SUBJECTIVE:    The patient is:   [] Verbal and participatory  [x] Non-participatory due to:   dementia    Clinical Pain Assessment (nonverbal scale for severity on nonverbal patients):   Clinical Pain Assessment  Severity: 3  Location: pt unable to report but tender on addominal exam  Character: pt unable to report  Duration: pt unable to report  Effect: pt unable to report  Factors: pt unable to report  Frequency: pt unable to report     Activity (Movement): Lying quietly, normal position    Duration: for how long has pt been experiencing pain (e.g., 2 days, 1 month, years)  Frequency: how often pain is an issue (e.g., several times per day, once every few days, constant)     FUNCTIONAL ASSESSMENT:     Palliative Performance Scale (PPS):  PPS: 30       PSYCHOSOCIAL/SPIRITUAL SCREENING:     Palliative IDT has assessed this patient for cultural preferences / practices and a referral made as appropriate to needs (Cultural Services, Patient Advocacy, Ethics, etc.)    Any spiritual / Presybeterian concerns:  [x] Yes /  [] No  - pt of Mormonism rosaura    Caregiver Burnout:  [] Yes /  [] No /  [x] No Caregiver Present      Anticipatory grief assessment:   [x] Normal  / [] Maladaptive       ESAS Anxiety:      ESAS Depression:          REVIEW OF SYSTEMS:     Positive and pertinent negative findings in ROS are noted above in HPI. The following systems were [] reviewed / [x] unable to be reviewed as noted in HPI  Other findings are noted below. Systems: constitutional, ears/nose/mouth/throat, respiratory, gastrointestinal, genitourinary, musculoskeletal, integumentary, neurologic, psychiatric, endocrine. Positive findings noted below. Modified ESAS Completed by: provider           Pain: 3     Nausea: 8(multiple doses of Zofran given in ED)     Dyspnea: 0           Stool Occurrence(s): 1        PHYSICAL EXAM:     From RN flowsheet:  Wt Readings from Last 3 Encounters:   04/27/20 141 lb (64 kg)   04/20/20 150 lb 9.6 oz (68.3 kg)   02/12/20 157 lb 8 oz (71.4 kg)     Blood pressure 97/62, pulse (!) 103, temperature 99 °F (37.2 °C), resp. rate 18, height 5' 4\" (1.626 m), weight 141 lb (64 kg), SpO2 94 %. Pain Scale 1: Adult Nonverbal Pain Scale  Pain Intensity 1: 0                 Last bowel movement, if known:     Constitutional: frail / cachectic elderly  female appears in modest distress    Eyes: pupils equal, anicteric  ENMT: dry oral mucosa  Cardiovascular: regular rhythm, distal pulses intact, no peripheral edema   Respiratory: breathing not labored, symmetric bs anteriorly  Gastrointestinal: soft  Musculoskeletal: no tenderness to palpation, rt femoral neck fracture  Skin: warm, dry  Neurologic: alert, tracks, responds a little and able to repeat phrases but very confused  Psychiatric:  Appears anxious, worried and mildly agitated. Not restless.         HISTORY:     Active Problems:    Coffee ground emesis (4/26/2020)      Past Medical History:   Diagnosis Date    Anemia NEC     Asthma     major asthma as a child/only with colds now    Cancer [facility])     BCCA removed    Coagulation disorder ([facility]     on xarelto    Dementia ([facility]     GERD (gastroesophageal reflux disease) 10/22/2010    Hiatal hernia     Hypertension     hx of blood pressure med for short time    Ill-defined condition     increased cholesterol    Other unknown and unspecified cause of morbidity or mortality     eval with Dr Mellisa Krishna for SOB, sept 2015:  results unkown    Stroke ([facility] 2013    no deficits    Stroke ([facility]     heat exhaustion    Watermelon stomach 2003      Past Surgical History:   Procedure Laterality Date    HX BLADDER SUSPENSION      HX COLONOSCOPY      HX ENDOSCOPY  6/4/15    Dr. Mendel Singleton HX GYN      vaginal del times 3    HX HERNIA REPAIR  2014    x2, failure    HX OTHER SURGICAL      EGDs    HX TONSILLECTOMY      MO EXC SKIN MALIG >4CM FACE,FACIAL      times 3 - BCCA      Family History   Problem Relation Age of Onset    COPD Mother     Asthma Mother     Heart Attack Father     Other Father         peptic ulcer disease    Anesth Problems Neg Hx       History reviewed, no pertinent family history.   Social History     Tobacco Use    Smoking status: Never Smoker    Smokeless tobacco: Never Used   Substance Use Topics    Alcohol use: No     Allergies   Allergen Reactions    Latex Hives     Latex tape    Codeine Nausea and Vomiting    Darvon [Propoxyphene] Nausea and Vomiting    Other Medication Unknown (comments)     Flu shot (per )    Pcn [Penicillins] Hives      Current Facility-Administered Medications   Medication Dose Route Frequency    0.9% sodium chloride infusion 250 mL  250 mL IntraVENous PRN    sodium chloride (NS) flush 5-40 mL  5-40 mL IntraVENous Q8H    sodium chloride (NS) flush 5-40 mL  5-40 mL IntraVENous PRN    sodium chloride (NS) flush 5-40 mL  5-40 mL IntraVENous Q8H    sodium chloride (NS) flush 5-40 mL  5-40 mL IntraVENous PRN    ondansetron (ZOFRAN) injection 4 mg  4 mg IntraVENous Q4H PRN    pantoprazole (PROTONIX) 40 mg in 0.9% sodium chloride 10 mL injection  40 mg IntraVENous Q12H    haloperidol (HALDOL) 2 mg/mL oral solution 2 mg  2 mg SubLINGual Q6H PRN    Or    haloperidol lactate (HALDOL) injection 2 mg  2 mg IntraVENous Q6H PRN    glycopyrrolate (ROBINUL) injection 0.2 mg  0.2 mg IntraVENous Q4H PRN    morphine injection 2 mg  2 mg IntraVENous Q15MIN PRN          LAB AND IMAGING FINDINGS:     Lab Results   Component Value Date/Time    WBC 12.0 (H) 04/26/2020 12:07 PM    HGB 6.3 (L) 04/26/2020 12:07 PM    PLATELET 972 74/61/8305 12:07 PM     Lab Results   Component Value Date/Time    Sodium 133 (L) 04/26/2020 12:07 PM    Potassium 4.4 04/26/2020 12:07 PM    Chloride 101 04/26/2020 12:07 PM    CO2 25 04/26/2020 12:07 PM    BUN 48 (H) 04/26/2020 12:07 PM    Creatinine 1.28 (H) 04/26/2020 12:07 PM    Calcium 8.2 (L) 04/26/2020 12:07 PM    Magnesium 1.7 04/14/2020 04:20 AM    Phosphorus 2.8 04/14/2020 04:20 AM      Lab Results   Component Value Date/Time    AST (SGOT) 14 (L) 04/26/2020 12:07 PM    Alk.  phosphatase 60 04/26/2020 12:07 PM    Protein, total 5.5 (L) 04/26/2020 12:07 PM    Albumin 2.1 (L) 04/26/2020 12:07 PM    Globulin 3.4 04/26/2020 12:07 PM     Lab Results   Component Value Date/Time    INR 1.3 (H) 04/26/2020 12:07 PM    Prothrombin time 12.9 (H) 04/26/2020 12:07 PM    aPTT 25.0 04/26/2020 12:07 PM      Lab Results   Component Value Date/Time    Iron 63 02/15/2016 02:38 PM    TIBC 321 02/15/2016 02:38 PM    Iron % saturation 20 02/15/2016 02:38 PM    Ferritin 34 11/18/2015 03:18 PM      No results found for: PH, PCO2, PO2  No components found for: [place]   Lab Results   Component Value Date/Time    CK 38 08/13/2015 02:50 PM    CK - MB <0.5 (L) 08/13/2015 02:50 PM                Total time:  35m  Counseling / coordination time, spent as noted above: 30m  > 50% counseling / coordination?: y    Prolonged service was provided for  []30 min   []75 min in face to face time in the presence of the patient, spent as noted above.  Time Start:   Time End:   Note: this can only be billed with 92463 (initial) or 78162 (follow up). If multiple start / stop times, list each separately.

## 2020-04-27 NOTE — CONSULTS
Palliative Medicine Consult  Kevin: [phone]    Patient Name: Megan Burgos  YOB: 1940    Date of Initial Consult: 4/26/20  Reason for Consult: End stage disease  Requesting Provider: Dr. Jerardo Locke   Primary Care Physician: Augustus Andre MD     SUMMARY:   Megan Burgos is a [de-identified] y.o. woman who has been residing at [address] [place] [address] with the support of [facility] since discharge from [facility] on 4/20 after admission for right femoral neck fx s/p arthroplasty on 4/8. She suffered post-op delirium and decline resulting in recommendation for Hospice support at discharge. 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  She returned to the ED on 4/26/2020 from [facility] with recurrent coffee ground emesis for 1-2 days and congruent 1.8 gram drop in Hg on admission labs. It isn't clear who/what prompted her presentation. Palliative medicine was consulted to assist with care goals discussions given her end stage illness. Social Hx-  to Gutierrez, three children, [employer], retired teacher. Daughter Jennifer Griffiths lives locally and works as SLP. Two sons out of state- one is an interventional radiologist.       Pt was living at home prior to past two hospitalizations. Now at [facility] with 24/7 caregivers provided by [employer]. She has a DDNR on file. PALLIATIVE DIAGNOSES:   1. Goals of care counseling  2. Abdominal pain  3. Frailty  4. GAVE syndrome w/ recurrent GI bleeding  5. Severe dementia  6. Hip fracture s/p arthroplasty 4/8       PLAN:   1. Assessed pt on 2N- she was alert but confused and unable to provide history. She appeared quite uncomfortable but w/ inconsistent complaints. 2. Pt is known to palliative team from past two admissions. 3. After extensive chart review I called and spoke with pt's  Gutierrez.   He has not seen his wife in person since the lockdown- early to mid March. He did see her from a window two weeks ago at [facility]. 4. When he last saw her early March, she was ambulatory with a walker, communicative, recognized him and was eating well. 5. We talked about the fact that a hip fracture can cause significant decline, effects of anesthesia can precipitously worsen dementia as can the ensuing immobility. He understood well, has seen it happen in other family members. 6. He understands she was on Hospice with [facility], but wasn't quite sold that she needed it just yet- \"It's just for people who are right at the end. \"  At the same time when I shared I think that she is nearing the end of life given the decline that has occurred, especially with her ongoing GI bleeding, he seemed to think so too. I think it is just hard for him to process given that he hasn't been able to be with her. 7. I recommended focusing on her comfort as I do not think she would tolerate any procedures, GI or otherwise, to further diagnose or stabilize her. He readily agreed as the same decision was made with GI on a previous admission. 8. I reviewed specifically what comfort focused care looks like- managing symptoms, forgoing diagnostics, IVF and artificial nutrition. He understood very well, though it doesn't make it easier for him. 9. Most importantly he would like to visit and I think this will help him process everything as well to see her in person. I spoke with charge RN on 2N and added him to the list of patient advocates, he will visit tonight or tomorrow. 10. Will start comfort measures only, stop IVF and diagnostics. She did receive 1u of PRBCs in the ED. 11. Start morphine 2 mg IV q15m in prn for pain as based on assessment she presents with abd pain. I explained to  that this may make her lethargic and less interactive but I recommend over NSAIDs and tylenol at this time. He agrees to use opioids as needed. 12. If she is stable in the morning- would transfer back to [facility] with [facility], or perhaps even at his home if he is open to it with caregivers (this would allow him to be with her). 13. Consult placed to case management to follow up on above. 14. Thank you for the opportunity to participate in the care of Jay Jay Soriano  15. Communicated plan of care with: Palliative IDT, [place] [facility] Team including Dr. Joel Gutiérrez and bedside RN Cory Weems.      GOALS OF CARE / TREATMENT PREFERENCES:     GOALS OF CARE:  Patient/Health Care Proxy Stated Goals: Comfort    TREATMENT PREFERENCES:   Code Status: DNR    Advance Care Planning:  [x] [facility] Interdisciplinary Team has updated the ACP Navigator with Xavier and Patient Capacity      Primary Decision Maker (Active): Rekha Spine Spouse - [phone]  Advance Care Planning 4/26/2020   Patient's Healthcare Decision Maker is: Legal Next of Kin   Confirm Advance Directive None   Patient Would Like to Complete Advance Directive Unable   Does the patient have other document types Do Not Resuscitate       Medical Interventions: Comfort measures     Artificially Administered Nutrition: No feeding tube     Other:    As far as possible, the palliative care team has discussed with patient / health care proxy about goals of care / treatment preferences for patient.      HISTORY:     History obtained from:  EMR,     CHIEF COMPLAINT: coffee ground emesis x several days    HPI/SUBJECTIVE:    The patient is:   [] Verbal and participatory  [x] Non-participatory due to:   dementia    Clinical Pain Assessment (nonverbal scale for severity on nonverbal patients):   Clinical Pain Assessment  Severity: 3  Location: pt unable to report but tender on addominal exam  Character: pt unable to report  Duration: pt unable to report  Effect: pt unable to report  Factors: pt unable to report  Frequency: pt unable to report     Activity (Movement): Lying quietly, normal position    Duration: for how long has pt been experiencing pain (e.g., 2 days, 1 month, years)  Frequency: how often pain is an issue (e.g., several times per day, once every few days, constant)     FUNCTIONAL ASSESSMENT:     Palliative Performance Scale (PPS):  PPS: 30       PSYCHOSOCIAL/SPIRITUAL SCREENING:     Palliative IDT has assessed this patient for cultural preferences / practices and a referral made as appropriate to needs (Cultural Services, Patient Advocacy, Ethics, etc.)    Any spiritual / Jehovah's witness concerns:  [x] Yes /  [] No  - pt of Oriental orthodox rosaura    Caregiver Burnout:  [] Yes /  [] No /  [x] No Caregiver Present      Anticipatory grief assessment:   [x] Normal  / [] Maladaptive       ESAS Anxiety:      ESAS Depression:          REVIEW OF SYSTEMS:     Positive and pertinent negative findings in ROS are noted above in HPI. The following systems were [] reviewed / [x] unable to be reviewed as noted in HPI  Other findings are noted below. Systems: constitutional, ears/nose/mouth/throat, respiratory, gastrointestinal, genitourinary, musculoskeletal, integumentary, neurologic, psychiatric, endocrine. Positive findings noted below. Modified ESAS Completed by: provider           Pain: 3     Nausea: 8(multiple doses of Zofran given in ED)     Dyspnea: 0           Stool Occurrence(s): 1        PHYSICAL EXAM:     From RN flowsheet:  Wt Readings from Last 3 Encounters:   04/26/20 65.5 kg (144 lb 6.4 oz)   04/20/20 68.3 kg (150 lb 9.6 oz)   02/12/20 71.4 kg (157 lb 8 oz)     Blood pressure 101/54, pulse 86, temperature 97.7 °F (36.5 °C), resp. rate 18, height 5' 4\" (1.626 m), weight 65.5 kg (144 lb 6.4 oz), SpO2 96 %.     Pain Scale 1: Adult Nonverbal Pain Scale  Pain Intensity 1: 0                 Last bowel movement, if known:     Constitutional: frail / cachectic elderly  female appears in modest distress    Eyes: pupils equal, anicteric  ENMT: dry oral mucosa  Cardiovascular: regular rhythm, distal pulses intact, no peripheral edema   Respiratory: breathing not labored, symmetric bs anteriorly  Gastrointestinal: soft, scaphoid, pt moans/groans when I palpate her abdomen  Musculoskeletal: no deformity, no tenderness to palpation  Skin: warm, dry  Neurologic: alert, tracks, responds a little and able to repeat phrases but very confused  Psychiatric:  Appears anxious, worried and mildly agitated. Not restless. HISTORY:     Active Problems:    Coffee ground emesis (4/26/2020)      Past Medical History:   Diagnosis Date    Anemia NEC     Asthma     major asthma as a child/only with colds now    Cancer ([facility]     BCCA removed    Coagulation disorder ([facility]     on xarelto    Dementia ([facility]     GERD (gastroesophageal reflux disease) 10/22/2010    Hiatal hernia     Hypertension     hx of blood pressure med for short time    Ill-defined condition     increased cholesterol    Other unknown and unspecified cause of morbidity or mortality     eval with Dr Bean Fletcher for SOB, sept 2015:  results unkown    Stroke ([facility] 2013    no deficits    Stroke ([facility]     heat exhaustion    Watermelon stomach 2003      Past Surgical History:   Procedure Laterality Date    HX BLADDER SUSPENSION      HX COLONOSCOPY      HX ENDOSCOPY  6/4/15    Dr. Redd Lacey    HX GYN      vaginal del times 3    HX HERNIA REPAIR  2014    x2, failure    HX OTHER SURGICAL      EGDs    HX TONSILLECTOMY      HI EXC SKIN MALIG >4CM FACE,FACIAL      times 3 - BCCA      Family History   Problem Relation Age of Onset    COPD Mother     Asthma Mother     Heart Attack Father     Other Father         peptic ulcer disease    Anesth Problems Neg Hx       History reviewed, no pertinent family history.   Social History     Tobacco Use    Smoking status: Never Smoker    Smokeless tobacco: Never Used   Substance Use Topics    Alcohol use: No     Allergies   Allergen Reactions    Latex Hives Latex tape    Codeine Nausea and Vomiting    Darvon [Propoxyphene] Nausea and Vomiting    Other Medication Unknown (comments)     Flu shot (per )    Pcn [Penicillins] Hives      Current Facility-Administered Medications   Medication Dose Route Frequency    0.9% sodium chloride infusion 250 mL  250 mL IntraVENous PRN    sodium chloride (NS) flush 5-40 mL  5-40 mL IntraVENous Q8H    sodium chloride (NS) flush 5-40 mL  5-40 mL IntraVENous PRN    sodium chloride (NS) flush 5-40 mL  5-40 mL IntraVENous Q8H    sodium chloride (NS) flush 5-40 mL  5-40 mL IntraVENous PRN    ondansetron (ZOFRAN) injection 4 mg  4 mg IntraVENous Q4H PRN    [START ON 4/27/2020] pantoprazole (PROTONIX) 40 mg in 0.9% sodium chloride 10 mL injection  40 mg IntraVENous Q12H    haloperidol (HALDOL) 2 mg/mL oral solution 2 mg  2 mg SubLINGual Q6H PRN    Or    haloperidol lactate (HALDOL) injection 2 mg  2 mg IntraVENous Q6H PRN    glycopyrrolate (ROBINUL) injection 0.2 mg  0.2 mg IntraVENous Q4H PRN    morphine injection 2 mg  2 mg IntraVENous Q15MIN PRN          LAB AND IMAGING FINDINGS:     Lab Results   Component Value Date/Time    WBC 12.0 (H) 04/26/2020 12:07 PM    HGB 6.3 (L) 04/26/2020 12:07 PM    PLATELET 444 42/71/7404 12:07 PM     Lab Results   Component Value Date/Time    Sodium 133 (L) 04/26/2020 12:07 PM    Potassium 4.4 04/26/2020 12:07 PM    Chloride 101 04/26/2020 12:07 PM    CO2 25 04/26/2020 12:07 PM    BUN 48 (H) 04/26/2020 12:07 PM    Creatinine 1.28 (H) 04/26/2020 12:07 PM    Calcium 8.2 (L) 04/26/2020 12:07 PM    Magnesium 1.7 04/14/2020 04:20 AM    Phosphorus 2.8 04/14/2020 04:20 AM      Lab Results   Component Value Date/Time    AST (SGOT) 14 (L) 04/26/2020 12:07 PM    Alk.  phosphatase 60 04/26/2020 12:07 PM    Protein, total 5.5 (L) 04/26/2020 12:07 PM    Albumin 2.1 (L) 04/26/2020 12:07 PM    Globulin 3.4 04/26/2020 12:07 PM     Lab Results   Component Value Date/Time    INR 1.3 (H) 04/26/2020 12:07 PM Prothrombin time 12.9 (H) 04/26/2020 12:07 PM    aPTT 25.0 04/26/2020 12:07 PM      Lab Results   Component Value Date/Time    Iron 63 02/15/2016 02:38 PM    TIBC 321 02/15/2016 02:38 PM    Iron % saturation 20 02/15/2016 02:38 PM    Ferritin 34 11/18/2015 03:18 PM      No results found for: PH, PCO2, PO2  No components found for: [place]   Lab Results   Component Value Date/Time    CK 38 08/13/2015 02:50 PM    CK - MB <0.5 (L) 08/13/2015 02:50 PM                Total time:  70m  Counseling / coordination time, spent as noted above: 50m  > 50% counseling / coordination?: y    Prolonged service was provided for  []30 min   []75 min in face to face time in the presence of the patient, spent as noted above. Time Start:   Time End:   Note: this can only be billed with 76788 (initial) or 74571 (follow up). If multiple start / stop times, list each separately.

## 2020-04-28 PROCEDURE — 65270000029 HC RM PRIVATE

## 2020-04-28 PROCEDURE — 87635 SARS-COV-2 COVID-19 AMP PRB: CPT

## 2020-04-28 PROCEDURE — C9113 INJ PANTOPRAZOLE SODIUM, VIA: HCPCS | Performed by: INTERNAL MEDICINE

## 2020-04-28 PROCEDURE — 74011250636 HC RX REV CODE- 250/636: Performed by: INTERNAL MEDICINE

## 2020-04-28 PROCEDURE — 74011000250 HC RX REV CODE- 250: Performed by: INTERNAL MEDICINE

## 2020-04-28 RX ADMIN — Medication 10 ML: at 06:48

## 2020-04-28 RX ADMIN — SODIUM CHLORIDE 40 MG: 9 INJECTION INTRAMUSCULAR; INTRAVENOUS; SUBCUTANEOUS at 13:31

## 2020-04-28 RX ADMIN — Medication 10 ML: at 13:33

## 2020-04-28 RX ADMIN — Medication 10 ML: at 15:03

## 2020-04-28 RX ADMIN — Medication 10 ML: at 21:27

## 2020-04-28 RX ADMIN — SODIUM CHLORIDE 40 MG: 9 INJECTION INTRAMUSCULAR; INTRAVENOUS; SUBCUTANEOUS at 02:30

## 2020-04-28 RX ADMIN — MORPHINE SULFATE 2 MG: 2 INJECTION, SOLUTION INTRAMUSCULAR; INTRAVENOUS at 13:35

## 2020-04-28 NOTE — PROGRESS NOTES
TRANSFER - IN REPORT:    Verbal report received from Masha(name) on Claire   being received from 2N(unit) for routine progression of care      Report consisted of patients Situation, Background, Assessment and   Recommendations(SBAR). Information from the following report(s) SBAR and Kardex was reviewed with the receiving nurse. Opportunity for questions and clarification was provided. Assessment completed upon patients arrival to unit and care assumed.

## 2020-04-28 NOTE — PROGRESS NOTES
[address] [place] [address] Medicine Consult  Jayme: [phone] ([phone])    Patient Name: Bindu Gilliland  YOB: 1940    Date of Initial Consult: 4/26/20  Reason for Consult: End stage disease  Requesting Provider: Dr. Rosemarie Culp   Primary Care Physician: Shimon Carias MD     SUMMARY:   Bindu Gilliland is a [de-identified] y.o. woman who has been residing at [address] [place] [address] with the support of [facility] since discharge from [facility] on 4/20 after admission for right femoral neck fx s/p arthroplasty on 4/8. She suffered post-op delirium and decline resulting in recommendation for Hospice support at discharge. 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  She returned to the ED on 4/26/2020 from [facility] with recurrent coffee ground emesis for 1-2 days and congruent 1.8 gram drop in Hg on admission labs. It isn't clear who/what prompted her presentation. Palliative medicine was consulted to assist with care goals discussions given her end stage illness. Social Hx-  to Kasey Brooks, three children, [employer], retired teacher. Daughter Adelina Wills lives locally and works as SLP. Two sons out of state- one is an interventional radiologist.       Pt was living at home prior to past two hospitalizations. Now at [facility] with 24/7 caregivers provided by [employer]. She has a DDNR on file. PALLIATIVE DIAGNOSES:   1. Goals of care counseling  2. Abdominal pain~ resolved  3. Coffee ground emesis~controlled  4. Hypoalbuminemia   5. Frailty  6. GAVE syndrome w/ recurrent GI bleeding  7. Severe dementia  8. Hip fracture s/p arthroplasty 4/8  9. Physical debility        PLAN:   1. Spoke with spouse today. He has decided to bring the pt home with hospice  2. He will be coming to the hospital today to discuss the dc plan. He has not spoken with hospice yet today  3.  He feels that he would not be prepared for dc today but tomorrow should be alright. Son is arriving from out of state today to help him  4. All questions and concerns addressed. He will call me if needed    4/27/20  5. Seen with spouse at the bedside. Pt alert at times but lacks capacity for decisions. She denies pain. 6. discussion held with spouse regarding care moving forward  7. Spouse agrees with comfort but will say things like \"she's gonna walk after her heel is healed\", \"she's gonna need to start eating more\". I explained to the spouse that goals will need to be realistic and based off of medical limitations. Explained that the pt will not walk again. Explained that she has been in bed for weeks, fractured femoral neck, and at end of life. Explained that she will not eat much and that is normal.  We will continue to feed for comfort but if she does not want to eat, we will not force feed her. 8. We compared care at the facility vs home including the risks. I recommended taking her home if feasible so he can be with her and control the environment  9. explained that the longer the pt is admitted here, the greater the risk of lina an infection. Spouse shared that if she is home then family can be with her and he could be with her when she passes  10. Spouse wants to discuss with children before making a decision. He will call them today  11. Will follow up tomorrow morning. Offered to discuss with children if helpful    4/26/20  12. Assessed pt on 2N- she was alert but confused and unable to provide history. She appeared quite uncomfortable but w/ inconsistent complaints. 15. Pt is known to palliative team from past two admissions. 14. After extensive chart review I called and spoke with pt's  Ruiz Jones. He has not seen his wife in person since the lockdown- early to mid March. He did see her from a window two weeks ago at [facility].    15. When he last saw her early March, she was ambulatory with a walker, communicative, recognized him and was eating well. 16. We talked about the fact that a hip fracture can cause significant decline, effects of anesthesia can precipitously worsen dementia as can the ensuing immobility. He understood well, has seen it happen in other family members. 16. He understands she was on Hospice with [facility], but wasn't quite sold that she needed it just yet- \"It's just for people who are right at the end. \"  At the same time when I shared I think that she is nearing the end of life given the decline that has occurred, especially with her ongoing GI bleeding, he seemed to think so too. I think it is just hard for him to process given that he hasn't been able to be with her. 18. I recommended focusing on her comfort as I do not think she would tolerate any procedures, GI or otherwise, to further diagnose or stabilize her. He readily agreed as the same decision was made with GI on a previous admission. 19. I reviewed specifically what comfort focused care looks like- managing symptoms, forgoing diagnostics, IVF and artificial nutrition. He understood very well, though it doesn't make it easier for him. 20. Most importantly he would like to visit and I think this will help him process everything as well to see her in person. I spoke with charge RN on 2N and added him to the list of patient advocates, he will visit tonight or tomorrow. 21. Will start comfort measures only, stop IVF and diagnostics. She did receive 1u of PRBCs in the ED. 22. Start morphine 2 mg IV q15m in prn for pain as based on assessment she presents with abd pain. I explained to  that this may make her lethargic and less interactive but I recommend over NSAIDs and tylenol at this time. He agrees to use opioids as needed.     23. If she is stable in the morning- would transfer back to [facility], or perhaps even at his home if he is open to it with caregivers (this would allow him to be with her). 24. Consult placed to case management to follow up on above. 25. Thank you for the opportunity to participate in the care of Dacia Farrar  26. Communicated plan of care with: Palliative IDT, [place] [address] Team including Dr. Naveed Nicolas and bedside RN Aye Mccormack.      GOALS OF CARE / TREATMENT PREFERENCES:     GOALS OF CARE:  Patient/Health Care Proxy Stated Goals: Comfort    TREATMENT PREFERENCES:   Code Status: DNR    Advance Care Planning:  [x] [facility] Interdisciplinary Team has updated the ACP Navigator with Xavier and Patient Capacity      Primary Decision Maker (Active): Mo Montaño Spouse - [phone]  Advance Care Planning 4/26/2020   Patient's Healthcare Decision Maker is: Legal Next of Kin   Confirm Advance Directive None   Patient Would Like to Complete Advance Directive Unable   Does the patient have other document types Do Not Resuscitate       Medical Interventions: Comfort measures     Artificially Administered Nutrition: No feeding tube     Other:    As far as possible, the palliative care team has discussed with patient / health care proxy about goals of care / treatment preferences for patient.      HISTORY:     History obtained from:  EMR,     CHIEF COMPLAINT: coffee ground emesis x several days    HPI/SUBJECTIVE:    The patient is:   [] Verbal and participatory  [x] Non-participatory due to:   dementia    Clinical Pain Assessment (nonverbal scale for severity on nonverbal patients):   Clinical Pain Assessment  Severity: 0  Location: pt unable to report but tender on addominal exam  Character: pt unable to report  Duration: pt unable to report  Effect: pt unable to report  Factors: pt unable to report  Frequency: pt unable to report     Activity (Movement): Lying quietly, normal position    Duration: for how long has pt been experiencing pain (e.g., 2 days, 1 month, years)  Frequency: how often pain is an issue (e.g., several times per day, once every few days, constant)     FUNCTIONAL ASSESSMENT:     Palliative Performance Scale (PPS):  PPS: 30       PSYCHOSOCIAL/SPIRITUAL SCREENING:     Palliative IDT has assessed this patient for cultural preferences / practices and a referral made as appropriate to needs (Cultural Services, Patient Advocacy, Ethics, etc.)    Any spiritual / Hoahaoism concerns:  [x] Yes /  [] No  - pt of [employer]    Caregiver Burnout:  [] Yes /  [x] No /  [] No Caregiver Present      Anticipatory grief assessment:   [x] Normal  / [] Maladaptive       ESAS Anxiety: Anxiety: 0    ESAS Depression:          REVIEW OF SYSTEMS:     Positive and pertinent negative findings in ROS are noted above in HPI. The following systems were [] reviewed / [x] unable to be reviewed as noted in HPI  Other findings are noted below. Systems: constitutional, ears/nose/mouth/throat, respiratory, gastrointestinal, genitourinary, musculoskeletal, integumentary, neurologic, psychiatric, endocrine. Positive findings noted below. Modified ESAS Completed by: provider   Fatigue: 10       Pain: 0   Anxiety: 0 Nausea: 8(multiple doses of Zofran given in ED)   Anorexia: 7 Dyspnea: 0           Stool Occurrence(s): 1        PHYSICAL EXAM:     From RN flowsheet:  Wt Readings from Last 3 Encounters:   04/28/20 139 lb 15.9 oz (63.5 kg)   04/20/20 150 lb 9.6 oz (68.3 kg)   02/12/20 157 lb 8 oz (71.4 kg)     Blood pressure 108/64, pulse (!) 106, temperature 97.6 °F (36.4 °C), resp. rate 18, height 5' 4\" (1.626 m), weight 139 lb 15.9 oz (63.5 kg), SpO2 98 %.     Pain Scale 1: Numeric (0 - 10)  Pain Intensity 1: 0     Pain Location 1: Abdomen  Pain Orientation 1: Anterior     Pain Intervention(s) 1: Declines  Last bowel movement, if known:     Constitutional: frail / cachectic elderly  female appears in modest distress    Eyes: pupils equal, anicteric  ENMT: dry oral mucosa  Cardiovascular: regular rhythm, distal pulses intact, no peripheral edema   Respiratory: breathing not labored, symmetric bs anteriorly  Gastrointestinal: soft  Musculoskeletal: no tenderness to palpation, rt femoral neck fracture  Skin: warm, dry  Neurologic: alert, tracks, responds a little and able to repeat phrases but very confused  Psychiatric:  Appears anxious, worried and mildly agitated. Not restless. HISTORY:     Active Problems:    Coffee ground emesis (4/26/2020)      Past Medical History:   Diagnosis Date    Anemia NEC     Asthma     major asthma as a child/only with colds now    Cancer ([facility]     BCCA removed    Coagulation disorder ([facility]     on xarelto    Dementia ([facility]     GERD (gastroesophageal reflux disease) 10/22/2010    Hiatal hernia     Hypertension     hx of blood pressure med for short time    Ill-defined condition     increased cholesterol    Other unknown and unspecified cause of morbidity or mortality     eval with Dr Jefferson Leroy for SOB, sept 2015:  results unkown    Stroke ([facility] 2013    no deficits    [email or web address]     heat exhaustion    Watermelon stomach 2003      Past Surgical History:   Procedure Laterality Date    HX BLADDER SUSPENSION      HX COLONOSCOPY      HX ENDOSCOPY  6/4/15    Dr. Adriana Barriga    HX GYN      vaginal del times 3    HX HERNIA REPAIR  2014    x2, failure    HX OTHER SURGICAL      EGDs    HX TONSILLECTOMY      ID EXC SKIN MALIG >4CM FACE,FACIAL      times 3 - BCCA      Family History   Problem Relation Age of Onset    COPD Mother     Asthma Mother     Heart Attack Father     Other Father         peptic ulcer disease    Anesth Problems Neg Hx       History reviewed, no pertinent family history.   Social History     Tobacco Use    Smoking status: Never Smoker    Smokeless tobacco: Never Used   Substance Use Topics    Alcohol use: No     Allergies   Allergen Reactions    Latex Hives     Latex tape    Codeine Nausea and Vomiting    Darvon [Propoxyphene] Nausea and Vomiting    Other Medication Unknown (comments)     Flu shot (per )    Pcn [Penicillins] Hives      Current Facility-Administered Medications   Medication Dose Route Frequency    0.9% sodium chloride infusion 250 mL  250 mL IntraVENous PRN    sodium chloride (NS) flush 5-40 mL  5-40 mL IntraVENous Q8H    sodium chloride (NS) flush 5-40 mL  5-40 mL IntraVENous PRN    sodium chloride (NS) flush 5-40 mL  5-40 mL IntraVENous Q8H    sodium chloride (NS) flush 5-40 mL  5-40 mL IntraVENous PRN    ondansetron (ZOFRAN) injection 4 mg  4 mg IntraVENous Q4H PRN    pantoprazole (PROTONIX) 40 mg in 0.9% sodium chloride 10 mL injection  40 mg IntraVENous Q12H    haloperidol (HALDOL) 2 mg/mL oral solution 2 mg  2 mg SubLINGual Q6H PRN    Or    haloperidol lactate (HALDOL) injection 2 mg  2 mg IntraVENous Q6H PRN    glycopyrrolate (ROBINUL) injection 0.2 mg  0.2 mg IntraVENous Q4H PRN    morphine injection 2 mg  2 mg IntraVENous Q15MIN PRN          LAB AND IMAGING FINDINGS:     Lab Results   Component Value Date/Time    WBC 12.0 (H) 04/26/2020 12:07 PM    HGB 6.3 (L) 04/26/2020 12:07 PM    PLATELET 998 18/04/1788 12:07 PM     Lab Results   Component Value Date/Time    Sodium 133 (L) 04/26/2020 12:07 PM    Potassium 4.4 04/26/2020 12:07 PM    Chloride 101 04/26/2020 12:07 PM    CO2 25 04/26/2020 12:07 PM    BUN 48 (H) 04/26/2020 12:07 PM    Creatinine 1.28 (H) 04/26/2020 12:07 PM    Calcium 8.2 (L) 04/26/2020 12:07 PM    Magnesium 1.7 04/14/2020 04:20 AM    Phosphorus 2.8 04/14/2020 04:20 AM      Lab Results   Component Value Date/Time    AST (SGOT) 14 (L) 04/26/2020 12:07 PM    Alk.  phosphatase 60 04/26/2020 12:07 PM    Protein, total 5.5 (L) 04/26/2020 12:07 PM    Albumin 2.1 (L) 04/26/2020 12:07 PM    Globulin 3.4 04/26/2020 12:07 PM     Lab Results   Component Value Date/Time    INR 1.3 (H) 04/26/2020 12:07 PM    Prothrombin time 12.9 (H) 04/26/2020 12:07 PM    aPTT 25.0 04/26/2020 12:07 PM      Lab Results   Component Value Date/Time Iron 63 02/15/2016 02:38 PM    TIBC 321 02/15/2016 02:38 PM    Iron % saturation 20 02/15/2016 02:38 PM    Ferritin 34 11/18/2015 03:18 PM      No results found for: PH, PCO2, PO2  No components found for: [place]   Lab Results   Component Value Date/Time    CK 38 08/13/2015 02:50 PM    CK - MB <0.5 (L) 08/13/2015 02:50 PM                Total time:  35m  Counseling / coordination time, spent as noted above: 30m  > 50% counseling / coordination?: y    Prolonged service was provided for  []30 min   []75 min in face to face time in the presence of the patient, spent as noted above. Time Start:   Time End:   Note: this can only be billed with 54408 (initial) or 18249 (follow up). If multiple start / stop times, list each separately.

## 2020-04-28 NOTE — PROGRESS NOTES
Problem: Falls - Risk of  Goal: *Absence of Falls  Description: Document Abner Quinones Fall Risk and appropriate interventions in the flowsheet.   Outcome: Progressing Towards Goal  Note: Fall Risk Interventions:       Mentation Interventions: Bed/chair exit alarm         Elimination Interventions: Bed/chair exit alarm              Problem: Patient Education: Go to Patient Education Activity  Goal: Patient/Family Education  Outcome: Progressing Towards Goal

## 2020-04-28 NOTE — PROGRESS NOTES
[address] [facility] Adult  Hospitalist Group                                                                                          Hospitalist Progress Note  Whit Sosa,   Answering service: [phone] OR [phone] from in house phone        Date of Service:  2020  NAME:  Megan Burgos  :  1940  MRN:  [id number]      Admission Summary:   [de-identified]year old female with past medical history of asthma, dementia, GERD, stroke, GAVE who comes in with coffee-ground emesis. Interval history / Subjective:   Patient seen and examined. Sleeping and awakens when aroused. No acute complaints. Minimal oral intake. Discussed with  yesterday evening. Plans to transition home with hospice. Has son coming into town today to help make arrangements. Assessment & Plan:     Acute blood loss anemia on chronic anemia  Coffee-ground emesis, possibly upper GI bleed: resolved  History of GAVE  -S/p 1 unit PRBC  -appreciate GI  -PPI BID  -goals are comfort      Advanced dementia: progressive with inability to care for self and behavioral disturbances.  -Palliative care consulted, family agreeable to hospice. -plans to transition home      Recent hip fracture due to ground-level fall status post right hip hemiarthroplasty 20  -supportive care, pain control     Dysphagia due to progressive dementia  -Restarted previous diet    Acute renal insufficiency with hyponatremia (acute kidney injury ruled out)due to poor oral intake and emesis  -Suspected with advanced dementia and limited oral intake     Depressioncontinue home dose of Remeron as able     Acute urinary retention status post Liz placementcontinue Liz for now.     Right posterior heel deep tissue injury, 6 x 6 x 0.1 cm, POA  -wound care consult      Code status: DNR      Care Plan discussed with: Patient/Family  Anticipated Disposition: Home w/Family  Anticipated Discharge: 24 hours to 48 hours     Hospital Problems  Date Reviewed: 4/8/2020          Codes Class Noted POA    Coffee ground emesis ICD-10-CM: K92.0  ICD-9-CM: 578.0  4/26/2020 Unknown                Review of Systems:   Unable to obtain      Vital Signs:    Last 24hrs VS reviewed since prior progress note. Most recent are:  Visit Vitals  BP 99/64 (BP 1 Location: Left arm)   Pulse 85   Temp 98.2 °F (36.8 °C)   Resp 18   Ht 5' 4\" (1.626 m)   Wt 63.5 kg (139 lb 15.9 oz)   SpO2 100%   BMI 24.03 kg/m²         Intake/Output Summary (Last 24 hours) at 4/28/2020 0846  Last data filed at 4/28/2020 0600  Gross per 24 hour   Intake 180 ml   Output 700 ml   Net -520 ml        Physical Examination:             Constitutional:  No acute distress,  pleasant, elderly, demented   ENT:  Oral mucosa moist, oropharynx benign. Resp:  CTA bilaterally. No wheezing/rhonchi/rales. CV:  Regular rhythm, normal rate, no murmurs, gallops, rubs    GI:  Soft, non distended, non tender    Musculoskeletal:  No edema, warm, 2+ pulses throughout    Neurologic:  Moves all extremities. Demented, not alert           Data Review:    Review and/or order of clinical lab test  Review and/or order of tests in the radiology section of CPT  Review and/or order of tests in the medicine section of CPT      Labs:     Recent Labs     04/26/20  1207   WBC 12.0*   HGB 6.3*   HCT 19.8*        Recent Labs     04/26/20  1207   *   K 4.4      CO2 25   BUN 48*   CREA 1.28*   *   CA 8.2*     Recent Labs     04/26/20  1207   SGOT 14*   ALT 13   AP 60   TBILI 0.4   TP 5.5*   ALB 2.1*   GLOB 3.4     Recent Labs     04/26/20  1207   INR 1.3*   PTP 12.9*   APTT 25.0      No results for input(s): FE, TIBC, PSAT, FERR in the last 72 hours. Lab Results   Component Value Date/Time    Folate 17.3 10/08/2015 11:30 AM      No results for input(s): PH, PCO2, PO2 in the last 72 hours. No results for input(s): CPK, CKNDX, TROIQ in the last 72 hours.     No lab exists for component: CPKMB  Lab Results   Component Value Date/Time    Cholesterol, total 145 04/04/2017 09:46 AM    HDL Cholesterol 61 04/04/2017 09:46 AM    LDL, calculated 67 04/04/2017 09:46 AM    Triglyceride 87 04/04/2017 09:46 AM    CHOL/HDL Ratio 3.4 06/02/2013 06:30 AM     Lab Results   Component Value Date/Time    Glucose (POC) 120 (H) 04/08/2020 11:17 AM    Glucose (POC) 115 (H) 09/28/2018 08:46 PM     Lab Results   Component Value Date/Time    Color YELLOW/STRAW 04/07/2020 02:00 PM    Appearance CLEAR 04/07/2020 02:00 PM    Specific gravity 1.022 04/07/2020 02:00 PM    pH (UA) 6.0 04/07/2020 02:00 PM    Protein 30 (A) 04/07/2020 02:00 PM    Glucose 100 (A) 04/07/2020 02:00 PM    Ketone Negative 04/07/2020 02:00 PM    Bilirubin Negative 04/07/2020 02:00 PM    Urobilinogen 0.2 04/07/2020 02:00 PM    Nitrites Negative 04/07/2020 02:00 PM    Leukocyte Esterase Negative 04/07/2020 02:00 PM    Epithelial cells FEW 04/07/2020 02:00 PM    Bacteria Negative 04/07/2020 02:00 PM    WBC 0-4 04/07/2020 02:00 PM    RBC 0-5 04/07/2020 02:00 PM         Medications Reviewed:     Current Facility-Administered Medications   Medication Dose Route Frequency    0.9% sodium chloride infusion 250 mL  250 mL IntraVENous PRN    sodium chloride (NS) flush 5-40 mL  5-40 mL IntraVENous Q8H    sodium chloride (NS) flush 5-40 mL  5-40 mL IntraVENous PRN    sodium chloride (NS) flush 5-40 mL  5-40 mL IntraVENous Q8H    sodium chloride (NS) flush 5-40 mL  5-40 mL IntraVENous PRN    ondansetron (ZOFRAN) injection 4 mg  4 mg IntraVENous Q4H PRN    pantoprazole (PROTONIX) 40 mg in 0.9% sodium chloride 10 mL injection  40 mg IntraVENous Q12H    haloperidol (HALDOL) 2 mg/mL oral solution 2 mg  2 mg SubLINGual Q6H PRN    Or    haloperidol lactate (HALDOL) injection 2 mg  2 mg IntraVENous Q6H PRN    glycopyrrolate (ROBINUL) injection 0.2 mg  0.2 mg IntraVENous Q4H PRN    morphine injection 2 mg  2 mg IntraVENous Q15MIN PRN ______________________________________________________________________  EXPECTED LENGTH OF STAY: 3d 0h  ACTUAL LENGTH OF STAY:          [id number],

## 2020-04-28 NOTE — PROGRESS NOTES
Bedside shift change report given to Yrn Dubon RN (oncoming nurse) by Mary Sow RN (offgoing nurse). Report included the following information SBAR, MAR and Recent Results.

## 2020-04-28 NOTE — PROGRESS NOTES
TRANSFER - OUT REPORT:    Verbal report given to Danitza URIARTE(name) on Elda Yeh  being transferred to [facility](unit) for routine progression of care       Report consisted of patients Situation, Background, Assessment and   Recommendations(SBAR). Information from the following report(s) SBAR, MAR and Recent Results was reviewed with the receiving nurse. Lines:   Peripheral IV 04/26/20 Left Antecubital (Active)   Site Assessment Clean, dry, & intact 4/27/2020  9:48 PM   Phlebitis Assessment 0 4/27/2020  9:48 PM   Infiltration Assessment 0 4/27/2020  9:48 PM   Dressing Status Clean, dry, & intact 4/27/2020  9:48 PM   Dressing Type Transparent 4/27/2020  9:48 PM   Hub Color/Line Status Capped 4/27/2020  9:48 PM   Action Taken Open ports on tubing capped 4/26/2020  9:00 PM   Alcohol Cap Used Yes 4/27/2020  9:48 PM       Peripheral IV 04/26/20 Right Forearm (Active)   Site Assessment Clean, dry, & intact 4/27/2020  9:48 PM   Phlebitis Assessment 0 4/27/2020  9:48 PM   Infiltration Assessment 0 4/27/2020  9:48 PM   Dressing Status Clean, dry, & intact 4/27/2020  9:48 PM   Dressing Type Transparent 4/27/2020  9:48 PM   Hub Color/Line Status Capped 4/27/2020  9:48 PM   Alcohol Cap Used Yes 4/27/2020  9:48 PM        Opportunity for questions and clarification was provided.       Patient transported with:   [facility] [employer]

## 2020-04-28 NOTE — PROGRESS NOTES
NUTRITION brief     Chart reviewed for PO check with puree diet. Pt on Hospice prior to admit. Admitted for GIB. Palliative following. Poor PO intake prior to admit with pt refusing most meals. With plans for comfort measures aggressive nutrition intervention not indicated. Puree diet in place with supplements already ordered. Continue to assist and encourage PO intake as accepted by pt. Will sign off at this time, if plan of care changes please reconsult as needed.      Nadiya Webb RD

## 2020-04-28 NOTE — PROGRESS NOTES
BAKARI:  1. [facility] CAM referral sent. 2. BLS transport. [facility] phone [phone]  3. Return to [facility]. 4. COVID-19 testing results pending. 1205: CM met with patient  at bedside, he said that her life expectancy is weeks to months, and they he can't provide the care at home and for her to return back to [facility] with Hospice. CM notified [place] of the return, and left message for admission at [facility], [phone]. Cm to follow. Cm spoke to Mr. Loyd, [phone]. Raven Pineda He said that he has decided for her to come home with hospice, and will need a hospital bed. CM sent referral to [facility]. Patient is being transferred to [address].  CM will provide handoff to unit  .    1240: CM spoke to Emery Dugan, [phone], [facility] requesting COVID-19 test, cm notified attending MD. [facility]

## 2020-04-29 VITALS
TEMPERATURE: 99.4 F | HEIGHT: 64 IN | WEIGHT: 139.99 LBS | DIASTOLIC BLOOD PRESSURE: 59 MMHG | RESPIRATION RATE: 16 BRPM | SYSTOLIC BLOOD PRESSURE: 105 MMHG | BODY MASS INDEX: 23.9 KG/M2 | OXYGEN SATURATION: 93 % | HEART RATE: 100 BPM

## 2020-04-29 LAB
SARS-COV-2, COV2: NOT DETECTED
SPECIMEN SOURCE, FCOV2M: NORMAL

## 2020-04-29 PROCEDURE — C9113 INJ PANTOPRAZOLE SODIUM, VIA: HCPCS | Performed by: INTERNAL MEDICINE

## 2020-04-29 PROCEDURE — 74011250636 HC RX REV CODE- 250/636: Performed by: INTERNAL MEDICINE

## 2020-04-29 PROCEDURE — 74011000250 HC RX REV CODE- 250: Performed by: INTERNAL MEDICINE

## 2020-04-29 RX ADMIN — Medication 10 ML: at 06:06

## 2020-04-29 RX ADMIN — SODIUM CHLORIDE 40 MG: 9 INJECTION INTRAMUSCULAR; INTRAVENOUS; SUBCUTANEOUS at 02:06

## 2020-04-29 NOTE — DISCHARGE INSTRUCTIONS
Discharge Instructions       PATIENT ID: No Rosas  MRN: [id number]   YOB: 1940    DATE OF ADMISSION: 4/26/2020 11:53 AM    DATE OF DISCHARGE: 4/29/2020    PRIMARY CARE PROVIDER: Margaret Alegria MD     ATTENDING PHYSICIAN: Corey Romeo DO  DISCHARGING PROVIDER: Mick Elliott DO    To contact this individual call [phone] and ask the  to page. If unavailable ask to be transferred the Adult Hospitalist Department. DISCHARGE DIAGNOSES     Coffee ground emesis     CONSULTATIONS: IP CONSULT TO GASTROENTEROLOGY  IP CONSULT TO PALLIATIVE CARE - PROVIDER  IP CONSULT TO HOSPITALIST    PROCEDURES/SURGERIES: * No surgery found *    PENDING TEST RESULTS:   At the time of discharge the following test results are still pending: none    FOLLOW UP APPOINTMENTS:   Follow-up Information     Follow up With Specialties Details Why Contact Info    Margaret Alegria MD Internal Medicine   [address] [place] [address]  [address]  [address], [address]  [phone]           ADDITIONAL CARE RECOMMENDATIONS:     You were admitted for coffee ground emesis and concerns for GI bleed. The decision was made to transition back to hospice/comfort care. Resume home medications. These can be further adjusted by hospice service. DIET: pureed. ACTIVITY: Activity as tolerated    DISCHARGE MEDICATIONS:   See Medication Reconciliation Form    · It is important that you take the medication exactly as they are prescribed. · Keep your medication in the bottles provided by the pharmacist and keep a list of the medication names, dosages, and times to be taken in your wallet. · Do not take other medications without consulting your doctor. NOTIFY YOUR PHYSICIAN FOR ANY OF THE FOLLOWING:   Fever over 101 degrees for 24 hours.    Chest pain, shortness of breath, fever, chills, nausea, vomiting, diarrhea, change in mentation, falling, weakness, bleeding. Severe pain or pain not relieved by medications. Or, any other signs or symptoms that you may have questions about.         Signed:   [address] [facility],   4/29/2020  3:33 PM

## 2020-04-29 NOTE — PROGRESS NOTES
BAKARI: Plan is to return to [address] [place] [address] with [facility]. COVID test was negative. [facility] transport time set for 4:30 PM.    Chart reviewed. CM called the [facility] at [facility], [phone] and spoke with Jose Ruiz ([phone]). They will need the COVID testing results prior to discharge. Jose Ruiz stated the patient was open with [facility] prior to admission. CM sent orders for hospice to [facility] via [employer]. CM faxed updated clinicials to [facility] at [phone]. CM spoke with MD, the test results should be back this morning. CM will follow. 2:00 PM: CM received notification that COVID-19 results came back as negative. CM faxed results to [facility] at [phone]. CM called Jose Ruiz (#[phone]) at [facility] and left message to find out if we can send the patient today. 2:52 PM: CM spoke with Jose Ruiz at [facility]. Confirmed they can accept patient today. CM working on securing transport time. [facility] can accommodate 4:30 PM transport. RN is aware. Jeanine at [facility] has been notified of transport time. CM also notified Loretta Joey with [facility] #[phone] of discharge today. CM spoke to  Mr. Ricky Galvan, [phone], he is aware of discharge and transport time. IM letter discussed with  via phone. 3:46 PM: CM faxed discharge papers to [facility] at [phone]. Discharge folder located on hard chart. RN to follow with discharge papers, MAR, Kardex, and call report to #[phone] or #[phone].       TERE MohanW/CRM

## 2020-04-30 NOTE — DISCHARGE SUMMARY
Discharge Summary       PATIENT ID: Austin Cordero  MRN: [id number]   YOB: 1940    DATE OF ADMISSION: 4/26/2020 11:53 AM    DATE OF DISCHARGE: 4/29/2020  PRIMARY CARE PROVIDER: Angel Luis Samuel MD     ATTENDING PHYSICIAN: Alexander Cornelius DO  DISCHARGING PROVIDER: Alexander Cornelius DO    To contact this individual call [phone] and ask the  to page. If unavailable ask to be transferred the Adult Hospitalist Department. CONSULTATIONS: IP CONSULT TO GASTROENTEROLOGY    PROCEDURES/SURGERIES: * No surgery found *    ADMITTING DIAGNOSES & HOSPITAL COURSE:   [de-identified]year old female with past medical history of asthma, dementia, GERD, stroke, GAVE who comes in with coffee-ground emesis. Patient recently admitted to [facility] this month for hip fracture. During that admission, also found to have coffee-ground emesis. GI consulted at that time and recommended conservative management. Patient with overall health decline in the setting of advanced dementia and new hip fracture. Patient had been discharged on hospice. During this admission, GI and palliative care consulted. Decision was made for conservative management. Family decided to transition to hospice care. Patient discharged to [facility] with 24-hour caregivers. DISCHARGE DIAGNOSES / PLAN:      Acute blood loss anemia on chronic anemia  Coffee-ground emesis, possibly upper GI bleed: resolved  History of GAVE  -S/p 1 unit [facility]BC  -appreciate GI  -PPI BID  -goals are comfort      Advanced dementia: progressive with inability to care for self and behavioral disturbances.  -Palliative care consulted, family agreeable to hospice.     -plans to transition to [facility] with 24-hour care      Recent hip fracture due to ground-level fall status post right hip hemiarthroplasty 4/8/20  -supportive care, pain control      Dysphagia due to progressive dementia  -Restarted previous diet, puréed     Acute renal insufficiency with hyponatremia (acute kidney injury ruled out)due to poor oral intake and emesis  -Suspected with advanced dementia and limited oral intake     Depressioncontinue home dose of Remeron as able     Acute urinary retention status post Liz placementcontinue Liz for now.     Right posterior heel deep tissue injury, 6 x 6 x 0.1 cm, POA  -wound care consult      ADDITIONAL CARE RECOMMENDATIONS:   You were admitted for coffee ground emesis and concerns for GI bleed. The decision was made to transition back to hospice/comfort care. Resume home medications. These can be further adjusted by hospice service. DIET: pureed. ACTIVITY: Activity as tolerated    PENDING TEST RESULTS:   At the time of discharge the following test results are still pending: none    FOLLOW UP APPOINTMENTS:    Follow-up Information     Follow up With Specialties Details Why Contact Info    Buck Farris MD Internal Medicine   [address] [facility]  [address]  [address] [place] [address], [place] [phone]             DISCHARGE MEDICATIONS:  Discharge Medication List as of 4/29/2020  4:28 PM      CONTINUE these medications which have NOT CHANGED    Details   aspirin delayed-release 81 mg tablet Take 81 mg by mouth daily. , Historical Med      diclofenac (VOLTAREN) 1 % gel Apply  to affected area daily. Left knee, Historical Med      famotidine (PEPCID) 20 mg tablet Take 20 mg by mouth daily. , Historical Med      ferrous sulfate 325 mg (65 mg iron) tablet Take 325 mg by mouth Daily (before breakfast). , Historical Med      Omeprazole delayed release (PRILOSEC D/R) 20 mg tablet Take 20 mg by mouth daily. , Historical Med      multivitamin, tx-iron-ca-min (THERA-M w/ IRON) 9 mg iron-400 mcg tab tablet Take 1 Tab by mouth daily. , Historical Med      acetaminophen (TYLENOL) 325 mg tablet Take 650 mg by mouth every six (6) hours as needed for Pain., Historical Med      cholecalciferol (Vitamin D3) (1000 Units /25 mcg) tablet Take 2,000 Units by mouth daily. , Historical Med      sertraline (Zoloft) 25 mg tablet Take 25 mg by mouth daily. , Historical Med      ALPRAZolam (XANAX) 0.5 mg tablet Take 1 Tab by mouth three (3) times daily as needed for Anxiety. Max Daily Amount: 1.5 mg., Print, Disp-10 Tab, R-0Not to exceed 4 additional fills before 09/15/2018. polyethylene glycol (MIRALAX) 17 gram/dose powder Take 17 g by mouth as needed., Historical Med      midodrine (PROAMITINE) 10 mg tablet Take 10 mg by mouth two (2) times a day., Historical Med      pravastatin (PRAVACHOL) 20 mg tablet TAKE 1 TABLET BY MOUTH AT BEDTIME, Normal, Disp-90 Tab, R-1               NOTIFY YOUR PHYSICIAN FOR ANY OF THE FOLLOWING:   Fever over 101 degrees for 24 hours. Chest pain, shortness of breath, fever, chills, nausea, vomiting, diarrhea, change in mentation, falling, weakness, bleeding. Severe pain or pain not relieved by medications. Or, any other signs or symptoms that you may have questions about. DISPOSITION:    Home With:   OT  PT  HH  RN       Long term SNF/Inpatient Rehab    Independent/assisted living   x Hospice    Other:       PATIENT CONDITION AT DISCHARGE:     Functional status   x Poor     Deconditioned     Independent      Cognition     Lucid     Forgetful    x Dementia      Catheters/lines (plus indication)   x Liz     PICC     PEG     None      Code status     Full code    x DNR      PHYSICAL EXAMINATION AT DISCHARGE:  Constitutional:  No acute distress,  pleasant, elderly, demented   ENT:  Oral mucosa moist, oropharynx benign. Resp:  CTA bilaterally. No wheezing/rhonchi/rales. CV:  Regular rhythm, normal rate, no murmurs, gallops, rubs    GI:  Soft, non distended, non tender    Musculoskeletal:  No edema, warm, 2+ pulses throughout    Neurologic:  Moves all extremities.   Demented, not alert CHRONIC MEDICAL DIAGNOSES:  Problem List as of 4/29/2020 Date Reviewed: 4/8/2020          Codes Class Noted - Resolved    Coffee ground emesis ICD-10-CM: K92.0  ICD-9-CM: 578.0  4/26/2020 - Present        Hip fracture ([facility] ICD-10-CM: N61.766S  ICD-9-CM: 820.8  4/7/2020 - Present        Generalized weakness ICD-10-CM: R53.1  ICD-9-CM: 780.79  2/7/2020 - Present        Status post gastrostomy ([facility] ICD-10-CM: Z93.1  ICD-9-CM: V44.1  3/8/2018 - Present        Blood loss anemia ICD-10-CM: D50.0  ICD-9-CM: 280.0  10/29/2015 - Present        Advance care planning ICD-10-CM: Z71.89  ICD-9-CM: V65.49  9/24/2015 - Present    Overview Signed 9/24/2015  3:32 PM by Trista Gamboa LPN     End of life planning discussed with patient. Patient states that they do have an advance directive and will provide a copy for our office at next visit.                  Chronic cholecystitis ICD-10-CM: K81.1  ICD-9-CM: 575.11  2/10/2015 - Present        Paraesophageal hernia ICD-10-CM: K44.9  ICD-9-CM: 553.3  2/10/2015 - Present        Moderate protein-calorie malnutrition ([facility] ICD-10-CM: E44.0  ICD-9-CM: 263.0  2/10/2015 - Present        CVA (cerebral vascular accident) [facility]) ICD-10-CM: I63.9  ICD-9-CM: 434.91  1/13/2014 - Present        Anemia ICD-10-CM: D64.9  ICD-9-CM: 285.9  12/30/2013 - Present        Watermelon stomach ICD-10-CM: U16.774  ICD-9-CM: 537.82  10/22/2010 - Present        GERD (gastroesophageal reflux disease) ICD-10-CM: K21.9  ICD-9-CM: 530.81  10/22/2010 - Present    Overview Signed 12/30/2013  5:50 AM by Ted Pearce MD     UGIB (12/13): 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               RESOLVED: UGIB (upper gastrointestinal bleed) ICD-10-CM: K92.2  ICD-9-CM: 578.9  12/30/2013 - 7/17/2014        RESOLVED: CVA (cerebral infarction) ICD-10-CM: I63.9  ICD-9-CM: 434.91  6/3/2013 - 6/4/2013        RESOLVED: TIA (transient ischemic attack) ICD-10-CM: G45.9  ICD-9-CM: 435.9 6/1/2013 - 6/3/2013        RESOLVED: Anemia ICD-10-CM: D64.9  ICD-9-CM: 285.9  5/14/2012 - 12/23/2013        Pulmonary hypertension ([facility] ICD-10-CM: I27.20  ICD-9-CM: 416.8  10/5/2015 - Present        Shortness of breath on exertion ICD-10-CM: R06.02  ICD-9-CM: 786.05  10/5/2015 - Present        Aortic valve regurgitation ICD-10-CM: I35.1  ICD-9-CM: 424.1  10/5/2015 - Present              Greater than 30 minutes were spent with the patient on counseling and coordination of care    Signed:   Rody Perkins DO  4/30/2020  9:32 AM

## 2022-03-19 PROBLEM — S72.009A HIP FRACTURE (HCC): Status: ACTIVE | Noted: 2020-04-07

## 2022-03-19 PROBLEM — R53.1 GENERALIZED WEAKNESS: Status: ACTIVE | Noted: 2020-02-07

## 2022-03-19 PROBLEM — K92.0 COFFEE GROUND EMESIS: Status: ACTIVE | Noted: 2020-04-26

## 2022-03-19 PROBLEM — Z93.1 STATUS POST GASTROSTOMY (HCC): Status: ACTIVE | Noted: 2018-03-08

## 2023-05-12 RX ORDER — FERROUS SULFATE 325(65) MG
TABLET ORAL
COMMUNITY

## 2023-05-12 RX ORDER — FAMOTIDINE 20 MG/1
20 TABLET, FILM COATED ORAL DAILY
COMMUNITY

## 2023-05-12 RX ORDER — POLYETHYLENE GLYCOL 3350 17 G/17G
POWDER, FOR SOLUTION ORAL PRN
COMMUNITY

## 2023-05-12 RX ORDER — SERTRALINE HYDROCHLORIDE 25 MG/1
25 TABLET, FILM COATED ORAL DAILY
COMMUNITY

## 2023-05-12 RX ORDER — OMEPRAZOLE 20 MG/1
20 TABLET, DELAYED RELEASE ORAL DAILY
COMMUNITY

## 2023-05-12 RX ORDER — MIDODRINE HYDROCHLORIDE 10 MG/1
TABLET ORAL 2 TIMES DAILY
COMMUNITY

## 2023-05-12 RX ORDER — PRAVASTATIN SODIUM 20 MG
1 TABLET ORAL NIGHTLY
COMMUNITY
Start: 2018-10-17

## 2023-05-12 RX ORDER — ACETAMINOPHEN 325 MG/1
TABLET ORAL EVERY 6 HOURS PRN
COMMUNITY

## 2023-05-12 RX ORDER — ASPIRIN 81 MG/1
81 TABLET ORAL DAILY
COMMUNITY

## 2023-05-12 RX ORDER — ALPRAZOLAM 0.5 MG/1
TABLET ORAL 3 TIMES DAILY PRN
COMMUNITY
Start: 2020-02-12

## (undated) DEVICE — SUTURE MCRYL SZ 3-0 L27IN ABSRB UD L19MM PS-2 3/8 CIR PRIM Y427H

## (undated) DEVICE — STRAP,POSITIONING,KNEE/BODY,FOAM,4X60": Brand: MEDLINE

## (undated) DEVICE — COVER,MAYO STAND,STERILE: Brand: MEDLINE

## (undated) DEVICE — INFECTION CONTROL KIT SYS

## (undated) DEVICE — SOLUTION IRRIG 3000ML 0.9% SOD CHL FLX CONT 0797208] ICU MEDICAL INC]

## (undated) DEVICE — REM POLYHESIVE ADULT PATIENT RETURN ELECTRODE: Brand: VALLEYLAB

## (undated) DEVICE — Z DUP USE 2275493 DRESSING ALGINATE POST OPERATIVE 10X3.5 IN RECT PRIMASEAL

## (undated) DEVICE — HANDPIECE SET WITH BONE CLEANING TIP AND SUCTION TUBE: Brand: INTERPULSE

## (undated) DEVICE — STERILE POLYISOPRENE POWDER-FREE SURGICAL GLOVES: Brand: PROTEXIS

## (undated) DEVICE — SOLUTION IV 1000ML 0.9% SOD CHL

## (undated) DEVICE — PAD BD MATTRESS 73X32 IN STD CONVOLUTED FOAM LTX FREE

## (undated) DEVICE — PILLOW POS W15XH6XL22IN RASPBERRY FOAM ABD W/ STRP DISP FOR

## (undated) DEVICE — SUTURE VCRL SZ 2-0 L36IN ABSRB UD L36MM CT-1 1/2 CIR J945H

## (undated) DEVICE — Device

## (undated) DEVICE — STERILE POLYISOPRENE POWDER-FREE SURGICAL GLOVES WITH EMOLLIENT COATING: Brand: PROTEXIS

## (undated) DEVICE — 3M™ IOBAN™ 2 ANTIMICROBIAL INCISE DRAPE 6651EZ: Brand: IOBAN™ 2

## (undated) DEVICE — SOLUTION IRRIG 1000ML H2O STRL BLT

## (undated) DEVICE — SUTURE VCRL SZ 0 L27IN ABSRB UD L36MM CT-1 1/2 CIR J260H

## (undated) DEVICE — PREP SKN PREVAIL 40ML APPL --

## (undated) DEVICE — BLADE SURG SAW SAG S STL 85MM LEN 25.4MM W 1.19MM THCK

## (undated) DEVICE — SCRUB DRY SURG EZ SCRUB BRUSH PREOPERATIVE GRN